# Patient Record
Sex: MALE | Race: WHITE | NOT HISPANIC OR LATINO | Employment: FULL TIME | URBAN - METROPOLITAN AREA
[De-identification: names, ages, dates, MRNs, and addresses within clinical notes are randomized per-mention and may not be internally consistent; named-entity substitution may affect disease eponyms.]

---

## 2017-01-05 ENCOUNTER — HOSPITAL ENCOUNTER (OUTPATIENT)
Dept: WOUND CARE | Facility: CLINIC | Age: 42
Discharge: HOME | End: 2017-01-05
Attending: SURGERY | Admitting: SURGERY
Payer: COMMERCIAL

## 2017-01-05 DIAGNOSIS — L89.324 DECUBITUS ULCER OF LEFT ISCHIUM, STAGE 4 (H): Primary | ICD-10-CM

## 2017-01-05 DIAGNOSIS — L89.324: Primary | ICD-10-CM

## 2017-01-05 DIAGNOSIS — T81.89XA NONHEALING SURGICAL WOUND, INITIAL ENCOUNTER: Primary | ICD-10-CM

## 2017-01-05 LAB
ALBUMIN SERPL-MCNC: 3.8 G/DL (ref 3.4–5)
CRP SERPL-MCNC: 15.5 MG/L (ref 0–8)
ERYTHROCYTE [SEDIMENTATION RATE] IN BLOOD BY WESTERGREN METHOD: 30 MM/H (ref 0–15)
PREALB SERPL IA-MCNC: 18 MG/DL (ref 15–45)

## 2017-01-05 PROCEDURE — 84134 ASSAY OF PREALBUMIN: CPT | Performed by: SURGERY

## 2017-01-05 PROCEDURE — 82040 ASSAY OF SERUM ALBUMIN: CPT | Performed by: SURGERY

## 2017-01-05 PROCEDURE — A6212 FOAM DRG <=16 SQ IN W/BORDER: HCPCS

## 2017-01-05 PROCEDURE — 86140 C-REACTIVE PROTEIN: CPT | Performed by: SURGERY

## 2017-01-05 PROCEDURE — 85652 RBC SED RATE AUTOMATED: CPT | Performed by: SURGERY

## 2017-01-05 PROCEDURE — 97602 WOUND(S) CARE NON-SELECTIVE: CPT

## 2017-01-05 PROCEDURE — 11042 DBRDMT SUBQ TIS 1ST 20SQCM/<: CPT

## 2017-01-05 PROCEDURE — A6248 HYDROGEL DRSG GEL FILLER: HCPCS

## 2017-01-05 RX ORDER — CEFAZOLIN SODIUM 1 G/50ML
2 SOLUTION INTRAVENOUS
Status: CANCELLED | OUTPATIENT
Start: 2017-01-05

## 2017-01-05 NOTE — PROGRESS NOTES
[facility]      DATE OF VISIT:  01/05/2017.       Sacha Ndiaye is a 41-year-old paraplegic who is here today with his significant other who also does his wound care at home.  We had done a redo left posterior thigh flap for recurrent large left ischial decubitus, stage IV, and unfortunately looks like he has once again developed a seroma pocket which is being expressed through a very tiny pinhole in the incision.  He is pounding the proteins, juice, muscle milk, you name it, foods as well but it really seems like the seroma has recurred causing recurrence of the wound below the healed flap surface.  The drain had been removed at 2 weeks while he was at a nursing facility.  He started a sitting protocol at the 3 week ranjit and my only thought that perhaps that this is too premature for him and we need to hold off on any kind of mechanical stress or trauma to these tissues until it has ample time to fill in, which is obviously not enough at the 3-week ranjit.  I really think there might be some element of shear there with him being home alone, sitting and whatnot.  We were trying to get him a motorized drive pusher for his manual wheelchair because his shoulders are starting to degenerate but this paperwork should probably be done either by PM&R at the  or perhaps his neurosurgeon.  He is still on vancomycin, still getting levels, which are looking good, CRP only blipped a little bit with this.  We will get CRP, sed rate and prealbumin today but will plan on scheduling him for a left gluteal rotation flap after debridement of his left IT on the 11th of this month.  Things were opened up with a 15 blade and essentially debrided into the skin and subcu layer.  We got a lot of clot and fibrinous debris out along with fair amount of serosanguineous fluid.  We will have him pack daily with dry gauze and at this point, I do not think he really needs to have much in the way of sitting limitations since we will  be flapping him again next week.  Please see nursing notes for dimensions of the new wound.  Vitals were within normal limits today.         REJI GUERRA MD             D: 2017 12:06   T: 2017 15:18   MT: BRYSON      Name:     GIA CAMARGO   MRN:      [id number]        Account:      [id number]   :      1975           Visit Date:   2017      Document: [id number]

## 2017-01-06 ENCOUNTER — TELEPHONE (OUTPATIENT)
Dept: INFECTIOUS DISEASES | Facility: CLINIC | Age: 42
End: 2017-01-06

## 2017-01-06 NOTE — TELEPHONE ENCOUNTER
----- Message from Abigail Bennett MD sent at 1/6/2017  1:47 PM CST -----  Regarding: RE: PICC  Hi Shanelle,     Thanks for the update. Will you see if Sacha is ok with the following plan? If he's not let me know and I'll call him to make a different one.     1. Stop vancomycin as planned on 1/9/17  2. Keep PICC in place as long as there are no problems with it since it will be useful to have for his surgery on 1/11.   3. Further antibiotics will be determined after his surgery. I'll discuss with Dr. Harris (his surgeon).   4. Please reschedule him with me for late February - we can cancel if we don't need the appointment but we always seem to have trouble getting him in when we need to so I'd like to have one on the books.     Thanks!  Abigail      ----- Message -----     From: Shanelle Roper RN     Sent: 1/6/2017   9:10 AM       To: Abigail Bennett MD  Subject: PICC                                             Patient calling asking about future antibiotics or removal of PICC. Spoke with Infusion nurse and will be done with antibiotics on 1/9/2017.  Patient is having surgery again on 1/11/2017.  Patient had a follow up appointment with you on 1/26/2017 but had to cancel because of this surgery. Please advise. Thank you, Shanelle Roper RN...01/06/2017....9:38 AM

## 2017-01-06 NOTE — TELEPHONE ENCOUNTER
Spoke with patient and agrees with the plan. Patient will call to schedule follow up appointment when knows how long will be on bedrest because of surgery. Shanelle Roper RN...01/06/2017....2:55 PM

## 2017-01-11 ENCOUNTER — ANESTHESIA EVENT (OUTPATIENT)
Dept: SURGERY | Facility: CLINIC | Age: 42
DRG: 901 | End: 2017-01-11
Payer: COMMERCIAL

## 2017-01-11 ENCOUNTER — ANESTHESIA (OUTPATIENT)
Dept: SURGERY | Facility: CLINIC | Age: 42
DRG: 901 | End: 2017-01-11
Payer: COMMERCIAL

## 2017-01-11 ENCOUNTER — SURGERY (OUTPATIENT)
Age: 42
End: 2017-01-11

## 2017-01-11 ENCOUNTER — HOSPITAL ENCOUNTER (INPATIENT)
Facility: CLINIC | Age: 42
LOS: 3 days | Discharge: NURSING, REHAB OR LONG-TERM CARE | DRG: 901 | End: 2017-01-14
Attending: SURGERY | Admitting: SURGERY
Payer: COMMERCIAL

## 2017-01-11 DIAGNOSIS — L89.324 DECUBITUS ULCER OF LEFT ISCHIUM, STAGE 4 (H): Primary | ICD-10-CM

## 2017-01-11 LAB
CREAT SERPL-MCNC: 0.7 MG/DL (ref 0.66–1.25)
GFR SERPL CREATININE-BSD FRML MDRD: NORMAL ML/MIN/1.7M2
PLATELET # BLD AUTO: 218 10E9/L (ref 150–450)

## 2017-01-11 PROCEDURE — 25800025 ZZH RX 258: Performed by: NURSE ANESTHETIST, CERTIFIED REGISTERED

## 2017-01-11 PROCEDURE — 99221 1ST HOSP IP/OBS SF/LOW 40: CPT | Performed by: INTERNAL MEDICINE

## 2017-01-11 PROCEDURE — 0JB90ZZ EXCISION OF BUTTOCK SUBCUTANEOUS TISSUE AND FASCIA, OPEN APPROACH: ICD-10-PCS | Performed by: SURGERY

## 2017-01-11 PROCEDURE — 37000008 ZZH ANESTHESIA TECHNICAL FEE, 1ST 30 MIN: Performed by: SURGERY

## 2017-01-11 PROCEDURE — 36000053 ZZH SURGERY LEVEL 2 EA 15 ADDTL MIN - UMMC: Performed by: SURGERY

## 2017-01-11 PROCEDURE — 25000125 ZZHC RX 250: Performed by: NURSE ANESTHETIST, CERTIFIED REGISTERED

## 2017-01-11 PROCEDURE — 25000125 ZZHC RX 250: Performed by: SURGERY

## 2017-01-11 PROCEDURE — 25000128 H RX IP 250 OP 636: Performed by: NURSE ANESTHETIST, CERTIFIED REGISTERED

## 2017-01-11 PROCEDURE — 82565 ASSAY OF CREATININE: CPT | Performed by: SURGERY

## 2017-01-11 PROCEDURE — P9041 ALBUMIN (HUMAN),5%, 50ML: HCPCS | Performed by: NURSE ANESTHETIST, CERTIFIED REGISTERED

## 2017-01-11 PROCEDURE — 12000001 ZZH R&B MED SURG/OB UMMC

## 2017-01-11 PROCEDURE — 40000170 ZZH STATISTIC PRE-PROCEDURE ASSESSMENT II: Performed by: SURGERY

## 2017-01-11 PROCEDURE — 36415 COLL VENOUS BLD VENIPUNCTURE: CPT | Performed by: SURGERY

## 2017-01-11 PROCEDURE — 25000566 ZZH SEVOFLURANE, EA 15 MIN: Performed by: SURGERY

## 2017-01-11 PROCEDURE — 25000132 ZZH RX MED GY IP 250 OP 250 PS 637: Performed by: INTERNAL MEDICINE

## 2017-01-11 PROCEDURE — 36000051 ZZH SURGERY LEVEL 2 1ST 30 MIN - UMMC: Performed by: SURGERY

## 2017-01-11 PROCEDURE — 85049 AUTOMATED PLATELET COUNT: CPT | Performed by: SURGERY

## 2017-01-11 PROCEDURE — 27210794 ZZH OR GENERAL SUPPLY STERILE: Performed by: SURGERY

## 2017-01-11 PROCEDURE — 25000132 ZZH RX MED GY IP 250 OP 250 PS 637: Performed by: PLASTIC SURGERY

## 2017-01-11 PROCEDURE — 25800025 ZZH RX 258: Performed by: SURGERY

## 2017-01-11 PROCEDURE — 71000014 ZZH RECOVERY PHASE 1 LEVEL 2 FIRST HR: Performed by: SURGERY

## 2017-01-11 PROCEDURE — 37000009 ZZH ANESTHESIA TECHNICAL FEE, EACH ADDTL 15 MIN: Performed by: SURGERY

## 2017-01-11 PROCEDURE — 0KXP0Z2 TRANSFER LEFT HIP MUSCLE WITH SKIN AND SUBCUTANEOUS TISSUE, OPEN APPROACH: ICD-10-PCS | Performed by: SURGERY

## 2017-01-11 RX ORDER — ALBUMIN, HUMAN INJ 5% 5 %
SOLUTION INTRAVENOUS CONTINUOUS PRN
Status: DISCONTINUED | OUTPATIENT
Start: 2017-01-11 | End: 2017-01-11

## 2017-01-11 RX ORDER — SODIUM CHLORIDE, SODIUM LACTATE, POTASSIUM CHLORIDE, CALCIUM CHLORIDE 600; 310; 30; 20 MG/100ML; MG/100ML; MG/100ML; MG/100ML
INJECTION, SOLUTION INTRAVENOUS CONTINUOUS PRN
Status: DISCONTINUED | OUTPATIENT
Start: 2017-01-11 | End: 2017-01-11

## 2017-01-11 RX ORDER — SODIUM CHLORIDE, SODIUM LACTATE, POTASSIUM CHLORIDE, CALCIUM CHLORIDE 600; 310; 30; 20 MG/100ML; MG/100ML; MG/100ML; MG/100ML
INJECTION, SOLUTION INTRAVENOUS CONTINUOUS
Status: DISCONTINUED | OUTPATIENT
Start: 2017-01-11 | End: 2017-01-11 | Stop reason: HOSPADM

## 2017-01-11 RX ORDER — CEFAZOLIN SODIUM 1 G/3ML
1 INJECTION, POWDER, FOR SOLUTION INTRAMUSCULAR; INTRAVENOUS SEE ADMIN INSTRUCTIONS
Status: DISCONTINUED | OUTPATIENT
Start: 2017-01-11 | End: 2017-01-11 | Stop reason: HOSPADM

## 2017-01-11 RX ORDER — HYDROMORPHONE HYDROCHLORIDE 1 MG/ML
.3-.5 INJECTION, SOLUTION INTRAMUSCULAR; INTRAVENOUS; SUBCUTANEOUS EVERY 5 MIN PRN
Status: DISCONTINUED | OUTPATIENT
Start: 2017-01-11 | End: 2017-01-11 | Stop reason: HOSPADM

## 2017-01-11 RX ORDER — ONDANSETRON 4 MG/1
4 TABLET, ORALLY DISINTEGRATING ORAL EVERY 30 MIN PRN
Status: DISCONTINUED | OUTPATIENT
Start: 2017-01-11 | End: 2017-01-11 | Stop reason: HOSPADM

## 2017-01-11 RX ORDER — CEFAZOLIN SODIUM 1 G/3ML
1 INJECTION, POWDER, FOR SOLUTION INTRAMUSCULAR; INTRAVENOUS EVERY 8 HOURS
Status: DISCONTINUED | OUTPATIENT
Start: 2017-01-12 | End: 2017-01-14 | Stop reason: HOSPADM

## 2017-01-11 RX ORDER — LIDOCAINE HYDROCHLORIDE 20 MG/ML
INJECTION, SOLUTION INFILTRATION; PERINEURAL PRN
Status: DISCONTINUED | OUTPATIENT
Start: 2017-01-11 | End: 2017-01-11

## 2017-01-11 RX ORDER — LIDOCAINE 40 MG/G
CREAM TOPICAL
Status: DISCONTINUED | OUTPATIENT
Start: 2017-01-11 | End: 2017-01-14 | Stop reason: HOSPADM

## 2017-01-11 RX ORDER — NEOSTIGMINE METHYLSULFATE 1 MG/ML
VIAL (ML) INJECTION PRN
Status: DISCONTINUED | OUTPATIENT
Start: 2017-01-11 | End: 2017-01-11

## 2017-01-11 RX ORDER — ONDANSETRON 4 MG/1
4 TABLET, ORALLY DISINTEGRATING ORAL EVERY 6 HOURS PRN
Status: DISCONTINUED | OUTPATIENT
Start: 2017-01-11 | End: 2017-01-14 | Stop reason: HOSPADM

## 2017-01-11 RX ORDER — FENTANYL CITRATE 50 UG/ML
25-50 INJECTION, SOLUTION INTRAMUSCULAR; INTRAVENOUS
Status: DISCONTINUED | OUTPATIENT
Start: 2017-01-11 | End: 2017-01-11 | Stop reason: HOSPADM

## 2017-01-11 RX ORDER — METOCLOPRAMIDE HYDROCHLORIDE 5 MG/ML
10 INJECTION INTRAMUSCULAR; INTRAVENOUS EVERY 6 HOURS PRN
Status: DISCONTINUED | OUTPATIENT
Start: 2017-01-11 | End: 2017-01-14 | Stop reason: HOSPADM

## 2017-01-11 RX ORDER — OXYBUTYNIN CHLORIDE 10 MG/1
30 TABLET, EXTENDED RELEASE ORAL DAILY
Status: DISCONTINUED | OUTPATIENT
Start: 2017-01-12 | End: 2017-01-14 | Stop reason: HOSPADM

## 2017-01-11 RX ORDER — ONDANSETRON 2 MG/ML
INJECTION INTRAMUSCULAR; INTRAVENOUS PRN
Status: DISCONTINUED | OUTPATIENT
Start: 2017-01-11 | End: 2017-01-11

## 2017-01-11 RX ORDER — NALOXONE HYDROCHLORIDE 0.4 MG/ML
.1-.4 INJECTION, SOLUTION INTRAMUSCULAR; INTRAVENOUS; SUBCUTANEOUS
Status: DISCONTINUED | OUTPATIENT
Start: 2017-01-11 | End: 2017-01-14 | Stop reason: HOSPADM

## 2017-01-11 RX ORDER — PROCHLORPERAZINE MALEATE 5 MG
5-10 TABLET ORAL EVERY 6 HOURS PRN
Status: DISCONTINUED | OUTPATIENT
Start: 2017-01-11 | End: 2017-01-14 | Stop reason: HOSPADM

## 2017-01-11 RX ORDER — GLYCOPYRROLATE 0.2 MG/ML
INJECTION, SOLUTION INTRAMUSCULAR; INTRAVENOUS PRN
Status: DISCONTINUED | OUTPATIENT
Start: 2017-01-11 | End: 2017-01-11

## 2017-01-11 RX ORDER — FENTANYL CITRATE 50 UG/ML
INJECTION, SOLUTION INTRAMUSCULAR; INTRAVENOUS PRN
Status: DISCONTINUED | OUTPATIENT
Start: 2017-01-11 | End: 2017-01-11

## 2017-01-11 RX ORDER — DOCUSATE SODIUM 100 MG/1
100 CAPSULE, LIQUID FILLED ORAL 2 TIMES DAILY
Status: DISCONTINUED | OUTPATIENT
Start: 2017-01-11 | End: 2017-01-12

## 2017-01-11 RX ORDER — ONDANSETRON 2 MG/ML
4 INJECTION INTRAMUSCULAR; INTRAVENOUS EVERY 6 HOURS PRN
Status: DISCONTINUED | OUTPATIENT
Start: 2017-01-11 | End: 2017-01-14 | Stop reason: HOSPADM

## 2017-01-11 RX ORDER — BISACODYL 10 MG
10 SUPPOSITORY, RECTAL RECTAL DAILY PRN
Status: DISCONTINUED | OUTPATIENT
Start: 2017-01-11 | End: 2017-01-14 | Stop reason: HOSPADM

## 2017-01-11 RX ORDER — ONDANSETRON 2 MG/ML
4 INJECTION INTRAMUSCULAR; INTRAVENOUS EVERY 30 MIN PRN
Status: DISCONTINUED | OUTPATIENT
Start: 2017-01-11 | End: 2017-01-11 | Stop reason: HOSPADM

## 2017-01-11 RX ORDER — METOCLOPRAMIDE 10 MG/1
10 TABLET ORAL EVERY 6 HOURS PRN
Status: DISCONTINUED | OUTPATIENT
Start: 2017-01-11 | End: 2017-01-14 | Stop reason: HOSPADM

## 2017-01-11 RX ORDER — OXYCODONE HYDROCHLORIDE 5 MG/1
5-10 TABLET ORAL
Status: DISCONTINUED | OUTPATIENT
Start: 2017-01-11 | End: 2017-01-14 | Stop reason: HOSPADM

## 2017-01-11 RX ORDER — ACETAMINOPHEN 325 MG/1
650 TABLET ORAL EVERY 4 HOURS PRN
Status: DISCONTINUED | OUTPATIENT
Start: 2017-01-11 | End: 2017-01-14 | Stop reason: HOSPADM

## 2017-01-11 RX ORDER — PROPOFOL 10 MG/ML
INJECTION, EMULSION INTRAVENOUS PRN
Status: DISCONTINUED | OUTPATIENT
Start: 2017-01-11 | End: 2017-01-11

## 2017-01-11 RX ORDER — CEFAZOLIN SODIUM 2 G/100ML
2 INJECTION, SOLUTION INTRAVENOUS
Status: COMPLETED | OUTPATIENT
Start: 2017-01-11 | End: 2017-01-11

## 2017-01-11 RX ADMIN — ACETAMINOPHEN 650 MG: 325 TABLET, FILM COATED ORAL at 20:26

## 2017-01-11 RX ADMIN — SODIUM CHLORIDE, POTASSIUM CHLORIDE, SODIUM LACTATE AND CALCIUM CHLORIDE: 600; 310; 30; 20 INJECTION, SOLUTION INTRAVENOUS at 13:42

## 2017-01-11 RX ADMIN — NEOSTIGMINE METHYLSULFATE 3 MG: 1 INJECTION INTRAMUSCULAR; INTRAVENOUS; SUBCUTANEOUS at 17:45

## 2017-01-11 RX ADMIN — GENTAMICIN SULFATE 1 L: 40 INJECTION, SOLUTION INTRAMUSCULAR; INTRAVENOUS at 15:29

## 2017-01-11 RX ADMIN — LIDOCAINE HYDROCHLORIDE 50 MG: 20 INJECTION, SOLUTION INFILTRATION; PERINEURAL at 14:04

## 2017-01-11 RX ADMIN — SODIUM CHLORIDE, POTASSIUM CHLORIDE, SODIUM LACTATE AND CALCIUM CHLORIDE: 600; 310; 30; 20 INJECTION, SOLUTION INTRAVENOUS at 17:30

## 2017-01-11 RX ADMIN — METHYLENE BLUE 1 ML: 10 INJECTION INTRAVENOUS at 14:57

## 2017-01-11 RX ADMIN — PHENYLEPHRINE HYDROCHLORIDE 50 MCG: 10 INJECTION, SOLUTION INTRAMUSCULAR; INTRAVENOUS; SUBCUTANEOUS at 16:46

## 2017-01-11 RX ADMIN — PHENYLEPHRINE HYDROCHLORIDE 50 MCG: 10 INJECTION, SOLUTION INTRAMUSCULAR; INTRAVENOUS; SUBCUTANEOUS at 16:15

## 2017-01-11 RX ADMIN — CEFAZOLIN SODIUM 2 G: 2 INJECTION, SOLUTION INTRAVENOUS at 14:28

## 2017-01-11 RX ADMIN — CIPROFLOXACIN HYDROCHLORIDE 750 MG: 500 TABLET, FILM COATED ORAL at 22:25

## 2017-01-11 RX ADMIN — FENTANYL CITRATE 50 MCG: 50 INJECTION, SOLUTION INTRAMUSCULAR; INTRAVENOUS at 13:49

## 2017-01-11 RX ADMIN — DEXTROSE AND SODIUM CHLORIDE: 5; 450 INJECTION, SOLUTION INTRAVENOUS at 20:26

## 2017-01-11 RX ADMIN — GLYCOPYRROLATE 0.4 MG: 0.2 INJECTION, SOLUTION INTRAMUSCULAR; INTRAVENOUS at 17:45

## 2017-01-11 RX ADMIN — MIDAZOLAM HYDROCHLORIDE 2 MG: 1 INJECTION, SOLUTION INTRAMUSCULAR; INTRAVENOUS at 13:42

## 2017-01-11 RX ADMIN — PHENYLEPHRINE HYDROCHLORIDE 100 MCG: 10 INJECTION, SOLUTION INTRAMUSCULAR; INTRAVENOUS; SUBCUTANEOUS at 16:25

## 2017-01-11 RX ADMIN — PROPOFOL 150 MG: 10 INJECTION, EMULSION INTRAVENOUS at 14:03

## 2017-01-11 RX ADMIN — PHENYLEPHRINE HYDROCHLORIDE 100 MCG: 10 INJECTION, SOLUTION INTRAMUSCULAR; INTRAVENOUS; SUBCUTANEOUS at 16:56

## 2017-01-11 RX ADMIN — FENTANYL CITRATE 100 MCG: 50 INJECTION, SOLUTION INTRAMUSCULAR; INTRAVENOUS at 14:02

## 2017-01-11 RX ADMIN — ONDANSETRON 4 MG: 2 INJECTION INTRAMUSCULAR; INTRAVENOUS at 17:43

## 2017-01-11 RX ADMIN — ROCURONIUM BROMIDE 50 MG: 10 INJECTION INTRAVENOUS at 14:01

## 2017-01-11 RX ADMIN — GENTAMICIN SULFATE 1 BOTTLE: 40 INJECTION, SOLUTION INTRAMUSCULAR; INTRAVENOUS at 16:30

## 2017-01-11 RX ADMIN — PHENYLEPHRINE HYDROCHLORIDE 100 MCG: 10 INJECTION, SOLUTION INTRAMUSCULAR; INTRAVENOUS; SUBCUTANEOUS at 16:36

## 2017-01-11 RX ADMIN — MIDAZOLAM HYDROCHLORIDE 1 MG: 1 INJECTION, SOLUTION INTRAMUSCULAR; INTRAVENOUS at 13:49

## 2017-01-11 RX ADMIN — ALBUMIN (HUMAN): 12.5 SOLUTION INTRAVENOUS at 16:23

## 2017-01-11 RX ADMIN — CEFAZOLIN SODIUM 1 G: 2 INJECTION, SOLUTION INTRAVENOUS at 16:30
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
## 2017-01-11 NOTE — BRIEF OP NOTE
[facility], [place]    Brief Operative Note    Pre-operative diagnosis: Recurrent Left Ischial Seroma   Post-operative diagnosis Recurrent left ischial ulcer  Procedure: Procedure(s):  Left Ischial Debridement, Left Gluteal Rotational Flap - Wound Class: II-Clean Contaminated  Surgeon: Surgeon(s) and Role:     * Cheryl Harris MD - Primary     * Liberty Borjas - Resident - Assisting  Anesthesia: General   Estimated blood loss: 250  Fluids: 1200 crystalloid, 250 albumin  UOP: 550 ml  Drains: RENARD drain  Specimens: * No specimens in log *  Findings:   None.  Complications: None.  Implants: None.    Plan: Admit to inpatient, wave mattress, Q2H turns while awake, Q4H while asleep, abx while drain in place

## 2017-01-11 NOTE — IP AVS SNAPSHOT
8A: [phone]                                              INTERAGENCY TRANSFER FORM - PHYSICIAN ORDERS   2017                    Hospital Admission Date: 2017  GIA CAMARGO   : 1975  Sex: Male        Attending Provider: Cheryl Harris MD     Allergies:  No Known Allergies    Infection:  None   Service:  PLASTIC SURG    Ht:  1.829 m (6')   Wt:  77.111 kg (170 lb)   Admission Wt:  77.111 kg (170 lb)    BMI:  23.05 kg/m 2   BSA:  1.98 m 2            Patient PCP Information     Provider PCP Type    MATT MELGAR [facility]      ED Clinical Impression     Diagnosis Description Comment Added By Time Added    Decubitus ulcer of left ischium, stage 4 (H) [L89.324] Decubitus ulcer of left ischium, stage 4 (H) [L89.324]  Cheryl Harris MD 2017  4:12 PM      Hospital Problems as of 2017              Priority Class Noted POA    Decubitus ulcer of left ischium, stage 4 (H) Medium  2017 Yes      Non-Hospital Problems as of 2017              Priority Class Noted    Spinal stenosis Medium  2015    SCI (spinal cord injury) Medium  2015    Hardware complicating wound infection, initial encounter (H) Medium  10/27/2015    Charcot's joint, vertebrae Medium  2015    Decubitus ulcer of ischial area, left, stage IV (H) Medium  2016    Status post flap graft Medium  2016    Decubital ulcer, stage IV (H) Medium  2016      Code Status History     Date Active Date Inactive Code Status Order ID Comments User Context    2017  4:49 PM  Full Code [id number]  Cheryl Harris MD Outpatient    2017  6:43 PM 2017  4:49 PM Full Code [id number]  Cheryl Harris MD Inpatient    2016  9:56 AM 2017  6:43 PM Full Code [id number]  Juice Tanner MD Outpatient    2016 12:19 PM 2016  9:56 AM Full Code [id number]  Marixa Davis MD Inpatient    2016  8:30 AM 2016 12:19 PM Full Code [id number]   Juice Tanner MD Outpatient    5/9/2016  6:27 PM 5/13/2016  8:30 AM Full Code [id number]  Ronald Bernard MD Inpatient    1/25/2016  8:33 PM 2/2/2016  4:15 PM Full Code [id number]  Cheryl Harris MD Inpatient    10/30/2015 10:21 AM 1/25/2016  8:33 PM Full Code [id number]  Susu Shea APRN CNP Outpatient    9/28/2015 11:22 AM 10/30/2015 10:21 AM Full Code [id number]  La Tristan MD Outpatient    9/24/2015 12:55 PM 9/28/2015 11:22 AM Full Code [id number]  La Tristan MD Inpatient    9/24/2015  8:07 AM 9/24/2015 12:55 PM Full Code [id number]  Susu Shea APRN CNP Outpatient         Medication Review      START taking        Dose / Directions Comments    sodium phosphate 7-19 GM/118ML rectal enema   Used for:  Decubitus ulcer of left ischium, stage 4 (H)        Dose:  1 enema   Place 1 Bottle (1 enema) rectally every 48 hours   Quantity:  1 Bottle   Refills:  0          CONTINUE these medications which have NOT CHANGED        Dose / Directions Comments    acetaminophen 325 MG tablet   Commonly known as:  TYLENOL   Used for:  Decubitus ulcer of left ischium, stage 4 (H)        Dose:  650 mg   Take 2 tablets (650 mg) by mouth every 4 hours as needed for other (surgical pain)   Quantity:  100 tablet   Refills:  0        bisacodyl 10 MG Suppository   Commonly known as:  DULCOLAX   Used for:  Decubitus ulcer of left ischium, stage 4 (H)        Dose:  10 mg   Place 1 suppository (10 mg) rectally daily as needed for constipation   Quantity:  30 suppository   Refills:  0        ciprofloxacin 750 MG tablet   Commonly known as:  CIPRO   Indication:  Skin and Soft Tissue Infection   Used for:  Decubitus ulcer of left ischium, stage 4 (H)        Dose:  750 mg   Take 1 tablet (750 mg) by mouth every 12 hours   Refills:  0        clindamycin 1 % lotion   Commonly known as:  CLEOCIN T        Apply topically 2 times daily Apply to rash on back   Refills:  0        docusate sodium 100 MG capsule  "  Commonly known as:  COLACE        Dose:  100 mg   Take 100 mg by mouth daily as needed   Quantity:  60 capsule   Refills:  0        FIBER SELECT GUMMIES PO        Dose:  1 tablet   Take 1 tablet by mouth   Refills:  0        MULTIVITAMINS PLUS ZINC Caps   Used for:  Malnutrition (H)        Dose:  1 tablet   Take 1 tablet by mouth daily   Refills:  0        nystatin ointment   Commonly known as:  MYCOSTATIN        Apply topically 2 times daily Apply to inner thighs   Refills:  0        * order for DME   Used for:  Nonhealing surgical wound, initial encounter        Equipment being ordered: [facility] Medical Order Fax [phone]  Primary dressin\" kerlix Qty: 30 Secondary Dressing 4x4 nonsterile gauze Qty 200 ct loaf Secondary dressing: 3x3 nonsterile gauze Qty 200ct loaf Length of Need: 1 month Frequency of dressing change: every day   Quantity:  30 days   Refills:  0        * order for DME   Used for:  Decubitus ulcer of buttock, left, stage IV (H)        Equipment being ordered: [facility] Medical Order Fax [phone]   Primary dressin\" kerlix Qty: 30  Secondary Dressing 4x4 Mepilex Border Qty 20 Secondary dressing: 3x3 mepilex border Qty 20 Length of Need: 1 month  Frequency of dressing change: every day   Quantity:  30 days   Refills:  0        oxybutynin chloride 15 MG Tb24   Used for:  Decubitus ulcer of left ischium, stage 4 (H)        Dose:  30 mg   Take 2 tablets (30 mg) by mouth daily   Quantity:  30 tablet   Refills:  0        PANOXYL 2.5 % Liqd   Generic drug:  benzoyl peroxide        Dose:  1 Application   Externally apply 1 Application topically daily Hold and notify prn excess erythema   Refills:  0        polyethylene glycol Packet   Commonly known as:  MIRALAX/GLYCOLAX        Dose:  17 g   Take 17 g by mouth daily as needed for constipation   Quantity:  7 packet   Refills:  0        PROBIOTIC DAILY PO        Dose:  1 tablet   Take 1 tablet by mouth   Refills:  0        * " Notice:  This list has 2 medication(s) that are the same as other medications prescribed for you. Read the directions carefully, and ask your doctor or other care provider to review them with you.      STOP taking     vancomycin 1,250 mg                   Summary of Visit     Reason for your hospital stay       S/p debridement recurrent left ischial decubitus, possibly due to recurrent seroma pocket. Reconstructed with left gluteal myocutaneous rotation flap. Tolerated surgery without complications. Postop course uneventful. Hb stable around 9. Flap looks great. RENARD output serosanguinous. No new cultures or path specimens sent so will keep on current prophylactic antibiotics until next sees ID. Does not need his PICC line. Ready for transfer back to [facility] where he did his previous recovery. Will follow more conservative drain management and prolonged bedrest before cautious sitting protocol at 6 weeks postop. Will also need CT pelvis around weeks 5-6 postop to check for signs of early seroma formation. Follow up at [facility] wound clinic either 2/16 or 2/23. Must transport via [facility] until cleared for sitting.             After Care     Advance Diet as Tolerated       Follow this diet upon discharge: Regular       Daily weights       Call Provider for weight gain of more than 2 pounds per day or 5 pounds per week.       Discharge Instructions       High protein diet - nutrition to see.       Rene catheter       To straight gravity drainage. Change catheter every 2 weeks and PRN for leaking or decreased uring output with signs of bladder distention. DO NOT change catheter without a specific MD order IF diagnosis of benign prostatic hypertrophy (BPH), neurogenic bladder, or other urological conditions       General info for [facility]       Length of Stay Estimate: Short Term Care: Estimated # of Days 31-90  Condition at Discharge: Improving  Level of care:skilled   Rehabilitation Potential: Excellent  Admission H&P  remains valid and up-to-date: Yes  Recent Chemotherapy: N/A  Use Nursing Home Standing Orders: Yes       IV access       OK to dc PICC since no IV antibiotics being given after discharge..       Intake and output       Every shift       Mantoux instructions       Give two-step Mantoux (PPD) Per Facility Policy Yes       Weight bearing status       MUST be on group 2 low air loss mattress as soon as he arrives at SNF.  HOB no greater than 30 degrees.  Be very careful to avoid shear forces when repositioning in bed. OK to lay supine and right lateral but try to avoid much time on left hip flap.  Should change positions Q2 hours while awake, Q4 hours during PMs.  Keep heels off mattress.       Wound care (specify)       Site:   Left gluteal incisions  Instructions:  Cleanse daily and PRN after bowel program. If any drainage from incision, cover with ABD to quantify and characterize fluid. Would cover incisions prophylacticaly with ABD pads to protect during transfers.             Referrals     Physical Therapy Adult Consult       Evaluate and treat as clinically indicated.    Reason:  Paraplegic s/p gluteal flap. Will need 6 weeks of bedrest this time to avoid recurrent seroma formation or shearing of internal flap.  PLEASE help patient with maintaining strength and FROM of upper extremities while at bedrest. OK to flex right lower extremity, but minimal flexion of left hip beyond 30 degrees to avoid tension on flap closure. Would be ok to do ROM on left LE from knee down while in lateral position.             Your next 10 appointments already scheduled     Feb 16, 2017 10:00 AM   Return Visit with Cheryl Harris MD   [facility] [place] ([facility])    [address] [address]   [phone]            Mar 23, 2017 10:30 AM   (Arrive by 10:15 AM)   Return Visit with Abigail Bennett MD   [facility] ([facility]  [facility] [place])    [address] [place] [address]  [address] [facility] [address]   [phone]            Mar 23, 2017 11:35 AM   XR THORACIC SPINE 2 VIEWS with UCXR1   [facility] ([facility])    [address] [place] [address]  [address] [facility] [id number] [phone]           Please bring a list of your current medicines to your exam. (Include vitamins, minerals and over-thecounter medicines.) Leave your valuables at home.  Tell your doctor if there is a chance you may be pregnant.  You do not need to do anything special for this exam.            Mar 23, 2017  2:15 PM   (Arrive by 2:00 PM)   Return Visit with Barbara Parikh MD   [facility] ([facility])    [address] [place] [address]   [phone]              Statement of Approval     Ordered          01/14/17 0952  I have reviewed and agree with all the recommendations and orders detailed in this document.   EFFECTIVE NOW     Approved and electronically signed by:  Fouzia Godfrey MD

## 2017-01-11 NOTE — IP AVS SNAPSHOT
[facility]    [address]    [facility] [place] [address]    Phone:  [phone]                                       After Visit Summary   1/11/2017    Sacha Ndiaye    MRN: [id number]           After Visit Summary Signature Page     I have received my discharge instructions, and my questions have been answered. I have discussed any challenges I see with this plan with the nurse or doctor.    ..........................................................................................................................................  Patient/Patient Representative Signature      ..........................................................................................................................................  Patient Representative Print Name and Relationship to Patient    ..................................................               ................................................  Date                                            Time    ..........................................................................................................................................  Reviewed by Signature/Title    ...................................................              ..............................................  Date                                                            Time

## 2017-01-11 NOTE — IP AVS SNAPSHOT
MRN:[id number]                      After Visit Summary   1/11/2017    Sacha Ndiaye    MRN: [id number]           Thank you!     Thank you for choosing [facility] for your care. Our goal is always to provide you with excellent care. Hearing back from our patients is one way we can continue to improve our services. Please take a few minutes to complete the written survey that you may receive in the mail after you visit with us. Thank you!        Patient Information     Date Of Birth          1975        About your hospital stay     You were admitted on:  January 11, 2017 You last received care in the:  [facility]    You were discharged on:  January 14, 2017        Reason for your hospital stay       S/p debridement recurrent left ischial decubitus, possibly due to recurrent seroma pocket. Reconstructed with left gluteal myocutaneous rotation flap. Tolerated surgery without complications. Postop course uneventful. Hb stable around 9. Flap looks great. RENARD output serosanguinous. No new cultures or path specimens sent so will keep on current prophylactic antibiotics until next sees ID. Does not need his PICC line. Ready for transfer back to [facility] where he did his previous recovery. Will follow more conservative drain management and prolonged bedrest before cautious sitting protocol at 6 weeks postop. Will also need CT pelvis around weeks 5-6 postop to check for signs of early seroma formation. [facility] up at [facility] wound clinic either 2/16 or 2/23. Must transport via [facility] until cleared for sitting.                  Who to Call     For medical emergencies, please call 911.  For non-urgent questions about your medical care, please call your primary care provider or clinic, [phone]  For questions related to your surgery, please call your surgery clinic        Attending Provider     Provider    Cheryl Harris MD       Primary Care Provider Office Phone # Fax #    Celestine Jerry  [phone]       [facility] [address] [facility] [address]        After Care Instructions     Advance Diet as Tolerated       Follow this diet upon discharge: Regular            Daily weights       Call Provider for weight gain of more than 2 pounds per day or 5 pounds per week.            Discharge Instructions       High protein diet - nutrition to see.            Rene catheter       To straight gravity drainage. Change catheter every 2 weeks and PRN for leaking or decreased uring output with signs of bladder distention. DO NOT change catheter without a specific MD order IF diagnosis of benign prostatic hypertrophy (BPH), neurogenic bladder, or other urological conditions            General info for SNF       Length of Stay Estimate: Short Term Care: Estimated # of Days 31-90  Condition at Discharge: Improving  Level of care:skilled   Rehabilitation Potential: Excellent  Admission H&P remains valid and up-to-date: Yes  Recent Chemotherapy: N/A  Use Nursing Home Standing Orders: Yes            IV access       OK to dc PICC since no IV antibiotics being given after discharge..            Intake and output       Every shift            Mantoux instructions       Give two-step Mantoux (PPD) Per Facility Policy Yes            Weight bearing status       MUST be on group 2 low air loss mattress as soon as he arrives at SNF.  HOB no greater than 30 degrees.  Be very careful to avoid shear forces when repositioning in bed. OK to lay supine and right lateral but try to avoid much time on left hip flap.  Should change positions Q2 hours while awake, Q4 hours during PMs.  Keep heels off mattress.            Wound care (specify)       Site:   Left gluteal incisions  Instructions:  Cleanse daily and PRN after bowel program. If any drainage from incision, cover with ABD to quantify and characterize fluid. Would cover incisions prophylacticaly with ABD pads to protect during transfers.                   Your next 10 appointments already scheduled     Feb 16, 2017 10:00 AM   Return Visit with Cheryl Harris MD   [facility] ([facility])    [address]  [address] [place] [id number]   [phone]            Mar 23, 2017 10:30 AM   (Arrive by 10:15 AM)   Return Visit with Abigail Bennett MD   [facility] ([facility] [place])    [address] [place] [address]  3rd [facility] [id number] [phone]            Mar 23, 2017 11:35 AM   XR THORACIC SPINE 2 VIEWS with UCXR1   [facility] [place] [facility] ([facility])    [address] [place] [id number] [phone]           Please bring a list of your current medicines to your exam. (Include vitamins, minerals and over-thecounter medicines.) Leave your valuables at home.  Tell your doctor if there is a chance you may be pregnant.  You do not need to do anything special for this exam.            Mar 23, 2017  2:15 PM   (Arrive by 2:00 PM)   Return Visit with Barbara Parikh MD   [facility] ([facility] [place])    [address] [place] [id number] [phone]              Additional Services     Physical Therapy Adult Consult       Evaluate and treat as clinically indicated.    Reason:  Paraplegic s/p gluteal flap. Will need 6 weeks of bedrest this time to avoid recurrent seroma formation or shearing of internal flap.  PLEASE help patient with maintaining strength and FROM of upper extremities while at bedrest. OK to flex right lower extremity, but minimal flexion of left hip beyond 30 degrees to avoid tension on flap closure. Would be ok to do ROM on left LE from knee down while in lateral position.                  Pending Results     No orders found from 1/10/2017 to 1/12/2017.            Statement of Approval     Ordered          01/14/17  "0952  I have reviewed and agree with all the recommendations and orders detailed in this document.   EFFECTIVE NOW     Approved and electronically signed by:  Fouzia Godfrey MD             Admission Information        Provider Department Dept Phone    2017 Cheryl Harris MD [facility] [phone]      Your Vitals Were     Blood Pressure Pulse Temperature    106/67 mmHg 86 96.1  F (35.6  C) (Oral)    Respirations Height Weight    16 1.829 m (6') 77.111 kg (170 lb)    BMI (Body Mass Index) Pulse Oximetry       23.05 kg/m2 94%       MyChart Information     [employer] lets you send messages to your doctor, view your test results, renew your prescriptions, schedule appointments and more. To sign up, go to [email or web address] . Click on \"Log in\" on the left side of the screen, which will take you to the Welcome page. Then click on \"Sign up Now\" on the right side of the page.     You will be asked to enter the access code listed below, as well as some personal information. Please follow the directions to create your username and password.     Your access code is: [email or web address]  Expires: 3/1/2017  9:10 AM     Your access code will  in 90 days. If you need help or a new code, please call your [facility] clinic or [phone].        Care EveryWhere ID     This is your Care EveryWhere ID. This could be used by other organizations to access your [facility] medical records  [id number]           Review of your medicines      START taking        Dose / Directions    sodium phosphate 7-19 GM/118ML rectal enema   Used for:  Decubitus ulcer of left ischium, stage 4 (H)        Dose:  1 enema   Place 1 Bottle (1 enema) rectally every 48 hours   Quantity:  1 Bottle   Refills:  0         CONTINUE these medicines which have NOT CHANGED        Dose / Directions    acetaminophen 325 MG tablet   Commonly known as:  TYLENOL   Used for:  Decubitus ulcer of left ischium, stage 4 (H)        Dose:  650 mg   Take 2 tablets (650 mg) " "by mouth every 4 hours as needed for other (surgical pain)   Quantity:  100 tablet   Refills:  0       bisacodyl 10 MG Suppository   Commonly known as:  DULCOLAX   Used for:  Decubitus ulcer of left ischium, stage 4 (H)        Dose:  10 mg   Place 1 suppository (10 mg) rectally daily as needed for constipation   Quantity:  30 suppository   Refills:  0       ciprofloxacin 750 MG tablet   Commonly known as:  CIPRO   Indication:  Skin and Soft Tissue Infection   Used for:  Decubitus ulcer of left ischium, stage 4 (H)        Dose:  750 mg   Take 1 tablet (750 mg) by mouth every 12 hours   Refills:  0       clindamycin 1 % lotion   Commonly known as:  CLEOCIN T        Apply topically 2 times daily Apply to rash on back   Refills:  0       docusate sodium 100 MG capsule   Commonly known as:  COLACE        Dose:  100 mg   Take 100 mg by mouth daily as needed   Quantity:  60 capsule   Refills:  0       FIBER SELECT GUMMIES PO        Dose:  1 tablet   Take 1 tablet by mouth   Refills:  0       MULTIVITAMINS PLUS ZINC Caps   Used for:  Malnutrition (H)        Dose:  1 tablet   Take 1 tablet by mouth daily   Refills:  0       nystatin ointment   Commonly known as:  MYCOSTATIN        Apply topically 2 times daily Apply to inner thighs   Refills:  0       * order for DME   Used for:  Nonhealing surgical wound, initial encounter        Equipment being ordered: [facility] Fax [phone]  Primary dressin\" kerlix Qty: 30 Secondary Dressing 4x4 nonsterile gauze Qty 200 ct loaf Secondary dressing: 3x3 nonsterile gauze Qty 200ct loaf Length of Need: 1 month Frequency of dressing change: every day   Quantity:  30 days   Refills:  0       * order for DME   Used for:  Decubitus ulcer of buttock, left, stage IV (H)        Equipment being ordered: [facility] Fax [phone]   Primary dressin\" kerlix Qty: 30  Secondary Dressing 4x4 Mepilex Border Qty 20 Secondary dressing: 3x3 mepilex " border Qty 20 Length of Need: 1 month  Frequency of dressing change: every day   Quantity:  30 days   Refills:  0       oxybutynin chloride 15 MG Tb24   Used for:  Decubitus ulcer of left ischium, stage 4 (H)        Dose:  30 mg   Take 2 tablets (30 mg) by mouth daily   Quantity:  30 tablet   Refills:  0       PANOXYL 2.5 % Liqd   Generic drug:  benzoyl peroxide        Dose:  1 Application   Externally apply 1 Application topically daily Hold and notify prn excess erythema   Refills:  0       polyethylene glycol Packet   Commonly known as:  MIRALAX/GLYCOLAX        Dose:  17 g   Take 17 g by mouth daily as needed for constipation   Quantity:  7 packet   Refills:  0       PROBIOTIC DAILY PO        Dose:  1 tablet   Take 1 tablet by mouth   Refills:  0       * Notice:  This list has 2 medication(s) that are the same as other medications prescribed for you. Read the directions carefully, and ask your doctor or other care provider to review them with you.      STOP taking     vancomycin 1,250 mg                Where to get your medicines      Some of these will need a paper prescription and others can be bought over the counter. Ask your nurse if you have questions.     You don't need a prescription for these medications    - acetaminophen 325 MG tablet  - bisacodyl 10 MG Suppository  - ciprofloxacin 750 MG tablet  - oxybutynin chloride 15 MG Tb24  - sodium phosphate 7-19 GM/118ML rectal enema             Protect others around you: Learn how to safely use, store and throw away your medicines at www.disposemymeds.org.             Medication List: This is a list of all your medications and when to take them. Check marks below indicate your daily home schedule. Keep this list as a reference.      Medications           Morning Afternoon Evening Bedtime As Needed    acetaminophen 325 MG tablet   Commonly known as:  TYLENOL   Take 2 tablets (650 mg) by mouth every 4 hours as needed for other (surgical pain)   Last time this  "was given:  650 mg on 2017 10:37 AM                                bisacodyl 10 MG Suppository   Commonly known as:  DULCOLAX   Place 1 suppository (10 mg) rectally daily as needed for constipation                                ciprofloxacin 750 MG tablet   Commonly known as:  CIPRO   Take 1 tablet (750 mg) by mouth every 12 hours   Last time this was given:  2017  7:41 AM                                clindamycin 1 % lotion   Commonly known as:  CLEOCIN T   Apply topically 2 times daily Apply to rash on back                                docusate sodium 100 MG capsule   Commonly known as:  COLACE   Take 100 mg by mouth daily as needed                                FIBER SELECT GUMMIES PO   Take 1 tablet by mouth                                MULTIVITAMINS PLUS ZINC Caps   Take 1 tablet by mouth daily                                nystatin ointment   Commonly known as:  MYCOSTATIN   Apply topically 2 times daily Apply to inner thighs                                * order for DME   Equipment being ordered: [facility] Fax [phone]  Primary dressin\" kerlix Qty: 30 Secondary Dressing 4x4 nonsterile gauze Qty 200 ct loaf Secondary dressing: 3x3 nonsterile gauze Qty 200ct loaf Length of Need: 1 month Frequency of dressing change: every day                                * order for DME   Equipment being ordered: [facility] Fax [phone]   Primary dressin\" kerlix Qty: 30  Secondary Dressing 4x4 Mepilex Border Qty 20 Secondary dressing: 3x3 mepilex border Qty 20 Length of Need: 1 month  Frequency of dressing change: every day                                oxybutynin chloride 15 MG Tb24   Take 2 tablets (30 mg) by mouth daily   Last time this was given:  30 mg on 2017  7:41 AM                                PANOXYL 2.5 % Liqd   Externally apply 1 Application topically daily Hold and notify prn excess erythema   Generic drug:  benzoyl peroxide     "                            polyethylene glycol Packet   Commonly known as:  MIRALAX/GLYCOLAX   Take 17 g by mouth daily as needed for constipation                                PROBIOTIC DAILY PO   Take 1 tablet by mouth                                sodium phosphate 7-19 GM/118ML rectal enema   Place 1 Bottle (1 enema) rectally every 48 hours   Last time this was given:  1 enema on 1/13/2017  8:50 AM                                * Notice:  This list has 2 medication(s) that are the same as other medications prescribed for you. Read the directions carefully, and ask your doctor or other care provider to review them with you.

## 2017-01-11 NOTE — IP AVS SNAPSHOT
` `     UR 8A: [phone]            Medication Administration Report for Sacha Ndiaye as of 01/14/17 1117   Legend:    Given Hold Not Given Due Canceled Entry Other Actions    Time Time (Time) Time  Time-Action       Inactive    Active    Linked        Medications 01/08/17 01/09/17 01/10/17 01/11/17 01/12/17 01/13/17 01/14/17    acetaminophen (TYLENOL) tablet 650 mg  Dose: 650 mg Freq: EVERY 4 HOURS PRN Route: PO  PRN Reason: other  PRN Comment: surgical pain  Start: 01/11/17 1839   Admin Instructions: Maximum acetaminophen dose from all sources = 75 mg/kg/day not to exceed 4 grams/day.        2026 (650 mg)-Given        0047 (650 mg)-Given       0819 (650 mg)-Given       1228 (650 mg)-Given       1736 (650 mg)-Given        0846 (650 mg)-Given       2149 (650 mg)-Given        1037 (650 mg)-Given           bisacodyl (DULCOLAX) Suppository 10 mg  Dose: 10 mg Freq: DAILY PRN Route: RE  PRN Reason: constipation  Start: 01/11/17 1843              ceFAZolin (ANCEF) 1 g vial to attach to  ml bag for ADULT or 50 ml bag for PEDS  Dose: 1 g Freq: EVERY 8 HOURS Route: IV  Indications of Use: SURGICAL PROPHYLAXIS  Indications Comment: possible SSTI  Start: 01/12/17 0000        0043 (1 g)-New Bag       1100 (1 g)-New Bag [C]       1849 (1 g)-New Bag        0319 (1 g)-New Bag       1155 (1 g)-New Bag       1854 (1 g)-New Bag        0226 (1 g)-New Bag       1037 (1 g)-New Bag       [ ] 1900           ciprofloxacin (CIPRO) tablet 750 mg  Dose: 750 mg Freq: EVERY 12 HOURS Route: PO  Indications of Use: SKIN AND SOFT TISSUE INFECTION  Start: 01/11/17 2145   Admin Instructions: Administer at least 2 hours before or 4 hours after aluminum, calcium, iron, zinc or magnesium containing medications. May be taken with food or on an empty stomach.  Do not administer alone with a dairy product like milk or yogurt or calcium-fortified juice,  but may be administered with a meal containing dairy.        2225 (750 mg)-Given         "0819 (750 mg)-Given              2049 (750 mg)-Given        1028 (750 mg)-Given       2148 (750 mg)-Given        0741 (750 mg)-Given       [ ] 2030           enoxaparin (LOVENOX) injection 40 mg  Dose: 40 mg Freq: EVERY 24 HOURS Route: SC  Start: 01/12/17 1215   Admin Instructions: Check to make sure start date/time is 12-24 hours post op unless documented complication, AND no sooner than 22 hours post op if spinal anesthesia used.   Continue until discharge to home. HOLD if platelet count falls below 50% of baseline or less than 100,000/ L and notify provider.         1222 (40 mg)-Given        1155 (40 mg)-Given        1037 (40 mg)-Given                  famotidine (PEPCID) injection 20 mg  Dose: 20 mg Freq: EVERY 12 HOURS Route: IV  Start: 01/11/17 1900   Admin Instructions: Automatic interchange Ranitidine 50mg IV q8h to Famotidine 20mg IV q12h per [place] policy        (2027)-Not Given        (0657)-Not Given [C]       (1849)-Not Given        (0834)-Not Given       (1849)-Not Given               [ ] 1900          Or  ranitidine (ZANTAC) tablet 150 mg  Dose: 150 mg Freq: EVERY 12 HOURS Route: PO  Start: 01/11/17 1900                                                    [ ] 1900           lidocaine (LMX4) kit  Freq: EVERY 1 HOUR PRN Route: Top  PRN Reason: pain  PRN Comment: with VAD insertion or accessing implanted port.  Start: 01/11/17 1843   Admin Instructions: Do NOT give if patient has a history of allergy to any local anesthetic or any \"alis\" product.   Apply 30 minutes prior to VAD insertion or port access.  MAX Dose:  2.5 g (  of 5 g tube)               lidocaine 1 % 1 mL  Dose: 1 mL Freq: EVERY 1 HOUR PRN Route: OTHER  PRN Comment: mild pain with VAD insertion or accessing implanted port  Start: 01/11/17 1843   Admin Instructions: Do NOT give if patient has a history of allergy to any local anesthetic or any \"alis\" product. MAX dose 1 mL subcutaneous OR intradermal in divided doses.               " metoclopramide (REGLAN) tablet 10 mg  Dose: 10 mg Freq: EVERY 6 HOURS PRN Route: PO  PRN Reasons: nausea,vomiting  Start: 01/11/17 1843   Admin Instructions: This is Step 3 of nausea and vomiting management.  Give if nausea not resolved 15 minutes after giving prochlorperazine (COMPAZINE).  If nausea not resolved in 15-30 minutes, Notify provider.              Or  metoclopramide (REGLAN) injection 10 mg  Dose: 10 mg Freq: EVERY 6 HOURS PRN Route: IV  PRN Reasons: nausea,vomiting  Start: 01/11/17 1843   Admin Instructions: This is Step 3 of nausea and vomiting management.  Give if nausea not resolved 15 minutes after giving prochlorperazine (COMPAZINE).  If nausea not resolved in 15-30 minutes, Notify provider.               naloxone (NARCAN) injection 0.1-0.4 mg  Dose: 0.1-0.4 mg Freq: EVERY 2 MIN PRN Route: IV  PRN Reason: opioid reversal  Start: 01/11/17 1843   Admin Instructions: For respiratory rate LESS than or EQUAL to 8.  Partial reversal dose:  0.1 mg titrated q 2 minutes for Analgesia Side Effects Monitoring Sedation Level of 3 (frequently drowsy, arousable, drifts to sleep during conversation).Full reversal dose:  0.4 mg bolus for Analgesia Side Effects Monitoring Sedation Level of 4 (somnolent, minimal or no response to stimulation).               ondansetron (ZOFRAN-ODT) ODT tab 4 mg  Dose: 4 mg Freq: EVERY 6 HOURS PRN Route: PO  PRN Reason: nausea  Start: 01/11/17 1843   Admin Instructions: This is Step 1 of nausea and vomiting management.  If nausea not resolved in 15 minutes, go to Step 2 prochlorperazine (COMPAZINE). Do not push through foil backing. Peel back foil and gently remove. Place on tongue immediately. Administration with liquid unnecessary              Or  ondansetron (ZOFRAN) injection 4 mg  Dose: 4 mg Freq: EVERY 6 HOURS PRN Route: IV  PRN Reasons: nausea,vomiting  Start: 01/11/17 1843   Admin Instructions: This is Step 1 of nausea and vomiting management.  If nausea not resolved in 15  minutes, go to Step 2 prochlorperazine (COMPAZINE).               oxybutynin (DITROPAN-XL) 24 hr tablet 30 mg  Dose: 30 mg Freq: DAILY Route: PO  Start: 01/12/17 0800        0819 (30 mg)-Given        0835 (30 mg)-Given        0741 (30 mg)-Given           oxyCODONE (ROXICODONE) IR tablet 5-10 mg  Dose: 5-10 mg Freq: EVERY 3 HOURS PRN Route: PO  PRN Reason: moderate to severe pain  Start: 01/11/17 1839   Admin Instructions: IF CrCl is UNKNOWN start at lowest end of dosing range. Hold while on PCA or with regular IV opioid dosing.               prochlorperazine (COMPAZINE) injection 5-10 mg  Dose: 5-10 mg Freq: EVERY 6 HOURS PRN Route: IV  PRN Reasons: nausea,vomiting  Start: 01/11/17 1843   Admin Instructions: This is Step 2 of nausea and vomiting management.   If nausea not resolved in 15 minutes, give metoclopramide (REGLAN), if ordered (step 3 of nausea and vomiting management)              Or  prochlorperazine (COMPAZINE) tablet 5-10 mg  Dose: 5-10 mg Freq: EVERY 6 HOURS PRN Route: PO  PRN Reasons: nausea,vomiting  Start: 01/11/17 1843   Admin Instructions: This is Step 2 of nausea and vomiting management.   If nausea not resolved in 15 minutes, give metoclopramide (REGLAN), if ordered (step 3 of nausea and vomiting management)               saccharomyces boulardii (FLORASTOR) capsule 250 mg  Dose: 250 mg Freq: 2 TIMES DAILY Route: PO  Start: 01/12/17 1145   Admin Instructions: Capsules may be opened and sprinkled on semisolid food or added to beverage.         (1229)-Not Given [C]       2049 (250 mg)-Given        0835 (250 mg)-Given       1854 (250 mg)-Given       (1905)-Not Given [C]        0741 (250 mg)-Given       [ ] 2000           sodium chloride (PF) 0.9% PF flush 3 mL  Dose: 3 mL Freq: EVERY 8 HOURS Route: IK  Start: 01/11/17 1845   Admin Instructions: And Q1H PRN, to lock peripheral IV dormant line.        (1858)-Not Given        (0525)-Not Given       (1004)-Not Given [C]       1849 (3 mL)-Given                1029 (3 mL)-Given       1854 (3 mL)-Given               [ ] 1045       [ ] 1845           sodium chloride (PF) 0.9% PF flush 3 mL  Dose: 3 mL Freq: EVERY 1 HOUR PRN Route: IK  PRN Reason: line flush  PRN Comment: for peripheral IV flush post IV meds  Start: 01/11/17 1843              sodium phosphate (FLEET ENEMA) 1 enema  Dose: 1 enema Freq: EVERY 48 HOURS Route: RE  Start: 01/13/17 0600   Admin Instructions: For children greater than or equal to 12 years          0850 (1 enema)-Given           Completed Medications  Medications 01/08/17 01/09/17 01/10/17 01/11/17 01/12/17 01/13/17 01/14/17         Dose: 500 mL Freq: ONCE Route: IV  Last Dose: 500 mL (01/12/17 0528)  Start: 01/12/17 0415   End: 01/12/17 0628        0528 (500 mL)-New Bag               Dose: 2 mg Freq: EVERY 2 HOURS Route: IV  Start: 01/12/17 0800   End: 01/12/17 1122   Admin Instructions: For Central Venous Catheter  Use 10 mL syringe to draw up 2 ML into clotted catheter & allow to dwell for 30 mins, then DRAW BACK and discard contents to assess catheter function.  If still occluded, allow mixture to dwell for an additional 90 mins, then DRAW BACK and discard contents to reassess catheter function.  May repeat dose once if occlusion persists.  Contact MD if 2nd dose is unsuccessful.  Only use a 10 ml syringe to administer the solution into each lumen. For catheters with lumen volumes greater than the volume dispensed, request additional syringe(s) from pharmacy.  To prevent inadvertent embolization of thrombus from the catheter, ALWAYS WITHDRAW alteplase (tPA) at the end of the dwell time before administering any solution through the catheter.         0738 (2 mg)-Given       1122 (2 mg)-Given               Dose: 2 g Freq: PRE-OP/PRE-PROCEDURE Route: IV  Indications of Use: SURGICAL PROPHYLAXIS  Start: 01/11/17 1046   End: 01/11/17 1630   Admin Instructions: Give first dose within 1 hour PRIOR to incision. If patient weight is greater than or  equal to 120 kg increase dose to 3 g.        1428 (2 g)-Given       1630 (1 g)-Given             Discontinued Medications  Medications 01/08/17 01/09/17 01/10/17 01/11/17 01/12/17 01/13/17 01/14/17         Freq: PRN  Start: 01/11/17 1630   End: 01/11/17 1843       1630 (1 Bottle)-Given       1843-Med Discontinued            Freq: PRN  Start: 01/11/17 1529   End: 01/11/17 1843       1529 (1 L)-Given       1843-Med Discontinued            Dose: 1 g Freq: SEE ADMIN INSTRUCTIONS Route: IV  Indications of Use: SURGICAL PROPHYLAXIS  Start: 01/11/17 1046   End: 01/11/17 1819   Admin Instructions: Intra-Op Dose.  Give every 2 hours while patient in surgery, starting 2 hours after pre-op dose.  DO NOT GIVE intra-op dose if CrCl less than 10 mL/min (on dialysis).  If CrCL less than 50 mL/min, double the time interval between doses.        1819-Med Discontinued            Rate: 100 mL/hr Freq: CONTINUOUS Route: IV  Start: 01/11/17 1845   End: 01/13/17 1701   Admin Instructions: Until adequate PO        2026 ( )-New Bag         1701-Med Discontinued          Dose: 100 mg Freq: 2 TIMES DAILY Route: PO  Start: 01/11/17 2000   End: 01/12/17 2019       (2027)-Not Given        (0819)-Not Given       2019-Med Discontinued                 Dose: 25-50 mcg Freq: EVERY 2 MIN PRN Route: IV  PRN Reason: other  PRN Comment: acute pain  Start: 01/11/17 1747   End: 01/11/17 1843   Admin Instructions: MAX cumulative dose = 250 mcg.    Use Fentanyl initially, as a short acting agent for acute pain control.  If insufficient, or a longer acting agent is needed, begin Morphine or Hydromorphone if ordered.        1843-Med Discontinued            Dose: 0.3-0.5 mg Freq: EVERY 5 MIN PRN Route: IV  PRN Reason: other  PRN Comment: acute pain.  May administer if Respiratory Rate is greater than 10  Start: 01/11/17 1747   End: 01/11/17 1843   Admin Instructions: If fentanyl is also ordered, use HYDROmorphone if pain control insufficient with fentanyl or a  longer acting agent is needed.   Max cumulative dose = 2 mg        1843-Med Discontinued            Rate: 100 mL/hr Freq: CONTINUOUS Route: IV  Start: 01/11/17 1800   End: 01/11/17 1843   Admin Instructions: Continue until IV catheter is weaned               1843-Med Discontinued            Freq: PRN  Start: 01/11/17 1457   End: 01/11/17 1843       1457 (1 mL)-Given       1843-Med Discontinued            Dose: 4 mg Freq: EVERY 30 MIN PRN Route: PO  PRN Reason: nausea  Start: 01/11/17 1747   End: 01/11/17 1843   Admin Instructions: MAX total dose = 8 mg, including OR dosing. If not resolved in 15 minutes, then go to step 2 (Prochlorperazine if ordered).        1843-Med Discontinued         Or    Dose: 4 mg Freq: EVERY 30 MIN PRN Route: IV  PRN Reason: nausea  Start: 01/11/17 1747   End: 01/11/17 1843   Admin Instructions: MAX total dose = 8 mg, including OR dosing. If not resolved in 15 minutes, then go to step 2 (Prochlorperazine if ordered).        1843-Med Discontinued            Dose: 5-10 mg Freq: EVERY 6 HOURS PRN Route: IV  PRN Reasons: nausea,vomiting  Start: 01/11/17 1747   End: 01/11/17 1843   Admin Instructions: This is Step 2 of the nausea and vomiting protocol.   If nausea not resolved in 15 minutes, give Metoclopramide if ordered (step 3 of nausea and vomiting protocol)        1843-Med Discontinued            Dose: 50 mg Freq: EVERY 8 HOURS Route: IV  Start: 01/11/17 1845   End: 01/11/17 1855   Admin Instructions: May also be given PO.               1855-Med Discontinued       Medications 01/08/17 01/09/17 01/10/17 01/11/17 01/12/17 01/13/17 01/14/17

## 2017-01-12 LAB
ANION GAP SERPL CALCULATED.3IONS-SCNC: 12 MMOL/L (ref 3–14)
ANION GAP SERPL CALCULATED.3IONS-SCNC: 6 MMOL/L (ref 3–14)
BASOPHILS # BLD AUTO: 0 10E9/L (ref 0–0.2)
BASOPHILS NFR BLD AUTO: 0.3 %
BUN SERPL-MCNC: 11 MG/DL (ref 7–30)
BUN SERPL-MCNC: 9 MG/DL (ref 7–30)
CALCIUM SERPL-MCNC: 7.7 MG/DL (ref 8.5–10.1)
CALCIUM SERPL-MCNC: 7.8 MG/DL (ref 8.5–10.1)
CHLORIDE SERPL-SCNC: 106 MMOL/L (ref 94–109)
CHLORIDE SERPL-SCNC: 107 MMOL/L (ref 94–109)
CO2 SERPL-SCNC: 23 MMOL/L (ref 20–32)
CO2 SERPL-SCNC: 27 MMOL/L (ref 20–32)
CREAT SERPL-MCNC: 0.59 MG/DL (ref 0.66–1.25)
CREAT SERPL-MCNC: 0.67 MG/DL (ref 0.66–1.25)
DIFFERENTIAL METHOD BLD: ABNORMAL
EOSINOPHIL # BLD AUTO: 0.2 10E9/L (ref 0–0.7)
EOSINOPHIL NFR BLD AUTO: 3.1 %
ERYTHROCYTE [DISTWIDTH] IN BLOOD BY AUTOMATED COUNT: 13.5 % (ref 10–15)
GFR SERPL CREATININE-BSD FRML MDRD: ABNORMAL ML/MIN/1.7M2
GFR SERPL CREATININE-BSD FRML MDRD: ABNORMAL ML/MIN/1.7M2
GLUCOSE SERPL-MCNC: 224 MG/DL (ref 70–99)
GLUCOSE SERPL-MCNC: 98 MG/DL (ref 70–99)
HCT VFR BLD AUTO: 29.3 % (ref 40–53)
HGB BLD-MCNC: 9.3 G/DL (ref 13.3–17.7)
IMM GRANULOCYTES # BLD: 0 10E9/L (ref 0–0.4)
IMM GRANULOCYTES NFR BLD: 0.3 %
LYMPHOCYTES # BLD AUTO: 1.1 10E9/L (ref 0.8–5.3)
LYMPHOCYTES NFR BLD AUTO: 14.3 %
MCH RBC QN AUTO: 27.8 PG (ref 26.5–33)
MCHC RBC AUTO-ENTMCNC: 31.7 G/DL (ref 31.5–36.5)
MCV RBC AUTO: 88 FL (ref 78–100)
MONOCYTES # BLD AUTO: 0.4 10E9/L (ref 0–1.3)
MONOCYTES NFR BLD AUTO: 5.2 %
NEUTROPHILS # BLD AUTO: 5.9 10E9/L (ref 1.6–8.3)
NEUTROPHILS NFR BLD AUTO: 76.8 %
NRBC # BLD AUTO: 0 10*3/UL
NRBC BLD AUTO-RTO: 0 /100
PLATELET # BLD AUTO: 206 10E9/L (ref 150–450)
POTASSIUM SERPL-SCNC: 3.8 MMOL/L (ref 3.4–5.3)
POTASSIUM SERPL-SCNC: 4.2 MMOL/L (ref 3.4–5.3)
RBC # BLD AUTO: 3.35 10E12/L (ref 4.4–5.9)
SODIUM SERPL-SCNC: 140 MMOL/L (ref 133–144)
SODIUM SERPL-SCNC: 141 MMOL/L (ref 133–144)
WBC # BLD AUTO: 7.6 10E9/L (ref 4–11)

## 2017-01-12 PROCEDURE — 25000132 ZZH RX MED GY IP 250 OP 250 PS 637: Performed by: PLASTIC SURGERY

## 2017-01-12 PROCEDURE — 25000132 ZZH RX MED GY IP 250 OP 250 PS 637: Performed by: INTERNAL MEDICINE

## 2017-01-12 PROCEDURE — 12000001 ZZH R&B MED SURG/OB UMMC

## 2017-01-12 PROCEDURE — 99232 SBSQ HOSP IP/OBS MODERATE 35: CPT | Performed by: INTERNAL MEDICINE

## 2017-01-12 PROCEDURE — 25000128 H RX IP 250 OP 636: Performed by: INTERNAL MEDICINE

## 2017-01-12 PROCEDURE — 80048 BASIC METABOLIC PNL TOTAL CA: CPT | Performed by: SURGERY

## 2017-01-12 PROCEDURE — 25000125 ZZHC RX 250: Performed by: SURGERY

## 2017-01-12 PROCEDURE — 80048 BASIC METABOLIC PNL TOTAL CA: CPT | Performed by: INTERNAL MEDICINE

## 2017-01-12 PROCEDURE — 85025 COMPLETE CBC W/AUTO DIFF WBC: CPT | Performed by: SURGERY

## 2017-01-12 PROCEDURE — 36415 COLL VENOUS BLD VENIPUNCTURE: CPT | Performed by: INTERNAL MEDICINE

## 2017-01-12 RX ORDER — SACCHAROMYCES BOULARDII 250 MG
250 CAPSULE ORAL 2 TIMES DAILY
Status: DISCONTINUED | OUTPATIENT
Start: 2017-01-12 | End: 2017-01-14 | Stop reason: HOSPADM

## 2017-01-12 RX ADMIN — CIPROFLOXACIN HYDROCHLORIDE 750 MG: 500 TABLET, FILM COATED ORAL at 08:19

## 2017-01-12 RX ADMIN — CEFAZOLIN 1 G: 1 INJECTION, POWDER, FOR SOLUTION INTRAMUSCULAR; INTRAVENOUS at 00:43

## 2017-01-12 RX ADMIN — ALTEPLASE 2 MG: 2.2 INJECTION, POWDER, LYOPHILIZED, FOR SOLUTION INTRAVENOUS at 07:38

## 2017-01-12 RX ADMIN — OXYBUTYNIN CHLORIDE 30 MG: 10 TABLET, FILM COATED, EXTENDED RELEASE ORAL at 08:19

## 2017-01-12 RX ADMIN — CEFAZOLIN 1 G: 1 INJECTION, POWDER, FOR SOLUTION INTRAMUSCULAR; INTRAVENOUS at 11:00

## 2017-01-12 RX ADMIN — ACETAMINOPHEN 650 MG: 325 TABLET, FILM COATED ORAL at 08:19

## 2017-01-12 RX ADMIN — CIPROFLOXACIN HYDROCHLORIDE 750 MG: 500 TABLET, FILM COATED ORAL at 20:49

## 2017-01-12 RX ADMIN — SODIUM CHLORIDE 500 ML: 9 INJECTION, SOLUTION INTRAVENOUS at 05:28

## 2017-01-12 RX ADMIN — ENOXAPARIN SODIUM 40 MG: 40 INJECTION SUBCUTANEOUS at 12:22

## 2017-01-12 RX ADMIN — ALTEPLASE 2 MG: 2.2 INJECTION, POWDER, LYOPHILIZED, FOR SOLUTION INTRAVENOUS at 11:22

## 2017-01-12 RX ADMIN — Medication 250 MG: at 20:49

## 2017-01-12 RX ADMIN — CEFAZOLIN 1 G: 1 INJECTION, POWDER, FOR SOLUTION INTRAMUSCULAR; INTRAVENOUS at 18:49

## 2017-01-12 RX ADMIN — ACETAMINOPHEN 650 MG: 325 TABLET, FILM COATED ORAL at 17:36

## 2017-01-12 RX ADMIN — ACETAMINOPHEN 650 MG: 325 TABLET, FILM COATED ORAL at 12:28

## 2017-01-12 RX ADMIN — ACETAMINOPHEN 650 MG: 325 TABLET, FILM COATED ORAL at 00:47

## 2017-01-12 NOTE — PLAN OF CARE
Problem: Goal Outcome Summary  Goal: Goal Outcome Summary  Outcome: No Change  Pt A&O x's 4. Paraplegic. VSS. Afebrile. O2 94-96% on RA. Lungs clear. Denies SOB, CP and nausea. Tolerating regular diet and tolerated well. Bowel sounds active in all quadrants. LBM on 01/11  but passing gas. Rene cathter in place and patent.  Left ischial incisional dressing CDI. RENARD  In place and patent. Pain well managed with prn tylenol. PICC infusing D5 1/2 NS at 100 ml/hr into right upper arm. Pt repositioned q2 hrs. Pt resting comfortably between cares. Able to make needs know, call light in reach. Will continue to monitor.

## 2017-01-12 NOTE — ANESTHESIA CARE TRANSFER NOTE
Patient: Sacha Ndiaye    COMBINED IRRIGATION AND DEBRIDEMENT DECUBITUS WITH FLAP CLOSURE (Left Sacrum)  Additional InformationProcedure(s):  Left Ischial Debridement, Left Gluteal Rotational Flap - Wound Class: II-Clean Contaminated    Diagnosis: Recurrent Left Ischial Seroma   Diagnosis Additional Information: No value filed.    Anesthesia Type:   General, ETT     Note:  Airway :Face Mask  Patient transferred to:PACU  Comments: Arrived in PACU, report to RN, vitals stable, patient comfortable.        Vitals: (Last set prior to Anesthesia Care Transfer)              Electronically Signed By: JULIANNE Berger CRNA  January 11, 2017  6:11 PM

## 2017-01-12 NOTE — OP NOTE
DATE OF PROCEDURE: 01/11/2017      ATTENDING SURGEON:  Dr. Cheryl Harris      RESIDENT SURGEON:  Dr. Liberty Borjas.        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      POSTOPERATIVE DIAGNOSIS:  Left ischial decubitus, stage IV, with probable seroma.      PROCEDURE:  Sharp excisional debridement of recurrent left ischial decubitus pocket.  Left gluteal myocutaneous rotation flap with gluteal muscle extension.      ANESTHESIA:  GET.      ESTIMATED BLOOD LOSS:  250 mL.      INTRAVENOUS FLUIDS:  1200 mL LR +250 mL of albumin.      URINE OUTPUT:  550 mL.      COUNTS:  Correct.      COMPLICATIONS:  None.      DRAINS:  RENARD x1.      INDICATIONS FOR PROCEDURE:  Sacha Ndiaye is a 41-year-old paraplegic gentleman who has had recurrent problems with left ischial decubitus.  He started out with a stage IV pressure sore and bone cultures showed osteomyelitis.  He has been treated for that.  Unfortunately, he then developed what appeared to be a seroma so the left posterior thigh was readvanced.  At this time, there was good healing externally but a small pinhole started to allow spontaneous drainage from serous fluid again.  As near as we can decipher, the tissues had dehisced and he had formed another seroma pocket.  The question is whether or not this occurred due to shear forces and early deep trauma due to sitting protocol and transfers.  A decision was made to bring the patient back and this time do debridement and try a left gluteal rotation flap.      DESCRIPTION OF PROCEDURE:  The patient was seen in the preoperative waiting area.  The operative site was marked on the anatomical diagram.  Informed consent was obtained after reviewing the possible risks and complications including but not limited to the following:  Infection, bleeding, hematoma, seroma formation, poor healing, possible flap loss or necrosis, possible dehiscence, possible injury surrounding  neurovascular, musculoskeletal as well as colorectal and urinary urologic structures, residual deformities, need for further surgery and anesthetic risks such as DVT, PE and cardiopulmonary arrest.  The patient was then brought to the operating room and after intubation, was placed prone on the OR table.  He had pillows under his chest and a hip roll.  He had additional padding under his knees and lower extremities.  He had sequential compression devices on his lower extremities prior to induction.  The arms were placed into superman position.  Additional safety straps were placed across his thorax and his lower extremities.  A Rene had been placed to replace his condom cath prior to positioning him prone.  Preop dressings were removed and photos were taken.  The area of the left buttock and left posterior thigh were then prepped and draped in the usual sterile fashion using PCMX soap.      After a timeout was taken and the proper patient and procedure were identified, we used a mL of methylene blue to stain the inner surface of the wound to facilitate better excision of the lining or bursa of the wound.  Essentially would be reusing the posterior thigh flap.  We opened that incision to facilitate good debridement.  This was done sharply with the 10 blade as well as electrocautery.  Of note, the proximal aspect of the posterior thigh flap was quite thin and there was a lot of rubbery scar tissue present.  Ultimately, that would most likely be sacrificed.  When we were happy with the debridement, we used the rongeur on the base of the wound.  This did expose his ischial bone but it appeared to be healthy bleeding and hard in consistency, so we did not send additional cultures or path.  The wound was then pulsavaced with a liter of triple antibiotic solution.  This included a liter of normal saline with 50,000 units of bacitracin, 500,000 units of polymyxin B and 80 mg of gentamicin.  Hemostasis was achieved with  electrocautery.  Of note, we lost a fair amount of blood due to the increased perfusion and inflammation to the area.  This was controlled with the Bovie.  When we were happy with our debridement and our cleanout, we then fashioned our gluteal rotation flap.  This was somewhat dictated by the shape of his posterior thigh rotation flap.  This was marked out and we made sure that we avoided going directly over the trochanter which already had a paucity of soft tissue there.  The flap incision was made with a 10 blade.  Cautery was used to dissect the deeper subcutaneous tissues down to and through the gluteal fascia.  We then mobilized the gluteal extension to the trochanter essentially undermining the overlying skin and subcutaneous fat.  This allowed for an additional 6 cm and muscle tissue to rotate with the flap.  We undermined the gluteal fascia proximally to allow for better rotation.  We did Doppler out a few of the perforators from the inferior gluteal but had difficulty in hearing that easily.  We did have to use a suture ligature with 3-0 Vicryl stick ties to control a large cluster of arterial and venous perforators in the mid flap region.  This did, however, allow us to rotate enough flap so that our gluteal extension could cover the entire exposed ischial tuberosity and an extension towards the pubic ramus.  When we were happy with this and had adequate hemostasis, the muscle was then anchored over a 19 channel drain that was brought out through a separate stab wound incision laterally over the hip area.  This was secured with 2-0 nylon suture.  Muscle was then anchored with 2-0 Vicryl figure-of-eight sutures and additional multiple layers, complex closure to eradicate dead space.  Once we were happy with our bony coverage, the gluteal flap and the residual posterior thigh flap proximally were reapproximated in multiple levels as well.  The area that had been thinned and was full of rubbery scar was also  excised sharply and was still bleeding well.  Closure was achieved with 2-0 Vicryl deep figure-of-eight sutures followed by 2-0 and 3-0 deep dermal buried sutures and then 3-0 Prolene vertical mattress sutures for the skin.  RENARD was put to bulb suction.  A few skin staples were used to reapproximate the skin of the donor site.  At all times, all the flap appeared to be viable and was not under too much undue tension.  A dressing was applied using Adaptic and ABD pads.  These were secured with Medipore tape.  The patient was then returned to a supine position on a wave mattress.  He was extubated in the operating room.  He was taken to the recovery room in satisfactory condition having tolerated the procedure without difficulty or complication.         REJI GUERRA MD             D: 2017 15:27   T: 2017 16:23   MT: TD      Name:     GIA CAMARGO   MRN:      [id number]        Account:        [id number]   :      1975           Procedure Date: 2017      Document: [id number]

## 2017-01-12 NOTE — PROVIDER NOTIFICATION
Went to draw out of Pt's PICC line and unable to get blood return. Dr. Gustavo Dumont paged and order for alteplase received and given. Oncoming nurse updated to withdraw after 30mins dwell time.

## 2017-01-12 NOTE — PLAN OF CARE
Problem: Goal Outcome Summary  Goal: Goal Outcome Summary  Outcome: No Change  Pt c/o minimal pain and medicated with tylenol. S/o at the bedside. BP 80/40's. Moonlighter paged see MAR for bolus order. Repositioning every 2hrs. Pt understands HOB and not being able to lay on flap side restrictions. J/p patent. IVFs infusing. On special mattress.  Call light in reach and able to make needs known.

## 2017-01-12 NOTE — TELEPHONE ENCOUNTER
Returned phone call to Sacha regarding his questions about appointments and plan of care. Firstly, Sacha would like to be seen if possible by Dr. Bennett and Dr. Parikh at the same time in mid to late March. He is unable to make it in Feb due to surgery recovery. He also has questions regarding when to restart doxycycline Rx. He currently does have a PICC line but is done with Vanco and other antibiotics as of today.  Will forward to coordinators for scheduling and to Dr. Bennett for doxycycline recommendations.

## 2017-01-12 NOTE — PLAN OF CARE
Problem: Goal Outcome Summary  Goal: Goal Outcome Summary  Patient A&O x 4 and able to make needs known using call light. VSS and lung sounds clear and equal bilaterally. Bowel sounds active and passing gas. Enema scheduled for QOD starting tomorrow morning per patient's home bowel routine. Denies chest pain, lightheadedness, dizziness, and SOB. TPA given x 2 in right arm PICC line d/t no blood return. PICC now with good blood return. Drinking well and tolerating regular diet with no nausea. Carrillo patent and draining clear yellow urine; will likely discharge home with carrillo d/t wound healing. Sensation absent from waist down. Pain at incision site and taking Tylenol with relief. Dressing CDI. Turned and repositioned with heels floated off of bed q 2 hours. Turning supine and right side only with HOB <30 degrees on WAVE mattress. RENARD with serosanguinous drainage to bulb suction. Patient will likely discharge home on Saturday. Will continue to follow plan of care and provide interventions as needed.

## 2017-01-12 NOTE — TELEPHONE ENCOUNTER
Pt called, informed of Dr. Bennett's plan for him. Pt will not be out of TCU by mid Feb, said mid March better. Please advise. Requesting back.

## 2017-01-12 NOTE — ANESTHESIA POSTPROCEDURE EVALUATION
Patient: Sacha Ndiaye    COMBINED IRRIGATION AND DEBRIDEMENT DECUBITUS WITH FLAP CLOSURE (Left Sacrum)  Additional InformationProcedure(s):  Left Ischial Debridement, Left Gluteal Rotational Flap - Wound Class: II-Clean Contaminated    Diagnosis:Recurrent Left Ischial Seroma   Diagnosis Additional Information: No value filed.    Anesthesia Type:  General, ETT    Note:  Anesthesia Post Evaluation    Patient location during evaluation: PACU  Patient participation: Able to fully participate in evaluation  Level of consciousness: awake  Pain management: adequate  Airway patency: patent  Cardiovascular status: acceptable and stable  Respiratory status: acceptable and room air  Hydration status: acceptable  PONV: none     Anesthetic complications: None          Last vitals:  Filed Vitals:    01/11/17 1000 01/11/17 1809   BP: 111/84 109/71   Pulse: 94    Temp: 36.5  C (97.7  F) 36.6  C (97.9  F)   Resp: 16 14   SpO2: 98% 100%       Electronically Signed By: Nixon Lamar MD  January 11, 2017  6:31 PM
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
## 2017-01-13 LAB
ANION GAP SERPL CALCULATED.3IONS-SCNC: 7 MMOL/L (ref 3–14)
BUN SERPL-MCNC: 14 MG/DL (ref 7–30)
CALCIUM SERPL-MCNC: 7.6 MG/DL (ref 8.5–10.1)
CHLORIDE SERPL-SCNC: 106 MMOL/L (ref 94–109)
CO2 SERPL-SCNC: 26 MMOL/L (ref 20–32)
CREAT SERPL-MCNC: 0.64 MG/DL (ref 0.66–1.25)
GFR SERPL CREATININE-BSD FRML MDRD: ABNORMAL ML/MIN/1.7M2
GLUCOSE SERPL-MCNC: 112 MG/DL (ref 70–99)
HBA1C MFR BLD: 4.7 % (ref 4.3–6)
HGB BLD-MCNC: 8.9 G/DL (ref 13.3–17.7)
POTASSIUM SERPL-SCNC: 3.8 MMOL/L (ref 3.4–5.3)
SODIUM SERPL-SCNC: 139 MMOL/L (ref 133–144)

## 2017-01-13 PROCEDURE — 99232 SBSQ HOSP IP/OBS MODERATE 35: CPT | Performed by: INTERNAL MEDICINE

## 2017-01-13 PROCEDURE — 25000132 ZZH RX MED GY IP 250 OP 250 PS 637: Performed by: PLASTIC SURGERY

## 2017-01-13 PROCEDURE — 25000132 ZZH RX MED GY IP 250 OP 250 PS 637: Performed by: INTERNAL MEDICINE

## 2017-01-13 PROCEDURE — 12000001 ZZH R&B MED SURG/OB UMMC

## 2017-01-13 PROCEDURE — 85018 HEMOGLOBIN: CPT | Performed by: INTERNAL MEDICINE

## 2017-01-13 PROCEDURE — 25000125 ZZHC RX 250: Performed by: SURGERY

## 2017-01-13 PROCEDURE — 80048 BASIC METABOLIC PNL TOTAL CA: CPT | Performed by: INTERNAL MEDICINE

## 2017-01-13 PROCEDURE — 83036 HEMOGLOBIN GLYCOSYLATED A1C: CPT | Performed by: INTERNAL MEDICINE

## 2017-01-13 RX ORDER — ACETAMINOPHEN 325 MG/1
650 TABLET ORAL EVERY 4 HOURS PRN
Qty: 100 TABLET | Status: ON HOLD
Start: 2017-01-13 | End: 2024-03-25

## 2017-01-13 RX ORDER — BISACODYL 10 MG
10 SUPPOSITORY, RECTAL RECTAL DAILY PRN
Qty: 30 SUPPOSITORY | Status: ON HOLD
Start: 2017-01-13 | End: 2024-03-25

## 2017-01-13 RX ORDER — CIPROFLOXACIN 750 MG/1
750 TABLET, FILM COATED ORAL EVERY 12 HOURS
Start: 2017-01-13 | End: 2017-03-23

## 2017-01-13 RX ORDER — OXYBUTYNIN CHLORIDE 15 MG/1
30 TABLET, EXTENDED RELEASE ORAL DAILY
Qty: 30 TABLET | Status: ON HOLD
Start: 2017-01-13 | End: 2024-03-25

## 2017-01-13 RX ADMIN — Medication 250 MG: at 18:54

## 2017-01-13 RX ADMIN — ACETAMINOPHEN 650 MG: 325 TABLET, FILM COATED ORAL at 21:49

## 2017-01-13 RX ADMIN — Medication 250 MG: at 08:35

## 2017-01-13 RX ADMIN — SODIUM PHOSPHATE 1 ENEMA: 7; 19 ENEMA RECTAL at 08:50

## 2017-01-13 RX ADMIN — ENOXAPARIN SODIUM 40 MG: 40 INJECTION SUBCUTANEOUS at 11:55

## 2017-01-13 RX ADMIN — ACETAMINOPHEN 650 MG: 325 TABLET, FILM COATED ORAL at 08:46

## 2017-01-13 RX ADMIN — CIPROFLOXACIN HYDROCHLORIDE 750 MG: 500 TABLET, FILM COATED ORAL at 21:48

## 2017-01-13 RX ADMIN — OXYBUTYNIN CHLORIDE 30 MG: 10 TABLET, FILM COATED, EXTENDED RELEASE ORAL at 08:35

## 2017-01-13 RX ADMIN — CEFAZOLIN 1 G: 1 INJECTION, POWDER, FOR SOLUTION INTRAMUSCULAR; INTRAVENOUS at 18:54

## 2017-01-13 RX ADMIN — CIPROFLOXACIN HYDROCHLORIDE 750 MG: 500 TABLET, FILM COATED ORAL at 10:28

## 2017-01-13 RX ADMIN — CEFAZOLIN 1 G: 1 INJECTION, POWDER, FOR SOLUTION INTRAMUSCULAR; INTRAVENOUS at 03:19

## 2017-01-13 RX ADMIN — CEFAZOLIN 1 G: 1 INJECTION, POWDER, FOR SOLUTION INTRAMUSCULAR; INTRAVENOUS at 11:55

## 2017-01-13 ASSESSMENT — ACTIVITIES OF DAILY LIVING (ADL)
TOILETING: 1-->ASSISTIVE EQUIPMENT
RETIRED_EATING: 0-->INDEPENDENT
AMBULATION: 3-->ASSISTIVE EQUIPMENT AND PERSON
SWALLOWING: 0-->SWALLOWS FOODS/LIQUIDS WITHOUT DIFFICULTY
BATHING: 1-->ASSISTIVE EQUIPMENT
RETIRED_COMMUNICATION: 0-->UNDERSTANDS/COMMUNICATES WITHOUT DIFFICULTY
DRESS: 1-->ASSISTIVE EQUIPMENT
TRANSFERRING: 0-->INDEPENDENT
FALL_HISTORY_WITHIN_LAST_SIX_MONTHS: NO
COGNITION: 0 - NO COGNITION ISSUES REPORTED

## 2017-01-13 ASSESSMENT — PAIN DESCRIPTION - DESCRIPTORS: DESCRIPTORS: ACHING

## 2017-01-13 NOTE — PROGRESS NOTES
[facility] Internal Medicine Progress Note            Interval History:   Record reviewed.   Seen with RN.   No interval concerns.  Resolution of upper airway congestion. Smear only with dig stim and fleets this AM.  Denies overt abdominal distention.  No nausea, reflux, CP, SOB.             Medications:   All medications reviewed today          Physical Exam:   Blood pressure 105/59, pulse 86, temperature 98.5  F (36.9  C), temperature source Oral, resp. rate 16, height 1.829 m (6'), weight 77.111 kg (170 lb), SpO2 100 %.    Intake/Output Summary (Last 24 hours) at 01/12/17 2001  Last data filed at 01/12/17 1339   Gross per 24 hour   Intake   1200 ml   Output   2970 ml   Net  -1770 ml   I/O this shift:  In: -   Out: 950 [Urine:950]      General:  Alert.  Appropriate.  No distress.  No O2.    Heent:      Neck:    Skin:    Chest:  Clear     Cardiac:  Reg without gallop, murmur    Abdomen:  Non distended, soft, good BS    Extremities:  Perfused.  No edema, calf, thigh induration.    Neuro:            Data:     Results for orders placed or performed during the hospital encounter of 01/11/17 (from the past 24 hour(s))   Basic metabolic panel   Result Value Ref Range    Sodium 139 133 - 144 mmol/L    Potassium 3.8 3.4 - 5.3 mmol/L    Chloride 106 94 - 109 mmol/L    Carbon Dioxide 26 20 - 32 mmol/L    Anion Gap 7 3 - 14 mmol/L    Glucose 112 (H) 70 - 99 mg/dL    Urea Nitrogen 14 7 - 30 mg/dL    Creatinine 0.64 (L) 0.66 - 1.25 mg/dL    GFR Estimate >90  Non  GFR Calc   >60 mL/min/1.7m2    GFR Estimate If Black >90   GFR Calc   >60 mL/min/1.7m2    Calcium 7.6 (L) 8.5 - 10.1 mg/dL   Hemoglobin   Result Value Ref Range    Hemoglobin 8.9 (L) 13.3 - 17.7 g/dL                Assessment and Plan:   1)  S/P Left Ischial Debridement, Left Gluteal Rotational Flap.  Indication Recurrent Left Ischial Seroma/ulcer. In general doing well.  Plan to extend bed rest per plastics.   2)  Polymicrobial  spinal HW infection on  cipro.  3)  T6 paraplegia.  4)   Anemia likely CD with superimposed acute blood loss.  Likely component of iron deficiency based on prior iron studies 11/29.   Adequate..   5)  Prior issue with constipation with tendency to frequent loose stools with excess laxative use.  Desires to continue present bowel regimen.     6)  Neurogenic bladder with recurrent UTI.  Rene in place.    7)  Charcot arthropathy, S/P spine fusion 2015 complicated by infection (as above).    8)  Transient hyperglycemia - no indication of DM.  Check A1c.     PLAN:  1)  Same meds.  2)  Add A1c to labs.  3)  SL IV.  4)   Plan for DC 1/14 to SNF.             Attestation:  I have reviewed today's vital signs, notes, medications, labs and imaging.     Marc Padilla MD

## 2017-01-13 NOTE — PROGRESS NOTES
Social Work: Assessment with Discharge Plan    Patient Name:  Sacha Camargo  :  1975  Age:  41 year old  MRN:  [id number]  Risk/Complexity Score:   7  Completed assessment with:  Patient    Presenting Information   Reason for Referral:  Discharge plan  Date of Intake:  2017  Referral Source:  Physician  Decision Maker:  Patient  Alternate Decision Maker:  Significant other, Tanja.  [phone]  Health Care Directive:  Declined completing  Living Situation:  House  Previous Functional Status:  Independent  Patient and family understanding of hospitalization:  Surgery for infections.  Cultural/Language/Spiritual Considerations:  N/A  Adjustment to Illness:  Pt has has a complicated course, but is fairly positive and calm upon this admission.  Pt accepting of TCU.  Pt has had frustration in the past, which is understandable.    Physical Health  Reason for Admission:  No diagnosis found.  Services Needed/Recommended:  TCU    Support System  Significant relationship at present time:  Significant Other: Tanja.  Family of origin is available for support:  Yes  Other support available:  Mother, Radha.  Gaps in support system:  N/A  Patient is caregiver to:  None     Provider Information   Primary Care Physician:  Celestine Jerry   [phone]   Clinic:  [facility] [address] [place] [address] / [facility] [address]      :  None known.    Financial   Income Source:  Employed  Financial Concerns:  None stated  Insurance:    Payor/Plan Subscriber Name Rel Member # Group #   [employer] * SACHA CAMARGO  [id number]       [id number]       Discharge Plan   Patient and family discharge goal:  TCU  Provided education on discharge plan:  YES  Patient agreeable to discharge plan:  YES  A list of Medicare Certified Facilities was provided to the patient and/or family to encourage patient choice. Patient's choices for facility are:  [facility].  Will NH provide Skilled  rehabilitation or complex medical:  YES  General information regarding anticipated insurance coverage and possible out of pocket cost was discussed. Patient and patient's family are aware patient may incur the cost of transportation to the facility, pending insurance payment: YES  Barriers to discharge:  None    Discharge Recommendations   Anticipated Disposition:  Facility:  [facility] ([phone] and F:[phone])  Transportation Needs:  Other:  Stretcher  Name of Transportation Company and Phone:  Online Dealer at 11:30am on 1-14.    Additional comments   Pt in agreement with plan to transfer to [facility] in the morning.  PAS completed, confirmation # is: [id number]  RN to call report to above phone number and orders to be faxed to above fax before discharge.      ARANZA López  [phone]

## 2017-01-13 NOTE — PLAN OF CARE
Problem: Goal Outcome Summary  Goal: Goal Outcome Summary  Outcome: Improving  A/Ox's 4. Pt denied pain. Dressing CDI. Pt has a Drain. CMS to pt's baseline. Denied any nausea, CP, SOB, lightheadedness or dizziness. Pt has a carrillo. Encouraged increased/continued IS use. Bilateral heels elevated off bed. Pt is on a wave mattress. Reposition done every two hours. Resting in bed at this time with call light in reach. Able to make needs known. Continue to monitor.

## 2017-01-13 NOTE — DISCHARGE SUMMARY
Physician Discharge Summary     Patient ID:  Sacha Ndiaye  [id number]  41 year old  1975    Admit date: 1/11/2017    Discharge date and time: 1/14/17     Admitting Physician: Cheryl Harris MD     Discharge Physician: jillian    Admission Diagnoses: Recurrent Left Ischial Seroma   Decubitus ulcer of left ischium, stage 4 (H)    Discharge Diagnoses: stage 4 recurrent left ischial decubitus with probable seroma recurrence. S/p left gluteal myocutaneous rotation flap closure after debridement.  T5 paraplegia.    Admission Condition: good    Discharged Condition: good    Indication for Admission: 41 year old paraplegic male with twice recurrent left ischial decubitus, stage 4, probably from recurrent seroma pocket created by shear forces. Flap #2 was perfectly healed externally but started to leak serous fluid when started sitting. Came in for yet another I&D and closed with gluteal muscle rotation flap this time. Admitted for flap observation prior to transfer to SNF closer to home.     Hospital Course: postop course uneventful. Hb stable around 9. Flap viable and intact. >100 mls serosanguinous drainage from RENARD. Minimal pain controlled with Tylenol. No cultures or pathology pending since recurrence most likely due to seroma rather than infection. Bone was hard and had good perfusion. Already had completed a full course of antibiotics based on previous cultures.    Consults: hospitalist    Significant Diagnostic Studies: none    Treatments: surgery: see procedure listed above.    Discharge Exam:  Stable. Flap viable and intact.    Disposition: SNF    Patient Instructions:   Current Discharge Medication List      START taking these medications    Details   sodium phosphate (FLEET ENEMA) 7-19 GM/118ML rectal enema Place 1 Bottle (1 enema) rectally every 48 hours  Qty: 1 Bottle, Refills: 0    Associated Diagnoses: Decubitus ulcer of left ischium, stage 4 (H)         CONTINUE these medications which have  "CHANGED    Details   acetaminophen (TYLENOL) 325 MG tablet Take 2 tablets (650 mg) by mouth every 4 hours as needed for other (surgical pain)  Qty: 100 tablet    Associated Diagnoses: Decubitus ulcer of left ischium, stage 4 (H)      bisacodyl (DULCOLAX) 10 MG Suppository Place 1 suppository (10 mg) rectally daily as needed for constipation  Qty: 30 suppository    Associated Diagnoses: Decubitus ulcer of left ischium, stage 4 (H)      ciprofloxacin (CIPRO) 750 MG tablet Take 1 tablet (750 mg) by mouth every 12 hours    Associated Diagnoses: Decubitus ulcer of left ischium, stage 4 (H)      oxybutynin 15 MG TB24 Take 2 tablets (30 mg) by mouth daily  Qty: 30 tablet    Associated Diagnoses: Decubitus ulcer of left ischium, stage 4 (H)         CONTINUE these medications which have NOT CHANGED    Details   Probiotic Product (PROBIOTIC DAILY PO) Take 1 tablet by mouth      FIBER SELECT GUMMIES PO Take 1 tablet by mouth      benzoyl peroxide (PANOXYL) 2.5 % LIQD Externally apply 1 Application topically daily Hold and notify prn excess erythema      clindamycin (CLEOCIN T) 1 % lotion Apply topically 2 times daily Apply to rash on back      docusate sodium (COLACE) 100 MG capsule Take 100 mg by mouth daily as needed   Qty: 60 capsule      polyethylene glycol (MIRALAX/GLYCOLAX) Packet Take 17 g by mouth daily as needed for constipation  Qty: 7 packet      Multiple Vitamins-Minerals (MULTIVITAMINS PLUS ZINC) CAPS Take 1 tablet by mouth daily    Associated Diagnoses: Malnutrition (H)      !! order for DME Equipment being ordered: [facility] Medical Order Fax [phone]    Primary dressin\" kerlix Qty: 30  Secondary Dressing 4x4 nonsterile gauze Qty 200 ct loaf  Secondary dressing: 3x3 nonsterile gauze Qty 200ct loaf  Length of Need: 1 month  Frequency of dressing change: every day  Qty: 30 days, Refills: 0    Associated Diagnoses: Nonhealing surgical wound, initial encounter      !! order for DME Equipment being " "ordered: [facility] Medical Order Fax [phone]     Primary dressin\" kerlix Qty: 30   Secondary Dressing 4x4 Mepilex Border Qty 20  Secondary dressing: 3x3 mepilex border Qty 20  Length of Need: 1 month   Frequency of dressing change: every day  Qty: 30 days, Refills: 0    Associated Diagnoses: Decubitus ulcer of buttock, left, stage IV (H)      nystatin (MYCOSTATIN) ointment Apply topically 2 times daily Apply to inner thighs       !! - Potential duplicate medications found. Please discuss with provider.      STOP taking these medications       vancomycin 1,250 mg Comments:   Reason for Stopping:             Activity: bedrest on group 2 low air loss mattress.   HOB no greater than 30 degrees- NO SITTING.   Change positions Q2 hours while awake, Q4 hours during PMs.   OK to lay supine or right lateral. Minimize time on left hip due to fresh flap.   Keep heels off mattress.   OK to do PT/OT for UE's strength and FROM. FROM for right LE only.   Do NOT flex left hip greater than 30-45 degrees.   Avoid shear forces.    Diet: regular diet with additional protein for wound healing as tolerated.    Wound Care: keep wound clean and dry. Cover if any drainage.   STRIP and record RENARD output BID and PRN. Most likely remove drain after 3 weeks postop.   Stitches and staples to remain for at least 4 weeks.   Will need CT pelvis around postop week 5-6 to rule out recurrent seroma.    Follow-up with Dr Harris at [facility] wound clinic in 6 weeks. ALL TRANSPORT BY STRETCHER until cleared to sit by Dr Harris    Signed:  Cheryl Harris  2017  4:49 PM    "

## 2017-01-13 NOTE — PROGRESS NOTES
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  Doing fine other than some mild discomfort from the operative site that seems to be well managed with Tylenol only.    Hb 8.9  tmax - 99.6, BP= /48-55, HR= 80's  RZ=823 serosanguinous yesterday.  No cultures or path sent this time.    Flap intact and viable,  looks great with minimal drainage/edema or tension.  RENARD patent and secured.    A/P: OK to dc tomorrow to [facility] for ongoing postop recovery.  Since this is the 3rd time we have repaired this site, we will be extending our usual postop bedrest criteria from 4 weeks to 6 weeks. We will leave the RENARD drain in for at least 2 more weeks. We will get a CT pelvis around the 5 week ranjit to assess for any recurrent seromas. He is currently scheduled to see me at the [facility] on 2/16, but we may need to extend that 1 week. Otherwise, we could see him in clinic on stretcher and he could finish his bedrest and start sitting protocol at home.  We will reassess in the next several weeks. The patient has my personal contact info to keep us in the loop if anything changes out of the ordinary.

## 2017-01-13 NOTE — PLAN OF CARE
Problem: Goal Outcome Summary  Goal: Goal Outcome Summary  Outcome: No Change  Patient A&O, lungs CTA, bowel sounds active-passing gas. Pt taking tylenol for pain. No numbness (aside from baseline), tingling, weakness or SOB per pt report. Fluids promoted, PICC saline locked. Dressing CDI.  Fleet enema given in am with no results, dig stim done, no stool felt upon inspection. Pt uses call light appropriately and is able to make needs known.  Will continue to monitor.

## 2017-01-13 NOTE — PROGRESS NOTES
[facility] Internal Medicine Progress Note            Interval History:   Record reviewed including medicine consult.  Seen earlier with RN.  Pt well known to me from prior admits.  S/P Left Ischial Debridement, Left Gluteal Rotational Flap.  Indication Recurrent Left Ischial Seroma/ulcer.   cc's.  In general doing well.  Discomfort controlled with tylenol.  Retained upper airway secretions attributed to ET tube irritation.  No appreciable distress.  Declines intervention.  No CP, SOB, nausea.  Tolerating diet.  Requesting gummy fiber tabs and QOD fleets for bowel program.            Medications:   All medications reviewed today          Physical Exam:   Blood pressure 95/54, pulse 73, temperature 99.6  F (37.6  C), temperature source Oral, resp. rate 16, height 1.829 m (6'), weight 77.111 kg (170 lb), SpO2 100 %.    Intake/Output Summary (Last 24 hours) at 01/12/17 2001  Last data filed at 01/12/17 1339   Gross per 24 hour   Intake   1200 ml   Output   2970 ml   Net  -1770 ml       General:  Alert.  Appropriate.  No distress.  No O2.    Heent:      Neck:    Skin:    Chest:  Clear - weak cough/ mild upper airway     Cardiac:  Reg without gallop, murmur    Abdomen:  Non distended, soft, good BS    Extremities:  Perfused.  No edema, calf, thigh induration.    Neuro:            Data:     Results for orders placed or performed during the hospital encounter of 01/11/17 (from the past 24 hour(s))   Platelet count   Result Value Ref Range    Platelet Count 218 150 - 450 10e9/L   Creatinine   Result Value Ref Range    Creatinine 0.70 0.66 - 1.25 mg/dL    GFR Estimate >90  Non  GFR Calc   >60 mL/min/1.7m2    GFR Estimate If Black >90   GFR Calc   >60 mL/min/1.7m2   Basic metabolic panel   Result Value Ref Range    Sodium 140 133 - 144 mmol/L    Potassium 4.2 3.4 - 5.3 mmol/L    Chloride 107 94 - 109 mmol/L    Carbon Dioxide 27 20 - 32 mmol/L    Anion Gap 6 3 - 14 mmol/L     Glucose 98 70 - 99 mg/dL    Urea Nitrogen 11 7 - 30 mg/dL    Creatinine 0.67 0.66 - 1.25 mg/dL    GFR Estimate >90  Non  GFR Calc   >60 mL/min/1.7m2    GFR Estimate If Black >90   GFR Calc   >60 mL/min/1.7m2    Calcium 7.8 (L) 8.5 - 10.1 mg/dL   CBC with platelets differential   Result Value Ref Range    WBC 7.6 4.0 - 11.0 10e9/L    RBC Count 3.35 (L) 4.4 - 5.9 10e12/L    Hemoglobin 9.3 (L) 13.3 - 17.7 g/dL    Hematocrit 29.3 (L) 40.0 - 53.0 %    MCV 88 78 - 100 fl    MCH 27.8 26.5 - 33.0 pg    MCHC 31.7 31.5 - 36.5 g/dL    RDW 13.5 10.0 - 15.0 %    Platelet Count 206 150 - 450 10e9/L    Diff Method Automated Method     % Neutrophils 76.8 %    % Lymphocytes 14.3 %    % Monocytes 5.2 %    % Eosinophils 3.1 %    % Basophils 0.3 %    % Immature Granulocytes 0.3 %    Nucleated RBCs 0 0 /100    Absolute Neutrophil 5.9 1.6 - 8.3 10e9/L    Absolute Lymphocytes 1.1 0.8 - 5.3 10e9/L    Absolute Monocytes 0.4 0.0 - 1.3 10e9/L    Absolute Eosinophils 0.2 0.0 - 0.7 10e9/L    Absolute Basophils 0.0 0.0 - 0.2 10e9/L    Abs Immature Granulocytes 0.0 0 - 0.4 10e9/L    Absolute Nucleated RBC 0.0    Basic metabolic panel   Result Value Ref Range    Sodium 141 133 - 144 mmol/L    Potassium 3.8 3.4 - 5.3 mmol/L    Chloride 106 94 - 109 mmol/L    Carbon Dioxide 23 20 - 32 mmol/L    Anion Gap 12 3 - 14 mmol/L    Glucose 224 (H) 70 - 99 mg/dL    Urea Nitrogen 9 7 - 30 mg/dL    Creatinine 0.59 (L) 0.66 - 1.25 mg/dL    GFR Estimate >90  Non  GFR Calc   >60 mL/min/1.7m2    GFR Estimate If Black >90   GFR Calc   >60 mL/min/1.7m2    Calcium 7.7 (L) 8.5 - 10.1 mg/dL                Assessment and Plan:   1)  S/P Left Ischial Debridement, Left Gluteal Rotational Flap.  Indication Recurrent Left Ischial Seroma/ulcer.  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  3)  T6 paraplegia.  4)   Anemia likely CD with superimposed acute blood loss.  Likely component of iron deficiency  based on prior iron studies 11/29.   Adequate..   5)  Prior issue with constipation with tendency to frequent loose stools with excess laxative use.   Resolved.     6)  Neurogenic bladder with recurrent UTI.  Rene in place.    7)  Charcot arthropathy, S/P spine fusion 2015 complicated by infection (as above).           PLAN:  1)  Review meds.  Bowel program per pt request.  2)  AM labs.  3)  Defer iron for now with bowel issues.  4)  IS, lovenox.  5)  Monitor wound/clinically with prob DC to TCU 1/14 (reviewed with Dr. Harris).           Attestation:  I have reviewed today's vital signs, notes, medications, labs and imaging.     Marc Padilla MD

## 2017-01-14 VITALS
BODY MASS INDEX: 23.03 KG/M2 | HEART RATE: 86 BPM | OXYGEN SATURATION: 94 % | TEMPERATURE: 96.1 F | HEIGHT: 72 IN | WEIGHT: 170 LBS | DIASTOLIC BLOOD PRESSURE: 67 MMHG | SYSTOLIC BLOOD PRESSURE: 106 MMHG | RESPIRATION RATE: 16 BRPM

## 2017-01-14 LAB
ANION GAP SERPL CALCULATED.3IONS-SCNC: 7 MMOL/L (ref 3–14)
BUN SERPL-MCNC: 11 MG/DL (ref 7–30)
CALCIUM SERPL-MCNC: 8.3 MG/DL (ref 8.5–10.1)
CHLORIDE SERPL-SCNC: 108 MMOL/L (ref 94–109)
CO2 SERPL-SCNC: 27 MMOL/L (ref 20–32)
CREAT SERPL-MCNC: 0.51 MG/DL (ref 0.66–1.25)
GFR SERPL CREATININE-BSD FRML MDRD: ABNORMAL ML/MIN/1.7M2
GLUCOSE SERPL-MCNC: 108 MG/DL (ref 70–99)
HGB BLD-MCNC: 9 G/DL (ref 13.3–17.7)
PLATELET # BLD AUTO: 249 10E9/L (ref 150–450)
POTASSIUM SERPL-SCNC: 3.9 MMOL/L (ref 3.4–5.3)
SODIUM SERPL-SCNC: 142 MMOL/L (ref 133–144)

## 2017-01-14 PROCEDURE — 85049 AUTOMATED PLATELET COUNT: CPT | Performed by: SURGERY

## 2017-01-14 PROCEDURE — 25000132 ZZH RX MED GY IP 250 OP 250 PS 637: Performed by: PLASTIC SURGERY

## 2017-01-14 PROCEDURE — 99232 SBSQ HOSP IP/OBS MODERATE 35: CPT | Performed by: INTERNAL MEDICINE

## 2017-01-14 PROCEDURE — 80048 BASIC METABOLIC PNL TOTAL CA: CPT | Performed by: SURGERY

## 2017-01-14 PROCEDURE — 25000132 ZZH RX MED GY IP 250 OP 250 PS 637: Performed by: INTERNAL MEDICINE

## 2017-01-14 PROCEDURE — 25000125 ZZHC RX 250: Performed by: SURGERY

## 2017-01-14 PROCEDURE — 85018 HEMOGLOBIN: CPT | Performed by: SURGERY

## 2017-01-14 RX ADMIN — CEFAZOLIN 1 G: 1 INJECTION, POWDER, FOR SOLUTION INTRAMUSCULAR; INTRAVENOUS at 10:37

## 2017-01-14 RX ADMIN — Medication 250 MG: at 07:41

## 2017-01-14 RX ADMIN — ACETAMINOPHEN 650 MG: 325 TABLET, FILM COATED ORAL at 10:37

## 2017-01-14 RX ADMIN — CIPROFLOXACIN HYDROCHLORIDE 750 MG: 500 TABLET, FILM COATED ORAL at 07:41

## 2017-01-14 RX ADMIN — OXYBUTYNIN CHLORIDE 30 MG: 10 TABLET, FILM COATED, EXTENDED RELEASE ORAL at 07:41

## 2017-01-14 RX ADMIN — ENOXAPARIN SODIUM 40 MG: 40 INJECTION SUBCUTANEOUS at 10:37

## 2017-01-14 RX ADMIN — CEFAZOLIN 1 G: 1 INJECTION, POWDER, FOR SOLUTION INTRAMUSCULAR; INTRAVENOUS at 02:26

## 2017-01-14 NOTE — DISCHARGE SUMMARY
Pt. discharged at 1130 to [facility] in [place]. Pt. was accompanied by [facility] transport, and left with personal belongings. Pt. received complete discharge paperwork. Pt. was given times of last dose for all discharge medications in writing on discharge medication sheets. Discharge teaching included medication, pain management, activity restrictions, dressing changes, and signs and symptoms of infection. Pt. to follow up with Dr. Harris in 4 weeks (appt already made). Pt. had no further questions at the time of discharge and no unmet needs were identified.

## 2017-01-14 NOTE — PROGRESS NOTES
[facility] Internal Medicine Progress Note            Interval History:   Record reviewed.  Seen with RN. Plan for DC to SNF this AM.  No interval concerns.   Still no BM. Mild abdominal bloating.  Similar issue over the last year.  Wants to continue present regimenfor now and address further at SNF.  No upper airway congestion, CP, SOB.    No nausea, reflux, tolerating diet.  Has carrillo.             Medications:   All medications reviewed today          Physical Exam:   Blood pressure 106/67, pulse 86, temperature 96.1  F (35.6  C), temperature source Oral, resp. rate 16, height 1.829 m (6'), weight 77.111 kg (170 lb), SpO2 94 %.    Intake/Output Summary (Last 24 hours) at 01/12/17 2001  Last data filed at 01/12/17 1339   Gross per 24 hour   Intake   1200 ml   Output   2970 ml   Net  -1770 ml   I/O this shift:  In: -   Out: 950 [Urine:950]      General:  Alert.  Appropriate.  No distress.  No O2.    Heent:      Neck:    Skin:    Chest:  Clear     Cardiac:  Reg without gallop, murmur    Abdomen:   Mildly  distended, soft, good BS    Extremities:  Perfused.  No edema, calf, thigh induration.    Neuro:            Data:     Results for orders placed or performed during the hospital encounter of 01/11/17 (from the past 24 hour(s))   Platelet count   Result Value Ref Range    Platelet Count 249 150 - 450 10e9/L   Basic metabolic panel   Result Value Ref Range    Sodium 142 133 - 144 mmol/L    Potassium 3.9 3.4 - 5.3 mmol/L    Chloride 108 94 - 109 mmol/L    Carbon Dioxide 27 20 - 32 mmol/L    Anion Gap 7 3 - 14 mmol/L    Glucose 108 (H) 70 - 99 mg/dL    Urea Nitrogen 11 7 - 30 mg/dL    Creatinine 0.51 (L) 0.66 - 1.25 mg/dL    GFR Estimate >90  Non  GFR Calc   >60 mL/min/1.7m2    GFR Estimate If Black >90   GFR Calc   >60 mL/min/1.7m2    Calcium 8.3 (L) 8.5 - 10.1 mg/dL   Hemoglobin   Result Value Ref Range    Hemoglobin 9.0 (L) 13.3 - 17.7 g/dL                Assessment and Plan:    1)  S/P Left Ischial Debridement, Left Gluteal Rotational Flap.  Indication Recurrent Left Ischial Seroma/ulcer. In general doing well.  Plan to extend bed rest per plastics.   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  3)  T6 paraplegia.  4)   Anemia likely CD with superimposed acute blood loss.  Likely component of iron deficiency based on prior iron studies 11/29.  Iron replacement reviewed.  Deferred for now with bowel issues.  Adequate..   5)  Constipation, altered bowel mobility (probable component neurogenic bowel).  Too frequent bowel movements with miralax.     6)  Neurogenic bladder with recurrent UTI.  Rene in place.    7)  Charcot arthropathy, S/P spine fusion 2015 complicated by infection (as above).    8)  Transient hyperglycemia - no indication of DM based on follow up, normal A1c.     PLAN:  1)  Clinically stable for DC to TCU.  2)  Meds/orders reviewed.          Attestation:  I have reviewed today's vital signs, notes, medications, labs and imaging.     Marc Padilla MD

## 2017-01-14 NOTE — PROGRESS NOTES
JOSR met with pt to discuss d/c planning today.  He is on board to d/c and updated on plan/part of the process.  He is looking forward to discharge today.  JOSR faxed orders to TCU at this time.  Morelia Kraus  [phone]

## 2017-01-18 ENCOUNTER — TELEPHONE (OUTPATIENT)
Dept: INFECTIOUS DISEASES | Facility: CLINIC | Age: 42
End: 2017-01-18

## 2017-01-18 DIAGNOSIS — L89.329: Primary | ICD-10-CM

## 2017-01-18 RX ORDER — DOXYCYCLINE 100 MG/1
100 CAPSULE ORAL 2 TIMES DAILY
Qty: 180 CAPSULE | Refills: 0 | Status: SHIPPED | OUTPATIENT
Start: 2017-01-18 | End: 2017-03-23

## 2017-01-18 NOTE — TELEPHONE ENCOUNTER
1/18/2017   Infectious Diseases Telephone Note:    I spoke with Sacha today. He is doing well after surgery. His PICC was pulled yesterday and he will restart doxycycline today. He is also on suppressive ciprofloxacin for previous Pseudomonas spinal hardware infection. He will continue both antibiotics through return appointment with me on 3/23/17. At that time we will discuss possible stop dates which will depend, in part, on whether his spinal hardware can be removed. He is also seeing neurosurgery that day. He will let me know of problems in the meantime.     Abigail Bennett MD  Infectious Diseases  [phone]

## 2017-01-18 NOTE — TELEPHONE ENCOUNTER
Pt called re: doxycycline. Pt at [facility], told if going to give him med, require order. Can be faxed to [phone].Message sent to INOC[employer] simon.

## 2017-01-18 NOTE — TELEPHONE ENCOUNTER
----- Message from Abigail Bennett MD sent at 1/18/2017  3:00 PM CST -----  Kameron Timmons,     Yes. He should continue taking doxycycline and ciprofloxacin through his appointment with me on 3/23. I spoke with Sacha about this earlier today so he is up to date as well. Thanks!     Abigail Bennett MD  Infectious Diseases  [phone]     ----- Message -----     From: Shanelle Roper RN     Sent: 1/18/2017   2:50 PM       To: Abigail Bennett MD    Do you want patient to continue on taking Doxycycline. Care Center he is at after surgery asking if yes need orders, I can do this I just need to know if he should continue taking. Shanelle Roper RN...01/18/2017....2:56 PM

## 2017-01-19 ENCOUNTER — TELEPHONE (OUTPATIENT)
Dept: WOUND CARE | Facility: CLINIC | Age: 42
End: 2017-01-19

## 2017-01-19 ENCOUNTER — TELEPHONE (OUTPATIENT)
Dept: INFECTIOUS DISEASES | Facility: CLINIC | Age: 42
End: 2017-01-19

## 2017-01-19 NOTE — TELEPHONE ENCOUNTER
----- Message from Abigail Bennett MD sent at 1/18/2017  3:00 PM CST -----  Kameron Timmons,     Yes. He should continue taking doxycycline and ciprofloxacin through his appointment with me on 3/23. I spoke with Sacha about this earlier today so he is up to date as well. Thanks!     Abigail Bennett MD  Infectious Diseases  [phone]     ----- Message -----     From: Shanelle Roper RN     Sent: 1/18/2017   2:50 PM       To: Abigail Bennett MD    Do you want patient to continue on taking Doxycycline. Care Center he is at after surgery asking if yes need orders, I can do this I just need to know if he should continue taking. Shanelle Roper RN...01/18/2017....2:56 PM

## 2017-01-19 NOTE — TELEPHONE ENCOUNTER
Nhung MIRANDA from [facility] in [facility] left a voice message asking for a call back regarding pt's antibiotic therapy. Nhung can be reached at [phone].

## 2017-01-19 NOTE — TELEPHONE ENCOUNTER
Return call to facility regarding plan of care.  Staff posed questions regarding care delivery. Dr. Harris consulted requested to have [facility] drainage recorded and report results to [facility] in 3 weeks, February 9th. They will fax results to clinic. Clarified scheduled appointment for a return visit to see Dr. Harris in the clinic on February 23rd and at that time staples will be removed. The third concern was to have the [facility]. Staff have clinic contact information and verbalized understanding of plan of care.

## 2017-02-06 ENCOUNTER — TELEPHONE (OUTPATIENT)
Dept: WOUND CARE | Facility: CLINIC | Age: 42
End: 2017-02-06

## 2017-02-06 DIAGNOSIS — Z98.890 S/P FLAP GRAFT: Primary | ICD-10-CM

## 2017-02-06 NOTE — TELEPHONE ENCOUNTER
Pt had flap surgery on 1/11/17 and Dr. Harris wanted a post op CT scan to rule out seroma pocket 5-6 weeks post op. Pt will be following up with Dr. Harris on 2-23-17. Order for CT scan sent to [facility] in [place] at [phone] to obtain scan the week prior to his appt so we can review the results. Asked their radiology dept to send us both the film and the report to review.

## 2017-02-09 ENCOUNTER — TELEPHONE (OUTPATIENT)
Dept: WOUND CARE | Facility: CLINIC | Age: 42
End: 2017-02-09

## 2017-02-09 NOTE — TELEPHONE ENCOUNTER
Received fax from Za at [facility] where pt is rehabing after flap surgery that pt has had less that 10cc output from his RENARD drain for the past two weeks. Pt has a CT scan scheduled for 2-16-17. Dr. Harris would like RENARD drain pulled and today to see if a seroma pocket develops when he gets his CT scan next week.

## 2017-02-16 ENCOUNTER — TRANSFERRED RECORDS (OUTPATIENT)
Dept: HEALTH INFORMATION MANAGEMENT | Facility: CLINIC | Age: 42
End: 2017-02-16

## 2017-02-17 ENCOUNTER — TELEPHONE (OUTPATIENT)
Dept: WOUND CARE | Facility: CLINIC | Age: 42
End: 2017-02-17

## 2017-02-17 NOTE — TELEPHONE ENCOUNTER
Patient calling to confirm that results from CT done yesterday at [facility] have been sent to [facility] in preparation for visit with Dr. Harris on 2/23/2017.  Results currently not in EMR or in fax.  If not here by Monday, 2/20/2017, please call patient and he will work on getting them sent.

## 2017-02-23 ENCOUNTER — HOSPITAL ENCOUNTER (OUTPATIENT)
Dept: WOUND CARE | Facility: CLINIC | Age: 42
Discharge: HOME | End: 2017-02-23
Attending: SURGERY | Admitting: SURGERY
Payer: COMMERCIAL

## 2017-02-23 DIAGNOSIS — Z98.890 S/P FLAP GRAFT: ICD-10-CM

## 2017-02-23 PROCEDURE — 99212 OFFICE O/P EST SF 10 MIN: CPT

## 2017-02-23 NOTE — PROGRESS NOTES
[facility]       DATE OF SERVICE:  02/23/2017      Sacha Ndiaye is a 41-year-old paraplegic gentleman who has had repeated problems with aleft ischial decubitus, requiring multiple flap surgeries.  It seems like the last one was complicated by a delayed seroma formation and so he is very concerned about that at this time.  We also did take extra care in keeping him at bed rest for additional healing since part of that might have happened before but due to deep dehiscence.        We did a flap on 01/11 and he has been doing his rehabilitation at the [facility].  His RENARD was discontinued about 2-3 weeks postop and we removed the sutures and staples today.  He has a very well-healed incision and everything appears very good from the outside.  CAT scan done on 02/16/2016 of his pelvis did not show any fluid collections underneath the flap itself.  He does have a small stable left hip effusion but other than that, things look good from imaging perspective.  The left anterior groin hip wound did break open a little bit and they have been using Keragel but other than a slightly raw area it seems to be developing pretty thick scar there.        At this point, he can start a sitting protocol.  I would like him to get pressure mapped one more time whenever he can since this was the new flap for this area rather than just posterior thigh.  He is hoping to get home on 03/01 in time for his birthday on the 2nd.  I think that is probably fine as long as as soon as he gets home after being in his chair he will get right back down into bed.  Just to play it safe with him, will get an ultrasound about 4 weeks from now just to make sure that there is no fluid collection starting as he starts to get more active and up in his chair.  If that shows something concerning, we can then go ahead with more detailed CAT scan for comparison.  This should be done around the left IT and under the flap areas.         We will see him back in 6-8 weeks here in clinic and hopefully he will be doing just fine by then and we can have the results of the ultrasound to review.  Overall, I think he has done extremely well, particularly with being down on bed rest for such a long time.  He even minimized the amount of head of bed elevation and did not even do weights, etc. this most recent recovery session.  Please see nursing notes for vitals today which were within normal limits.         REJI GUERRA MD             D: 2017 10:53   T: 2017 11:09   MT: CD      Name:     GIA CAMARGO   MRN:      [id number]        Account:      [id number]   :      1975           Visit Date:   2017      Document: [id number]

## 2017-03-01 DIAGNOSIS — L89.93: Primary | ICD-10-CM

## 2017-03-13 DIAGNOSIS — L89.93: ICD-10-CM

## 2017-03-23 ENCOUNTER — OFFICE VISIT (OUTPATIENT)
Dept: INFECTIOUS DISEASES | Facility: CLINIC | Age: 42
End: 2017-03-23
Attending: INTERNAL MEDICINE
Payer: COMMERCIAL

## 2017-03-23 ENCOUNTER — OFFICE VISIT (OUTPATIENT)
Dept: NEUROSURGERY | Facility: CLINIC | Age: 42
End: 2017-03-23

## 2017-03-23 VITALS
BODY MASS INDEX: 25.06 KG/M2 | HEIGHT: 72 IN | HEART RATE: 79 BPM | TEMPERATURE: 96.2 F | WEIGHT: 185 LBS | DIASTOLIC BLOOD PRESSURE: 74 MMHG | SYSTOLIC BLOOD PRESSURE: 117 MMHG

## 2017-03-23 VITALS
SYSTOLIC BLOOD PRESSURE: 136 MMHG | HEIGHT: 72 IN | WEIGHT: 185 LBS | HEART RATE: 80 BPM | BODY MASS INDEX: 25.06 KG/M2 | DIASTOLIC BLOOD PRESSURE: 58 MMHG

## 2017-03-23 DIAGNOSIS — T84.7XXD WOUND INFECTION COMPLICATING HARDWARE, SUBSEQUENT ENCOUNTER: Primary | ICD-10-CM

## 2017-03-23 DIAGNOSIS — L89.324 DECUBITUS ULCER OF LEFT ISCHIUM, STAGE 4 (H): ICD-10-CM

## 2017-03-23 DIAGNOSIS — Z98.1 ARTHRODESIS STATUS: Primary | ICD-10-CM

## 2017-03-23 PROCEDURE — 99212 OFFICE O/P EST SF 10 MIN: CPT | Mod: ZF

## 2017-03-23 RX ORDER — DOXYCYCLINE 100 MG/1
100 CAPSULE ORAL 2 TIMES DAILY
Qty: 180 CAPSULE | Refills: 1 | Status: SHIPPED | OUTPATIENT
Start: 2017-03-23 | End: 2018-01-22

## 2017-03-23 RX ORDER — CIPROFLOXACIN 750 MG/1
750 TABLET, FILM COATED ORAL EVERY 12 HOURS
Qty: 60 TABLET | Refills: 3 | Status: SHIPPED | OUTPATIENT
Start: 2017-03-23 | End: 2017-11-02

## 2017-03-23 ASSESSMENT — PAIN SCALES - GENERAL: PAINLEVEL: NO PAIN (0)
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
## 2017-03-23 NOTE — MR AVS SNAPSHOT
After Visit Summary   3/23/2017    Sacha Ndiaye    MRN: [id number]           Patient Information     Date Of Birth          1975        Visit Information        Provider Department      3/23/2017 10:30 AM Abigail Bennett MD [facility] and Infectious Diseases        Today's Diagnoses     Wound infection complicating hardware, subsequent encounter    -  1    Decubitus ulcer of left ischium, stage 4 (H)           Follow-ups after your visit        Follow-up notes from your care team     Return in about 1 year (around 3/23/2018).      Your next 10 appointments already scheduled     Jun 01, 2017 10:00 AM CDT   Return Visit with Cheryl Harris MD   [facility] [place] ([facility])    [address]  [address] [place] [id number] [phone]            Mar 22, 2018 12:30 PM CDT   XR SPINE COMPLETE 2 VIEWS with UCXR1   [facility] ([facility] [place])    [address]  1st Floor  [facility] [address] [phone]           Please bring a list of your current medicines to your exam. (Include vitamins, minerals and over-thecounter medicines.) Leave your valuables at home.  Tell your doctor if there is a chance you may be pregnant.  You do not need to do anything special for this exam.            Mar 22, 2018  1:00 PM CDT   (Arrive by 12:45 PM)   Return Visit with Barbara Parikh MD   [facility]surgery ([facility] [place])    [address]  3rd Floor  [facility] [place] [facility] [address] [phone]              Who to contact     If you have questions or need follow up information about today's clinic visit or your schedule please contact [facility] AND INFECTIOUS DISEASES directly at [phone].  Normal or non-critical lab and imaging results will be communicated to you by MyChart, letter or phone within 4 business days after the clinic has received  "the results. If you do not hear from us within 7 days, please contact the clinic through [employer] or phone. If you have a critical or abnormal lab result, we will notify you by phone as soon as possible.  Submit refill requests through SeeSpace or call your pharmacy and they will forward the refill request to us. Please allow 3 business days for your refill to be completed.          Additional Information About Your Visit        [employer] Information     See[employer] lets you send messages to your doctor, view your test results, renew your prescriptions, schedule appointments and more. To sign up, go to [email or web address] . Click on \"Log in\" on the left side of the screen, which will take you to the Welcome page. Then click on \"Sign up Now\" on the right side of the page.     You will be asked to enter the access code listed below, as well as some personal information. Please follow the directions to create your username and password.     Your access code is: [email or web address]  Expires: 2017  9:03 AM     Your access code will  in 90 days. If you need help or a new code, please call your [place] clinic or [phone].        Care EveryWhere ID     This is your Care EveryWhere ID. This could be used by other organizations to access your [place] medical records  [id number]        Your Vitals Were     Pulse Temperature Height BMI (Body Mass Index)          79 96.2  F (35.7  C) (Oral) 1.829 m (6') 25.09 kg/m2         Blood Pressure from Last 3 Encounters:   17 136/58   17 117/74   17 106/67    Weight from Last 3 Encounters:   17 83.9 kg (185 lb)   17 83.9 kg (185 lb)   17 77.1 kg (170 lb)              Today, you had the following     No orders found for display         Where to get your medicines      These medications were sent to [id number]  [address] [facility] [address]  [address] [address], [facility] [address]     Phone:  [phone]     ciprofloxacin 750 MG " tablet    doxycycline 100 MG capsule          Primary Care Provider Office Phone # Fax #    Celestine Jerry [phone]       [facility] [address] [facility] [address]        Thank you!     Thank you for choosing [facility] AND INFECTIOUS DISEASES  for your care. Our goal is always to provide you with excellent care. Hearing back from our patients is one way we can continue to improve our services. Please take a few minutes to complete the written survey that you may receive in the mail after your visit with us. Thank you!             Your Updated Medication List - Protect others around you: Learn how to safely use, store and throw away your medicines at www.disposemymeds.org.          This list is accurate as of: 3/23/17 11:59 PM.  Always use your most recent med list.                   Brand Name Dispense Instructions for use    acetaminophen 325 MG tablet    TYLENOL    100 tablet    Take 2 tablets (650 mg) by mouth every 4 hours as needed for other (surgical pain)       bisacodyl 10 MG Suppository    DULCOLAX    30 suppository    Place 1 suppository (10 mg) rectally daily as needed for constipation       ciprofloxacin 750 MG tablet    CIPRO    60 tablet    Take 1 tablet (750 mg) by mouth every 12 hours       doxycycline 100 MG capsule    VIBRAMYCIN    180 capsule    Take 1 capsule (100 mg) by mouth 2 times daily       FIBER SELECT GUMMIES PO      Take 1 tablet by mouth       MULTIVITAMINS PLUS ZINC Caps      Take 1 tablet by mouth daily       order for DME     30 days    Equipment being ordered [facility] Medical Medical Order Fax [phone]  Primary Dressing 3x3 adaptic Qty 5 sheets Length of Need: 1 month Frequency of dressing change: every other day       oxybutynin chloride 15 MG Tb24     30 tablet    Take 2 tablets (30 mg) by mouth daily       PANOXYL 2.5 % Liqd   Generic drug:  benzoyl peroxide      Externally apply 1 Application topically daily Hold and notify prn  excess erythema       polyethylene glycol Packet    MIRALAX/GLYCOLAX    7 packet    Take 17 g by mouth daily as needed for constipation       PROBIOTIC DAILY PO      Take 1 tablet by mouth       sodium phosphate 7-19 GM/118ML rectal enema     1 Bottle    Place 1 Bottle (1 enema) rectally every 48 hours

## 2017-03-23 NOTE — NURSING NOTE
Chief Complaint   Patient presents with     RECHECK     F/U per patient       Initial /74  Pulse 79  Temp 96.2  F (35.7  C) (Oral)  Ht 1.829 m (6')  Wt 83.9 kg (185 lb)  BMI 25.09 kg/m2 Estimated body mass index is 25.09 kg/(m^2) as calculated from the following:    Height as of this encounter: 1.829 m (6').    Weight as of this encounter: 83.9 kg (185 lb).  Medication Reconciliation: complete

## 2017-03-23 NOTE — MR AVS SNAPSHOT
After Visit Summary   3/23/2017    Sacha Ndiaye    MRN: [id number]           Patient Information     Date Of Birth          1975        Visit Information        Provider Department      3/23/2017 2:15 PM Barbara Parikh MD [facility]        Today's Diagnoses     Arthrodesis status    -  1       Follow-ups after your visit        Your next 10 appointments already scheduled     2017 10:00 AM CDT   Return Visit with Cheryl Harris MD   [facility] ([facility])    [address] [facility] [address] [place] [id number] [phone]              Who to contact     Please call your clinic at [phone] to:    Ask questions about your health    Make or cancel appointments    Discuss your medicines    Learn about your test results    Speak to your doctor   If you have compliments or concerns about an experience at your clinic, or if you wish to file a complaint, please contact [facility] Patient Relations at [phone] or email us at [email or web address]         Additional Information About Your Visit        MyChart Information     [employer] is an electronic gateway that provides easy, online access to your medical records. With [employer], you can request a clinic appointment, read your test results, renew a prescription or communicate with your care team.     To sign up for [employer] visit the website at [email or web address]   You will be asked to enter the access code listed below, as well as some personal information. Please follow the directions to create your username and password.     Your access code is: [email or web address]  Expires: 2017  9:03 AM     Your access code will  in 90 days. If you need help or a new code, please contact your [facility] or call [phone] for assistance.        Care EveryWhere ID     This is your Care EveryWhere ID.  This could be used by other organizations to access your [facility] medical records  [id number]        Your Vitals Were     Pulse Height BMI (Body Mass Index)             80 1.829 m (6') 25.09 kg/m2          Blood Pressure from Last 3 Encounters:   03/23/17 136/58   03/23/17 117/74   01/14/17 106/67    Weight from Last 3 Encounters:   03/23/17 83.9 kg (185 lb)   03/23/17 83.9 kg (185 lb)   01/11/17 77.1 kg (170 lb)              Today, you had the following     No orders found for display         Where to get your medicines      These medications were sent to [facility] [address] [place] [address] [address] [facility] [address]  [address] [address]     Phone:  [phone]     ciprofloxacin 750 MG tablet    doxycycline 100 MG capsule          Primary Care Provider Office Phone # Fax #    Celestine Jerry [phone]       [facility] [address] [address]        Thank you!     Thank you for choosing [facility]  for your care. Our goal is always to provide you with excellent care. Hearing back from our patients is one way we can continue to improve our services. Please take a few minutes to complete the written survey that you may receive in the mail after your visit with us. Thank you!             Your Updated Medication List - Protect others around you: Learn how to safely use, store and throw away your medicines at www.disposemymeds.org.          This list is accurate as of: 3/23/17 11:59 PM.  Always use your most recent med list.                   Brand Name Dispense Instructions for use    acetaminophen 325 MG tablet    TYLENOL    100 tablet    Take 2 tablets (650 mg) by mouth every 4 hours as needed for other (surgical pain)       bisacodyl 10 MG Suppository    DULCOLAX    30 suppository    Place 1 suppository (10 mg) rectally daily as needed for constipation       ciprofloxacin 750 MG tablet    CIPRO    60 tablet    Take 1 tablet (750 mg) by mouth every 12 hours        doxycycline 100 MG capsule    VIBRAMYCIN    180 capsule    Take 1 capsule (100 mg) by mouth 2 times daily       FIBER SELECT GUMMIES PO      Take 1 tablet by mouth       MULTIVITAMINS PLUS ZINC Caps      Take 1 tablet by mouth daily       order for DME     30 days    Equipment being ordered [facility] Order Fax [phone]  Primary Dressing 3x3 adaptic Qty 5 sheets Length of Need: 1 month Frequency of dressing change: every other day       oxybutynin chloride 15 MG Tb24     30 tablet    Take 2 tablets (30 mg) by mouth daily       PANOXYL 2.5 % Liqd   Generic drug:  benzoyl peroxide      Externally apply 1 Application topically daily Hold and notify prn excess erythema       polyethylene glycol Packet    MIRALAX/GLYCOLAX    7 packet    Take 17 g by mouth daily as needed for constipation       PROBIOTIC DAILY PO      Take 1 tablet by mouth       sodium phosphate 7-19 GM/118ML rectal enema     1 Bottle    Place 1 Bottle (1 enema) rectally every 48 hours

## 2017-03-23 NOTE — LETTER
3/23/2017       RE: Sacha Ndiaye  [address] [facility] [address]     Dear Colleague,    Thank you for referring your patient, Sacha Ndiaye, to the [facility] NEUROSURGERY at [facility]. Please see a copy of my visit note below.    Please see dictated note #[id number].        HISTORY OF PRESENT ILLNESS:  Mr. Ndiaye is a 42-year-old male seen in Neurosurgery Clinic today for followup.  Mr. Ndiaye has a history of T6 paraplegia from childhood injury who underwent T2-S1 fusion for Charcot spine on 09/16/2015 with Dr. Parikh and Dr. Welsh.  His postoperative course has been complicated by several pressure ulcers within the sacrum as well as his left proximal thigh.  He also has had issues with infection associated with these pressure ulcers.      He is seen in Neurosurgery Clinic today for followup.  He continues to be on chronic suppressive antibiotic therapy with oral ciprofloxacin and doxycycline.  He continues to be followed in the Infectious Disease Clinic by Dr. Dayana Bennett.      Mr. Ndiaye has no complaints since his prior visit.  However, he did have several questions including whether or not removal of the hardware would be an option in order to cease chronic oral antibiotics.  He also had a question whether or not his bowel or bladder function has plateaued in terms of recovery following a spinal operation.      PHYSICAL EXAMINATION:   GENERAL:  This is a middle-aged obese male sitting in a wheelchair in no acute distress.  He is paraplegic from waist down.      IMAGING:  Partial AP and lateral films of his T2 to pelvic fusion and instrumentation were obtained 03/23/2017.  I have reviewed the images that demonstrate that the fusion hardware is intact and in place with no evidence of defect.      ASSESSMENT AND PLAN:  Mr. Ndiaye is a 42-year-old male with history of T2 to pelvic fusion for Charcot spine which is sequelae of T6 paraplegia from a spinal cord  injury suffered as a child.  Overall, he is doing well from a spine standpoint; however, he continues to be on oral antibiotic therapy for suppression of osteomyelitis.  Per discussion with the patient, we recommended that the spinal hardware remain in place as we cannot take all of it out given that he has a vertebral implant that could potentially harbor infection and cannot be removed safely.  We discussed with the patient that if he is insistent upon cessation of oral antibiotics, that he could give this a trial and see if the infection recurs.  We also discussed that at this point, we believe his bowel and bladder function have recovered to the extent that could reasonably be expected following the operation.  Finally, he can follow up on an as-needed basis in Neurosurgery Clinic.          Addendum:  I have seen this patient and agree with this note. BULL DAILEY MD       As dictated by AUORRA FLETCHER MD, PHD, PGY1 neurosurgery resident.             D: 2017 18:22   T: 2017 08:21   MT: MONICA      Name:     GIA CAMARGO   MRN:      [id number]        Account:      [id number]   :      1975           Service Date: 2017      Document: [id number]

## 2017-03-24 ENCOUNTER — TELEPHONE (OUTPATIENT)
Dept: WOUND CARE | Facility: CLINIC | Age: 42
End: 2017-03-24

## 2017-03-24 DIAGNOSIS — G82.20 PARAPLEGIA (H): Primary | ICD-10-CM

## 2017-03-25 NOTE — PROGRESS NOTES
HISTORY OF PRESENT ILLNESS:  Mr. Ndiaye is a 42-year-old male seen in Neurosurgery Clinic today for followup.  Mr. Ndiaye has a history of T6 paraplegia from childhood injury who underwent T2-S1 fusion for Charcot spine on 09/16/2015 with Dr. Parikh and Dr. Welsh.  His postoperative course has been complicated by several pressure ulcers within the sacrum as well as his left proximal thigh.  He also has had issues with infection associated with these pressure ulcers.      He is seen in Neurosurgery Clinic today for followup.  He continues to be on chronic suppressive antibiotic therapy with oral ciprofloxacin and doxycycline.  He continues to be followed in the Infectious Disease Clinic by Dr. Dayana Bennett.      Mr. Ndiaye has no complaints since his prior visit.  However, he did have several questions including whether or not removal of the hardware would be an option in order to cease chronic oral antibiotics.  He also had a question whether or not his bowel or bladder function has plateaued in terms of recovery following a spinal operation.      PHYSICAL EXAMINATION:   GENERAL:  This is a middle-aged obese male sitting in a wheelchair in no acute distress.  He is paraplegic from waist down.      IMAGING:  Partial AP and lateral films of his T2 to pelvic fusion and instrumentation were obtained 03/23/2017.  I have reviewed the images that demonstrate that the fusion hardware is intact and in place with no evidence of defect.      ASSESSMENT AND PLAN:  Mr. Ndiaye is a 42-year-old male with history of T2 to pelvic fusion for Charcot spine which is sequelae of T6 paraplegia from a spinal cord injury suffered as a child.  Overall, he is doing well from a spine standpoint; however, he continues to be on oral antibiotic therapy for suppression of osteomyelitis.  Per discussion with the patient, we recommended that the spinal hardware remain in place as we cannot take all of it out given that he has a vertebral  implant that could potentially harbor infection and cannot be removed safely.  We discussed with the patient that if he is insistent upon cessation of oral antibiotics, that he could give this a trial and see if the infection recurs.  We also discussed that at this point, we believe his bowel and bladder function have recovered to the extent that could reasonably be expected following the operation.  Finally, he can follow up on an as-needed basis in Neurosurgery Clinic.          Addendum:  I have seen this patient and agree with this note. BULL DAILEY MD       As dictated by AURORA FLETCHER MD, PHD, PGY1 neurosurgery resident.             D: 2017 18:22   T: 2017 08:21   MT: MONICA      Name:     GIA CAMARGO   MRN:      [id number]        Account:      [id number]   :      1975           Service Date: 2017      Document: [id number]

## 2017-03-27 ENCOUNTER — TRANSFERRED RECORDS (OUTPATIENT)
Dept: HEALTH INFORMATION MANAGEMENT | Facility: CLINIC | Age: 42
End: 2017-03-27

## 2017-04-06 ENCOUNTER — HOSPITAL ENCOUNTER (OUTPATIENT)
Dept: WOUND CARE | Facility: CLINIC | Age: 42
Discharge: HOME | End: 2017-04-06
Attending: SURGERY | Admitting: SURGERY
Payer: COMMERCIAL

## 2017-04-06 DIAGNOSIS — G82.20 PARAPLEGIA (H): Primary | ICD-10-CM

## 2017-04-06 DIAGNOSIS — L76.34 POSTOPERATIVE SEROMA OF SUBCUTANEOUS TISSUE AFTER NON-DERMATOLOGIC PROCEDURE: ICD-10-CM

## 2017-04-06 PROCEDURE — 97597 DBRDMT OPN WND 1ST 20 CM/<: CPT

## 2017-04-06 PROCEDURE — A6197 ALGINATE DRSG >16 <=48 SQ IN: HCPCS

## 2017-04-06 PROCEDURE — A6212 FOAM DRG <=16 SQ IN W/BORDER: HCPCS

## 2017-04-06 NOTE — PROGRESS NOTES
[facility]      DATE OF VISIT:  04/06/2017.        Sacha Ndiaye is a 42-year-old paraplegic who is here today with his new wife and caregiver.  He just got  a couple of weekends ago.  He is here today for further evaluation of his remaining left groin wound as well as kind of a reevaluation of his left ischial wound, status post flap.  He had an ultrasound done at the [facility] and results showed a 14 x 9 x 0 mm disk of possible fluid in the IT area, but it really did not seem to be anything that was of concern.  They did recommend that we do a followup ultrasound and make sure that it is not progressing and I think we can schedule out for the next couple of months.  As far as the skin in this area, over the IT with a redo posterior thigh and gluteal flaps, and a little bit of a callus along the incision, but otherwise nothing open.  We gave him some Sween cream to use for that to keep the skin supple.  As far as the left groin, it is healing quite nicely.  They did, however, feel that maybe the Keragel was keeping things too moist so she switch to Silvercel on that and it is doing better now.  Will continue with the Silvercel for that area.  As far as wheelchair time, he is up now about 4 hours and then down for 2, then up for another 4-6 hours most days.  He starts to complain of head, neck and upper back pain by about a 4-hour ranjit.  He is wondering if he will be able to go back to work now as a .  I think he is doing as much activity at home as he would on the job so I think that would be fine.  Disability paperwork was filled out.  Obviously if things start to worsen, he can let us know and we can add limitations to his current work papers.  Interestingly, he has another wheelchair for work and that needs to be pressure mapped so this was facilitated.  He also had me check him sitting in his current wheelchair and the side guards are definitely too narrow since he has  gained quite a bit of weight since all of his surgeries and bed rest.  We are happy to write any orders or sign any orders that may come through as far as his chair adjustments are concerned.  We will see Gia back in a couple months.  Please see nursing notes for dimensions of the wound and vital signs which were normal.         REJI GUERRA MD             D: 2017 11:34   T: 2017 13:31   MT: BRYSON      Name:     GIA CAMARGO   MRN:      [id number]        Account:      [id number]   :      1975           Visit Date:   2017      Document: [id number]

## 2017-04-07 ENCOUNTER — TELEPHONE (OUTPATIENT)
Dept: WOUND CARE | Facility: CLINIC | Age: 42
End: 2017-04-07

## 2017-04-07 NOTE — TELEPHONE ENCOUNTER
Received call from Jes she is calling about coordinating his scheduled colonoscopy. She wants Dr. Harris's notes from the visit and wants an ok. Advised that while in hospital should have pressure reducing mattress and should not lay one side for too long. Advised he only has a wound on his anterior groin at this time.

## 2017-04-28 ENCOUNTER — TRANSFERRED RECORDS (OUTPATIENT)
Dept: HEALTH INFORMATION MANAGEMENT | Facility: CLINIC | Age: 42
End: 2017-04-28

## 2017-06-07 ENCOUNTER — TRANSFERRED RECORDS (OUTPATIENT)
Dept: HEALTH INFORMATION MANAGEMENT | Facility: CLINIC | Age: 42
End: 2017-06-07

## 2017-06-15 ENCOUNTER — HOSPITAL ENCOUNTER (OUTPATIENT)
Dept: WOUND CARE | Facility: CLINIC | Age: 42
Discharge: HOME | End: 2017-06-15
Attending: SURGERY | Admitting: SURGERY
Payer: COMMERCIAL

## 2017-06-15 PROCEDURE — 99211 OFF/OP EST MAY X REQ PHY/QHP: CPT

## 2017-06-15 NOTE — PROGRESS NOTES
[facility]      DATE OF VISIT:  06/15/2017.       Gia Camargo is a 42-year-old paraplegic gentleman who has become somewhat obese during all of his months and months of bed rest.  He is here for final followup after multiple surgeries for both recurrent left ischial wound and a chronic left anterior groin wound.  Both wounds continue to be healed.  He has been using Aquaphor on all the scars to keep them more supple and moisturized.  We did have him repeat an ultrasound that originally had showed a very small fluid collection in the IT area, but this has since resolved since the previous study.  He has been feeling good and working part-time sitting up for about 12-14 hours a day, doing his weight shifts.  He actually drove his car for 2-1/2 hours to get here today by himself and realizes he needs a transfer board for that even though he is fine with using his pickup truck.  We recommended that he look into talking with [facility] for possible Javed strips for a sliding board.  He could also talk to them about other friction reducing products if necessary.  At this point, he does not need to see us for followup anymore unless some problems develop.  I suggested that if he starts to feel crappy then he can see his PMD and get a CRP and ESR checked.  We will have him work for another month on a part-time level and then he can work himself up to full-time.  If he needs any paperwork they can send it to the clinic for signature.  Gia officially discharged from our clinic.         REJI GUERRA MD             D: 06/15/2017 11:15   T: 06/15/2017 14:44   MT: BRYSON      Name:     GIA CAMARGO   MRN:      [id number]        Account:      [id number]   :      1975           Visit Date:   06/15/2017      Document: [id number]

## 2017-08-08 ENCOUNTER — TELEPHONE (OUTPATIENT)
Dept: WOUND CARE | Facility: CLINIC | Age: 42
End: 2017-08-08

## 2017-08-08 DIAGNOSIS — Z98.890 S/P FLAP GRAFT: Primary | ICD-10-CM

## 2017-08-09 ENCOUNTER — TRANSFERRED RECORDS (OUTPATIENT)
Dept: HEALTH INFORMATION MANAGEMENT | Facility: CLINIC | Age: 42
End: 2017-08-09

## 2017-09-14 ENCOUNTER — HOSPITAL ENCOUNTER (OUTPATIENT)
Dept: WOUND CARE | Facility: CLINIC | Age: 42
Discharge: HOME | End: 2017-09-14
Attending: SURGERY | Admitting: SURGERY
Payer: COMMERCIAL

## 2017-09-14 VITALS
RESPIRATION RATE: 16 BRPM | TEMPERATURE: 97.5 F | HEART RATE: 75 BPM | SYSTOLIC BLOOD PRESSURE: 148 MMHG | DIASTOLIC BLOOD PRESSURE: 96 MMHG

## 2017-09-14 DIAGNOSIS — L89.324 DECUBITUS ULCER OF LEFT BUTTOCK, STAGE 4 (H): Primary | ICD-10-CM

## 2017-09-14 PROCEDURE — 11042 DBRDMT SUBQ TIS 1ST 20SQCM/<: CPT

## 2017-09-14 NOTE — PROGRESS NOTES
[facility]       REQUESTING PHYSICIAN:  None stated.        DATE OF SERVICE:  09/14/2017        HISTORY OF PRESENT ILLNESS:  Sacha Ndiaye is a 42-year-old paraplegic gentleman who we have known for quite some time now for a variety of pressure sores.  He is here today for a recurrent wound over the left ischial area where he has already had at least 2 flap procedures.  He states that after his last surgery he was last mapped probably in June.  He currently uses a Wazoo Sportso hybrid that he got from [employer].  Then he went back to work part-time and noticed a scab over the medial aspect of the posterior thigh incision sometime in August.  Using Aquaphor, the scab came off and he has had a small little wound since then.  The patient did have a CAT scan in early August to evaluate for seroma and this was not evident, although there was some stranding in the fat and the skin over the IT.  Since then, he has been trying to offload more, he also purchased Glidewear for his shorts and his cushion cover which is actually quite slippery but he is able to manage with this.  He feels that his transfers are fairly airborne although we noticed here that his transfers showed a fair amount of dragging or shear.  He does have a standing wheelchair at home but essentially the hydraulics are kaput and he is not able to use it.  He is hoping to get another one that they found online that costs about 3800, but it does look like it is completely upright and anything should really be formally evaluated by seeing a specialist.  As far as any kind of infection control, he has been on suppressive doxycycline and Cipro for his spinal hardware and Dr. Bennett from Infectious Disease has been following him for that.  To my knowledge, he has not been ill of late.      PHYSICAL EXAMINATION:  On exam today, he has well-healed scars from his posterior thigh flaps and gluteal flap on the left but posterior thigh flap, particularly over  the greater trochanter area is quite attenuated and widened.  Even the gluteal flap was a little bit wider and thicker than one would expect.  We also noted essentially a stage I wound over his IT again which is kind of in the middle of our most recent flap.  The wound is medial to that along the incision, near the scrotal base and when I went to curet the fibrinous looking debris there, the  curet actually poked through into a rather large pocket that is probably at least 6 cm in diameter.  It appeared to have a lining that would be suggestive of previous seroma, although nothing showed up on imaging.  It is possible that this is just lining of the previous flap surfaces that did not heal together.  Decision was made to open this wound wider so that we could do dressings and 10 mL 1% lidocaine with epinephrine was injected into the area and allowed to take hemostatic effect.  A #10 blade was used to incise the skin and subcutaneous tissue and actually a portion of the lining and this tissue was actually excised sharply.  He tolerated it fine since he is relatively insensate.  This entire procedure was of course done after obtaining informed consent and doing a timeout.  While tissues appear to be healthy inside, there is that lining appearance and the tissues are actually quite mobile so that depending on how he sits, I am sure the tissues that have pulled away from his previous flap are essentially just having skin over IT.  Hemostasis was achieved with digital pressure and silver nitrate.  At this point, we will try a dry A&D packing on a daily basis.  I suspect that the VAC might be difficult to get a seal since it is so close to the anus and the scrotal base.  I am not sure how effective this will be because there is that lining inside.  We will see how it goes for the next month here.  He has 1 month of [employer] left before he needs to go back fulltime so we will see him in early October before he has to decide  about work.  In the meantime, he should offload as much as possible.  I will send a referral to [facility] to do pressure mapping and seating eval for either standing frame or anything else.  I asked him if he can do his work as a  with a standing frame and seems to think that that would not be doable but then he could certainly do more paperwork from that vantage point.  Essentially, this is a recurrent stage IV left ischial decubitus with an overlying stage I area.  Please see nursing notes for dimensions of the wounds today.  We will see Rommel in a month.         REJI GUERRA MD             D: 2017 09:43   T: 2017 11:10   MT: CRIS      Name:     GIA CAMARGO   MRN:      [id number]        Account:      [id number]   :      1975           Visit Date:   2017      Document: [id number]

## 2017-10-12 ENCOUNTER — HOSPITAL ENCOUNTER (OUTPATIENT)
Dept: WOUND CARE | Facility: CLINIC | Age: 42
Discharge: HOME | End: 2017-10-12
Attending: SURGERY | Admitting: SURGERY
Payer: COMMERCIAL

## 2017-10-12 VITALS — TEMPERATURE: 98.4 F | SYSTOLIC BLOOD PRESSURE: 148 MMHG | HEART RATE: 82 BPM | DIASTOLIC BLOOD PRESSURE: 82 MMHG

## 2017-10-12 PROCEDURE — 97602 WOUND(S) CARE NON-SELECTIVE: CPT

## 2017-10-12 NOTE — PROGRESS NOTES
[facility]       DATE OF SERVICE:  10/12/2017       Sacha Ndiaye is a 42-year-old paraplegic gentleman who is here for follow up of his recurrent left ischial/pubic decubitus.  This is stage IV.  We have been having him use dry A&D gauze for packing and since his last visit, his wife sent a text showing a lot of stringy material coming out of the wound that she cleaned up for us.  The wound itself is relatively soft tissue but somewhat pale and looks fairly soggy or high drainage output.  It is down to bone, although it is not exposed bone.  After examining it more closely, I really feel that this is more along the pubic ramus rather than the ischial tuberosity itself.  Sacha has already had 2 posterior thigh flaps and 1 gluteal rotation flap by myself as well as some assistance from Dr. Elder for some bony cleanup for an anterior wound and then 2 procedures on his spine for Charcot spine problem with neurosurgery x2.  Since we are not sure exactly why this keeps happening other than the fact that this is an area of previous scar tissue and might have been an element of seroma formation, I think we really need to take a very close look at how he is sitting and what would be the best possible seating system for him.  He currently sits on a cushion from [employer] which is basically a Roho in the posterior aspect and then foam anteriorly.  He is due to be evaluated by [facility] not only for pressure mapping but also to help facilitate getting him back into his standing frame at home.  In the meantime, he is trying to offload by going home after 3 hours at work in the morning for a quick lunch and an hour in bed before going back for another 3 hours.  I am starting to wonder if his current cushion with the foam anteriorly is  causing some of this pressure over more of the pubic area.  He does have GlideWear that he got from [employer].  I am going to have them contact him to talk about their  "FlexForm seating system as well.  He would like them to call him on his cell which is [phone].  He has been somewhat concerned about his pelvis \"widening\" since all of his surgeries and really does not feel that this is at all due to his weight gain and loss.  He tried to go to [place] but apparently there was a wrong code for him being seen by their wound team down there and was wondering who could possibly evaluate him for something like this.  I am not exactly sure either, but we can see if there is a team or docs in the [facility] either at the  or possibly [facility] who might have some ideas.  I am not sure that our orthopedic people are appropriate for that.  At this point, will just wait to see what [facility] says about his mapping and then also see what Annette can offer with their FlexForm system and see Gia back on .  He will let us know if there are any other issues or concerns.        Please see nursing notes for dimensions of the wound which are 3.5 x 4.5 x 5.5 with tunnel at 12 o'clock at 4.3 cm and undermining at 2 that is 3.4 cm.  This is essentially a stage IV wound.  Please see nursing notes for vitals which were within normal limits today.         REJI GUERRA MD             D: 10/12/2017 10:08   T: 10/12/2017 11:30   MT: RODNEY      Name:     GIA CAMARGO   MRN:      [id number]        Account:      [id number]   :      1975           Visit Date:   10/12/2017      Document: [id number]    "

## 2017-10-17 ENCOUNTER — HOSPITAL ENCOUNTER (OUTPATIENT)
Dept: OCCUPATIONAL THERAPY | Facility: CLINIC | Age: 42
Setting detail: THERAPIES SERIES
End: 2017-10-17
Attending: SURGERY
Payer: COMMERCIAL

## 2017-10-17 PROCEDURE — 97542 WHEELCHAIR MNGMENT TRAINING: CPT | Mod: GO | Performed by: OCCUPATIONAL THERAPIST

## 2017-10-17 PROCEDURE — 40000132 ZZH STATISTIC OT SEATING/WHEELED MOBILITY VISIT: Performed by: OCCUPATIONAL THERAPIST

## 2017-10-17 NOTE — PROGRESS NOTES
SEATING AND WHEELED MOBILITY ASSESSMENT  10/17/17 1000   Quick Adds   Quick Adds Current Manual Wheelchair   General Information:   Rehab Discipline OT   [facility]   Service Outpatient;Occupational Therapy;Seating/Wheeled Mobility Evaluation   Height 6   Weight 190   Start Of Care Date 10/17/17   Referring Physician Cheryl Harris   Orders Evaluate And Treat As Indicated;Per Therapist Evaluation   Orders Date 09/14/17   Others Present at Evaluation LEVO rep   Patient/Caregiver Goals standing w/c   Rehabilitation Technology Supplier [facility]   Current Community Support Family/Friend Caregiver   Patient role/Employment history Employed  ()   Medical History   Onset Of Illness/injury Or Date Of Surgery 12/3/1993   Medical Diagnosis SCI T5   Medical History Charcot spine with hardware removal and addition of new hardware, 3 flap surgeries with new stage 4 pubic ramus wound   Current Manual Wheelchair   Age 3    Ti lite ZRA   Cushion Air Filled  (roho elite hybrid)   Wheelchair Back Solid Curved   Footrest Style v front   Settings Used Home;Work;Outdoor Community Mobility;Primary Means of Transportation   Condition Good   Manual Wheelchair Comments Needs another way to pressure releve due to chronic wounds   Home Accessibility   Living Environment House   Primary Entrance Exposed Ramp   All Rooms Wheelchair Accessible Yes   Community ADL   Transportation Van  (Truck)   Community Mobility Requirements Medical Appointments;Work;Shopping   Accessibility Requirements for Community Settings full time work as a    Cognitive/Visual/Hearing Status   Vision Intact   Hearing Intact   ADL Status   Feeding Independent   Grooming/Hygiene Independent   Dressing Independent   Toileting Independent;Incontinent;Catheter   Bathing Independent;Uses Equipment  (shower chair with pads)   Meal Preparation Independent   Home Management Independent   Balance   Unsupported Sitting Balance  Uses Upper Extremities for Balance   Sitting Balance in Chair Uses Upper Extremities for Balance   Standing Balance Physical Assist Required;Unable   Balance Comments standing frame at home as tolerated.  Less lately.   Ambulation   Ambulation Non Ambulatory   Transfers   Transfer Assist Independent   Transfer Method Squat Pivot/Scoot Boost   Wheelchair Ability   Wheelchair Ability Quick Adds Manual Chair;Wheelchair Use   Manual Wheelchair Propulsion   Manual Wheelchair Propulsion Independent   Wheelchair Use   Ability to Perform Weight Shifts Independent   Bed Confined Without Wheelchair? Yes   Hours in Wheelchair Daily 9   Hours Spent Alone Daily 0   Comments tolerates only a few hours at a time up in chair due to wound   Neuromuscular   History of Pressure Sores Yes   Current Pressure Sores Yes   Pressure Sore location pubic ramus   Pressure Sore type/size 4   Coordination UE Intact;LE Impaired   Tone Hypotonic   Sensory Deficits Reported chest down    Sensation on Seating Surface Absent per Report   Upper Extremities   UE ROM full rom   UE Strength 5/5   Pelvis   Anterior/Posterior Pelvis Position Anterior Tilt;Partially Flexible;Fixed   Trunk   Anterior/Posterior Trunk Position Increased Lumbar Lordosis;Fixed   Lower Extremities   Hip Position Neutral;Flexible   LE ROM full passive   LE Strength 0   Patient Measurements   Other see atp notes   Education Assessment   Barriers to Learning No Barriers   Preferred Learning Style Listening;Demonstration   Assessment/Plan   Criteria for Skilled Interventions Met Yes, Treatment Indicated   Treatment Diagnosis LB paralysis and chronic wounds   Therapy Frequency up to 4 sessions as needed   Predicted Duration of Therapy Intervention in 90 days   Planned Therapy Interventions Wheelchair Management/Propulsion Training   Planned Therapy Interventions Comments standing chair and custom cushion trials.   Risks and benefits of treatment have been explained Yes    Patient/family & other staff in agreement with plan of care Yes   Comments next session in clinic for better trials.   Session Time   Total Treatment Time 90   Total Session Time 90   Adult OT Eval Goals   OT Eval Goals (Adult) 1   OT Goal 1   Goal Identifier standing manual chair and custom cushion   Goal Description Patient to demonstrate a successful clinical trial of the recommended wheelchair   Target Date 01/15/18   Electronically signed by:  Lucila Ulrich OTR/L, ATP       Occupational Therapist, Assistive   [phone]      fax: [phone]      [email or web address]  Seating Clinic- [facility], [address]  [address]  [place] [address]

## 2017-10-20 ENCOUNTER — TELEPHONE (OUTPATIENT)
Dept: WOUND CARE | Facility: CLINIC | Age: 42
End: 2017-10-20

## 2017-10-20 DIAGNOSIS — L89.324 DECUBITUS ULCER OF LEFT BUTTOCK, STAGE 4 (H): ICD-10-CM

## 2017-10-20 NOTE — TELEPHONE ENCOUNTER
Patient called reporting more bloody drainage and odor with dressing changes. Patient given instructions  for vinegar soaks.

## 2017-10-25 DIAGNOSIS — L89.324 DECUBITUS ULCER OF LEFT BUTTOCK, STAGE 4 (H): ICD-10-CM

## 2017-11-02 DIAGNOSIS — L89.324 DECUBITUS ULCER OF LEFT ISCHIUM, STAGE 4 (H): ICD-10-CM

## 2017-11-02 RX ORDER — CIPROFLOXACIN 750 MG/1
TABLET, FILM COATED ORAL
Qty: 180 TABLET | Refills: 1 | Status: SHIPPED | OUTPATIENT
Start: 2017-11-02 | End: 2018-05-28

## 2017-11-16 ENCOUNTER — HOSPITAL ENCOUNTER (OUTPATIENT)
Dept: WOUND CARE | Facility: CLINIC | Age: 42
Discharge: HOME | End: 2017-11-16
Attending: SURGERY | Admitting: SURGERY
Payer: COMMERCIAL

## 2017-11-16 ENCOUNTER — HOSPITAL ENCOUNTER (OUTPATIENT)
Dept: OCCUPATIONAL THERAPY | Facility: CLINIC | Age: 42
Setting detail: THERAPIES SERIES
End: 2017-11-16
Attending: SURGERY
Payer: COMMERCIAL

## 2017-11-16 VITALS
HEART RATE: 100 BPM | SYSTOLIC BLOOD PRESSURE: 122 MMHG | TEMPERATURE: 98.1 F | RESPIRATION RATE: 16 BRPM | DIASTOLIC BLOOD PRESSURE: 65 MMHG

## 2017-11-16 DIAGNOSIS — G82.20 PARAPLEGIA (H): Primary | ICD-10-CM

## 2017-11-16 DIAGNOSIS — L89.43: ICD-10-CM

## 2017-11-16 PROCEDURE — 97602 WOUND(S) CARE NON-SELECTIVE: CPT

## 2017-11-16 PROCEDURE — 97542 WHEELCHAIR MNGMENT TRAINING: CPT | Mod: GO | Performed by: OCCUPATIONAL THERAPIST

## 2017-11-16 PROCEDURE — 40000132 ZZH STATISTIC OT SEATING/WHEELED MOBILITY VISIT: Performed by: OCCUPATIONAL THERAPIST

## 2017-11-16 NOTE — PROGRESS NOTES
[facility]       REQUESTING PHYSICIAN:  None stated.        DATE OF SERVICE:  11/16/2017       HISTORY OF PRESENT ILLNESS:  Sacha Ndiaye is a 42-year-old paraplegic gentleman who continues to struggle with a recurrent left pubic stage IV decubitus.  He has been doing dry AMD gauze with occasional acetic acid soaking every few days.  He was using [employer] but stopped because things were too slippery and his pants were falling off.  He is due to see Annette today so I have asked him to talk to them about possibly find some solutions for that.  It sounds like they are also helping with standing chair but he still needs some parts.  He did not really do anything with regard to seeing PM&R, ortho or Sister Jitendra or anybody like that after being refused at [facility].  He does feel that his chair is not as comfortable anymore and perhaps there is some sort of spinal shift going on since he feels like his pelvis is kind of like tilting or shifting.  I think perhaps seeing Dr. Welsh who was involved in his earlier spinal surgery with Dr. Parikh might be helpful in this regard.  I will send an email for Dr. Welsh and ask if this sounds like an appropriate referral.  As far as pressure mapping goes, he did remind us that his last mapping showed that there was quite a large area of pressure over the pubic area and they did let some air out that improved it but still I think this is an area of concern.  Hopefully, Annette will be able to address this as well.  At this point, will still continue with the dry AMD gauze and the acetic acid soaks.  Really the wound is quite clean but it is down to bone but the walls and soft tissue are soft with minimal scar and decent granulation tissue.  I will see Sacha back in January.  If he needs to be seen sooner than that he can come up and be seen by our PA.  The wound measures today are as follows:  Left pubis 5.3 x 2.9 x 2.5 with undermining from the 10 to 11 of 4.8 cm and a  tunnel at 11 o'clock of 5 cm.  His vitals are within normal limits other than heart rate of 100.         REJI GUERRA MD             D: 2017 11:52   T: 2017 13:07   MT: CRIS      Name:     GIA CAMARGO   MRN:      [id number]        Account:      [id number]   :      1975           Visit Date:   2017      Document: [id number]

## 2017-11-20 ENCOUNTER — TELEPHONE (OUTPATIENT)
Dept: WOUND CARE | Facility: CLINIC | Age: 42
End: 2017-11-20

## 2017-11-20 NOTE — TELEPHONE ENCOUNTER
Patient calling to report that the soonest he can get in to see an orthopedist is March 14, 2018.  He is on the list should there be a cancellation.

## 2017-11-21 ENCOUNTER — TELEPHONE (OUTPATIENT)
Dept: WOUND CARE | Facility: CLINIC | Age: 42
End: 2017-11-21

## 2017-11-21 DIAGNOSIS — N81.89 PELVIC FLOOR RELAXATION: Primary | ICD-10-CM

## 2017-11-21 DIAGNOSIS — G82.20 PARAPLEGIA (H): ICD-10-CM

## 2017-11-22 DIAGNOSIS — Z98.1 HISTORY OF SPINAL FUSION: ICD-10-CM

## 2017-11-22 DIAGNOSIS — G82.20 PARAPLEGIC SPINAL PARALYSIS (H): Primary | ICD-10-CM

## 2017-11-22 DIAGNOSIS — M14.68: ICD-10-CM

## 2017-11-22 DIAGNOSIS — S22.009A: ICD-10-CM

## 2017-11-22 DIAGNOSIS — M43.8X9 SAGITTAL PLANE IMBALANCE: ICD-10-CM

## 2017-11-29 ENCOUNTER — TELEPHONE (OUTPATIENT)
Dept: ORTHOPEDICS | Facility: CLINIC | Age: 42
End: 2017-11-29

## 2017-11-29 NOTE — TELEPHONE ENCOUNTER
Pt. Had an XR only appt. Today because pt. & wife wanted to be sure there is nothing wrong with pelvis.   See plastics 's dictations.     reviewed XR & stated negative from his standpoint & nothing that needs pelvis surgery.  F/U will be with .   had his partner  review case  & imaging & they discussed need for offloading Ulcer area in some way &  stated he would be available to  if she wanted a consult or assist if there was a surgery in future.  Informed pt. & wife & they agreed with plan to F/U with .   sent message to .  Call back prn.  Pt. Agreed.  V.O.R.B./Susu Knox RN.

## 2017-11-30 ENCOUNTER — TELEPHONE (OUTPATIENT)
Dept: WOUND CARE | Facility: CLINIC | Age: 42
End: 2017-11-30

## 2017-11-30 DIAGNOSIS — L89.43: ICD-10-CM

## 2017-11-30 DIAGNOSIS — G82.20 PARAPLEGIA (H): ICD-10-CM

## 2017-11-30 NOTE — TELEPHONE ENCOUNTER
Patient was calling to see if Dr. Harris reviewed his recent xrays. Information given to Dr. Harris, she wants Patient to follow up with PM&R to assess his pelvic tilt. Patient was called and given the number to call to make an apt.

## 2017-12-08 NOTE — PROGRESS NOTES
REQUISITION AND JUSTIFICATION FOR DURABLE MEDICAL EQUIPMENT    Patient Name:  Sacha Ndiaye  MR #:  [id number]  :  1975  Age/Gender:  42 year old male  Visit Date:  Patient seen for seating and wheeled mobility evaluation by Lucila Ulrich OTR/L, ATP and ATP from [facility] on 10/17/17.      CLINICAL CRITERIA FOR MOBILITY ASSISTIVE EQUIPMENT  Coverage Criteria Per Local Coverage Determination    A) Sacha dNiaye has mobility limitations due to T5 SCI that significantly impairs his ability to participate in all of his mobility-related activities of daily living (MRADL). Specifically affected are toileting (being able to get there in time to prevent accidents), dressing, and bathing (getting into the bathroom of designated place). Current equipment used is 2 year old Ti Lite ZRA. This patient needs the new equipment requested to be able to allow for pressure relief in order to assist with full time w/c use and prevent/heal chronic pressure ulcers. Please see additional documentation in the seating and wheeled mobility report for details.   Sacha had a successful clinical trial here, and also a successful trial at home with the recommended equipment. He is very willing and physically / cognitively able to use the recommended equipment to assist his with mobility related activities of daily living and general mobility.    B) Sacha's mobility limitation cannot be sufficiently and safely resolved by the use of an appropriately fitted cane or walker because he is non ambulatory s/p paralysis from injury.. Strength of legs is 0/5 for one maximal repetition. Fatigue also  impacts this patient's ability to ambulate, regardless of the gait aid.    C) Sacha does not have sufficient upper extremity function to self-propel an optimally-configured manual wheelchair in his home to perform MRADLs during a typical day due to limitations in strength, endurance, range of motion, and coordination. Distance and time to propel a  "light weight manual wheelchair NT as he is a full time ultra lightweight manual w/c user since injury.  Strength of arms is 5/5.    D) The need for this equipment is LIFETIME.     RECOMMENDATIONS FOR MOBILITY BASE, SEATING SYSTEM AND COMPONENTS  LEVA LAE gas strut assist to stand manual wheelchair - this wheelchair is appropriate and necessary for Sacha to be able to assist and complete a standing program while participating in full time work in order to prevent and heal current pressure ulcers.  He is currently on a restricted work schedule due to chronic pressure ulcers that have resulted in 3 flap surgeries.  He would like to work more, but is limited due to Dr. Polk for need to stay on wound that is currently on his pubic ramus.  This chair will allow for increased work time due to ability to stand and offload pressure. Sacha has mobility limitations impacting his ability to ambulate independently or with any ambulation aid. He has had a thorough clinical evaluation, and this manual wheelchair is the best option for this patient to prevent further surgeries/hospitalization and allow for increased productivity at work.    Steel hand rim- needed to assist with ease of propulsion as this is his preferred method of  with w/c use.    6\" camber- Patient requesting this in order to make the chair have wider SIDDHARTH and thus sturdier with standing and functional UE use.    High pressure pnuematic tires - for jarring free, soft roll mobility.  Knee support/Abductor- Scoot requires the abductor knee block with swing away hardware to abduct the lower extremities to a neutral position while seated and provide an anterior support component for reaching using standing feature.  This swing away hardware is necessary for transfers in/out of power chair.     Calf strap- prevents lower legs migrating rearward     Chest support- Sacha requires the chest support to provide additional safety and stability, particularly necessary " when standing to prevent anterior movement.    2pc flip up footplates- Required with individual adjustability (angle and height) to accommodate bilateral leg length and asymmetries present.  The flip-up features allows for them to fold completely out of the way for safe transfers.      Solid curved and padded back support - firm and contoured back support is needed to support Sacha's thoracolumbar area in an upright and midline position, with appropriate support pads as needed. This back support is essential to provide sufficient posterior support to maximize his postural alignment and minimize his tendency to develop scoliosis and other secondary complications.  This equipment is reasonable and necessary with reference to accepted standards of medical practice and treatment of this patient's condition and is not being recommended as a convenience item. Without this recommended equipment, he is highly likely to sustain injuries from falls, develop pressure sores or postural compensation, and/or be bed confined, which those costs far exceed the cost of the requested equipment.    Electronically signed by:  Lucila SALINAS, ATP       Occupational Therapist, Assistive   [phone]      fax: [phone]      [email or web address]  Seating Clinic- [facility], [place] [address]  December 8, 2017    I have read and concur with the above recommendations.    Physician Printed Name __________________________________________    Physician SIgnature  _____________________________________________    Date of SIgnature ______________________________    Physician Phone  ______________________________

## 2017-12-08 NOTE — ADDENDUM NOTE
Encounter addended by: Lucila Ulrich OT on: 12/8/2017 12:21 PM<BR>     Actions taken: Sign clinical note

## 2017-12-12 ENCOUNTER — TELEPHONE (OUTPATIENT)
Dept: PHYSICAL MEDICINE AND REHAB | Facility: CLINIC | Age: 42
End: 2017-12-12

## 2017-12-12 NOTE — TELEPHONE ENCOUNTER
Received PMR referral from Dr Harris. Left message on patient's voice with PMR phone number to call to schedule.

## 2017-12-21 ENCOUNTER — MEDICAL CORRESPONDENCE (OUTPATIENT)
Dept: HEALTH INFORMATION MANAGEMENT | Facility: CLINIC | Age: 42
End: 2017-12-21

## 2018-01-04 ENCOUNTER — HOSPITAL ENCOUNTER (OUTPATIENT)
Dept: WOUND CARE | Facility: CLINIC | Age: 43
Discharge: HOME | End: 2018-01-04
Attending: SURGERY | Admitting: SURGERY
Payer: COMMERCIAL

## 2018-01-04 VITALS — HEART RATE: 109 BPM | TEMPERATURE: 97.4 F | SYSTOLIC BLOOD PRESSURE: 128 MMHG | DIASTOLIC BLOOD PRESSURE: 75 MMHG

## 2018-01-04 DIAGNOSIS — L89.324 DECUBITUS ULCER OF LEFT BUTTOCK, STAGE 4 (H): ICD-10-CM

## 2018-01-04 PROCEDURE — 97602 WOUND(S) CARE NON-SELECTIVE: CPT

## 2018-01-04 NOTE — IP AVS SNAPSHOT
MRN:[id number]                      After Visit Summary   1/4/2018    Sacha Ndiaye    MRN: [id number]           Visit Information        Provider Department      1/4/2018 10:00 AM Cheryl Harris MD [facility] [place]           Review of your medicines      UNREVIEWED medicines. Ask your doctor about these medicines        Dose / Directions    acetaminophen 325 MG tablet   Commonly known as:  TYLENOL   Used for:  Decubitus ulcer of left ischium, stage 4 (H)        Dose:  650 mg   Take 2 tablets (650 mg) by mouth every 4 hours as needed for other (surgical pain)   Quantity:  100 tablet   Refills:  0       bisacodyl 10 MG Suppository   Commonly known as:  DULCOLAX   Used for:  Decubitus ulcer of left ischium, stage 4 (H)        Dose:  10 mg   Place 1 suppository (10 mg) rectally daily as needed for constipation   Quantity:  30 suppository   Refills:  0       ciprofloxacin 750 MG tablet   Commonly known as:  CIPRO   Used for:  Decubitus ulcer of left ischium, stage 4 (H)        TAKE ONE TABLET BY MOUTH EVERY 12 HOURS   Quantity:  180 tablet   Refills:  1       doxycycline 100 MG capsule   Commonly known as:  VIBRAMYCIN        Dose:  100 mg   Take 1 capsule (100 mg) by mouth 2 times daily   Quantity:  180 capsule   Refills:  1       FIBER SELECT GUMMIES PO        Dose:  1 tablet   Take 1 tablet by mouth   Refills:  0       MULTIVITAMINS PLUS ZINC Caps   Used for:  Malnutrition (H)        Dose:  1 tablet   Take 1 tablet by mouth daily   Refills:  0       oxybutynin chloride 15 MG Tb24   Used for:  Decubitus ulcer of left ischium, stage 4 (H)        Dose:  30 mg   Take 2 tablets (30 mg) by mouth daily   Quantity:  30 tablet   Refills:  0       PANOXYL 2.5 % Liqd   Generic drug:  benzoyl peroxide        Dose:  1 Application   Externally apply 1 Application topically daily Hold and notify prn excess erythema   Refills:  0       polyethylene glycol Packet   Commonly known  as:  MIRALAX/GLYCOLAX        Dose:  17 g   Take 17 g by mouth daily as needed for constipation   Quantity:  7 packet   Refills:  0       PROBIOTIC DAILY PO        Dose:  1 tablet   Take 1 tablet by mouth   Refills:  0       sodium phosphate 7-19 GM/118ML rectal enema   Used for:  Decubitus ulcer of left ischium, stage 4 (H)        Dose:  1 enema   Place 1 Bottle (1 enema) rectally every 48 hours   Quantity:  1 Bottle   Refills:  0         CONTINUE these medicines which have NOT CHANGED        Dose / Directions    * order for DME   Used for:  Paraplegia (H)        Equipment being ordered: Evaluate for appropriate length/ width wheelchair axle tube spacer.  Chair is too narrow following weight gain.   Quantity:  1 each   Refills:  0       * order for DME   Used for:  Paraplegia (H)        Equipment being ordered:   Wheelchair cushion mapping and seating eval   Quantity:  1 Device   Refills:  0       * order for DME   Used for:  Decubitus ulcer of left buttock, stage 4 (H)        [facility] Phone [phone] Fax [phone] Wen Mclean to Pressure map current wheelchair as well as Patient was referred to [facility] for standing wheelchair assist with evaluation for this   Quantity:  1 Device   Refills:  0       * order for DME   Used for:  Decubitus ulcer of left buttock, stage 4 (H)        [facility] Fax [phone] Secondary Dressing: saline flush 10 ml qty: 60 Secondary dressing 2' Medipore Tape Qty 2 roll Length of Need: 1 month Frequency of dressing change: daily   Quantity:  30 days   Refills:  0       * order for DME   Used for:  Decubitus ulcer of left buttock, stage 4 (H)        [facility] Fax [phone] Secondary Dressing AMD Kerlix Qty 10 rolls Length of Need: 1 month Frequency of dressing change: daily   Quantity:  30 days   Refills:  0       * order for DME   Used for:  Paraplegia (H), Pressure ulcer of contiguous region involving buttock and hip, stage 3, unspecified  laterality (H)        Equipment being ordered [facility] Fax [phone]  Secondary Dressing ABD Pad: 30 Secondary Dressing All Purpose Gauze Sponges: 1 loaf Length of Need: 1 month Frequency of dressing change: daily   Quantity:  30 days   Refills:  0       * Notice:  This list has 6 medication(s) that are the same as other medications prescribed for you. Read the directions carefully, and ask your doctor or other care provider to review them with you.             Protect others around you: Learn how to safely use, store and throw away your medicines at www.disposemymeds.org.         Follow-ups after your visit        Your next 10 appointments already scheduled     Jan 04, 2018 10:00 AM CST   Return Visit with Cheryl Harris MD   [facility] [place] ([facility])    [address]  [address] [place] [address]   [phone]            Mar 15, 2018 12:00 PM CDT   (Arrive by 11:30 AM)   NEW SPINE with Mack Welsh MD   [facility] ([facility])    [address] [place] [address]  [address] [facility] [id number] [phone]            Mar 22, 2018 12:30 PM CDT   XR SPINE COMPLETE 2 VIEWS with UCXR1   [facility] [place] [facility] ([facility])    [address] [place] [address]  [address] [facility] [id number] [phone]           Please bring a list of your current medicines to your exam. (Include vitamins, minerals and over-thecounter medicines.) Leave your valuables at home.  Tell your doctor if there is a chance you may be pregnant.  You do not need to do anything special for this exam.            Mar 22, 2018  1:00 PM CDT   (Arrive by 12:45 PM)   Return Visit with Barbara Parikh MD   [facility] Neurosurgery ([facility])    [address] [place] [address]  [address] [facility] [id number] [phone]            May 02, 2018 10:20 AM CDT   (Arrive by 10:05  "AM)   New Patient Visit with Sacha Rivera MD   [facility] ([facility] [place])    [address] [facility]  [address] [facility] [address] [phone]               Care Instructions         Additional Information About Your Visit        MyChart Information     [employer] lets you send messages to your doctor, view your test results, renew your prescriptions, schedule appointments and more. To sign up, go to [email or web address] . Click on \"Log in\" on the left side of the screen, which will take you to the Welcome page. Then click on \"Sign up Now\" on the right side of the page.     You will be asked to enter the access code listed below, as well as some personal information. Please follow the directions to create your username and password.     Your access code is: [email or web address]  Expires: 2018 11:31 AM     Your access code will  in 90 days. If you need help or a new code, please call your [place] clinic or [phone].        Care EveryWhere ID     This is your Care EveryWhere ID. This could be used by other organizations to access your [place] medical records  [id number]        Your Vitals Were     Blood Pressure Pulse Temperature             128/75 109 97.4  F (36.3  C) (Tympanic)          Primary Care Provider Office Phone # Fax #    Celestine Jerry [phone]      Equal Access to Services     [facility] Center[facility]SE ARMANDO AH: Hadii aad ku hadasho Soomaali, waaxda luqadaha, qaybta kaalmada adeegyada, raj ortega . So [facility] [phone].    ATENCIÓN: Si habla español, tiene a townsend disposición servicios gratuitos de asistencia lingüística. Llame al [phone].    We comply with applicable federal civil rights laws and [place] laws. We do not discriminate on the basis of race, color, national origin, age, disability, sex, sexual orientation, or gender identity.            Thank you!     Thank you for choosing [facility] for your care. Our goal is always to provide you with excellent care. Hearing back from our patients is one way we can continue to improve our services. Please take a few minutes to complete the written survey that you may receive in the mail after you visit with us. Thank you!             Medication List: This is a list of all your medications and when to take them. Check marks below indicate your daily home schedule. Keep this list as a reference.      Medications           Morning Afternoon Evening Bedtime As Needed    acetaminophen 325 MG tablet   Commonly known as:  TYLENOL   Take 2 tablets (650 mg) by mouth every 4 hours as needed for other (surgical pain)                                bisacodyl 10 MG Suppository   Commonly known as:  DULCOLAX   Place 1 suppository (10 mg) rectally daily as needed for constipation                                ciprofloxacin 750 MG tablet   Commonly known as:  CIPRO   TAKE ONE TABLET BY MOUTH EVERY 12 HOURS                                doxycycline 100 MG capsule   Commonly known as:  VIBRAMYCIN   Take 1 capsule (100 mg) by mouth 2 times daily                                FIBER SELECT GUMMIES PO   Take 1 tablet by mouth                                MULTIVITAMINS PLUS ZINC Caps   Take 1 tablet by mouth daily                                * order for DME   Equipment being ordered: Evaluate for appropriate length/ width wheelchair axle tube spacer.  Chair is too narrow following weight gain.                                * order for DME   Equipment being ordered:   Wheelchair cushion mapping and seating eval                                * order for DME   [facility] Phone [phone] Fax [phone] Wen Mclean to Pressure map current wheelchair as well as Patient was referred to [facility] for standing wheelchair assist with evaluation for this                                * order for DME   [facility] Fax [phone] Secondary Dressing: saline  flush 10 ml qty: 60 Secondary dressing 2' Medipore Tape Qty 2 roll Length of Need: 1 month Frequency of dressing change: daily                                * order for DME   [facility] Fax [phone] Secondary Dressing AMD Kerlix Qty 10 rolls Length of Need: 1 month Frequency of dressing change: daily                                * order for DME   Equipment being ordered [facility] Fax [phone]  Secondary Dressing ABD Pad: 30 Secondary Dressing All Purpose Gauze Sponges: 1 loaf Length of Need: 1 month Frequency of dressing change: daily                                oxybutynin chloride 15 MG Tb24   Take 2 tablets (30 mg) by mouth daily                                PANOXYL 2.5 % Liqd   Externally apply 1 Application topically daily Hold and notify prn excess erythema   Generic drug:  benzoyl peroxide                                polyethylene glycol Packet   Commonly known as:  MIRALAX/GLYCOLAX   Take 17 g by mouth daily as needed for constipation                                PROBIOTIC DAILY PO   Take 1 tablet by mouth                                sodium phosphate 7-19 GM/118ML rectal enema   Place 1 Bottle (1 enema) rectally every 48 hours                                * Notice:  This list has 6 medication(s) that are the same as other medications prescribed for you. Read the directions carefully, and ask your doctor or other care provider to review them with you.

## 2018-01-04 NOTE — IP AVS SNAPSHOT
[facility] [place] [facility] [place]    [address] [facility] [address] [place] [address]    Phone:  [phone]                                       After Visit Summary   1/4/2018    Sacha Ndiaye    MRN: [id number]           After Visit Summary Signature Page     I have received my discharge instructions, and my questions have been answered. I have discussed any challenges I see with this plan with the nurse or doctor.    ..........................................................................................................................................  Patient/Patient Representative Signature      ..........................................................................................................................................  Patient Representative Print Name and Relationship to Patient    ..................................................               ................................................  Date                                            Time    ..........................................................................................................................................  Reviewed by Signature/Title    ...................................................              ..............................................  Date                                                            Time

## 2018-01-04 NOTE — PROGRESS NOTES
[facility]        DATE OF SERVICE:  01/04/2018      Sacha Ndiaye is a 42-year-old paraplegic gentleman who is here today actually with one of the representatives from [place].  Sacha is here to follow up on his recurrent chronic left kind of ischial/pubic decubitus.  He has just been using dry AMD and ABDs.  He feels like the size may be somewhat smaller but certainly has been less drainage.  Unfortunately, he also had recurrence of his left kind of inguinal crease hip anterior wound from wearing jeans for a couple days.  This we tried to selectively debride with a curet, but it is quite adherent so we are just going to put a DuoDERM on that and see if he can heal it up just by protecting it from further injury.  That one measures 3 x 2 x 0.1 cm today.  The left kind of IT pubic wound is very clean and today measures 4.5 x 1.5 x 2.3 cm with a tunnel at 11 o'clock position of 7.5 cm and undermining of about 4.2 cm.  This also tracked along the pubic ramus towards the pubic symphysis at least 6 cm.  There is some kind of a rubbery scarred granulation tissue in the base covering the bone.  While we tried to send Sacha to see Dr. Welsh to see if there is anything about his spinal and pelvic alignment that could be adding to our problems with this recurrence since we have already done at least a couple flaps to this area, they felt that there is nothing to be done from their perspective and he did get scheduled with Sacha Rivera in PM&R at the  but not until May.  In the meantime, he has been on FlexForm seating for the last couple weeks and [place] is working very closely with him as far as even having made a customized GlideWear pad for his truck seat since he feels like there has been some drag or injury when he returns to get in and out of his vehicle.  He did try the GlideWear undies but they are too slippery and he could not keep his pants on, so he is not using that.  He does, however, have the  GlideWear cover on the FlexForm seating.  They had to do some adjustments to the wheelchair back so that he was not riding on the posterior edge of the seat itself and then that required an extension of his seat bottom and Gia being a  was able to extend that, so that is not so much of a problem anymore but he is hoping to replace the back with something a little bit more sturdy.  At this point, we will continue with the dry AMD for the large posterior decub.  Will use a DuoDERM for the anterior hip and will see Gia back on .  His vitals today were within normal limits other than heart rate 109.         REJI GUERRA MD             D: 2018 10:44   T: 2018 11:27   MT: RODNEY      Name:     GIA CAMARGO   MRN:      [id number]        Account:      [id number]   :      1975           Visit Date:   2018      Document: [id number]

## 2018-01-05 ENCOUNTER — TELEPHONE (OUTPATIENT)
Dept: WOUND CARE | Facility: CLINIC | Age: 43
End: 2018-01-05

## 2018-01-05 DIAGNOSIS — L89.324 DECUBITUS ULCER OF LEFT BUTTOCK, STAGE 4 (H): ICD-10-CM

## 2018-01-05 NOTE — TELEPHONE ENCOUNTER
Patient called requesting more 4x4 Mepilex borders, new order placed yesterday for 3x3 thin duoderm patient states that duoderm doesn't stick and would like Mepilex instead. Order from yesterday modified to reflect this change, [facility] notified of the change and new order sent.

## 2018-01-22 DIAGNOSIS — T84.7XXA: Primary | ICD-10-CM

## 2018-01-24 RX ORDER — DOXYCYCLINE 100 MG/1
100 CAPSULE ORAL 2 TIMES DAILY
Qty: 60 CAPSULE | Refills: 2 | Status: SHIPPED | OUTPATIENT
Start: 2018-01-24 | End: 2018-05-17

## 2018-01-26 NOTE — TELEPHONE ENCOUNTER
APPT INFO    Date /Time: 3/15/18 12PM   Reason for Appt: Spinal Stenosis   Ref Provider/Clinic: Dr. Harris   Are there internal records? Yes/No?  IF YES, list clinic names: Yes-     [facility]  [facility]    Irrigation and Debridement Spine op-note 9/16/15 in epic.    Imaging in pacs:  XR Spine 11/29/17, 3/23/17  XR T Spine 3/23/17, 10/14/16  XR Leg Length 3/23/17  2015 spine imaging   Are there outside records? Yes/No? no   Patient Contact (Y/N) & Call Details: No - Patient is referred. All records are in Epic/PACS.      Action: Chart reviewed

## 2018-02-01 ENCOUNTER — HOSPITAL ENCOUNTER (OUTPATIENT)
Dept: WOUND CARE | Facility: CLINIC | Age: 43
Discharge: HOME | End: 2018-02-01
Attending: SURGERY | Admitting: SURGERY
Payer: COMMERCIAL

## 2018-02-01 VITALS — DIASTOLIC BLOOD PRESSURE: 69 MMHG | SYSTOLIC BLOOD PRESSURE: 112 MMHG | TEMPERATURE: 98.2 F | HEART RATE: 90 BPM

## 2018-02-01 DIAGNOSIS — L89.324 DECUBITUS ULCER OF LEFT BUTTOCK, STAGE 4 (H): ICD-10-CM

## 2018-02-01 PROCEDURE — 97597 DBRDMT OPN WND 1ST 20 CM/<: CPT | Performed by: SURGERY

## 2018-02-01 PROCEDURE — 97602 WOUND(S) CARE NON-SELECTIVE: CPT

## 2018-02-01 PROCEDURE — 27211076 ZZH DRESSING FIBRACOL 4X4 3/8 INCH

## 2018-02-01 PROCEDURE — A6212 FOAM DRG <=16 SQ IN W/BORDER: HCPCS

## 2018-02-01 NOTE — PROGRESS NOTES
Visit Date:   02/01/2018      [facility]      This is a 42-year-old paraplegic gentleman who is here today for further evaluation and treatment of his left ischial/pubic decubitus.  He has been using dry AMD gauze on a daily basis.  He notes that his drainage has been decreasing and that his wife, who packs his wound, feels that perhaps they are packing less material into the defect.      On exam, there still tracking along the ischial ramus to the ischial tuberosity, which is pretty smooth, but over the more distal portion that is headed towards the pubis, there is more growth of irregular granulation tissue on the bone, which would suggest to us that perhaps he is getting better at off-loading that area.  He is getting used to his FlexForm wheelchair from [employer] and really feels that the majority of his weight is being born on his anterior thighs.  They also set him up with a car seat cover, and that seems to be working as well as far shear forces when he transfers in and out of his car.  As far as his wheelchair back is concerned, he is still waiting for that, although it is in process.  With regard to his left anterior hip wound and the old scar in the crevice, he has been avoiding tight jeans, and they were treating that with some collagen they have left over, but they ran out.  It still has a filmy layer, and that was curetted selectively with a small curet today.  We will continue with the collagen for that, either Fibracol or some acceptable alternative.  We will continue with the dry AMD for the IT pubic wound.  He is due to see Sacha Rivera in May, and I think we just want Dr. Rivera to address any other factors that might be causing a recurrence of this wound in this area or if there is any other possible options that may be unrelated to any kind of pelvic or spinal surgery.      Dimensions of the wounds today are as follows:  Left anterior hip 2 x 0.7 x 0.1 cm.  The left IT pubic 5  x 1.1 x 1.5 cm with undermining of 5.3 cm from the 6 o'clock to 12 o'clock position.  Gia's vitals were within normal limits, other than a heart rate of 90 today.      We will see him back on 03/15/2018.         REJI GUERRA MD             D: 2018   T: 2018   MT: NTS      Name:     GIA CAMARGO   MRN:      [id number]        Account:      [id number]   :      1975           Visit Date:   2018      Document: [id number]

## 2018-02-09 ENCOUNTER — TELEPHONE (OUTPATIENT)
Dept: INFECTIOUS DISEASES | Facility: CLINIC | Age: 43
End: 2018-02-09

## 2018-02-09 DIAGNOSIS — L89.90 DECUBITAL ULCER: Primary | ICD-10-CM

## 2018-02-09 NOTE — TELEPHONE ENCOUNTER
Patient is calling regarding upcoming labs that are needed for his ID apt in April.   Has an appt on 4/12/2018 with ID.    Is requesting to have labs drawn at local clinic prior to appt in April with ID if appropriate.   Lab orders can be sent to [facility] in [place].   Phone # to Clinic is [phone].  Pt is requesting that a voicemail be left for him when orders faxed at [phone].  Thanks   Beverly Samuel LPN

## 2018-02-14 NOTE — TELEPHONE ENCOUNTER
Abigail Bennett MD   You 19 hours ago (1:34 PM)                 Kameron Mendenhall,     It would be very useful to have a creatinine and a CRP before his appointment.  We do not really need anything more extensive. Thanks!     -AML (Routing comment)                        You   Abigail Bennett MD 2 days ago                   Hi Dr. Bnenett- What labs do you want Sacha to get done prior to his appt with you? Thanks!   Za (Routing comment)               Faxed orders to clinic at [phone]. LVM with pt to let him know orders were faxed.  Za Mcneill RN

## 2018-02-16 ENCOUNTER — TRANSFERRED RECORDS (OUTPATIENT)
Dept: HEALTH INFORMATION MANAGEMENT | Facility: CLINIC | Age: 43
End: 2018-02-16

## 2018-03-12 DIAGNOSIS — L89.324 DECUBITUS ULCER OF LEFT BUTTOCK, STAGE 4 (H): ICD-10-CM

## 2018-03-15 ENCOUNTER — PRE VISIT (OUTPATIENT)
Dept: ORTHOPEDICS | Facility: CLINIC | Age: 43
End: 2018-03-15

## 2018-03-15 ENCOUNTER — HOSPITAL ENCOUNTER (OUTPATIENT)
Dept: WOUND CARE | Facility: CLINIC | Age: 43
Discharge: HOME | End: 2018-03-15
Attending: SURGERY | Admitting: SURGERY
Payer: COMMERCIAL

## 2018-03-15 VITALS — DIASTOLIC BLOOD PRESSURE: 69 MMHG | HEART RATE: 107 BPM | TEMPERATURE: 96.7 F | SYSTOLIC BLOOD PRESSURE: 107 MMHG

## 2018-03-15 PROCEDURE — 97597 DBRDMT OPN WND 1ST 20 CM/<: CPT

## 2018-03-15 PROCEDURE — 11042 DBRDMT SUBQ TIS 1ST 20SQCM/<: CPT

## 2018-03-15 PROCEDURE — A6209 FOAM DRSG <=16 SQ IN W/O BDR: HCPCS

## 2018-03-15 NOTE — PROGRESS NOTES
Visit Date:   03/15/2018      [facility] VISIT NOTE       DATE OF VISIT:  03/15/2018      HISTORY OF PRESENT ILLNESS:  This is a 42-year-old paraplegic gentleman who is here today for further evaluation and treatment of his left pubic ramus ischial stage IV decubitus ulcer as well as stage II over the left anterior iliac crest.  He has been using some collagen samples for the iliac crest and then dry AMD for the left pubic IT region.  Overall, I think he is pretty much status quo.  He states he has a little bit of drainage at the end of the day or when he wakes up in the morning, otherwise it is not much.  His wheelchair back issue with cane length measurement is being addressed.  He still is due to visit with Sacha wilkins in PMNR at  on 05/11/2018 to see if he any insight as to why this particular location wound continues to be a problem for Sacha now that he is on a flex form and using GlideWear for both the car as well as his wheelchair cover.  Overall, he has been in good health.  Interestingly, the left pubic IT wound has a fair amount of kind of edematous fronds tongues or grape-like growth coming from the base of the wound.  These were sharply excised with use of Bovie into and including the subcutaneous layer.  The film over the left hip wound anterior hip wound was selectively debrided with a curette.  This was of course done after gaining written consent from patient.  He did have 4% topical Lidocaine applied to both areas prior to that by our CMA staff as per protocol.  He tolerated the procedures well, although he is somewhat sensate for the left anterior hip.  I would like to try some PolyMem for that and see if that helps debride that.  He is wearing tight jeans that might have caused the wound in the first place, but this is within old scar tissue.  We will continue with dry AMD for the posterior wound.  We will just see if removing some of the excess tissue actually is  going to allow more normal granulation tissue to occur.      Wound measurements as follows:  Left pubic IT:  6.7 x 1 x 3.9 with undermining of 4.6 from the 9 to 11 o'clock position.  Left anterior hip:  1.1 x 0.9 x 0.1 cm.        Gia's vitals were within normal limits other than heart rate of 107 today.         REJI GUERRA MD             D: 03/15/2018   T: 03/15/2018   MT: OMAYRA      Name:     GIA CAMARGO   MRN:      [id number]        Account:      [id number]   :      1975           Visit Date:   03/15/2018      Document: [id number]

## 2018-03-15 NOTE — MR AVS SNAPSHOT
MRN:[id number]                      After Visit Summary   3/15/2018    Sacha Ndiaye    MRN: [id number]           Visit Information        Provider Department      3/15/2018  9:30 AM Cheryl Harris MD [facility]        Your next 10 appointments already scheduled     Mar 15, 2018 12:00 PM CDT   (Arrive by 11:30 AM)   NEW SPINE with Mack Welsh MD   [facility] ([facility])    [address]  4th Floor  [facility] [address] [phone]            Mar 22, 2018 12:30 PM CDT   XR SPINE COMPLETE 2 VIEWS with [id number]   [facility] ([facility])    [address] [place] [address]  1st Floor  [facility] [id number] [phone]           Please bring a list of your current medicines to your exam. (Include vitamins, minerals and over-thecounter medicines.) Leave your valuables at home.  Tell your doctor if there is a chance you may be pregnant.  You do not need to do anything special for this exam.            Mar 22, 2018  1:00 PM CDT   (Arrive by 12:45 PM)   Return Visit with Barbara Parikh MD   [facility] ([facility])    [address] [place] [address]  3rd Floor  [facility] [address] [phone]            Apr 12, 2018 10:00 AM CDT   (Arrive by 9:45 AM)   Return Visit with Abigail Bennett MD   [facility] ([facility])    [address] [place] [address]  [address]  [facility] [address] [phone]            May 02, 2018 10:20 AM CDT   (Arrive by 10:05 AM)   New Patient Visit with Sacha Rivera MD   [facility] ([facility])    [address] [place] [address]  3rd Floor  [facility] [address] [phone]                Further instructions from your care team             [facility]  [address] [facility] [address], [facility] [address]  Appointment Phone [phone] Nurse Advisors [phone]    Sacha Ndiaye      1975    Wound Dressing Change to left IT/pubic area  Cleanse wound and surrounding skin with: wound cleanser  Lightly pack wound with dry AMD gauze (antimicrobial dressing) to fill cavity.  Cover wound with ABD pad  Change dressing daily.        Wound Dressing Change left anterior groin  Cleanse wound and surrounding skin with: wound cleanser  Cover wound with Polymem, secure with 2\" medipore tape  Change dressing daily     Bull Harris M.D.. March 15, 2018    Call us at [phone] if you have any questions about your wounds, have redness or swelling around your wound, have a fever of 101 or greater or if you have any other problems or concerns. We answer the phone Monday through Friday 8 am to 4 pm, please leave a message as we check the voicemail frequently throughout the day.     Follow up with Provider - 4 weeks          [employer] Information     [employer] lets you send messages to your doctor, view your test results, renew your prescriptions, schedule appointments and more. To sign up, go to [email or web address] . Click on \"Log in\" on the left side of the screen, which will take you to the Welcome page. Then click on \"Sign up Now\" on the right side of the page.     You will be asked to enter the access code listed below, as well as some personal information. Please follow the directions to create your username and password.     Your access code is: [email or web address]  Expires: 2018  9:59 AM     Your access code will  in 90 days. If you need help or a new code, please call your [place] clinic or [phone].        Care EveryWhere ID     This is your Care EveryWhere ID. This could be used by other organizations to access your [place] medical records  [id number]        Equal Access to Services     [facility] MICHAEL AH: lindy Goss, " raj marie ah. So [facility] [phone].    ATENCIÓN: Si habla español, tiene a townsend disposición servicios gratuitos de asistencia lingüística. Llame al [phone].    We comply with applicable federal civil rights laws and [place] laws. We do not discriminate on the basis of race, color, national origin, age, disability, sex, sexual orientation, or gender identity.

## 2018-03-15 NOTE — DISCHARGE INSTRUCTIONS
"      [facility]  [address] [facility] [address], [place] [address]  Appointment Phone [phone] Nurse Advisors [phone]    Sacha Ndiaye      1975    Wound Dressing Change to left IT/pubic area  Cleanse wound and surrounding skin with: wound cleanser  Lightly pack wound with dry AMD gauze (antimicrobial dressing) to fill cavity.  Cover wound with ABD pad  Change dressing daily.        Wound Dressing Change left anterior groin  Cleanse wound and surrounding skin with: wound cleanser  Cover wound with Polymem, secure with 2\" medipore tape  Change dressing daily     Bull Harris M.D.. March 15, 2018    Call us at [phone] if you have any questions about your wounds, have redness or swelling around your wound, have a fever of 101 or greater or if you have any other problems or concerns. We answer the phone Monday through Friday 8 am to 4 pm, please leave a message as we check the voicemail frequently throughout the day.     Follow up with Provider - 4 weeks        "

## 2018-04-12 ENCOUNTER — HOSPITAL ENCOUNTER (OUTPATIENT)
Dept: WOUND CARE | Facility: CLINIC | Age: 43
Discharge: HOME | End: 2018-04-12
Attending: SURGERY | Admitting: SURGERY
Payer: COMMERCIAL

## 2018-04-12 VITALS
SYSTOLIC BLOOD PRESSURE: 134 MMHG | TEMPERATURE: 97.6 F | DIASTOLIC BLOOD PRESSURE: 80 MMHG | RESPIRATION RATE: 12 BRPM | HEART RATE: 90 BPM

## 2018-04-12 DIAGNOSIS — L89.324 DECUBITUS ULCER OF LEFT BUTTOCK, STAGE 4 (H): ICD-10-CM

## 2018-04-12 PROCEDURE — 97602 WOUND(S) CARE NON-SELECTIVE: CPT

## 2018-04-12 PROCEDURE — A6210 FOAM DRG >16<=48 SQ IN W/O B: HCPCS

## 2018-04-12 NOTE — PROGRESS NOTES
Visit Date:   04/12/2018      [facility] VISIT      DATE OF VISIT:    04/12/2018      This is a 43-year-old paraplegic gentleman who is here for further evaluation and treatment of his chronic recurrent left ischial pubic decubitus, as well as a recurrent left anterior groin/hip wound.  Today, he also pointed out a rather sizable 2 x 1 cm cyst under his left chin and jaw that he states is a pimple that just kind of blew up to this size when he squeezed it.  It feels rather fixed and it is right over his carotids, so I have deferred him to ENT for that.  This is not something that our office is set up to manage.        Overall, Sacha has been doing fine.  He is currently under the care of a GI doc who is planning on doing an EGD on the 26th of this month for Sacha's hemoglobin of 10.7.  He said a couple years ago his hemoglobin was 12.  My guess is this is just related to multiple surgeries and anemia of chronic disease since he is not having any other kind of GI symptoms.  He is also due to see Sacha Rivera MD over in [facility] on May 2nd at the .  We are hoping that Dr. Rievra can shed some light on any other factors that might be contributing to Sacha's multiple occurrences.  We have already addressed the chair issue and while he is still working on getting some new canes for the wheelchair back, he mapped fine.  Wound care has been stable using homemade acetic acid and then AMD dry gauze packing on a daily basis. He states the drainage is about the same with some blood on occasion.  The base of the wound is starting to show growth again of these little, kind of grape-like granulation buds of edematous tissue.  Last visit, we had actually excised some of those with the cautery.  He still has a tunnel that is quite smooth, so we know that it is kind of lined with a fibrin capsule related to his wound pocket.  That wound today measures 5 x 3.2 x 4.3 cm with an undermining of 7 cm at about the  11-12 o'clock position.  He also has a small wound kind of in the area of his left inguinal anterior pelvic scar tissue that breaks open on occasion.  We had been using PolyMem on that, but now it looks a little bit hypertrophic.  I think we will try some Iodoform for that and see if that will help that finish up.  He continues to have issues with some of his supplies.  It sounds like they are charging him $30 to $50 for a roll of tape for his dressings and we gave him a resource called [employer] where he can get 3M tape, a whole box for a fraction of that cost.  He also needs ABDs and AMD gauze, as well as 4 x 4s to apply acetic acid, so those supplies will be ordered for him.  At this point, we will continue with our current regimen and see Gia back on May 24th. When he comes on  that means he misses work and has to work on .  It should be fine.  The left anterior hip today measured 1.5 x 0.5 x 0.1 cm.  His vitals today were within normal limits other than a pulse rate of 90 after positioning.         REJI GUERRA MD             D: 2018   T: 2018   MT: KYRA      Name:     GIA CAMARGO   MRN:      [id number]        Account:      [id number]   :      1975           Visit Date:   2018      Document: [id number]

## 2018-04-12 NOTE — MR AVS SNAPSHOT
MRN:[id number]                      After Visit Summary   4/12/2018    Sacha Ndiaye    MRN: [id number]           Visit Information        Provider Department      4/12/2018  9:00 AM Cheryl Harris MD [facility]        Your next 10 appointments already scheduled     Apr 12, 2018 10:00 AM CDT   (Arrive by 9:45 AM)   Return Visit with Abigail Bennett MD   [facility] ([facility])    [address]  [address]  [facility] [address] [phone]            May 02, 2018 10:20 AM CDT   (Arrive by 10:05 AM)   New Patient Visit with Sacha Rivera MD   [facility] ([facility])    [address]  3rd Floor  [facility] [address] [phone]                Further instructions from your care team             [address] [address]  Appointment Phone [phone] Nurse Advisors [phone]     Sacha Ndiaye      1975     Wound Dressing Change to left ischial tuberosity/pubic area  Cleanse wound and surrounding skin with: wound cleanser  Lightly pack wound with dry AMD gauze (antimicrobial dressing) to fill cavity.  Cover wound with ABD pad  Change dressing daily.         Wound Dressing Change left anterior groin  Cleanse wound and surrounding skin with: wound cleanser  Cover wound Iodofoam, cover with gauze or bandaid  Change dressing daily    Bull Harris M.D. April 12, 2018    See an ENT specialist for bump on the right side of your throat     Call us at [phone] if you have any questions about your wounds, have redness or swelling around your wound, have a fever of 101 or greater or if you have any other problems or concerns. We answer the phone Monday through Friday 8 am to 4 pm, please leave a message as we check the voicemail  "frequently throughout the day.      Follow up with Dr. Harris in 4 weeks      start[employer] Information     [employer] lets you send messages to your doctor, view your test results, renew your prescriptions, schedule appointments and more. To sign up, go to [email or web address] . Click on \"Log in\" on the left side of the screen, which will take you to the Welcome page. Then click on \"Sign up Now\" on the right side of the page.     You will be asked to enter the access code listed below, as well as some personal information. Please follow the directions to create your username and password.     Your access code is: [email or web address]  Expires: 2018  9:59 AM     Your access code will  in 90 days. If you need help or a new code, please call your [place] clinic or [phone].        Care EveryWhere ID     This is your Care EveryWhere ID. This could be used by other organizations to access your [place] medical records  [id number]        Equal Access to Services     NICK RODRIGUEZ : Hadii carol salinaso Soolivia, waaxda luqadaha, qaybta kaalmada adeozzie, raj nye. So [facility] [phone].    ATENCIÓN: Si habla español, tiene a townsend disposición servicios gratuitos de asistencia lingüística. Llame al [phone].    We comply with applicable federal civil rights laws and [place] laws. We do not discriminate on the basis of race, color, national origin, age, disability, sex, sexual orientation, or gender identity.            "

## 2018-04-12 NOTE — DISCHARGE INSTRUCTIONS
[facility]  [address] [facility] [address], [facility] [address]  Appointment Phone [phone] Nurse Advisors [phone]     Sacha Ndiaye      1975     Wound Dressing Change to left ischial tuberosity/pubic area  Cleanse wound and surrounding skin with: wound cleanser  Lightly pack wound with dry AMD gauze (antimicrobial dressing) to fill cavity.  Cover wound with ABD pad  Change dressing daily.         Wound Dressing Change left anterior groin  Cleanse wound and surrounding skin with: wound cleanser  Cover wound Iodofoam, cover with gauze or bandaid  Change dressing daily    Bull Harris M.D. April 12, 2018    See an ENT specialist for bump on the right side of your throat     Call us at [phone] if you have any questions about your wounds, have redness or swelling around your wound, have a fever of 101 or greater or if you have any other problems or concerns. We answer the phone Monday through Friday 8 am to 4 pm, please leave a message as we check the voicemail frequently throughout the day.      Follow up with Dr. Harris in 4 weeks

## 2018-05-02 ENCOUNTER — OFFICE VISIT (OUTPATIENT)
Dept: PHYSICAL MEDICINE AND REHAB | Facility: CLINIC | Age: 43
End: 2018-05-02
Payer: COMMERCIAL

## 2018-05-02 VITALS
HEIGHT: 72 IN | DIASTOLIC BLOOD PRESSURE: 73 MMHG | OXYGEN SATURATION: 100 % | BODY MASS INDEX: 23.66 KG/M2 | RESPIRATION RATE: 24 BRPM | TEMPERATURE: 97.7 F | WEIGHT: 174.7 LBS | HEART RATE: 94 BPM | SYSTOLIC BLOOD PRESSURE: 121 MMHG

## 2018-05-02 DIAGNOSIS — G82.20 PARAPLEGIA (H): Primary | ICD-10-CM

## 2018-05-02 PROBLEM — S73.005A HIP DISLOCATION, BILATERAL (H): Status: ACTIVE | Noted: 2017-08-24

## 2018-05-02 PROBLEM — K59.00 CONSTIPATION: Status: ACTIVE | Noted: 2017-04-12

## 2018-05-02 PROBLEM — S73.004A HIP DISLOCATION, BILATERAL (H): Status: ACTIVE | Noted: 2017-08-24

## 2018-05-02 PROBLEM — K59.2 NEUROGENIC BOWEL: Status: ACTIVE | Noted: 2017-08-24

## 2018-05-02 PROBLEM — N31.9 NEUROGENIC BLADDER: Status: ACTIVE | Noted: 2017-08-24

## 2018-05-02 PROBLEM — Z98.890 H/O LUMBOSACRAL SPINE SURGERY: Status: ACTIVE | Noted: 2017-08-24

## 2018-05-02 RX ORDER — SILDENAFIL 100 MG/1
100 TABLET, FILM COATED ORAL PRN
Status: ON HOLD | COMMUNITY
End: 2024-03-19

## 2018-05-02 RX ORDER — BACLOFEN 20 MG/1
20 TABLET ORAL DAILY
Status: ON HOLD | COMMUNITY
End: 2019-04-12

## 2018-05-02 RX ORDER — DOXYCYCLINE HYCLATE 100 MG
100 TABLET ORAL 2 TIMES DAILY
Status: ON HOLD | COMMUNITY
End: 2019-04-17

## 2018-05-02 ASSESSMENT — PAIN SCALES - GENERAL: PAINLEVEL: NO PAIN (0)

## 2018-05-02 NOTE — MR AVS SNAPSHOT
After Visit Summary   5/2/2018    Sacha Ndiaye    MRN: [id number]           Patient Information     Date Of Birth          1975        Visit Information        Provider Department      5/2/2018 10:20 AM Sacha Rivera MD [facility]        Today's Diagnoses     Paraplegia (H)    -  1       Follow-ups after your visit        Additional Services     PHYSICAL THERAPY REFERRAL       [facility]    Treatment: Evaluation & Treatment  Special Instructions/Modalities: 42 yo with paraplegia and some chronic buttock wounds refractory to flap and other management options. Presently with [place] Flex Form cushion. Looking at optimizing present seating, mapping and consider custom wheelchair moulded cushion.    Please be aware that coverage of these services is subject to the terms and limitations of your health insurance plan.  Call member services at your health plan with any benefit or coverage questions.      **Note to Provider** To refer patients to therapy outside of the location list, change the order class to External Referral in the order composer.                  Follow-up notes from your care team     Return if symptoms worsen or fail to improve.      Your next 10 appointments already scheduled     May 24, 2018  9:30 AM CDT   Return Visit with Cheryl Harris MD   [facility] [place] ([facility])    [address] [address] [phone]              Who to contact     Please call your clinic at [phone] to:    Ask questions about your health    Make or cancel appointments    Discuss your medicines    Learn about your test results    Speak to your doctor            Additional Information About Your Visit        [employer] Information     [employer] is an electronic gateway that provides easy, online access to your medical records. With [employer], you can request a clinic appointment,  read your test results, renew a prescription or communicate with your care team.     To sign up for [employer] Informationhart visit the website at [email or web address]   You will be asked to enter the access code listed below, as well as some personal information. Please follow the directions to create your username and password.     Your access code is: [email or web address]  Expires: 2018  9:59 AM     Your access code will  in 90 days. If you need help or a new code, please contact your [facility] or call [phone] for assistance.        Care EveryWhere ID     This is your Care EveryWhere ID. This could be used by other organizations to access your [place] medical records  [id number]        Your Vitals Were     Pulse Temperature Respirations Height Pulse Oximetry BMI (Body Mass Index)    94 97.7  F (36.5  C) (Oral) 24 6' (1.829 m) 100% 23.69 kg/m2       Blood Pressure from Last 3 Encounters:   18 121/73   18 134/80   03/15/18 107/69    Weight from Last 3 Encounters:   18 174 lb 11.2 oz (79.2 kg)   17 185 lb (83.9 kg)   17 185 lb (83.9 kg)              We Performed the Following     PHYSICAL THERAPY REFERRAL        Primary Care Provider Office Phone # Fax #    Celestine Jerry [phone]       [facility] [address] [facility] [address]        Equal Access to Services     NICK RODRIGUEZ : Hadii aad ku hadasho Soomaali, waaxda luqadaha, qaybta kaalmada adeegyada, waxay viktoria nye. So [facility] [phone].    ATENCIÓN: Si habla español, tiene a townsend disposición servicios gratuitos de asistencia lingüística. Llame al [phone].    We comply with applicable federal civil rights laws and [place] laws. We do not discriminate on the basis of race, color, national origin, age, disability, sex, sexual orientation, or gender identity.            Thank you!     Thank you for choosing [facility] PHYSICAL MEDICINE AND  REHABILITATION  for your care. Our goal is always to provide you with excellent care. Hearing back from our patients is one way we can continue to improve our services. Please take a few minutes to complete the written survey that you may receive in the mail after your visit with us. Thank you!             Your Updated Medication List - Protect others around you: Learn how to safely use, store and throw away your medicines at www.[employer].org.          This list is accurate as of 5/2/18  2:56 PM.  Always use your most recent med list.                   Brand Name Dispense Instructions for use Diagnosis    acetaminophen 325 MG tablet    TYLENOL    100 tablet    Take 2 tablets (650 mg) by mouth every 4 hours as needed for other (surgical pain)    Decubitus ulcer of left ischium, stage 4 (H)       baclofen 20 MG tablet    LIORESAL     Take 20 mg by mouth daily        bisacodyl 10 MG Suppository    DULCOLAX    30 suppository    Place 1 suppository (10 mg) rectally daily as needed for constipation    Decubitus ulcer of left ischium, stage 4 (H)       ciprofloxacin 750 MG tablet    CIPRO    180 tablet    TAKE ONE TABLET BY MOUTH EVERY 12 HOURS    Decubitus ulcer of left ischium, stage 4 (H)       doxycycline 100 MG tablet    VIBRA-TABS     Take 100 mg by mouth 2 times daily        doxycycline monohydrate 100 MG capsule     60 capsule    Take 1 capsule (100 mg) by mouth 2 times daily Please call [phone] & make appointment w/Dr. Bennett before April.    Wound infection complicating hardware (H)       order for DME     30 days    [facility] Fax [phone]  Primary Dressing to left ischial wound: AMD kerlix Qty 6 Secondary Dressing to left ischial: 5x9 ABD pads Qty 30 Secondary dressing to left pubis: 4x4 nonsterile gauze Qty 1 loaf of 200  Length of Need: 1 month Frequency of dressing change: daily    Decubitus ulcer of left buttock, stage 4 (H)       oxybutynin chloride 15 MG Tb24     30 tablet    Take 2  tablets (30 mg) by mouth daily    Decubitus ulcer of left ischium, stage 4 (H)       PANOXYL 2.5 % Liqd   Generic drug:  benzoyl peroxide      Externally apply 1 Application topically daily Hold and notify prn excess erythema        polyethylene glycol Packet    MIRALAX/GLYCOLAX    7 packet    Take 17 g by mouth daily as needed for constipation        sildenafil 100 MG tablet    VIAGRA     Take 100 mg by mouth as needed        sodium phosphate 7-19 GM/118ML rectal enema     1 Bottle    Place 1 Bottle (1 enema) rectally every 48 hours    Decubitus ulcer of left ischium, stage 4 (H)

## 2018-05-02 NOTE — NURSING NOTE
A referral from Dr Rivera was faxed to [facility] [phone].Patient was given a copy of the referral.

## 2018-05-02 NOTE — NURSING NOTE
Chief Complaint   Patient presents with     Consult     [facility]- SPINAL CORD INJURY     Orville Alvarado, CMA

## 2018-05-02 NOTE — PROGRESS NOTES
Physical medicine report some clinic consultation:    Reason for consultation: Was asked by Dr. Harris to evaluate Mr. Ndiaye related to pressure ulcer and if any rehabilitation principles positioning issues may help.    Sacha is a 43-year-old with history of T5 JOSE A SCI in 1993.  Presents accompanied by his wife as above.  Briefly reviewing, original rehab through [facility]. He has been having different wounds since roughly 2015.  He has had three flap procedures he currently has an ulceration in the buttock area opening midline roughly between the anus and the scrotum with some tracking towards the left ischial tuberosity.  He has had IT ulcers left anterior inguinal ulcers and lumbar spinal surgery.  He had revision spinal surgery related to T12-L1 fracture dislocation for which he underwent corpectomy decompression and fusion.  He uses an ultra lightweight manual wheelchair.  Currently with a [place] Flex Formcushion since about November.  He has been pressure mapped in this cushion.  He is at other wheelchair cushions in his past.  He also has a standing manual wheelchair that blocks his knees and provide some trunk control.  This has been malfunctioning with mechanical issues as it is quite old.  He is in process of trying to acquire another standing manual chair but insurance is pushing back having issued the most recent ultralight weight manual chair roughly 2 years ago.  Most recent seating evaluation clinic with Lucila Bird.  Sacha is up in his manual chair roughly from 7 AM until 11, down for midday then up 1 PM till 4:30 PM.  Another 1.5 hour downtime then up from 5 PM until 9:30 PM.  Reports doing frequent repositioning and wheelchair push-ups.  He does drive with hand controls.  He does not tend to drive for very long distances.  2 and from work is only 6 minutes with only occasional longer drive times it is to the [facility].  They live in [place].  He has neurogenic bowel managed with  half dose MiraLAX each day and daily dig stim/extraction.  Historically was effective with suppository but that is been less effective in more recent years.  Neurogenic bladder management has a condom catheter with some reflexive voiding but he also intermittently catheterizes 3 times per day now.    Physical exam:  /73  Pulse 94  Temp 97.7  F (36.5  C) (Oral)  Resp 24  Ht 6' (1.829 m)  Wt 174 lb 11.2 oz (79.2 kg)  SpO2 100%  BMI 23.69 kg/m2  Alert pleasant fluent speech language excellent historian.  Arrives with ultralight weight manual wheelchair and a [employer] Flex Form cushion.  There is partial approximately 5  dump, a low backrest that comes forward supporting some of his slight hyperlordosis.  Overall alignment of hips spine in sitting position seems neutral and not particularly problematic for pressure areas.  Able to transfer wheelchair to and from the exam table by himself.  Slight assistance to bring his legs up onto bed.  Examination of left inguinal area very small approximately 1 cm shallow ulceration seen under Mepilex.  Posteriorly he has numerous healed scars including lumbar spine large area, right trochanter, bilateral issue tuberosities all of which are currently closed over.  There is a larger gauze dressing with moderate tape between the scrotum and anterior to the rectum.  I did not do this dressing for further wound check having recently been documented by Dr. Harris in the wound care clinic note.  He has bilateral hip instability able to do some partial subluxation with different anterior posterior positioning.  Some clunking is felt.  Lower extremities are flaccid no elevated tone.    Assessment and recommendations:  1. For the most part I believe Sacha is already doing most of the things he could be in maximizing his wound control though there are some potential opportunities.  2. He should quit smoking for sure.  Counseled on the importance of this for tissue healing and  oxygenation.  He is quit smoking in the past but went back to it.  3. He should be more consistent or at least cognizant of how often he is doing weight relief and repositioning.  He does have many hours up in his wheelchair per day and even 1 hour sitting in 1 position can be too long.  4. He is pursuing another standing wheelchair which I believe will be good for partial weight relief and getting more different positions through the day.  Challenge in getting insurance authorization for this given his current wheelchair is less than a few years old.  5. Current wheelchair cushion may well be sufficient for optimal however consideration for custom molded cushion could be explored.  I did give him a prescription for formal seating assessment and remapping option for custom molding to [facility].  6. Defer back to Dr. Harris specific wound care treatments.  7. Given Sacha lives further away, he would prefer to follow-up in rehabilitation clinic on an as-needed basis if rehab specific equipment type issues arise.    One hour spent in direct patient interaction, greater than 50% counseling and education on the above detailed items.

## 2018-05-02 NOTE — LETTER
5/2/2018       RE: Sacha Ndiaye  [address] [facility] [address]     Dear Colleague,    Thank you for referring your patient, Sacha Ndiaye, to the [facility] PHYSICAL MEDICINE AND REHABILITATION at [facility]. Please see a copy of my visit note below.    Physical medicine report some clinic consultation:    Reason for consultation: Was asked by Dr. Harris to evaluate Mr. Ndiaye related to pressure ulcer and if any rehabilitation principles positioning issues may help.    Sacha is a 43-year-old with history of T5 JOSE A SCI in 1993.  Presents accompanied by his wife as above.  Briefly reviewing, original rehab through [facility]. He has been having different wounds since roughly 2015.  He has had three flap procedures he currently has an ulceration in the buttock area opening midline roughly between the anus and the scrotum with some tracking towards the left ischial tuberosity.  He has had IT ulcers left anterior inguinal ulcers and lumbar spinal surgery.  He had revision spinal surgery related to T12-L1 fracture dislocation for which he underwent corpectomy decompression and fusion.  He uses an ultra lightweight manual wheelchair.  Currently with a [place] Flex Formcushion since about November.  He has been pressure mapped in this cushion.  He is at other wheelchair cushions in his past.  He also has a standing manual wheelchair that blocks his knees and provide some trunk control.  This has been malfunctioning with mechanical issues as it is quite old.  He is in process of trying to acquire another standing manual chair but insurance is pushing back having issued the most recent ultralight weight manual chair roughly 2 years ago.  Most recent seating evaluation clinic with Lucila Bird.  Sacha is up in his manual chair roughly from 7 AM until 11, down for midday then up 1 PM till 4:30 PM.  Another 1.5 hour downtime then up from 5 PM until 9:30 PM.  Reports  doing frequent repositioning and wheelchair push-ups.  He does drive with hand controls.  He does not tend to drive for very long distances.  2 and from work is only 6 minutes with only occasional longer drive times it is to the [facility].  They live in [place].  He has neurogenic bowel managed with half dose MiraLAX each day and daily dig stim/extraction.  Historically was effective with suppository but that is been less effective in more recent years.  Neurogenic bladder management has a condom catheter with some reflexive voiding but he also intermittently catheterizes 3 times per day now.    Physical exam:  /73  Pulse 94  Temp 97.7  F (36.5  C) (Oral)  Resp 24  Ht 6' (1.829 m)  Wt 174 lb 11.2 oz (79.2 kg)  SpO2 100%  BMI 23.69 kg/m2  Alert pleasant fluent speech language excellent historian.  Arrives with ultralight weight manual wheelchair and a [place] Flex Form cushion.  There is partial approximately 5  dump, a low backrest that comes forward supporting some of his slight hyperlordosis.  Overall alignment of hips spine in sitting position seems neutral and not particularly problematic for pressure areas.  Able to transfer wheelchair to and from the exam table by himself.  Slight assistance to bring his legs up onto bed.  Examination of left inguinal area very small approximately 1 cm shallow ulceration seen under Mepilex.  Posteriorly he has numerous healed scars including lumbar spine large area, right trochanter, bilateral issue tuberosities all of which are currently closed over.  There is a larger gauze dressing with moderate tape between the scrotum and anterior to the rectum.  I did not do this dressing for further wound check having recently been documented by Dr. Harris in the wound care clinic note.  He has bilateral hip instability able to do some partial subluxation with different anterior posterior positioning.  Some clunking is felt.  Lower extremities are flaccid no elevated  tone.    Assessment and recommendations:  1. For the most part I believe Sacha is already doing most of the things he could be in maximizing his wound control though there are some potential opportunities.  2. He should quit smoking for sure.  Counseled on the importance of this for tissue healing and oxygenation.  He is quit smoking in the past but went back to it.  3. He should be more consistent or at least cognizant of how often he is doing weight relief and repositioning.  He does have many hours up in his wheelchair per day and even 1 hour sitting in 1 position can be too long.  4. He is pursuing another standing wheelchair which I believe will be good for partial weight relief and getting more different positions through the day.  Challenge in getting insurance authorization for this given his current wheelchair is less than a few years old.  5. Current wheelchair cushion may well be sufficient for optimal however consideration for custom molded cushion could be explored.  I did give him a prescription for formal seating assessment and remapping option for custom molding to [facility].  6. Defer back to Dr. Harris specific wound care treatments.  7. Given Sacha lives further away, he would prefer to follow-up in rehabilitation clinic on an as-needed basis if rehab specific equipment type issues arise.    One hour spent in direct patient interaction, greater than 50% counseling and education on the above detailed items.      Again, thank you for allowing me to participate in the care of your patient.      Sincerely,    Sacha Rivera MD

## 2018-05-09 DIAGNOSIS — L89.324 DECUBITUS ULCER OF LEFT BUTTOCK, STAGE 4 (H): ICD-10-CM

## 2018-05-14 DIAGNOSIS — L89.324 DECUBITUS ULCER OF LEFT BUTTOCK, STAGE 4 (H): ICD-10-CM

## 2018-05-14 DIAGNOSIS — T84.7XXA: ICD-10-CM

## 2018-05-14 RX ORDER — DOXYCYCLINE 100 MG/1
CAPSULE ORAL
Qty: 60 CAPSULE | Refills: 2 | Status: CANCELLED | OUTPATIENT
Start: 2018-05-14

## 2018-05-15 ENCOUNTER — TELEPHONE (OUTPATIENT)
Dept: INFECTIOUS DISEASES | Facility: CLINIC | Age: 43
End: 2018-05-15

## 2018-05-15 DIAGNOSIS — T84.7XXA: ICD-10-CM

## 2018-05-15 NOTE — TELEPHONE ENCOUNTER
[facility] Call Center    Phone Message    May a detailed message be left on voicemail: yes    Reason for Call: Other: Other: PT is following up on his refill request for doxycycline monohydrate and is wondering if he needs to make an appointment with Dr. Bennett      Action Taken: Message routed to:  [facility] ([facility]): ID

## 2018-05-16 NOTE — TELEPHONE ENCOUNTER
GRACYM for pt to let him know that Dr. Bennett wants to see pt annually for clinic appt. Gave him number to schedule and once appt is made, will refill doxycycline to last until appt with Dr. Bennett.  Za Mcneill RN

## 2018-05-17 RX ORDER — DOXYCYCLINE 100 MG/1
100 CAPSULE ORAL 2 TIMES DAILY
Qty: 60 CAPSULE | Refills: 2 | Status: SHIPPED | OUTPATIENT
Start: 2018-05-17 | End: 2018-09-05

## 2018-05-28 DIAGNOSIS — L89.324 DECUBITUS ULCER OF LEFT ISCHIUM, STAGE 4 (H): ICD-10-CM

## 2018-05-29 RX ORDER — CIPROFLOXACIN 750 MG/1
TABLET, FILM COATED ORAL
Qty: 120 TABLET | Refills: 0 | Status: SHIPPED | OUTPATIENT
Start: 2018-05-29 | End: 2018-08-19

## 2018-06-07 ENCOUNTER — HOSPITAL ENCOUNTER (OUTPATIENT)
Dept: WOUND CARE | Facility: CLINIC | Age: 43
Discharge: HOME | End: 2018-06-07
Attending: SURGERY | Admitting: SURGERY
Payer: COMMERCIAL

## 2018-06-07 VITALS — TEMPERATURE: 98.5 F | HEART RATE: 114 BPM | SYSTOLIC BLOOD PRESSURE: 121 MMHG | DIASTOLIC BLOOD PRESSURE: 70 MMHG

## 2018-06-07 DIAGNOSIS — L89.324 DECUBITUS ULCER OF ISCHIAL AREA, LEFT, STAGE IV (H): ICD-10-CM

## 2018-06-07 DIAGNOSIS — L89.324 DECUBITUS ULCER OF LEFT BUTTOCK, STAGE 4 (H): ICD-10-CM

## 2018-06-07 PROCEDURE — 97602 WOUND(S) CARE NON-SELECTIVE: CPT

## 2018-06-07 NOTE — DISCHARGE INSTRUCTIONS
[facility]  [address] [facility] [address], [facility] [address]  Appointment Phone [phone] Nurse Advisors [phone]      Sacha Ndiaye      1975      Wound Dressing Change to left ischial tuberosity/pubic area  Cleanse wound and surrounding skin with: wound cleanser  Lightly pack wound with dry AMD gauze (antimicrobial dressing) to fill cavity.  Cover wound with ABD pad  Change dressing daily.     Bull Harris M.D. June 7, 2018      Call us at [phone] if you have any questions about your wounds, have redness or swelling around your wound, have a fever of 101 or greater or if you have any other problems or concerns. We answer the phone Monday through Friday 8 am to 4 pm, please leave a message as we check the voicemail frequently throughout the day.       Follow up with Dr. Harris in 4 weeks

## 2018-06-07 NOTE — MR AVS SNAPSHOT
MRN:[id number]                      After Visit Summary   6/7/2018    Sacha Ndiaye    MRN: [id number]           Visit Information        Provider Department      6/7/2018  9:30 AM Cheryl Harris MD [facility]        Your next 10 appointments already scheduled     Jul 26, 2018 10:30 AM CDT   (Arrive by 10:15 AM)   Return Visit with Abigail Bennett MD   [facility] ([facility])    [address] [place] [address]  [address]  [facility] [address] [phone]                Further instructions from your care team              [facility] [place]  [address] [place] [address] [address] [address]  Appointment Phone [phone] Nurse Advisors [phone]      Sacha Grandend      1975      Wound Dressing Change to left ischial tuberosity/pubic area  Cleanse wound and surrounding skin with: wound cleanser  Lightly pack wound with dry AMD gauze (antimicrobial dressing) to fill cavity.  Cover wound with ABD pad  Change dressing daily.     Bull Harris M.D. June 7, 2018      Call us at [phone] if you have any questions about your wounds, have redness or swelling around your wound, have a fever of 101 or greater or if you have any other problems or concerns. We answer the phone Monday through Friday 8 am to 4 pm, please leave a message as we check the voicemail frequently throughout the day.       Follow up with Dr. Harris in 4 weeks      Care EveryWhere ID     This is your Care EveryWhere ID. This could be used by other organizations to access your [facility] medical records  [id number]        Equal Access to Services     [facility] CenterJOSE ARMANDO : Jose Zamorano, lindy mosher, raj maire. So [facility] [phone].    ATENCIÓN: Si habla español, tiene a townsend disposición servicios gratuitos de asistencia  lingüística. Maria Luisa al [phone].    We comply with applicable federal civil rights laws and [place] laws. We do not discriminate on the basis of race, color, national origin, age, disability, sex, sexual orientation, or gender identity.

## 2018-06-07 NOTE — PROGRESS NOTES
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  He also has had a chronic recurrent wound of the left anterior iliac crest, but this has since healed.  Currently, they are using homemade acetic acid and AMD gauze for the left IT pubic wound and just ABDs for cover.  I have received a text from his wife this morning, who was worried about him, both in terms of his wound progress, and also some recent history of some dizziness.  Sacha feels that the dizziness may have been related to being dehydrated from taking a lot of MiraLax.  It sounds like these symptoms resolved fairly quickly and he has not been in any dangerous situations when this occurs.  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  He had his local MD excise the dermal cyst that was in his right neck and that was after it spontaneously drained just before his appointment.  As far as seating and stuff goes, he is due to see Alfredo in 2 weeks for another seating eval.  He did go and see Sacha Rivera over in rehab at the  and Dr. Rivera suggested maybe getting a molded cushion, but Sacha is not interested in that considering how active he is.  Dr. Rivera was also worried about Sacha getting a standing frame but I do not think that is going to deter the patient.  He will, however, wait until he can get a new wheelchair covered, as it is not much of a hassle getting it approved.  He is working with [employer] to get this standing frame chair.  As far as the wound itself, it is very clean.  There is still those kind of scarred grape-like granulation excrescences in the base of the wound.  It does track up over the ramus to the ischial  "tuberosity, where I might feel just a speck of the exposed bone at the very tip.  Otherwise, it is a pretty smooth tunnel.  The outer edges are extremely callused, but at this point, we cannot even get the inner tunnel to fill in.  I mentioned a resource in [place] that may help us if we need to \"more closely assess\" any kind of shear forces that might be occurring to create this wound in this particular anatomical location.  Sacha has given me verbal permission to talk with this mera about his case.  He would even be willing to go out an visit the mera.  In the meantime, they will continue with the homemade acetic acid and maybe try packing the wound with a little bit less of the AMD gauze.  We will see Sacha back on probably  so that he can coordinate with another visit at the hospital that day.  Dimensions of the left IT pubic wound are 5.5 x 2.5 x 3.7 cm with a 7.5 cm tunnel at the 10 o'clock position.  His vitals were okay except for heart rate 114 today.  BP was 121/70.         REJI GUERRA MD             D: 2018   T: 2018   MT: BEBO      Name:     SACHA CAMARGO   MRN:      [id number]        Account:      [id number]   :      1975           Visit Date:   2018      Document: [id number]    "

## 2018-07-05 ENCOUNTER — TELEPHONE (OUTPATIENT)
Dept: WOUND CARE | Facility: CLINIC | Age: 43
End: 2018-07-05

## 2018-07-05 DIAGNOSIS — L89.324 DECUBITUS ULCER OF LEFT BUTTOCK, STAGE 4 (H): ICD-10-CM

## 2018-07-05 NOTE — TELEPHONE ENCOUNTER
Patient called and left message that he wanted roll gauze. Attempted to reach patient to see what type of roll gauze he was requesting. I believe it is for AMD Gauze but I just wanted to make sure.

## 2018-07-26 ENCOUNTER — HOSPITAL ENCOUNTER (OUTPATIENT)
Dept: WOUND CARE | Facility: CLINIC | Age: 43
Discharge: HOME | End: 2018-07-26
Attending: SURGERY | Admitting: SURGERY
Payer: COMMERCIAL

## 2018-07-26 ENCOUNTER — OFFICE VISIT (OUTPATIENT)
Dept: INFECTIOUS DISEASES | Facility: CLINIC | Age: 43
End: 2018-07-26
Attending: INTERNAL MEDICINE
Payer: COMMERCIAL

## 2018-07-26 VITALS — HEART RATE: 84 BPM | SYSTOLIC BLOOD PRESSURE: 114 MMHG | TEMPERATURE: 97.4 F | DIASTOLIC BLOOD PRESSURE: 67 MMHG

## 2018-07-26 VITALS
OXYGEN SATURATION: 98 % | TEMPERATURE: 97.8 F | HEART RATE: 81 BPM | DIASTOLIC BLOOD PRESSURE: 72 MMHG | SYSTOLIC BLOOD PRESSURE: 114 MMHG

## 2018-07-26 DIAGNOSIS — L89.324 DECUBITUS ULCER OF LEFT ISCHIUM, STAGE 4 (H): ICD-10-CM

## 2018-07-26 DIAGNOSIS — G82.20 PARAPLEGIA (H): Primary | ICD-10-CM

## 2018-07-26 PROCEDURE — G0463 HOSPITAL OUTPT CLINIC VISIT: HCPCS | Mod: ZF

## 2018-07-26 PROCEDURE — 97602 WOUND(S) CARE NON-SELECTIVE: CPT

## 2018-07-26 ASSESSMENT — PAIN SCALES - GENERAL: PAINLEVEL: NO PAIN (0)

## 2018-07-26 NOTE — PATIENT INSTRUCTIONS
1. Continue doxycycline and ciprofloxacin until a surgical plan is in place.   2. If you plan to go ahead with surgery, we may try stopping antibiotics 6-8 weeks before it is planned.   3. I'll also look at your MRI and we can re-adjust earlier if needed.   4. If surgery is planned, please give my clinic a call at [phone] with an update.

## 2018-07-26 NOTE — MR AVS SNAPSHOT
After Visit Summary   7/26/2018    Sacha Ndiaye    MRN: [id number]           Patient Information     Date Of Birth          1975        Visit Information        Provider Department      7/26/2018 10:30 AM Abigail Bennett MD [facility] Infectious Diseases        Today's Diagnoses     Decubitus ulcer of left ischium, stage 4 (H)          Care Instructions    1. Continue doxycycline and ciprofloxacin until a surgical plan is in place.   2. If you plan to go ahead with surgery, we may try stopping antibiotics 6-8 weeks before it is planned.   3. I'll also look at your MRI and we can re-adjust earlier if needed.   4. If surgery is planned, please give my clinic a call at [phone] with an update.           Follow-ups after your visit        Your next 10 appointments already scheduled     Aug 23, 2018 10:00 AM CDT   Return Visit with Cheryl Harris MD   [facility] [place] ([facility])    [address] [address] [phone]              Who to contact     If you have questions or need follow up information about today's clinic visit or your schedule please contact [facility] AND INFECTIOUS DISEASES directly at [phone].  Normal or non-critical lab and imaging results will be communicated to you by MyChart, letter or phone within 4 business days after the clinic has received the results. If you do not hear from us within 7 days, please contact the clinic through MyChart or phone. If you have a critical or abnormal lab result, we will notify you by phone as soon as possible.  Submit refill requests through [employer] or call your pharmacy and they will forward the refill request to us. Please allow 3 business days for your refill to be completed.          Additional Information About Your Visit        Care EveryWhere ID     This is your Care EveryWhere ID. This could be used by other  organizations to access your [facility] medical records  [id number]        Your Vitals Were     Pulse Temperature Pulse Oximetry             81 97.8  F (36.6  C) 98%          Blood Pressure from Last 3 Encounters:   07/26/18 114/72   07/26/18 114/67   06/07/18 121/70    Weight from Last 3 Encounters:   05/02/18 79.2 kg (174 lb 11.2 oz)   03/23/17 83.9 kg (185 lb)   03/23/17 83.9 kg (185 lb)              Today, you had the following     No orders found for display       Primary Care Provider Office Phone # Fax #    Celestine Jerry [phone]       [facility] [address] [facility] [address]        Equal Access to Services     NICK RODRIGUEZ : Jose salinaso Soolivia, waaxda luqadaha, qaybta kaalmada adeegyada, raj ortega . So [facility] [phone].    ATENCIÓN: Si habla español, tiene a townsend disposición servicios gratuitos de asistencia lingüística. Llame al [phone].    We comply with applicable federal civil rights laws and [place] laws. We do not discriminate on the basis of race, color, national origin, age, disability, sex, sexual orientation, or gender identity.            Thank you!     Thank you for choosing [facility] AND INFECTIOUS DISEASES  for your care. Our goal is always to provide you with excellent care. Hearing back from our patients is one way we can continue to improve our services. Please take a few minutes to complete the written survey that you may receive in the mail after your visit with us. Thank you!             Your Updated Medication List - Protect others around you: Learn how to safely use, store and throw away your medicines at www.disposemymeds.org.          This list is accurate as of 7/26/18 10:42 AM.  Always use your most recent med list.                   Brand Name Dispense Instructions for use Diagnosis    acetaminophen 325 MG tablet    TYLENOL    100 tablet    Take 2 tablets (650 mg) by mouth every 4 hours as  "needed for other (surgical pain)    Decubitus ulcer of left ischium, stage 4 (H)       baclofen 20 MG tablet    LIORESAL     Take 20 mg by mouth daily        bisacodyl 10 MG Suppository    DULCOLAX    30 suppository    Place 1 suppository (10 mg) rectally daily as needed for constipation    Decubitus ulcer of left ischium, stage 4 (H)       ciprofloxacin 750 MG tablet    CIPRO    120 tablet    TAKE ONE TABLET BY MOUTH EVERY 12 HOURS    Decubitus ulcer of left ischium, stage 4 (H)       doxycycline 100 MG tablet    VIBRA-TABS     Take 100 mg by mouth 2 times daily        doxycycline monohydrate 100 MG capsule     60 capsule    Take 1 capsule (100 mg) by mouth 2 times daily    Wound infection complicating hardware (H)       order for DME     30 days    [facility] Medical Fax [phone]  Primary Dressing to left ischial: AMD kerlix qty 30 Primary dressing to left pubis: 4x4 nonsterile gauze Qty 30 Secondary dressing to left IT: 30 ABD pads 6\" medipore tape Qty 4 rolls   Length of Need: 1 month Frequency of dressing change: daily    Decubitus ulcer of left buttock, stage 4 (H)       oxybutynin chloride 15 MG Tb24     30 tablet    Take 2 tablets (30 mg) by mouth daily    Decubitus ulcer of left ischium, stage 4 (H)       PANOXYL 2.5 % Liqd   Generic drug:  benzoyl peroxide      Externally apply 1 Application topically daily Hold and notify prn excess erythema        polyethylene glycol Packet    MIRALAX/GLYCOLAX    7 packet    Take 17 g by mouth daily as needed for constipation        sildenafil 100 MG tablet    VIAGRA     Take 100 mg by mouth as needed        sodium phosphate 7-19 GM/118ML rectal enema     1 Bottle    Place 1 Bottle (1 enema) rectally every 48 hours    Decubitus ulcer of left ischium, stage 4 (H)         "

## 2018-07-26 NOTE — MR AVS SNAPSHOT
MRN:[id number]                      After Visit Summary   7/26/2018    Sacha Ndiaye    MRN: [id number]           Visit Information        Provider Department      7/26/2018  8:30 AM Cheryl Harris MD [facility]        Your next 10 appointments already scheduled     Jul 26, 2018 10:30 AM CDT   (Arrive by 10:15 AM)   Return Visit with Abigail Bennett MD   [facility] ([facility])    [address]  [address]  [facility] [address] [phone]                Further instructions from your care team              [facility] [place]  [address] [place] [address] [address] [address]  Appointment Phone [phone] Nurse Advisors [phone]      Sacha Ndiaye      1975      Wound Dressing Change to left ischial tuberosity/pubic area  Cleanse wound and surrounding skin with: wound cleanser then dampen a gauze with Acetic Acid and let dwell in wound for 10 minutes. Remove gauze then do perform dressing change  Lightly pack wound with dry AMD gauze (antimicrobial dressing) to fill cavity.  Cover wound with ABD pad  Change dressing daily      Bull Harris M.D. July 26, 2018      Call us at [phone] if you have any questions about your wounds, have redness or swelling around your wound, have a fever of 101 or greater or if you have any other problems or concerns. We answer the phone Monday through Friday 8 am to 4 pm, please leave a message as we check the voicemail frequently throughout the day.       Follow up with Dr. Harris August 23rd      Care EveryWhere ID     This is your Care EveryWhere ID. This could be used by other organizations to access your [place] medical records  [id number]        Equal Access to Services     NICK RODRIGUEZ : lindy Goss, raj marie  eligio ortega ah. So [facility] [phone].    ATENCIÓN: Si habla español, tiene a townsend disposición servicios gratuitos de asistencia lingüística. Llame al [phone].    We comply with applicable federal civil rights laws and [place] laws. We do not discriminate on the basis of race, color, national origin, age, disability, sex, sexual orientation, or gender identity.
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
## 2018-07-26 NOTE — Clinical Note
7/26/2018      RE: Sacha Ndiaye  [address] [facility] [address]       /72  Pulse 81  Temp 97.8  F (36.6  C)  SpO2 98%     Abigail Bennett MD

## 2018-07-26 NOTE — PROGRESS NOTES
Visit Date:   07/26/2018      [facility] VISIT      This is a 43-year-old paraplegic gentleman who is here for further followup of his recurrent left ischial pubic stage IV decubitus.  He is essentially doing dry AMD gauze,  packing it lately, and they are cleansing with acetic acid made at home.  His seating system is a Flexform and when he went to [place] a couple weeks ago, he mapped extremely well to the point where really there is nothing more that they would suggest for him, other than a customized cushion and I do not think that would be any better.  We had a long conversation about what might be causing this recurrence.  I have already done 2 posterior thigh flaps and a gluteal flap on him and he has been trying to be much more cognizant and careful with his activities at home.  He has adjusted how he transfers in and out of his car, in his bed.  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  Even when lifting things from side, he has switched to right-sided in order to not lean on the left side.  While we could consider having someone come in and do home PT evaluation, that might be a little difficult, him being up in [place], but also, I am not sure that they would be any more cognizant of what is probably more likely shear forces than pressure.  We could do a sitting MRI to see if there is any change in anatomy that could possibly explain these recurrent wounds or one of the factors in their recurrence.  There is one open MRI facility called [facility] in the [place] area that we can call and see if they are able to do him.  As far as any kind of flap closure, at this point I do not think a redo gluteus is going to cut it for us since there is a fairly large undermined pocket.  The posterior thigh flap wall is very redundant, would end up being not an axial flap, or rather a random flap based on the medial thigh tissue.  My only other possible option for him would be  actually doing a vastus lateralis that might need to be delayed in order to get extension.  We could certainly do that with the Spy-Phi, and then have him come back 3 weeks later for the actual transposition of the flap.  As far as timing for that, Gia is very busy at work and probably would not be able to do anything until 2019.  As long as he continues to stay clean and healthy, I guess that is fine.  Other than having a slightly low hemoglobin of 9.5, he feels well.  Iron supplements cause problems with constipation, so he is no longer supplementing that.        As far as the exam is concerned, the tissues are clean, but there is a little edge of bone that I am feeling laterally in the pocket and there is this kind of grape-like granulation tissue over the bone that we have trimmed in the past.  The pocket is perhaps a little bit smaller, but still significant, and I would not anticipate that this would heal by itself, certainly not with him being active.  At this point, he will continue with the dry AMD light packing.  We will see him back in a month on .  His pubic wound opening measures 5.5 x 2.3 x 4 cm and then there is tunneling pocket of at least 7.7 cm, deepest at the 10 o'clock position.  His vitals were within normal limits today.         REJI GUERRA MD             D: 2018   T: 2018   MT: BEBO      Name:     GIA CAMARGO   MRN:      [id number]        Account:      [id number]   :      1975           Visit Date:   2018      Document: [id number]

## 2018-07-26 NOTE — DISCHARGE INSTRUCTIONS
[facility]  [address] [facility] [address], [facility] [address]  Appointment Phone [phone] Nurse Advisors [phone]      Sacha Ndiaye      1975      Wound Dressing Change to left ischial tuberosity/pubic area  Cleanse wound and surrounding skin with: wound cleanser then dampen a gauze with Acetic Acid and let dwell in wound for 10 minutes. Remove gauze then do perform dressing change  Lightly pack wound with dry AMD gauze (antimicrobial dressing) to fill cavity.  Cover wound with ABD pad  Change dressing daily      Bull Harris M.D. July 26, 2018      Call us at [phone] if you have any questions about your wounds, have redness or swelling around your wound, have a fever of 101 or greater or if you have any other problems or concerns. We answer the phone Monday through Friday 8 am to 4 pm, please leave a message as we check the voicemail frequently throughout the day.       Follow up with Dr. Harris August 23rd

## 2018-07-26 NOTE — NURSING NOTE
Chief Complaint   Patient presents with     RECHECK     follow Up Med refills     /72  Pulse 81  Temp 97.8  F (36.6  C)  SpO2 98%   Rebeka Stover

## 2018-08-08 ENCOUNTER — TELEPHONE (OUTPATIENT)
Dept: WOUND CARE | Facility: CLINIC | Age: 43
End: 2018-08-08

## 2018-08-08 DIAGNOSIS — L89.324 DECUBITUS ULCER OF LEFT BUTTOCK, STAGE 4 (H): ICD-10-CM

## 2018-08-19 DIAGNOSIS — L89.324 DECUBITUS ULCER OF LEFT ISCHIUM, STAGE 4 (H): ICD-10-CM

## 2018-08-21 RX ORDER — CIPROFLOXACIN 750 MG/1
TABLET, FILM COATED ORAL
Qty: 60 TABLET | Refills: 1 | Status: SHIPPED | OUTPATIENT
Start: 2018-08-21 | End: 2018-10-22

## 2018-08-23 ENCOUNTER — HOSPITAL ENCOUNTER (OUTPATIENT)
Dept: WOUND CARE | Facility: CLINIC | Age: 43
Discharge: HOME | End: 2018-08-23
Attending: SURGERY | Admitting: SURGERY
Payer: COMMERCIAL

## 2018-08-23 VITALS
RESPIRATION RATE: 18 BRPM | DIASTOLIC BLOOD PRESSURE: 79 MMHG | SYSTOLIC BLOOD PRESSURE: 111 MMHG | TEMPERATURE: 97.8 F | HEART RATE: 115 BPM

## 2018-08-23 DIAGNOSIS — L89.324 DECUBITUS ULCER OF ISCHIAL AREA, LEFT, STAGE IV (H): ICD-10-CM

## 2018-08-23 PROCEDURE — 97602 WOUND(S) CARE NON-SELECTIVE: CPT

## 2018-08-23 NOTE — PROGRESS NOTES
[facility] [place] Progress Note    Subject: Sacha Ndiaye is a 43-year-old paraplegic who is here today for further follow-up of his recurrent left stage IV ischial decubitus.  Since his last visit last month, I made a number of phone calls to various radiology clinics in the [facility] area to discuss the practicality of getting a sitting MRI for him to assess what might be happening to his soft tissues as a possible explanation for his repeated recurrence.  Unfortunately, even if that imaging study could be done, the radiologists would be at a  loss to render an opinion on the appearance of the soft tissues in this area since this is not something that they routinely do.  Sacha would be interested in talking to the specialists in [place] that do intensive functional assessments for 2 weeks  when customizing seating options for paraplegics.  He is also open to having his case be reviewed by an independent medical intuitive.  He reports his wounds are essentially status quo.  He has a couple small scabs on the right trochanteric area that he has been treating 3-4 times a day with Aquaphor.  The anterior left inguinal crease has a small opening again which he is treating with a cover foam.  The ischial decubitus continues to be treated with their homemade acetic acid and dry AMD gauze.      Exam:  /79  Pulse 115  Temp 97.8  F (36.6  C) (Temporal)  Resp 18  On exam, Rommel's left IT/ pubic wound measures 5 x 2.7 x 3.8 cm.  The depth of the tunnel at 10:00 is 7 cm.  Essentially there are some tongues of granulation tissue growing from the base of the wound.  There are a couple areas where there is a very thin layer of coverage of his ischial ramus.  The tissues however are clean and fairly soft.  However, the undermined pocket is still large despite the difference in measurements today.    Procedure: none    Impression: Sacha continues to maintain his stage IV left ischial recurrent decubitus  in a clean condition.  I do not think he has lost any ground, but he has not really made any considerable progress either.  He is using the FlexForm seating system and being very cognizant of his activities with regard to shear and pressure over this area.  He is still busy at work and still open to flap closure sometime after January when work slows down.  While we could probably re-rotate posterior thigh tissue, I am a bit concerned with the large undermined pocket.  He already had a gluteal rotation flap which could also be readvanced.  We may need to bring in new tissue from the left vastus lateralis area of the lateral thigh.  We would need a SPY-PHI to assess whether we could or should do an extension of that flap in a delayed fashion.    Plan: We will dress the wounds with acetic acid and dry AMD gauze..  Patient will return to the clinic in 6 weeks time on 10/4.    Cheryl Harris MD on 8/23/2018 at 10:57 AM

## 2018-08-23 NOTE — DISCHARGE INSTRUCTIONS
[facility]  [address] [facility] [address], [place] [address]  Appointment Phone [phone] Nurse Advisors [phone]      Sacha Ndiaye      1975      Wound Dressing Change to left ischial tuberosity/pubic area  Cleanse wound and surrounding skin with: wound cleanser then dampen a gauze with Acetic Acid and let dwell in wound for 10 minutes. Remove gauze then do perform dressing change  Lightly pack wound with dry AMD gauze (antimicrobial dressing) to fill cavity.  Cover wound with ABD pad  Change dressing daily      Bull Harris M.D. August 23, 2018      Call us at [phone] if you have any questions about your wounds, have redness or swelling around your wound, have a fever of 101 or greater or if you have any other problems or concerns. We answer the phone Monday through Friday 8 am to 4 pm, please leave a message as we check the voicemail frequently throughout the day.       Follow up with Dr. Harris in 4-6 weeks

## 2018-08-23 NOTE — MR AVS SNAPSHOT
MRN:[id number]                      After Visit Summary   8/23/2018    Sacha Ndiaye    MRN: [id number]           Visit Information        Provider Department      8/23/2018 10:00 AM Cheryl Harris MD [facility] [place]          Further instructions from your care team              [facility]  [address] [facility] [address], [place] [address]  Appointment Phone [phone] Nurse Advisors [phone]      Sacha Ndiaye      1975      Wound Dressing Change to left ischial tuberosity/pubic area  Cleanse wound and surrounding skin with: wound cleanser then dampen a gauze with Acetic Acid and let dwell in wound for 10 minutes. Remove gauze then do perform dressing change  Lightly pack wound with dry AMD gauze (antimicrobial dressing) to fill cavity.  Cover wound with ABD pad  Change dressing daily      Bull Harris M.D. August 23, 2018      Call us at [phone] if you have any questions about your wounds, have redness or swelling around your wound, have a fever of 101 or greater or if you have any other problems or concerns. We answer the phone Monday through Friday 8 am to 4 pm, please leave a message as we check the voicemail frequently throughout the day.       Follow up with Dr. Harris in 4-6 weeks      Care EveryWhere ID     This is your Care EveryWhere ID. This could be used by other organizations to access your [place] medical records  [id number]        Equal Access to Services     DUSTIN RODRIGUEZ AH: Hadii carol ku hadasho Sojordanali, waaxda luqadaha, qaybta kaalmada adeegyada, waxay idiin hayaan adeann nye. So [facility] [phone].    ATENCIÓN: Si habla español, tiene a townsend disposición servicios gratuitos de asistencia lingüística. Llame al [phone].    We comply with applicable federal civil rights laws and [place] laws. We do not discriminate on the basis of race, color, national origin, age,  disability, sex, sexual orientation, or gender identity.

## 2018-09-04 DIAGNOSIS — L89.324 DECUBITUS ULCER OF LEFT BUTTOCK, STAGE 4 (H): ICD-10-CM

## 2018-09-05 DIAGNOSIS — T84.7XXA: ICD-10-CM

## 2018-09-06 RX ORDER — DOXYCYCLINE 100 MG/1
CAPSULE ORAL
Qty: 60 CAPSULE | Refills: 2 | Status: SHIPPED | OUTPATIENT
Start: 2018-09-06 | End: 2018-12-23

## 2018-09-14 ENCOUNTER — TELEPHONE (OUTPATIENT)
Dept: WOUND CARE | Facility: CLINIC | Age: 43
End: 2018-09-14

## 2018-09-14 DIAGNOSIS — L89.324 DECUBITUS ULCER OF LEFT BUTTOCK, STAGE 4 (H): ICD-10-CM

## 2018-10-04 ENCOUNTER — HOSPITAL ENCOUNTER (OUTPATIENT)
Dept: WOUND CARE | Facility: CLINIC | Age: 43
Discharge: HOME | End: 2018-10-04
Attending: SURGERY | Admitting: SURGERY
Payer: COMMERCIAL

## 2018-10-04 VITALS
DIASTOLIC BLOOD PRESSURE: 62 MMHG | SYSTOLIC BLOOD PRESSURE: 118 MMHG | RESPIRATION RATE: 18 BRPM | TEMPERATURE: 98.4 F | HEART RATE: 92 BPM

## 2018-10-04 DIAGNOSIS — L89.324 DECUBITUS ULCER OF LEFT ISCHIUM, STAGE 4 (H): ICD-10-CM

## 2018-10-04 PROCEDURE — 97602 WOUND(S) CARE NON-SELECTIVE: CPT

## 2018-10-04 PROCEDURE — A6212 FOAM DRG <=16 SQ IN W/BORDER: HCPCS

## 2018-10-04 NOTE — DISCHARGE INSTRUCTIONS
[facility]  [address] [facility] [address], [place] [address]  Appointment Phone [phone] Nurse Advisors [phone]      Sacha Ndiaye      1975      Wound Dressing Change to left ischial tuberosity/pubic area  Cleanse wound and surrounding skin with: wound cleanser then dampen a gauze with Acetic Acid and let dwell in wound for 10 minutes. Remove gauze then do perform dressing change  Lightly pack wound with dry AMD gauze (antimicrobial dressing) to fill cavity.  Cover wound with ABD pad  Change dressing daily    Right trochanter ulcer, protect with 3x3 mepilex border every other day      Bull Harris M.D. October 4, 2018      Call us at [phone] if you have any questions about your wounds, have redness or swelling around your wound, have a fever of 101 or greater or if you have any other problems or concerns. We answer the phone Monday through Friday 8 am to 4 pm, please leave a message as we check the voicemail frequently throughout the day.       Follow up with Dr. Harris in 4-6 weeks

## 2018-10-04 NOTE — PROGRESS NOTES
Visit Date:   10/04/2018      [facility]       SUBJECTIVE:  This is a 43-year-old obese paraplegic who is here for further followup of his left chronic recurrent ischiopubic decubitus stage IV.  He also had a recent scare with a newer right trochanteric wound that at this point is stage II with a pinhole wound, but it had been much larger before this.  He got in to see Annette pérez to make adjustments on his FlexForm Seating System and they have adjusted things so that there is no further pressure on that and he is healing that smaller wound now.        They have been using acetic acid and dry AMD gauze at home.  His left IT is no worse.  There is still  fairly extensive tunneling that does go over the initial ramus and tuberosity, but it is also localized quite close to the medial scrotal base and pubic bone.  It is clean.  The base has these interesting tongues of hypertrophic granulation tissue.  There is no evidence of gross infection.  It measures 6.5 x 4 x 3.2 cm today with a tunnel at the 10-o'clock position with a depth of 7 cm.        I think they are managing fine with their current dressing regimen so will continue with that.  He would like to use a Mepilex 3 x 3's every other day for his newer right trochanteric wound.  He is still interested in having our medical intuitive work with him, but I also mentioned that I found a chiropractor who works at [employer] who can take a look at him as far as his body mechanics and activity levels to see if there is any information that we can glean as to why this might be recurring given his new spinal pelvic healing and positioning.  He is interested in all of these and will facilitate those appointments.        At this point, we are still thinking about doing a left vastus lateralis with a SPI-PHY probably sometime after January for the left IT.  We will see Jez back in a month.  His vitals today were within normal limits other than a  heart rate of 92.         REJI GUERRA MD             D: 10/04/2018   T: 10/04/2018   MT: BEBO      Name:     GIA CAMARGO   MRN:      [id number]        Account:      [id number]   :      1975           Visit Date:   10/04/2018      Document: [id number]

## 2018-10-04 NOTE — MR AVS SNAPSHOT
MRN:[id number]                      After Visit Summary   10/4/2018    Sacha Ndiaye    MRN: [id number]           Visit Information        Provider Department      10/4/2018 10:00 AM Cheryl Harris MD [facility] [place] [facility] [place]          Further instructions from your care team                 [facility]  [address], [place] [address]  Appointment Phone [phone] Nurse Advisors [phone]      Sacha Ndiaye      1975      Wound Dressing Change to left ischial tuberosity/pubic area  Cleanse wound and surrounding skin with: wound cleanser then dampen a gauze with Acetic Acid and let dwell in wound for 10 minutes. Remove gauze then do perform dressing change  Lightly pack wound with dry AMD gauze (antimicrobial dressing) to fill cavity.  Cover wound with ABD pad  Change dressing daily    Right trochanter ulcer, protect with 3x3 mepilex border every other day      Bull Harris M.D. October 4, 2018      Call us at [phone] if you have any questions about your wounds, have redness or swelling around your wound, have a fever of 101 or greater or if you have any other problems or concerns. We answer the phone Monday through Friday 8 am to 4 pm, please leave a message as we check the voicemail frequently throughout the day.       Follow up with Dr. Harris in 4-6 weeks      Care EveryWhere ID     This is your Care EveryWhere ID. This could be used by other organizations to access your [facility] medical records  [id number]        Equal Access to Services     [facility] ARMANDO : Hadii aad ku hadasho Soomaali, waaxda luqadaha, qaybta kaalmada chanaegyada, raj ortega . So [facility] [phone].    ATENCIÓN: Si habla español, tiene a townsend disposición servicios gratuitos de asistencia lingüística. Llame al [phone].    We comply with applicable federal civil rights laws and [place] laws.  We do not discriminate on the basis of race, color, national origin, age, disability, sex, sexual orientation, or gender identity.

## 2018-10-05 DIAGNOSIS — L89.324 DECUBITUS ULCER OF LEFT BUTTOCK, STAGE 4 (H): ICD-10-CM

## 2018-10-05 NOTE — TELEPHONE ENCOUNTER
Patient called reporting he forgot to have ABD pads ordered at his appointment yesterday. Rx sent to [facility] per his request.

## 2018-10-22 DIAGNOSIS — L89.324 DECUBITUS ULCER OF LEFT ISCHIUM, STAGE 4 (H): ICD-10-CM

## 2018-10-25 ENCOUNTER — HOSPITAL ENCOUNTER (OUTPATIENT)
Dept: WOUND CARE | Facility: CLINIC | Age: 43
Discharge: HOME | End: 2018-10-25
Attending: SURGERY | Admitting: SURGERY
Payer: COMMERCIAL

## 2018-10-25 VITALS
SYSTOLIC BLOOD PRESSURE: 96 MMHG | RESPIRATION RATE: 18 BRPM | DIASTOLIC BLOOD PRESSURE: 55 MMHG | HEART RATE: 103 BPM | TEMPERATURE: 97.8 F

## 2018-10-25 DIAGNOSIS — L89.324 DECUBITUS ULCER OF ISCHIAL AREA, LEFT, STAGE IV (H): ICD-10-CM

## 2018-10-25 PROCEDURE — 97602 WOUND(S) CARE NON-SELECTIVE: CPT

## 2018-10-25 NOTE — PROGRESS NOTES
"Visit Date:   10/25/2018      [facility]       DATE OF SERVICE:  10/25/2018.        This 43-year-old paraplegic gentleman who is here today for further followup of his left recurrent stage IV ischial decubitus.  He has been using acetic acid and dry AMD gauze packing on a daily basis and is essentially keeping things pretty clean, but we have not been making much progress really.  He continues to work with his weight shifts, etc.  The wound itself is clean.  The ischial tuberosity and ramus are covered with a thin layer of healthy granulation tissue but still pretty exposed us far as its contour is concerned.  Interestingly, inferiorly, there are these lymphedematous grape-like fronds of granulation tissue in the base.  Some of these had been excised by me earlier, but they seem to occur and increase in size over time.  The surrounding tissues are intact with good jazmine-wound skin.  Today, this measures 5.5 x 3.2 x 3.5 cm with a tunnel at the 10 o'clock position that is 8 cm deep.  This is essentially along the ischial ramus.  His stroke healed pretty quickly just with conservative management and a quick adjustment to his Flexform chair.  On his truck seat, he does not use a special cushion, but he is using GlideWear, and that seems to help him when he turns to get in and out of the seat.  As far as his wound cares are concerned, we will continue with the acetic acid and the AMD gauze.  We are waiting to see what the chiropractor at [facility] has to say about his activity level or his spinal pelvic anatomy and how that might be adding to this recurrence.  That being said, I really think that his Flexform has been good for him.  He is still interested in speaking with our medical intuitive as well as pursuing possible additional therapies using frequency specific microcurrent.  He has been reading a book that I recommended called \"The Residence Effect,\" and I spoke somewhat to him about a workshop that " will be local next month.  He states that since his spine surgery for the Charcot joint, he has had some persistent lower back pain.  His bowel program was made worse, and he is experiencing some more erectile dysfunction.  It is quite possible that might have been just as a result of any nerve damage done from the surgery itself, or also a possibility of just scar tissue or inflammation.  At this point, he is still thinking that if he needs surgery, he would like to do it sometime after January, but he is willing and able to pursue some of these additional complimentary therapies for his wound healing.  At this point, we will see Jez lewis in about 6 weeks.  His vitals today were within normal limits other than a heart rate of 103.         REJI GUERRA MD             D: 10/25/2018   T: 10/25/2018   MT: APRIL      Name:     GIA CAMARGO   MRN:      [id number]        Account:      [id number]   :      1975           Visit Date:   10/25/2018      Document: [id number]

## 2018-10-25 NOTE — DISCHARGE INSTRUCTIONS
[facility]  [address] [facility] [address], [place] [address]  Appointment Phone [phone] Nurse Advisors [phone]      Sacha Ndiaye      1975      Wound Dressing Change to left ischial tuberosity/pubic area  Cleanse wound and surrounding skin with: wound cleanser then dampen a gauze with Acetic Acid and let dwell in wound for 10 minutes. Remove gauze then do perform dressing change  Lightly pack wound with dry AMD gauze (antimicrobial dressing) to fill cavity.  Cover wound with ABD pad  Change dressing daily        Bull Harris M.D. October 25, 2018      Call us at [phone] if you have any questions about your wounds, have redness or swelling around your wound, have a fever of 101 or greater or if you have any other problems or concerns. We answer the phone Monday through Friday 8 am to 4 pm, please leave a message as we check the voicemail frequently throughout the day.       Follow up with Dr. Harris in December 6, 2018

## 2018-10-25 NOTE — MR AVS SNAPSHOT
MRN:[id number]                      After Visit Summary   10/25/2018    Sacha Ndiaye    MRN: [id number]           Visit Information        Provider Department      10/25/2018  9:30 AM Cheryl Harris MD [facility] [place]          Further instructions from your care team              [facility]  [address] [facility] [address], [place] [address]  Appointment Phone [phone] Nurse Advisors [phone]      Sacha Ndiaye      1975      Wound Dressing Change to left ischial tuberosity/pubic area  Cleanse wound and surrounding skin with: wound cleanser then dampen a gauze with Acetic Acid and let dwell in wound for 10 minutes. Remove gauze then do perform dressing change  Lightly pack wound with dry AMD gauze (antimicrobial dressing) to fill cavity.  Cover wound with ABD pad  Change dressing daily        Bull Harris M.D. October 25, 2018      Call us at [phone] if you have any questions about your wounds, have redness or swelling around your wound, have a fever of 101 or greater or if you have any other problems or concerns. We answer the phone Monday through Friday 8 am to 4 pm, please leave a message as we check the voicemail frequently throughout the day.       Follow up with Dr. Harris in December 6, 2018         Care EveryWhere ID     This is your Care EveryWhere ID. This could be used by other organizations to access your [facility] medical records  [id number]        Equal Access to Services     DUSTIN RODRIGUEZ AH: Hadii carol hernandez hadforesto Soolivia, waaxda luqadaha, qaybta kaalmada adeegyada, waxay idiin hayaan adeann nye. So [facility] [phone].    ATENCIÓN: Si habla español, tiene a townsend disposición servicios gratuitos de asistencia lingüística. Llame al [phone].    We comply with applicable federal civil rights laws and [place] laws. We do not discriminate on the basis of race, color, national  origin, age, disability, sex, sexual orientation, or gender identity.

## 2018-10-29 RX ORDER — CIPROFLOXACIN 750 MG/1
TABLET, FILM COATED ORAL
Qty: 180 TABLET | Refills: 0 | Status: SHIPPED | OUTPATIENT
Start: 2018-10-29 | End: 2019-02-17

## 2018-10-31 DIAGNOSIS — L89.324 DECUBITUS ULCER OF LEFT BUTTOCK, STAGE 4 (H): ICD-10-CM

## 2018-11-20 DIAGNOSIS — L89.324 DECUBITUS ULCER OF LEFT BUTTOCK, STAGE 4 (H): ICD-10-CM

## 2018-12-10 DIAGNOSIS — L89.324 DECUBITUS ULCER OF LEFT BUTTOCK, STAGE 4 (H): ICD-10-CM

## 2018-12-13 ENCOUNTER — HOSPITAL ENCOUNTER (OUTPATIENT)
Dept: WOUND CARE | Facility: CLINIC | Age: 43
Discharge: HOME | End: 2018-12-13
Attending: SURGERY | Admitting: SURGERY
Payer: COMMERCIAL

## 2018-12-13 VITALS
HEART RATE: 83 BPM | RESPIRATION RATE: 18 BRPM | DIASTOLIC BLOOD PRESSURE: 89 MMHG | TEMPERATURE: 97.3 F | SYSTOLIC BLOOD PRESSURE: 146 MMHG

## 2018-12-13 DIAGNOSIS — L89.324 DECUBITUS ULCER OF LEFT ISCHIUM, STAGE 4 (H): ICD-10-CM

## 2018-12-13 PROCEDURE — 97602 WOUND(S) CARE NON-SELECTIVE: CPT

## 2018-12-13 NOTE — DISCHARGE INSTRUCTIONS
[facility]  [address] [facility] [address], [place] [address]  Appointment Phone [phone] Nurse Advisors [phone]      Sacha Ndiaye      1975      Wound Dressing Change to left ischial tuberosity/pubic area  Cleanse wound and surrounding skin with: wound cleanser then dampen a gauze with Acetic Acid and let dwell in wound for 10 minutes. Remove gauze then do perform dressing change  Lightly pack wound with dry AMD gauze (antimicrobial dressing) to fill cavity.  Cover wound with ABD pad  Change dressing daily      Bull Harris M.D. December 13, 2018      Call us at [phone] if you have any questions about your wounds, have redness or swelling around your wound, have a fever of 101 or greater or if you have any other problems or concerns. We answer the phone Monday through Friday 8 am to 4 pm, please leave a message as we check the voicemail frequently throughout the day.       Follow up with Dr. Harris in 4-6 weeks

## 2018-12-14 NOTE — PROGRESS NOTES
Visit Date:   12/13/2018      [facility] VISIT NOTE      This is a 43-year-old paraplegic gentleman who is here today with his wife for further followup of his recurrent left ischial stage IV decubitus.  They have been using homemade acetic acid and dry AMD gauze packing on a daily basis.  There have been no changes as far as drainage, odor or anything like that.  The wound appears quite clean.  There are some grape-like growths in the base that sometimes fall off with dressings, but otherwise it is clean.  There is a thin film of granulation tissue covering the ischial ramus from the tuberosity and no evidence of any ischemic injury at this point.  It measures 5.2 x 3 x 3.5 cm today with a tunnel of 9 cm depth at the 10 o'clock position along the bone.  The skin edges are very calloused and macerated today.  At this point, we will discontinue with the normal dressings.  He is interested in doing a personal trial with a frequency specific microcurrent that he trained for this past month.  We will facilitate those trials including running current through the actual packing in the wound.  The treatment that he received at his training actually helped with his lordosis and relieved a considerable amount of his neck and upper back pain for at least 2 weeks afterwards after working with his lumbar musculoskeletal system.  He did not notice any difference with his ED or his bowel program.  We still have the paperwork to send to [facility] to see if there is any other evaluation of his spinal mechanics in his chair, etc.  We will see how it goes with the [facility] microcurrent.  Sacha is going to come back and see us in about 4 weeks or so, barring any bad weather.  His vitals today were within normal limits, although he had a slightly elevated diastolic blood pressure of 89.  Systolic was 146.  Heart rate was 83.         REJI GUERRA MD             D: 12/13/2018   T: 12/14/2018   MT: MARCELINA      Name:      GIA CAMARGO   MRN:      [id number]        Account:      [id number]   :      1975           Visit Date:   2018      Document: [id number]

## 2018-12-23 DIAGNOSIS — T84.7XXA: ICD-10-CM

## 2018-12-24 RX ORDER — DOXYCYCLINE 100 MG/1
CAPSULE ORAL
Qty: 60 CAPSULE | Refills: 2 | Status: ON HOLD | OUTPATIENT
Start: 2018-12-24 | End: 2019-04-17

## 2019-01-17 ENCOUNTER — HOSPITAL ENCOUNTER (OUTPATIENT)
Dept: WOUND CARE | Facility: CLINIC | Age: 44
Discharge: HOME | End: 2019-01-17
Attending: SURGERY | Admitting: SURGERY
Payer: COMMERCIAL

## 2019-01-17 VITALS
HEART RATE: 101 BPM | SYSTOLIC BLOOD PRESSURE: 132 MMHG | TEMPERATURE: 97.3 F | RESPIRATION RATE: 18 BRPM | DIASTOLIC BLOOD PRESSURE: 75 MMHG

## 2019-01-17 DIAGNOSIS — L89.324 DECUBITUS ULCER OF LEFT ISCHIUM, STAGE 4 (H): ICD-10-CM

## 2019-01-17 DIAGNOSIS — L89.324 DECUBITUS ULCER OF LEFT BUTTOCK, STAGE 4 (H): ICD-10-CM

## 2019-01-17 LAB
ALBUMIN SERPL-MCNC: 2.9 G/DL (ref 3.4–5)
CRP SERPL-MCNC: 37.8 MG/L (ref 0–8)
ERYTHROCYTE [SEDIMENTATION RATE] IN BLOOD BY WESTERGREN METHOD: 72 MM/H (ref 0–15)
PREALB SERPL IA-MCNC: 16 MG/DL (ref 15–45)

## 2019-01-17 PROCEDURE — 87070 CULTURE OTHR SPECIMN AEROBIC: CPT | Performed by: SURGERY

## 2019-01-17 PROCEDURE — 87077 CULTURE AEROBIC IDENTIFY: CPT | Performed by: SURGERY

## 2019-01-17 PROCEDURE — 87186 SC STD MICRODIL/AGAR DIL: CPT | Performed by: SURGERY

## 2019-01-17 PROCEDURE — 87181 SC STD AGAR DILUTION PER AGT: CPT | Performed by: SURGERY

## 2019-01-17 PROCEDURE — 85652 RBC SED RATE AUTOMATED: CPT | Performed by: SURGERY

## 2019-01-17 PROCEDURE — 84134 ASSAY OF PREALBUMIN: CPT | Performed by: SURGERY

## 2019-01-17 PROCEDURE — 87075 CULTR BACTERIA EXCEPT BLOOD: CPT | Performed by: SURGERY

## 2019-01-17 PROCEDURE — 87076 CULTURE ANAEROBE IDENT EACH: CPT | Performed by: SURGERY

## 2019-01-17 PROCEDURE — 82040 ASSAY OF SERUM ALBUMIN: CPT | Performed by: SURGERY

## 2019-01-17 PROCEDURE — 11044 DBRDMT BONE 1ST 20 SQ CM/<: CPT

## 2019-01-17 PROCEDURE — 87102 FUNGUS ISOLATION CULTURE: CPT | Performed by: SURGERY

## 2019-01-17 PROCEDURE — 86140 C-REACTIVE PROTEIN: CPT | Performed by: SURGERY

## 2019-01-17 NOTE — DISCHARGE INSTRUCTIONS
[facility]  [address] [facility] [address], [place] [address]  Appointment Phone [phone] Nurse Advisors [phone]     Sacha Ndiaye 1975     Wound Dressing Change to left ischial tuberosity/pubic area  Cleanse wound and surrounding skin with: wound cleanser then dampen a gauze with Acetic Acid and let dwell in wound for 10 minutes. Remove gauze then do perform dressing change  Lightly pack wound with dry AMD gauze (antimicrobial dressing) to fill cavity.  Cover wound with ABD pad  Change dressing daily     Bull Harris M.D. January 17, 2019    Call us at [phone] if you have any questions about your wounds, have redness or swelling around your wound, have a fever of 101 or greater or if you have any other problems or concerns. We answer the phone Monday through Friday 8 am to 4 pm, please leave a message as we check the voicemail frequently throughout the day.     Follow up with Dr. Harris in 4-6 weeks

## 2019-01-18 ENCOUNTER — TRANSFERRED RECORDS (OUTPATIENT)
Dept: HEALTH INFORMATION MANAGEMENT | Facility: CLINIC | Age: 44
End: 2019-01-18

## 2019-01-18 NOTE — PROGRESS NOTES
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  He has been using homemade acetic acid and dry AMD gauze, and we also added on frequency specific micro current to his regimen since his last visit.  Interestingly, there seems to be less grape-like excrescences in the wound base, but also a thin ridge of bone that is now exposed.  This was debrided sharply with a rongeur and these bony shard samples were sent for culture, including aerobic, anaerobic and fungal.  Interestingly, the wound sidewalls are much beefier then they usually appear to be and seem to be coming in somewhat.  That being said, however, there is still a significant tunnel that tracks along the exposed ischial ramus.  There seems to be some thinning over the previous flap incisional scar.  Overall, it appears healthy and clean.  He has been feeling better overall, particularly with back pain, which is minimal now since starting the FSM.  He still has not noticed much change as far as bowel habits or any erectile function, but he thinks that even though he caths regularly throughout the day, that the urine volumes are smaller.  That is difficult to explain.  He has been in good health and continues to work despite his wound.        The wound today measured 4.5 x 2 x 5 with a tunnel at the 11 o'clock that has still an 8 cm depth.  His vitals were within normal limits, other than a heart rate of 101 today.  We will go ahead and get a CRP and a sed rate since he had exposed bone.  We took cultures.  We will order a CAT scan of his pelvis to see if there is any new osteo.  He will continue with the acetic acid and the dry AMD packing.  We will make some adjustments to his micro current protocol related to polarity of the electrodes when we see him next.  At this point, we will see Sacha back in a month.  He  will let us know if there are any other issues or concerns.         REJI GUERRA MD             D: 2019   T: 2019   MT: MARIBEL      Name:     GIA CAMARGO   MRN:      [id number]        Account:      [id number]   :      1975           Visit Date:   2019      Document: [id number]

## 2019-01-22 LAB
BACTERIA SPEC CULT: ABNORMAL
SPECIMEN SOURCE: ABNORMAL

## 2019-01-24 ENCOUNTER — TELEPHONE (OUTPATIENT)
Dept: WOUND CARE | Facility: CLINIC | Age: 44
End: 2019-01-24

## 2019-01-24 DIAGNOSIS — L89.324 DECUBITUS ULCER OF LEFT BUTTOCK, STAGE 4 (H): ICD-10-CM

## 2019-01-26 LAB
BACTERIA SPEC CULT: ABNORMAL
Lab: ABNORMAL
SPECIMEN SOURCE: ABNORMAL

## 2019-02-14 DIAGNOSIS — L89.324 DECUBITUS ULCER OF LEFT BUTTOCK, STAGE 4 (H): ICD-10-CM

## 2019-02-14 LAB
FUNGUS SPEC CULT: NORMAL
SPECIMEN SOURCE: NORMAL

## 2019-02-17 DIAGNOSIS — L89.324 DECUBITUS ULCER OF LEFT ISCHIUM, STAGE 4 (H): ICD-10-CM

## 2019-02-19 RX ORDER — CIPROFLOXACIN 750 MG/1
TABLET, FILM COATED ORAL
Qty: 60 TABLET | Refills: 5 | Status: SHIPPED | OUTPATIENT
Start: 2019-02-19 | End: 2019-08-08

## 2019-02-19 NOTE — TELEPHONE ENCOUNTER
Per face to face discussion with Dr. Bennett, OK to refill cipro for 6 months worth of medication. New prescription ordered. For future cipro prescriptions, patient will need to either be seen by Dr. Bennett within the next 6 months or the prescription will need to be transferred to his primary care provider. Clinic will get in contact with patient to determine what he would like to do.      Lisa Casillas RN

## 2019-02-20 ENCOUNTER — TELEPHONE (OUTPATIENT)
Dept: INFECTIOUS DISEASES | Facility: CLINIC | Age: 44
End: 2019-02-20

## 2019-02-20 NOTE — TELEPHONE ENCOUNTER
Called patient to schedule appointment with Dr. Bennett within the next 6 months. Scheduled patient for 03/28/2019 at 9:30am with Dr. Bennett but patient will call clinic back tomorrow to confirm if that date and time will work or not.    Lisa Casillas RN

## 2019-02-21 ENCOUNTER — TELEPHONE (OUTPATIENT)
Dept: WOUND CARE | Facility: CLINIC | Age: 44
End: 2019-02-21

## 2019-02-21 ENCOUNTER — HOSPITAL ENCOUNTER (OUTPATIENT)
Dept: WOUND CARE | Facility: CLINIC | Age: 44
Discharge: HOME | End: 2019-02-21
Attending: SURGERY | Admitting: SURGERY
Payer: COMMERCIAL

## 2019-02-21 VITALS — DIASTOLIC BLOOD PRESSURE: 66 MMHG | HEART RATE: 124 BPM | SYSTOLIC BLOOD PRESSURE: 94 MMHG | TEMPERATURE: 97.2 F

## 2019-02-21 DIAGNOSIS — L89.324 DECUBITUS ULCER OF LEFT ISCHIUM, STAGE 4 (H): ICD-10-CM

## 2019-02-21 PROCEDURE — 97602 WOUND(S) CARE NON-SELECTIVE: CPT

## 2019-02-21 NOTE — TELEPHONE ENCOUNTER
Left message for Veronica, radiology dept at [facility] where pt had his CT scan done on 1-18-19, requesting that a copy of the exam images be placed on a disc and mailed to our Central File room to be uploaded into Epic for ortho to review. The mailing address is     Central File Room   [address] [address]  [phone]

## 2019-02-21 NOTE — PROGRESS NOTES
Visit Date:   02/21/2019      [facility]AL[facility]  INST[facility]      This is a 43-year-old paraplegic gentleman who is here today for further evaluation and treatment of his chronic recurrent left ischial decubitus.  Currently, he is using acetic acid for cleansing and then packing with dry AMD gauze.  He has also been doing some frequency specific microcurrent of a trial at home every couple days or so.  This has helped tremendously with his upper mid back pain and lower back soreness which could be related to his hardware.  His CAT scan of the pelvis about a month ago shows osteomyelitis of the left ischial ramus towards the pubis and past the tuberosity.  Will ask Dr. Elder to review that since any aggressive resection of bone may effect pressure redistribution in sitting positions.  He is still on his Flexform from [place] and that seems to be going well.  He is still on prophylactic Cipro for his hardware and followed by Dr. Abigail Bennett from Infectious Disease at the .  His next visit with her was scheduled for 03/28.  As far the wound is concerned, it appears clean.  I think the walls are definitely kind of smooth from scar.  He is starting to redevelop these kind of grape-like granulation buds in the base that are quite abnormal and were excised a while back.        The bone is covered, but it is a thin covering and there is a tiny little bit just at the very tip of it that may be exposed.  The skin edges themselves are fairly thickened and callused, but at this point we cannot even fill in depth let alone enlist any kind of healing at the skin level.  His old flap scars from a gluteal rotation flap and posterior thigh advancement flap are both well-healed.  The posterior thigh flap scars are quite attenuated.  There is quite a bit of laxity there and when we redo coverage for this wound, I think we will be able to readvance the posterior thigh flap hopefully using that as a delayed kind of a random medially  based flap to try to get the fill.  I am not sure that another gluteal rotation would really do it.  We will definitely need the Spy to make sure that this is going to work for him.  At this point, he is hoping for maybe some time in April, but we will not pick a day until next month we see him back.        Nursing notes have his vitals which were normal except for heart rate 124.  The dimensions are also included are 5.2 x 2.5 cm with undermining of 4.6 cm between the 6 and 12 o'clock positions.        At this point, we will see Gia in 5 weeks as I will be at an FSM conference in 4.  I would like to try changing the polarity of the therapy that he is using for the wounds.  Also curious if there is anything that might be good for osteomyelitis.  We will try to get in touch with him outside the clinic to make some adjustments to his programs and do some additional alternative treatment.  He is very open and eager to participate and understands the possible risks and complications.  We will continue, however, to provide standardized care that he is currently on the acetic acid and the AMD packing.         REJI GUERRA MD             D: 2019   T: 2019   MT: BRITTANY      Name:     GIA CAMARGO   MRN:      [id number]        Account:      [id number]   :      1975           Visit Date:   2019      Document: [id number]

## 2019-02-21 NOTE — DISCHARGE INSTRUCTIONS
[facility]  [address] [facility] [address], [place] [address]  Appointment Phone [phone] Nurse Advisors [phone]     Sacha Ndiaye 1975     Wound Dressing Change to left ischial tuberosity/pubic area  Cleanse wound and surrounding skin with: wound cleanser then dampen a gauze with Acetic Acid and let dwell in wound for 10 minutes. Remove gauze then do perform dressing change  Lightly pack wound with dry AMD gauze (antimicrobial dressing) to fill cavity.  Cover wound with ABD pad  Change dressing daily     Bull Harris M.D. February 21, 2019     Call us at [phone] if you have any questions about your wounds, have redness or swelling around your wound, have a fever of 101 or greater or if you have any other problems or concerns. We answer the phone Monday through Friday 8 am to 4 pm, please leave a message as we check the voicemail frequently throughout the day.     Follow up with Dr. Harris in 5-6 weeks

## 2019-02-25 DIAGNOSIS — L89.324 DECUBITUS ULCER OF LEFT BUTTOCK, STAGE 4 (H): ICD-10-CM

## 2019-03-21 DIAGNOSIS — L89.324 DECUBITUS ULCER OF LEFT BUTTOCK, STAGE 4 (H): ICD-10-CM

## 2019-03-28 ENCOUNTER — HOSPITAL ENCOUNTER (OUTPATIENT)
Dept: WOUND CARE | Facility: CLINIC | Age: 44
Discharge: HOME | End: 2019-03-28
Attending: SURGERY | Admitting: SURGERY
Payer: COMMERCIAL

## 2019-03-28 VITALS — TEMPERATURE: 97.8 F | SYSTOLIC BLOOD PRESSURE: 85 MMHG | DIASTOLIC BLOOD PRESSURE: 69 MMHG | RESPIRATION RATE: 16 BRPM

## 2019-03-28 DIAGNOSIS — L89.324 PRESSURE INJURY OF LEFT ISCHIUM, STAGE 4 (H): Primary | ICD-10-CM

## 2019-03-28 DIAGNOSIS — L89.324 DECUBITUS ULCER OF LEFT ISCHIUM, STAGE 4 (H): ICD-10-CM

## 2019-03-28 PROCEDURE — 97602 WOUND(S) CARE NON-SELECTIVE: CPT

## 2019-03-28 RX ORDER — CEFAZOLIN SODIUM 1 G/50ML
1 INJECTION, SOLUTION INTRAVENOUS SEE ADMIN INSTRUCTIONS
Status: CANCELLED | OUTPATIENT
Start: 2019-03-28

## 2019-03-28 NOTE — PROGRESS NOTES
Visit Date:   03/28/2019      [facility]      This is a 43-year-old paraplegic gentleman with current chronic left ischial decubitus, now has been diagnosed with osteomyelitis.  He is just using dry gauze and a homemade acetic acid for dressings.  The wound itself is quite clean.  There is good coverage of the ischial bone right now, which is interesting.  He has been using frequency specific microcurrent and was doing some experiments essentially by holding off on his therapies and is noticing that it has been doing some good for his lower back pain as well as helping him to manage his bowel and bladder function better.  He states that the bladder volume has been going up an additional 10 ounces of output over the past 2 weeks when he stopped therapy and also his bowels are more alternating between constipation and diarrhea when he is not on it.  So, he is prepared to do another flap surgery.  I am hoping that we can use a posterior thigh flap again but it would be more of a delayed kind of wide-based pedicle flap since we have already done a gluteal rotation but will study things with the SPIFI and make sure we have good flow for that.  If not, we could conceivably re-rotate the gluteus, but I do not think that that is as mobile  or as redundant as the thigh is.  The other option that we could consider would be a vastus lateralis from the lateral thigh, but we will just have to see what things look like when we check the blood flow.  It sounds like Sacha is able to continue to do some work from home paperwork and his employers are okay with that.  He is thinking he needs a couple weeks to kind of tidy things up and then could be ready to be on bed rest for the next month or 2.  We were looking to schedule him on 04/12 if there is operating room availability.  Otherwise, it will not be until like the 26th or something, so hopefully we can get him on the books.  In the meantime, we will continue with  his normal dressing changes.  He is also going to go back on FSM to see how that changes his bowel and bladder function as well as his lower back pain again.  Today, his wound measures 4.5 x 1.5 x 4 with undermining from 10-12 o'clock of a depth of 6 cm.  The base does have some grape-like granulation tissue but otherwise it looks very healthy and very clean and a bit thicker over the bone, but still very rubbery with a lot of scar tissue.  His vitals were within normal limits except for heart rate of 128.   Any kind of followup postop will be based off of when we get the surgery done.  Most likely, he will need to go to some sort of facility to get long-term antibiotics and bed rest.         REJI GUERRA MD             D: 2019   T: 2019   MT: MICHELLE      Name:     GIA CAMARGO   MRN:      [id number]        Account:      [id number]   :      1975           Visit Date:   2019      Document: [id number]

## 2019-03-28 NOTE — DISCHARGE INSTRUCTIONS
[facility]  [address] [facility] [address], [place] [address]  Appointment Phone [phone] Nurse Advisors [phone]     Sacha Ndiaye 1975     Wound Dressing Change to left ischial tuberosity/pubic area  Cleanse wound and surrounding skin with: wound cleanser then dampen a gauze with Acetic Acid and let dwell in wound for 10 minutes. Remove gauze then do perform dressing change  Lightly pack wound with dry AMD gauze (antimicrobial dressing) to fill cavity.  Cover wound with ABD pad  Change dressing daily     Plan for flap surgery with Dr. Harris on April 12th at the [facility]. Her surgery scheduler will call you for a time.     Bull Harris M.D. March 28, 2019     Call us at [phone] if you have any questions about your wounds, have redness or swelling around your wound, have a fever of 101 or greater or if you have any other problems or concerns. We answer the phone Monday through Friday 8 am to 4 pm, please leave a message as we check the voicemail frequently throughout the day.     Follow up with Dr. Harris for a post op at our office on May 16th at 11:10am

## 2019-04-01 ENCOUNTER — TELEPHONE (OUTPATIENT)
Dept: PLASTIC SURGERY | Facility: CLINIC | Age: 44
End: 2019-04-01

## 2019-04-01 NOTE — TELEPHONE ENCOUNTER
Spoke with patient to schedule surgery with Dr Bull Harris      Surgery was scheduled on 4/12 at [facility]    Patient will have pre-surgery visit with PCP on      -Patient advised H&P is needed or surgery cancellation may occur    Post-Op care appointment scheduled?  YES on 5/16    Patient is aware a / is needed day of surgery.     Surgery packet was sent via mail, patient has my direct contact information for any further questions.     Whit Bella  Surgical Melissa-Op Coordinator  [phone]

## 2019-04-12 ENCOUNTER — HOSPITAL ENCOUNTER (INPATIENT)
Facility: CLINIC | Age: 44
LOS: 5 days | Discharge: NURSING, REHAB OR LONG-TERM CARE | DRG: 580 | End: 2019-04-17
Attending: SURGERY | Admitting: SURGERY
Payer: COMMERCIAL

## 2019-04-12 ENCOUNTER — ANESTHESIA (OUTPATIENT)
Dept: SURGERY | Facility: CLINIC | Age: 44
DRG: 580 | End: 2019-04-12
Payer: COMMERCIAL

## 2019-04-12 ENCOUNTER — ANESTHESIA EVENT (OUTPATIENT)
Dept: SURGERY | Facility: CLINIC | Age: 44
DRG: 580 | End: 2019-04-12
Payer: COMMERCIAL

## 2019-04-12 DIAGNOSIS — Z98.890: Primary | ICD-10-CM

## 2019-04-12 LAB
CREAT SERPL-MCNC: 0.52 MG/DL (ref 0.66–1.25)
GFR SERPL CREATININE-BSD FRML MDRD: >90 ML/MIN/{1.73_M2}
GLUCOSE BLDC GLUCOMTR-MCNC: 82 MG/DL (ref 70–99)
PLATELET # BLD AUTO: 324 10E9/L (ref 150–450)

## 2019-04-12 PROCEDURE — 00000146 ZZHCL STATISTIC GLUCOSE BY METER IP

## 2019-04-12 PROCEDURE — 88311 DECALCIFY TISSUE: CPT | Performed by: SURGERY

## 2019-04-12 PROCEDURE — 25800030 ZZH RX IP 258 OP 636: Performed by: STUDENT IN AN ORGANIZED HEALTH CARE EDUCATION/TRAINING PROGRAM

## 2019-04-12 PROCEDURE — 87102 FUNGUS ISOLATION CULTURE: CPT | Performed by: SURGERY

## 2019-04-12 PROCEDURE — 85049 AUTOMATED PLATELET COUNT: CPT | Performed by: STUDENT IN AN ORGANIZED HEALTH CARE EDUCATION/TRAINING PROGRAM

## 2019-04-12 PROCEDURE — 36000057 ZZH SURGERY LEVEL 3 1ST 30 MIN - UMMC: Performed by: SURGERY

## 2019-04-12 PROCEDURE — 88304 TISSUE EXAM BY PATHOLOGIST: CPT | Performed by: SURGERY

## 2019-04-12 PROCEDURE — 25800030 ZZH RX IP 258 OP 636: Performed by: NURSE ANESTHETIST, CERTIFIED REGISTERED

## 2019-04-12 PROCEDURE — 99232 SBSQ HOSP IP/OBS MODERATE 35: CPT | Performed by: INTERNAL MEDICINE

## 2019-04-12 PROCEDURE — 99207 ZZC MOONLIGHTING INDICATOR: CPT | Performed by: INTERNAL MEDICINE

## 2019-04-12 PROCEDURE — 25000128 H RX IP 250 OP 636: Performed by: NURSE ANESTHETIST, CERTIFIED REGISTERED

## 2019-04-12 PROCEDURE — 27210794 ZZH OR GENERAL SUPPLY STERILE: Performed by: SURGERY

## 2019-04-12 PROCEDURE — 25000125 ZZHC RX 250: Performed by: NURSE ANESTHETIST, CERTIFIED REGISTERED

## 2019-04-12 PROCEDURE — 87181 SC STD AGAR DILUTION PER AGT: CPT | Performed by: SURGERY

## 2019-04-12 PROCEDURE — 87116 MYCOBACTERIA CULTURE: CPT | Performed by: SURGERY

## 2019-04-12 PROCEDURE — 37000009 ZZH ANESTHESIA TECHNICAL FEE, EACH ADDTL 15 MIN: Performed by: SURGERY

## 2019-04-12 PROCEDURE — 87076 CULTURE ANAEROBE IDENT EACH: CPT | Performed by: SURGERY

## 2019-04-12 PROCEDURE — 87070 CULTURE OTHR SPECIMN AEROBIC: CPT | Performed by: SURGERY

## 2019-04-12 PROCEDURE — 25000566 ZZH SEVOFLURANE, EA 15 MIN: Performed by: SURGERY

## 2019-04-12 PROCEDURE — 36415 COLL VENOUS BLD VENIPUNCTURE: CPT | Performed by: STUDENT IN AN ORGANIZED HEALTH CARE EDUCATION/TRAINING PROGRAM

## 2019-04-12 PROCEDURE — 25000128 H RX IP 250 OP 636: Performed by: SURGERY

## 2019-04-12 PROCEDURE — 82565 ASSAY OF CREATININE: CPT | Performed by: STUDENT IN AN ORGANIZED HEALTH CARE EDUCATION/TRAINING PROGRAM

## 2019-04-12 PROCEDURE — 88304 TISSUE EXAM BY PATHOLOGIST: CPT | Mod: 26 | Performed by: SURGERY

## 2019-04-12 PROCEDURE — 25800025 ZZH RX 258: Performed by: SURGERY

## 2019-04-12 PROCEDURE — 87186 SC STD MICRODIL/AGAR DIL: CPT | Performed by: SURGERY

## 2019-04-12 PROCEDURE — 40000170 ZZH STATISTIC PRE-PROCEDURE ASSESSMENT II: Performed by: SURGERY

## 2019-04-12 PROCEDURE — 25000125 ZZHC RX 250: Performed by: SURGERY

## 2019-04-12 PROCEDURE — 71000014 ZZH RECOVERY PHASE 1 LEVEL 2 FIRST HR: Performed by: SURGERY

## 2019-04-12 PROCEDURE — 37000008 ZZH ANESTHESIA TECHNICAL FEE, 1ST 30 MIN: Performed by: SURGERY

## 2019-04-12 PROCEDURE — 0JWW07Z REVISION OF AUTOLOGOUS TISSUE SUBSTITUTE IN LOWER EXTREMITY SUBCUTANEOUS TISSUE AND FASCIA, OPEN APPROACH: ICD-10-PCS | Performed by: SURGERY

## 2019-04-12 PROCEDURE — 36000059 ZZH SURGERY LEVEL 3 EA 15 ADDTL MIN UMMC: Performed by: SURGERY

## 2019-04-12 PROCEDURE — 87077 CULTURE AEROBIC IDENTIFY: CPT | Performed by: SURGERY

## 2019-04-12 PROCEDURE — 12000001 ZZH R&B MED SURG/OB UMMC

## 2019-04-12 PROCEDURE — 0QB30ZZ EXCISION OF LEFT PELVIC BONE, OPEN APPROACH: ICD-10-PCS | Performed by: SURGERY

## 2019-04-12 PROCEDURE — 87075 CULTR BACTERIA EXCEPT BLOOD: CPT | Performed by: SURGERY

## 2019-04-12 PROCEDURE — 87206 SMEAR FLUORESCENT/ACID STAI: CPT | Performed by: SURGERY

## 2019-04-12 RX ORDER — PROPOFOL 10 MG/ML
INJECTION, EMULSION INTRAVENOUS PRN
Status: DISCONTINUED | OUTPATIENT
Start: 2019-04-12 | End: 2019-04-12

## 2019-04-12 RX ORDER — OXYCODONE HYDROCHLORIDE 5 MG/1
5-10 TABLET ORAL EVERY 4 HOURS PRN
Status: DISCONTINUED | OUTPATIENT
Start: 2019-04-12 | End: 2019-04-17 | Stop reason: HOSPADM

## 2019-04-12 RX ORDER — ACETAMINOPHEN 325 MG/1
650 TABLET ORAL EVERY 4 HOURS PRN
Status: DISCONTINUED | OUTPATIENT
Start: 2019-04-12 | End: 2019-04-17 | Stop reason: HOSPADM

## 2019-04-12 RX ORDER — ONDANSETRON 2 MG/ML
4 INJECTION INTRAMUSCULAR; INTRAVENOUS EVERY 30 MIN PRN
Status: DISCONTINUED | OUTPATIENT
Start: 2019-04-12 | End: 2019-04-12 | Stop reason: HOSPADM

## 2019-04-12 RX ORDER — LIDOCAINE 40 MG/G
CREAM TOPICAL
Status: DISCONTINUED | OUTPATIENT
Start: 2019-04-12 | End: 2019-04-17 | Stop reason: HOSPADM

## 2019-04-12 RX ORDER — HYDROMORPHONE HYDROCHLORIDE 1 MG/ML
.3-.5 INJECTION, SOLUTION INTRAMUSCULAR; INTRAVENOUS; SUBCUTANEOUS EVERY 5 MIN PRN
Status: DISCONTINUED | OUTPATIENT
Start: 2019-04-12 | End: 2019-04-12 | Stop reason: HOSPADM

## 2019-04-12 RX ORDER — EPHEDRINE SULFATE 50 MG/ML
INJECTION, SOLUTION INTRAMUSCULAR; INTRAVENOUS; SUBCUTANEOUS PRN
Status: DISCONTINUED | OUTPATIENT
Start: 2019-04-12 | End: 2019-04-12

## 2019-04-12 RX ORDER — CEFAZOLIN SODIUM 1 G/3ML
1 INJECTION, POWDER, FOR SOLUTION INTRAMUSCULAR; INTRAVENOUS SEE ADMIN INSTRUCTIONS
Status: DISCONTINUED | OUTPATIENT
Start: 2019-04-12 | End: 2019-04-12 | Stop reason: HOSPADM

## 2019-04-12 RX ORDER — ONDANSETRON 2 MG/ML
INJECTION INTRAMUSCULAR; INTRAVENOUS PRN
Status: DISCONTINUED | OUTPATIENT
Start: 2019-04-12 | End: 2019-04-12

## 2019-04-12 RX ORDER — ONDANSETRON 4 MG/1
4 TABLET, ORALLY DISINTEGRATING ORAL EVERY 30 MIN PRN
Status: DISCONTINUED | OUTPATIENT
Start: 2019-04-12 | End: 2019-04-12 | Stop reason: HOSPADM

## 2019-04-12 RX ORDER — BISACODYL 10 MG
10 SUPPOSITORY, RECTAL RECTAL DAILY PRN
Status: DISCONTINUED | OUTPATIENT
Start: 2019-04-12 | End: 2019-04-17 | Stop reason: HOSPADM

## 2019-04-12 RX ORDER — SODIUM CHLORIDE, SODIUM LACTATE, POTASSIUM CHLORIDE, CALCIUM CHLORIDE 600; 310; 30; 20 MG/100ML; MG/100ML; MG/100ML; MG/100ML
INJECTION, SOLUTION INTRAVENOUS CONTINUOUS
Status: DISCONTINUED | OUTPATIENT
Start: 2019-04-12 | End: 2019-04-12 | Stop reason: HOSPADM

## 2019-04-12 RX ORDER — LIDOCAINE HYDROCHLORIDE 20 MG/ML
INJECTION, SOLUTION INFILTRATION; PERINEURAL PRN
Status: DISCONTINUED | OUTPATIENT
Start: 2019-04-12 | End: 2019-04-12

## 2019-04-12 RX ORDER — FENTANYL CITRATE 50 UG/ML
25-50 INJECTION, SOLUTION INTRAMUSCULAR; INTRAVENOUS
Status: DISCONTINUED | OUTPATIENT
Start: 2019-04-12 | End: 2019-04-12 | Stop reason: HOSPADM

## 2019-04-12 RX ORDER — INDOCYANINE GREEN AND WATER 25 MG
KIT INJECTION PRN
Status: DISCONTINUED | OUTPATIENT
Start: 2019-04-12 | End: 2019-04-12

## 2019-04-12 RX ORDER — NALOXONE HYDROCHLORIDE 0.4 MG/ML
.1-.4 INJECTION, SOLUTION INTRAMUSCULAR; INTRAVENOUS; SUBCUTANEOUS
Status: ACTIVE | OUTPATIENT
Start: 2019-04-12 | End: 2019-04-13

## 2019-04-12 RX ORDER — ONDANSETRON 2 MG/ML
4 INJECTION INTRAMUSCULAR; INTRAVENOUS EVERY 6 HOURS PRN
Status: DISCONTINUED | OUTPATIENT
Start: 2019-04-12 | End: 2019-04-17 | Stop reason: HOSPADM

## 2019-04-12 RX ORDER — SODIUM CHLORIDE 9 MG/ML
INJECTION, SOLUTION INTRAVENOUS CONTINUOUS
Status: DISCONTINUED | OUTPATIENT
Start: 2019-04-12 | End: 2019-04-13

## 2019-04-12 RX ORDER — SODIUM CHLORIDE, SODIUM LACTATE, POTASSIUM CHLORIDE, CALCIUM CHLORIDE 600; 310; 30; 20 MG/100ML; MG/100ML; MG/100ML; MG/100ML
INJECTION, SOLUTION INTRAVENOUS CONTINUOUS PRN
Status: DISCONTINUED | OUTPATIENT
Start: 2019-04-12 | End: 2019-04-12

## 2019-04-12 RX ORDER — OXYBUTYNIN CHLORIDE 10 MG/1
30 TABLET, EXTENDED RELEASE ORAL DAILY
Status: DISCONTINUED | OUTPATIENT
Start: 2019-04-13 | End: 2019-04-17 | Stop reason: HOSPADM

## 2019-04-12 RX ORDER — POLYETHYLENE GLYCOL 3350 17 G/17G
17 POWDER, FOR SOLUTION ORAL DAILY PRN
Status: DISCONTINUED | OUTPATIENT
Start: 2019-04-12 | End: 2019-04-17 | Stop reason: HOSPADM

## 2019-04-12 RX ORDER — FENTANYL CITRATE 50 UG/ML
INJECTION, SOLUTION INTRAMUSCULAR; INTRAVENOUS PRN
Status: DISCONTINUED | OUTPATIENT
Start: 2019-04-12 | End: 2019-04-12

## 2019-04-12 RX ADMIN — PROPOFOL 120 MG: 10 INJECTION, EMULSION INTRAVENOUS at 11:04

## 2019-04-12 RX ADMIN — ROCURONIUM BROMIDE 50 MG: 10 INJECTION INTRAVENOUS at 11:06

## 2019-04-12 RX ADMIN — SODIUM CHLORIDE: 9 INJECTION, SOLUTION INTRAVENOUS at 16:40

## 2019-04-12 RX ADMIN — CEFAZOLIN 1 G: 1 INJECTION, POWDER, FOR SOLUTION INTRAMUSCULAR; INTRAVENOUS at 12:37

## 2019-04-12 RX ADMIN — Medication 5 MG: at 11:14

## 2019-04-12 RX ADMIN — PROPOFOL 60 MG: 10 INJECTION, EMULSION INTRAVENOUS at 15:43

## 2019-04-12 RX ADMIN — ONDANSETRON 4 MG: 2 INJECTION INTRAMUSCULAR; INTRAVENOUS at 15:14

## 2019-04-12 RX ADMIN — INDOCYANINE GREEN 7.5 MG: KIT INTRAVENOUS at 14:06

## 2019-04-12 RX ADMIN — SODIUM CHLORIDE, POTASSIUM CHLORIDE, SODIUM LACTATE AND CALCIUM CHLORIDE: 600; 310; 30; 20 INJECTION, SOLUTION INTRAVENOUS at 13:23

## 2019-04-12 RX ADMIN — SODIUM CHLORIDE, POTASSIUM CHLORIDE, SODIUM LACTATE AND CALCIUM CHLORIDE: 600; 310; 30; 20 INJECTION, SOLUTION INTRAVENOUS at 10:57

## 2019-04-12 RX ADMIN — INDOCYANINE GREEN 7.5 MG: KIT INTRAVENOUS at 13:12

## 2019-04-12 RX ADMIN — LIDOCAINE HYDROCHLORIDE 40 MG: 20 INJECTION, SOLUTION INFILTRATION; PERINEURAL at 11:04

## 2019-04-12 RX ADMIN — MIDAZOLAM 2 MG: 1 INJECTION INTRAMUSCULAR; INTRAVENOUS at 11:02

## 2019-04-12 RX ADMIN — FENTANYL CITRATE 50 MCG: 50 INJECTION, SOLUTION INTRAMUSCULAR; INTRAVENOUS at 11:04

## 2019-04-12 RX ADMIN — Medication 5 MG: at 11:38

## 2019-04-12 RX ADMIN — HYDROMORPHONE HYDROCHLORIDE 0.5 MG: 1 INJECTION, SOLUTION INTRAMUSCULAR; INTRAVENOUS; SUBCUTANEOUS at 13:24

## 2019-04-12 RX ADMIN — FENTANYL CITRATE 50 MCG: 50 INJECTION, SOLUTION INTRAMUSCULAR; INTRAVENOUS at 11:02

## 2019-04-12 ASSESSMENT — ACTIVITIES OF DAILY LIVING (ADL)
DRESS: 1-->ASSISTIVE EQUIPMENT
TOILETING: 1-->ASSISTIVE EQUIPMENT
FALL_HISTORY_WITHIN_LAST_SIX_MONTHS: NO
TRANSFERRING: 0-->INDEPENDENT
ADLS_ACUITY_SCORE: 17
RETIRED_COMMUNICATION: 0-->UNDERSTANDS/COMMUNICATES WITHOUT DIFFICULTY
AMBULATION: 3-->ASSISTIVE EQUIPMENT AND PERSON
BATHING: 1-->ASSISTIVE EQUIPMENT
RETIRED_EATING: 0-->INDEPENDENT
SWALLOWING: 0-->SWALLOWS FOODS/LIQUIDS WITHOUT DIFFICULTY
COGNITION: 0 - NO COGNITION ISSUES REPORTED

## 2019-04-12 ASSESSMENT — MIFFLIN-ST. JEOR: SCORE: 1568.04

## 2019-04-12 NOTE — BRIEF OP NOTE
[facility], [place]    Brief Operative Note    Pre-operative diagnosis: Pressure Injury Of Left Ischium Stage 4  Post-operative diagnosis Same    Procedure:   Left Ischial Decubitus Debridement Including Bone  Re-rotation of Left Gluteal Myocutaneous Flap  Readvancement of Left Posterior Thigh Flap  SPY-PHI    Surgeon: Surgeon(s) and Role:     * Cheryl Harris MD - Primary     * Marshal De Leon MD - Resident - Assisting    Anesthesia: General   Estimated blood loss: 100 ml  Drains: Duke-Sosa  Specimens:   ID Type Source Tests Collected by Time Destination   1 : Left Ischium Bone Bone AFB CULTURE NON BLOOD, ANAEROBIC BACTERIAL CULTURE, BONE CULTURE AEROBIC BACTERIAL, FUNGUS CULTURE Cheryl Harris MD 4/12/2019 12:35 PM    A : Left Ischium Bone Bone SURGICAL PATHOLOGY EXAM Cheryl Harris MD 4/12/2019 12:35 PM    B : Left Ischial Bone Bone Bone SURGICAL PATHOLOGY EXAM Cheryl Harris MD 4/12/2019  1:40 PM      Findings:   Please see dictation.  Complications: None.  Implants:    None

## 2019-04-12 NOTE — LETTER
Transition Communication Hand-off for Care Transitions to Next Level of Care Provider    Name: Sacha Ndiaye  : 1975  MRN #: [id number]  Primary Care Provider: MATT MELGAR     Primary Clinic: [facility] [address] [facility] [address]     Reason for Hospitalization:  Pressure Injury Of Left Ischium Stage 4  Hx of plastic surgery  Admit Date/Time: 2019  9:30 AM  Discharge Date: 19  Payor Source: Payor: [employer] / Plan: [employer] OPEN ACCESS / Product Type: HMO /            Reason for Communication Hand-off Referral: Other Discharge plan    Discharge Plan:  [facility] [phone] f: [phone]    Will be followed by Dr Turner at facility     Concern for non-adherence with plan of care:   Y/N N  Discharge Needs Assessment:        Follow-up plan:    Future Appointments   Date Time Provider Department Center   2019 11:10 AM Cheryl Harris MD [facility]       Any outstanding tests or procedures:        Referrals     Future Labs/Procedures    Occupational Therapy Adult Consult     Comments:    Evaluate and treat as clinically indicated.    Reason: Deconditioned post op    Physical Therapy Adult Consult     Comments:    Evaluate and treat as clinically indicated.    Reason: Deconditioned post op            RAGHAV Suarez  8a/10a Ortho/Med/Surg and Adult [facility] ED     [phone]  [email or web address]    AVS/Discharge Summary is the source of truth; this is a helpful guide for improved communication of patient story
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
## 2019-04-12 NOTE — OR NURSING
Per Dr. De La Rosa, patient is to use saline drops for eye discomfort. Ok to transfer to the floor.

## 2019-04-12 NOTE — ANESTHESIA CARE TRANSFER NOTE
Patient: Sacha Ndiaye    Procedure(s):  Left Ischial Decubitus Debridement Including Bone, Readvancement Gluteal Flaps, SPY-PHI    Diagnosis: Pressure Injury Of Left Ischium Stage 4  Diagnosis Additional Information: No value filed.    Anesthesia Type:   No value filed.     Note:  Airway :Nasal Cannula  Patient transferred to:PACU  Handoff Report: Identifed the Patient, Identified the Reponsible Provider, Reviewed the pertinent medical history, Discussed the surgical course, Reviewed Intra-OP anesthesia mangement and issues during anesthesia, Set expectations for post-procedure period and Allowed opportunity for questions and acknowledgement of understanding      Vitals: (Last set prior to Anesthesia Care Transfer)    CRNA VITALS  4/12/2019 1521 - 4/12/2019 1600      4/12/2019             Pulse:  89    SpO2:  100 %    Resp Rate (observed):  2  (Abnormal)                 Electronically Signed By: JULIANNE Morris CRNA  April 12, 2019  4:00 PM

## 2019-04-12 NOTE — OR NURSING
PACU to Inpatient Nursing Handoff    Patient Sacha Ndiaye is a 44 year old male who speaks English.   Procedure Procedure(s):  Left Ischial Decubitus Debridement Including Bone, Readvancement Gluteal Flaps, SPY-PHI   Surgeon(s) Primary: Cheryl Harris MD  Resident - Assisting: Marshal De Leon MD     Allergies   Allergen Reactions     Econazole Rash     Gentamycin [Gentamicin] Blisters, Unknown and Dermatitis     From topical gentamicin, developed severe blistering on foot       Isolation  [unfilled]     Past Medical History   has a past medical history of Anemia, Charcot's joint, Chronic UTI, Constipation, Dislocated hip (H), Epicondylitis, History of blood transfusion, UTI (urinary tract infection), Neurogenic bladder, Other chronic pain, Paraplegia following spinal cord injury (H), and Pressure ulcer stage IV.    Anesthesia General   Dermatome Level     Preop Meds Not applicable   Nerve block Not applicable   Intraop Meds hydromorphone (Dilaudid): 0.5 mg total  ondansetron (Zofran): last given at 100  fentanyl 100 mcg   Ephedrine 10 mg   Local Meds No   Antibiotics cefazolin (Ancef) - last given at 1237     Pain Patient Currently in Pain: yes  Comfort: comfortably manageable  Pain Control: partially effective   PACU meds  Not applicable   PCA / epidural No   Capnography Respiratory Monitoring (EtCO2): 30 mmHg  Integrated Pulmonary Index (IPI): 8-9   Telemetry ECG Rhythm: Normal sinus rhythm   Inpatient Telemetry Monitor Ordered? No        Labs Glucose Lab Results   Component Value Date     01/14/2017       Hgb Lab Results   Component Value Date    HGB 9.0 01/14/2017       INR Lab Results   Component Value Date    INR 1.12 10/27/2015      PACU Imaging Not applicable     Wound/Incision Pressure Injury 04/12/19 Left Ischial tuberosity Stage 4 (Active)   Dressing Status Re-applied 4/12/2019  4:00 PM   Number of days: 0       Rash 11/28/16 Bilateral thigh (Active)   Number of days: 865       Wound  (used by OP [facility] only) 02/21/19 0916 Left pubis pressure injury (Active)   Length (cm) 4.5 3/28/2019 10:00 AM   Width (cm) 1.5 3/28/2019 10:00 AM   Depth (cm) 4 3/28/2019 10:00 AM   Wound (cm^2) 6.75 cm^2 3/28/2019 10:00 AM   Wound Volume (cm^3) 27 cm^3 3/28/2019 10:00 AM   Undermining [Depth (cm)/Location] 10-12Oclock/Depth 6.0 m 3/28/2019 10:00 AM   Dressing Appearance moist drainage 3/28/2019 11:01 AM   Drainage Characteristics/Odor serosanguineous 3/28/2019 11:01 AM   Drainage Amount moderate 3/28/2019 11:01 AM   Thickness/Stage Stage 4 3/28/2019 11:01 AM   Base red/granulating 3/28/2019 11:01 AM   Periwound intact 3/28/2019 11:01 AM   Periwound Temperature warm 3/28/2019 11:01 AM   Care, Wound non-select wound debridement performed 3/28/2019 11:01 AM   Number of days: 50       Incision/Surgical Site 04/12/19 Left Buttocks (Active)   Incision Assessment UTV 4/12/2019  3:54 PM   Melissa-Incision Assessment UTV 4/12/2019  3:54 PM   Closure Sutures 4/12/2019  2:17 PM   Dressing Intervention Clean, dry, intact 4/12/2019  3:54 PM   Number of days: 0      CMS        Equipment Not applicable   Other LDA       IV Access Peripheral IV 04/12/19 Left Lower forearm (Active)   Site Assessment WDL 4/12/2019  3:54 PM   Line Status Infusing 4/12/2019  3:54 PM   Phlebitis Scale 0-->no symptoms 4/12/2019  3:54 PM   Infiltration Scale 0 4/12/2019  3:54 PM   Dressing Intervention New dressing  4/12/2019 10:45 AM   Number of days: 0      Blood Products Not applicable  mL   Intake/Output Date 04/12/19 0700 - 04/13/19 0659   Shift 3664-2400 4098-9566 8636-5046 24 Hour Total   INTAKE   I.V. 1000 800  1800   Shift Total(mL/kg) 1000(15.75) 800(12.6)  1800(28.35)   OUTPUT   Urine 400 550  950   Blood  100  100   Shift Total(mL/kg) 400(6.3) 650(10.24)  1050(16.53)   Weight (kg) 63.5 63.5 63.5 63.5      Drains / Rene Closed/Suction Drain 1 Left Hip Bulb 15 Irish (Active)   Site Description UTV 4/12/2019  3:54 PM   Dressing Status  Normal: Clean, Dry & Intact 4/12/2019  3:54 PM   Status To bulb suction 4/12/2019  3:54 PM   Number of days: 0       Urethral Catheter Latex;Straight-tip 16 fr (Active)   Collection Container Standard 4/12/2019  3:54 PM   Securement Method Securing device (Describe) 4/12/2019  3:54 PM   Rationale for Continued Use Anesthesia;Neurogenic Bladder 4/12/2019  3:54 PM   Number of days: 0      Time of void PreOp Void Prior to Procedure: 1030(carrillo in place ) (04/12/19 1034)    PostOp      Diapered? No   Bladder Scan     PO    tolerating sips     Vitals    B/P: 96/59  T: 97.5  F (36.4  C)    Temp src: Oral  P:  Pulse: 67 (04/12/19 1630)    Heart Rate: 80 (04/12/19 1630)     R: 12  O2:  SpO2: 100 %    O2 Device: None (Room air) (04/12/19 1554)              Family/support present significant other   Patient belongings  wheelchair   Patient transported on bed   DC meds/scripts (obs/outpt) Not applicable   Inpatient Pain Meds Released? Yes       Special needs/considerations None   Tasks needing completion None       Juana Acosta, RN  [facility] [id number]

## 2019-04-12 NOTE — OR NURSING
Patient meets criteria for discharge but is complaining of eye pain. Updated Dr. De La Rosa, she will come to patient's bedside to assess.

## 2019-04-12 NOTE — PHARMACY
"The following home medications were NOT continued on inpatient admission per \"Discontinuation of nonessential home medications during hospitalization\" policy: benzoyl peroxide liquid--->not supplied in hospital, patient would have to use home supply    If a therapeutic holiday is deemed inappropriate per the prescriber, please notify the pharmacist regarding the medication order.    The pharmacist is available to answer any questions and/or concerns the patient may have regarding discontinuation of non-essential medications.    Please ensure that these medications are restarted as needed upon discharge via the medication reconciliation discharge process and included on the discharge medication reconciliation report.    Thank you,  Zelda Harris, PharmD, BCPS    "

## 2019-04-12 NOTE — LETTER
Transition Communication Hand-off for Care Transitions to Next Level of Care Provider    Name: Sacha Ndiaye  : 1975  MRN #: [id number]  Primary Care Provider: MATT MELGAR     Primary Clinic: [facility] [address] [facility] [address]     Reason for Hospitalization:  Pressure Injury Of Left Ischium Stage 4  Hx of plastic surgery  Admit Date/Time: 2019  9:30 AM  Discharge Date: 19  Payor Source: Payor: [email or web address] / Plan: [email or web address] OPEN ACCESS / Product Type: HMO /              Reason for Communication Hand-off Referral: Other discharge plan    Discharge Plan:  [facility] [phone]  Will be followed by Dr. Turner     Concern for non-adherence with plan of care:   Y/N N  Discharge Needs Assessment:        Follow-up plan:    Future Appointments   Date Time Provider Department Center   2019 11:10 AM Cheryl Harris MD [facility]       Any outstanding tests or procedures:        Referrals     Future Labs/Procedures    Occupational Therapy Adult Consult     Comments:    Evaluate and treat as clinically indicated.    Reason: Deconditioned post op    Physical Therapy Adult Consult     Comments:    Evaluate and treat as clinically indicated.    Reason: Deconditioned post op            RAGHAV Suarez  8a/10a Ortho/Med/Surg and Adult [facility]    [phone]  [email or web address]    AVS/Discharge Summary is the source of truth; this is a helpful guide for improved communication of patient story

## 2019-04-12 NOTE — LETTER
Health Information Management Services               Recipient:    [facility], attention: Pat      Sender:    Keyanna Roy, [facility]  [phone]    [address] [phone]      Date: April 16, 2019  Patient Name:  Sacha Ndiaye  Routing Message:            The documents accompanying this notice contain confidential information belonging to the sender.  This information is intended only for the use of the individual or entity named above.  The authorized recipient of this information is prohibited from disclosing this information to any other party and is required to destroy the information after its stated need has been fulfilled, unless otherwise required by state law.      If you are not the intended recipient, you are hereby notified that any disclosure, copying, distribution or action taken in reliance on the contents of these documents is strictly prohibited. If you have received this document in error, please notify [place] immediately at [phone].  You may return the document via fax ([phone]) or return mail  (Health Information Management, , [address] [place] [address]).

## 2019-04-12 NOTE — OR NURSING
Patient states drug allergies listed are more sensitivities than true allergies. Refusing allergy band, left in chart.

## 2019-04-13 LAB
ANION GAP SERPL CALCULATED.3IONS-SCNC: 6 MMOL/L (ref 3–14)
BASOPHILS # BLD AUTO: 0 10E9/L (ref 0–0.2)
BASOPHILS NFR BLD AUTO: 0.1 %
BUN SERPL-MCNC: 12 MG/DL (ref 7–30)
CALCIUM SERPL-MCNC: 7.4 MG/DL (ref 8.5–10.1)
CHLORIDE SERPL-SCNC: 111 MMOL/L (ref 94–109)
CO2 SERPL-SCNC: 26 MMOL/L (ref 20–32)
CREAT SERPL-MCNC: 0.55 MG/DL (ref 0.66–1.25)
DIFFERENTIAL METHOD BLD: ABNORMAL
EOSINOPHIL # BLD AUTO: 0.3 10E9/L (ref 0–0.7)
EOSINOPHIL NFR BLD AUTO: 3.1 %
ERYTHROCYTE [DISTWIDTH] IN BLOOD BY AUTOMATED COUNT: 16.8 % (ref 10–15)
GFR SERPL CREATININE-BSD FRML MDRD: >90 ML/MIN/{1.73_M2}
GLUCOSE SERPL-MCNC: 95 MG/DL (ref 70–99)
HCT VFR BLD AUTO: 30 % (ref 40–53)
HGB BLD-MCNC: 8.7 G/DL (ref 13.3–17.7)
IMM GRANULOCYTES # BLD: 0 10E9/L (ref 0–0.4)
IMM GRANULOCYTES NFR BLD: 0.2 %
LYMPHOCYTES # BLD AUTO: 1.5 10E9/L (ref 0.8–5.3)
LYMPHOCYTES NFR BLD AUTO: 17.7 %
MCH RBC QN AUTO: 23.8 PG (ref 26.5–33)
MCHC RBC AUTO-ENTMCNC: 29 G/DL (ref 31.5–36.5)
MCV RBC AUTO: 82 FL (ref 78–100)
MONOCYTES # BLD AUTO: 0.5 10E9/L (ref 0–1.3)
MONOCYTES NFR BLD AUTO: 5.9 %
NEUTROPHILS # BLD AUTO: 6.3 10E9/L (ref 1.6–8.3)
NEUTROPHILS NFR BLD AUTO: 73 %
NRBC # BLD AUTO: 0 10*3/UL
NRBC BLD AUTO-RTO: 0 /100
PLATELET # BLD AUTO: 312 10E9/L (ref 150–450)
POTASSIUM SERPL-SCNC: 4.3 MMOL/L (ref 3.4–5.3)
RBC # BLD AUTO: 3.66 10E12/L (ref 4.4–5.9)
SODIUM SERPL-SCNC: 143 MMOL/L (ref 133–144)
WBC # BLD AUTO: 8.6 10E9/L (ref 4–11)

## 2019-04-13 PROCEDURE — 80048 BASIC METABOLIC PNL TOTAL CA: CPT | Performed by: STUDENT IN AN ORGANIZED HEALTH CARE EDUCATION/TRAINING PROGRAM

## 2019-04-13 PROCEDURE — 85025 COMPLETE CBC W/AUTO DIFF WBC: CPT | Performed by: STUDENT IN AN ORGANIZED HEALTH CARE EDUCATION/TRAINING PROGRAM

## 2019-04-13 PROCEDURE — 12000001 ZZH R&B MED SURG/OB UMMC

## 2019-04-13 PROCEDURE — 25000132 ZZH RX MED GY IP 250 OP 250 PS 637: Performed by: INTERNAL MEDICINE

## 2019-04-13 PROCEDURE — 25000128 H RX IP 250 OP 636: Performed by: HOSPITALIST

## 2019-04-13 PROCEDURE — 36415 COLL VENOUS BLD VENIPUNCTURE: CPT | Performed by: STUDENT IN AN ORGANIZED HEALTH CARE EDUCATION/TRAINING PROGRAM

## 2019-04-13 PROCEDURE — 82947 ASSAY GLUCOSE BLOOD QUANT: CPT | Performed by: STUDENT IN AN ORGANIZED HEALTH CARE EDUCATION/TRAINING PROGRAM

## 2019-04-13 PROCEDURE — 99231 SBSQ HOSP IP/OBS SF/LOW 25: CPT | Performed by: INTERNAL MEDICINE

## 2019-04-13 PROCEDURE — 25800030 ZZH RX IP 258 OP 636: Performed by: STUDENT IN AN ORGANIZED HEALTH CARE EDUCATION/TRAINING PROGRAM

## 2019-04-13 PROCEDURE — 25000132 ZZH RX MED GY IP 250 OP 250 PS 637: Performed by: STUDENT IN AN ORGANIZED HEALTH CARE EDUCATION/TRAINING PROGRAM

## 2019-04-13 PROCEDURE — 25000128 H RX IP 250 OP 636: Performed by: STUDENT IN AN ORGANIZED HEALTH CARE EDUCATION/TRAINING PROGRAM

## 2019-04-13 RX ADMIN — SODIUM PHOSPHATE 1 ENEMA: 7; 19 ENEMA RECTAL at 09:56

## 2019-04-13 RX ADMIN — DEXTRAN 70 AND HYPROMELLOSE 2910 1 DROP: 1; 3 SOLUTION/ DROPS OPHTHALMIC at 08:08

## 2019-04-13 RX ADMIN — ACETAMINOPHEN 650 MG: 325 TABLET, FILM COATED ORAL at 08:07

## 2019-04-13 RX ADMIN — OXYBUTYNIN CHLORIDE 30 MG: 10 TABLET, FILM COATED, EXTENDED RELEASE ORAL at 09:48

## 2019-04-13 RX ADMIN — ENOXAPARIN SODIUM 40 MG: 40 INJECTION SUBCUTANEOUS at 09:48

## 2019-04-13 RX ADMIN — SODIUM CHLORIDE: 9 INJECTION, SOLUTION INTRAVENOUS at 06:03

## 2019-04-13 RX ADMIN — ACETAMINOPHEN 650 MG: 325 TABLET, FILM COATED ORAL at 20:32

## 2019-04-13 RX ADMIN — SODIUM CHLORIDE 1000 ML: 9 INJECTION, SOLUTION INTRAVENOUS at 03:47

## 2019-04-13 ASSESSMENT — ACTIVITIES OF DAILY LIVING (ADL)
ADLS_ACUITY_SCORE: 17

## 2019-04-13 ASSESSMENT — PAIN DESCRIPTION - DESCRIPTORS: DESCRIPTORS: ACHING

## 2019-04-13 NOTE — PLAN OF CARE
VS: Temp: 98.4  F (36.9  C) Temp src: Oral BP: 92/47 Pulse: 85 Heart Rate: 54 Resp: 14 SpO2: 98 % O2 Device: None (Room air)    O2: Stable room air. Denies SOB   Output: Rene. Urine output good   Last BM: 4/12 per pt report   Activity: Bedrest, paraplegic    Skin: L ischial tuberosity incision covered, r eye pink and forehead pink.  Pt was laying on stomach for surgery.     Pain: Denies. Has oxy available. Did not request this shift.      CMS: Intact, no new numbness or tingling.  No feeling of BLE. Para, baseline   Dressing: CDI   Diet: Reg. No nausea   LDA: RENARD, Edgard   Equipment: IV pump/pole   Plan: Continue plan of care. Transfer to SNF   Additional Info: Pt able to reposition self, has strong UE strength. Came to floor around 1750.        Moonlighter notified for soft BP's 82/44. Pt asymptomatic.  1L NS bolus ordered.  Will recheck BP when finished.  94/52 post bolus

## 2019-04-13 NOTE — ANESTHESIA POSTPROCEDURE EVALUATION
Anesthesia POST Procedure Evaluation    Patient: Sacha Ndiaye   MRN:     [id number] Gender:   male   Age:    44 year old :      1975        Preoperative Diagnosis: Pressure Injury Of Left Ischium Stage 4   Procedure(s):  Left Ischial Decubitus Debridement Including Bone, Readvancement Gluteal Flaps, SPY-PHI   Postop Comments: No value filed.       Anesthesia Type:  General    Reportable Event: NO     PAIN: Uncomplicated   Sign Out status: Comfortable, Well controlled pain     PONV: No PONV   Sign Out status:  No Nausea or Vomiting     Neuro/Psych: Uneventful perioperative course   Sign Out Status: Preoperative baseline; Age appropriate mentation     Airway/Resp.: Uneventful perioperative course   Sign Out Status: Non labored breathing, age appropriate RR; Resp. Status within EXPECTED Parameters     CV: Uneventful perioperative course   Sign Out status: Appropriate BP and perfusion indices; Appropriate HR/Rhythm     Disposition:   Sign Out in:  PACU  Disposition:  Floor  Recovery Course: Uneventful  Follow-Up: Not required           Last Anesthesia Record Vitals:  CRNA VITALS  2019 1521 - 2019 1621      2019             Pulse:  89    SpO2:  100 %    Resp Rate (observed):  2  (Abnormal)           Last PACU Vitals:  Vitals Value Taken Time   /56 2019  5:55 PM   Temp 35.9  C (96.7  F) 2019  5:55 PM   Pulse 85 2019  5:55 PM   Resp 12 2019  5:30 PM   SpO2 100 % 2019  5:55 PM   Temp src     NIBP     Pulse     SpO2     Resp     Temp     Ht Rate     Temp 2           Electronically Signed By: Gabriella De La Rosa MD, 2019, 7:29 PM

## 2019-04-13 NOTE — PROGRESS NOTES
Plastic Surgery Progress Note    Subjective/Interval History:  Some soft pressures overnight responsive to fluid.     Objective:  Temp:  [96.7  F (35.9  C)-98.4  F (36.9  C)] 97.4  F (36.3  C)  Pulse:  [58-85] 85  Heart Rate:  [54-86] 72  Resp:  [9-16] 16  BP: ()/(44-72) 105/61  SpO2:  [96 %-100 %] 97 %    General appearance: in NAD  Pulm: Non-labored breathing; saturating well on RA  CV: Hemodynamically stable  Left buttock: Both gluteal flap and posterior thigh flaps pink and viable with 2 sec cap refill, sutures and staples in place, drain with serosanguinous output. Dressing intact. Stage II pressure injuries at other bony prominences over contralateral ischium and greater trochanters covered with Mepilex.     Assessment/Plan:   Sacha Ndiaye is a 44 year old male with paraplegia secondary to spinal cord injury and stage IV recurrent left ischial decubitus ulcer who is now s/p re-rotation of left gluteal musculocutaneous flap with concomitant re-advancement of left posterior thigh flap by Dr. Harris 4/12.     - Routine flap cares as per orders.  - Wave mattress.   - Bedrest. Head of bed no greater than 30 degrees. No sitting. Change positions Q2 hours while awake, Q4 hours overnight. OK to lie on side opposite to flap. Please minimize time on flap side.  - Intra-operative cultures pending. Appreciate ID recommendations for antibiotics. May need PICC placement if he has long-term IV antibiotic requirements. Previously on long term PO antibiotics reportedly for spine hardware.   - Placement at TCU once antibiotic regimen is determined.   - Continue Rene for now.     Marshal De Leon, PGY4  [phone]
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
## 2019-04-13 NOTE — PLAN OF CARE
VS: A&Ox4. LS clear. RA Sats 100%, Capno EtCO2=29, IPI=10, Cont pulse ox.   Output: BS+, Rene patent draining clear yellow urine.  RENARD: 10   Activity: Bedrest. HOB <30, No sitting, Okay to lie supine or on R. side.   Skin: Intact ex: L. flap    Pain: C/o right eye discomfort/swelling.    Neuro/CMS: Paraplegic. No sensation BLE.   Dressing(s): CDI   Diet: Regular diet. Tolerating good.   LDA: L. PIV infusing NS 75ml/hr   Equipment: IV Pole. PCDs.   Plan: Call light within reach. Continue to monitor.    Additional Info: Wave mattress.   Wife (Tanja) is staying overnight with patient.  Pt. refused Capno. Okayed to be on cont pulse ox. VSS.
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
## 2019-04-13 NOTE — PROGRESS NOTES
Patient was seen, case reviewed with nursing staff.  Internal medicine consultation from last evening reviewed.    Patient reports feeling comfortable.  He has a mild frontal headache which he believes is secondary to positioning during surgery  He denies cough, chest pain, shortness of breath, anxiety.    Afebrile  Vital signs stable  Patient is alert, fully oriented, appears comfortable, eating breakfast  No sign of head trauma.  Neck is supple  Lungs clear  CV RRR  Abdomen soft protuberant, nontender  No lower extremity edema      Results for GIA CAMARGO (MRN [id number]) as of 4/13/2019 12:25   Ref. Range 4/13/2019 06:51   Sodium Latest Ref Range: 133 - 144 mmol/L 143   Potassium Latest Ref Range: 3.4 - 5.3 mmol/L 4.3   Chloride Latest Ref Range: 94 - 109 mmol/L 111 (H)   Carbon Dioxide Latest Ref Range: 20 - 32 mmol/L 26   Urea Nitrogen Latest Ref Range: 7 - 30 mg/dL 12   Creatinine Latest Ref Range: 0.66 - 1.25 mg/dL 0.55 (L)   GFR Estimate Latest Ref Range: >60 mL/min/1.73_m2 >90   GFR Estimate If Black Latest Ref Range: >60 mL/min/1.73_m2 >90   Calcium Latest Ref Range: 8.5 - 10.1 mg/dL 7.4 (L)   Anion Gap Latest Ref Range: 3 - 14 mmol/L 6   Glucose Latest Ref Range: 70 - 99 mg/dL 95   WBC Latest Ref Range: 4.0 - 11.0 10e9/L 8.6   Hemoglobin Latest Ref Range: 13.3 - 17.7 g/dL 8.7 (L)   Hematocrit Latest Ref Range: 40.0 - 53.0 % 30.0 (L)   Platelet Count Latest Ref Range: 150 - 450 10e9/L 312       Assessment    Status post Left Ischial Decubitus Debridement Including Bone  Re-rotation of Left Gluteal Myocutaneous Flap  Readvancement of Left Posterior Thigh Flap    T6 paraplegia with neurogenic bladder.  Patient performs self catheterizations at home.  Rene in place currently    Anemia with normal MCV, unclear baseline.  Suspect component of acute blood loss.    Plan  Tylenol for pain, per patient request  Hemoglobin in a.m.  Consider empiric iron therapy  Bowel regimen  Will defer need for chemical DVT  prophylaxis to plastics surgery

## 2019-04-13 NOTE — PLAN OF CARE
VS: VSS   O2: PT on RA   Output: Pt has indwelling carrillo, will remain through surgical healing   Last BM: Dig stim today with small amt   Activity: Bedrest, bed to be no higher than 30 degrees   Skin: Pt has incisions covered with dressings, will be changed by plastics tomorrow. Other ischial wounds covered as well   Pain: Pt denies surgical pain, did take tylenol for headache   CMS: Intact to baseline, no sensation/feeling past umbilicus. Cap refill WDL   Dressing: CDI   Diet: Regular   LDA: PIV infusing   Equipment: Wave mattress   Plan: Continue POC, plastics to change dressings tomorrow   Additional Info:

## 2019-04-14 LAB
GLUCOSE SERPL-MCNC: 109 MG/DL (ref 70–99)
HGB BLD-MCNC: 9 G/DL (ref 13.3–17.7)

## 2019-04-14 PROCEDURE — 82947 ASSAY GLUCOSE BLOOD QUANT: CPT | Performed by: INTERNAL MEDICINE

## 2019-04-14 PROCEDURE — 12000001 ZZH R&B MED SURG/OB UMMC

## 2019-04-14 PROCEDURE — 99231 SBSQ HOSP IP/OBS SF/LOW 25: CPT | Performed by: INTERNAL MEDICINE

## 2019-04-14 PROCEDURE — 36415 COLL VENOUS BLD VENIPUNCTURE: CPT | Performed by: INTERNAL MEDICINE

## 2019-04-14 PROCEDURE — 85018 HEMOGLOBIN: CPT | Performed by: INTERNAL MEDICINE

## 2019-04-14 PROCEDURE — 25000128 H RX IP 250 OP 636: Performed by: STUDENT IN AN ORGANIZED HEALTH CARE EDUCATION/TRAINING PROGRAM

## 2019-04-14 PROCEDURE — 25000132 ZZH RX MED GY IP 250 OP 250 PS 637: Performed by: STUDENT IN AN ORGANIZED HEALTH CARE EDUCATION/TRAINING PROGRAM

## 2019-04-14 RX ADMIN — OXYBUTYNIN CHLORIDE 30 MG: 10 TABLET, FILM COATED, EXTENDED RELEASE ORAL at 10:33

## 2019-04-14 RX ADMIN — ENOXAPARIN SODIUM 40 MG: 40 INJECTION SUBCUTANEOUS at 10:33

## 2019-04-14 ASSESSMENT — ACTIVITIES OF DAILY LIVING (ADL)
ADLS_ACUITY_SCORE: 21
ADLS_ACUITY_SCORE: 17
ADLS_ACUITY_SCORE: 21
ADLS_ACUITY_SCORE: 21

## 2019-04-14 NOTE — PLAN OF CARE
VS:   /56 (BP Location: Left arm)   Pulse 85   Temp 97.8  F (36.6  C) (Oral)   Resp 16   Ht 1.829 m (6')   Wt 63.5 kg (140 lb)   SpO2 100%   BMI 18.99 kg/m       Output:   Bowel program tomorrow BS+ LAST BM 4/13 Rene patent draining yellow urine   Activity:   Bedrest can log roll to right side   Skin: Intact ex flap site, old groin wound small drainage MD aware   Pain:   denies   Neuro/CMS:   Intact, sensation absent below naval. paraplegic   Dressing(s):   Wound vac CDI   Diet:   REG   LDA:   PIV SL, wound vac in place   Equipment:   Wound vac, wave mattress, call light within reach     Plan:   Possible discharge this week to TCU   Additional Info:

## 2019-04-14 NOTE — PROGRESS NOTES
Plastic Surgery Progress Note    Subjective/Interval History:  No acute events.     Objective:  Temp:  [97.6  F (36.4  C)-98.7  F (37.1  C)] 97.6  F (36.4  C)  Heart Rate:  [58-74] 58  Resp:  [16] 16  BP: ()/(57-67) 112/67  SpO2:  [98 %-100 %] 100 %    General appearance: in NAD  Pulm: Non-labored breathing; saturating well on RA  CV: Hemodynamically stable  Left buttock: Both gluteal flap and posterior thigh flaps pink and viable with 2 sec cap refill, sutures and staples in place, drain with serosanguinous output. Dressing intact. Stage II pressure injuries at other bony prominences over contralateral ischium and greater trochanters covered with Mepilex.     Assessment/Plan:   Sacha Ndiaye is a 44 year old male with paraplegia secondary to spinal cord injury and stage IV recurrent left ischial decubitus ulcer who is now s/p re-rotation of left gluteal musculocutaneous flap with concomitant re-advancement of left posterior thigh flap by Dr. Harris 4/12.     - Routine flap cares as per orders.  - Wave mattress.   - Bedrest. Head of bed no greater than 30 degrees. No sitting. Change positions Q2 hours while awake, Q4 hours overnight. OK to lie on side opposite to flap. Please minimize time on flap side.  - Intra-operative cultures and path pending. Appreciate ID recommendations for antibiotics. May need PICC placement if he has long-term IV antibiotic requirements. Previously on long term PO antibiotics reportedly for spine hardware. Off antibiotics until ID recommendations.   - Placement at TCU once antibiotic regimen is determined.   - Continue Rene for now.     Marshal De Leon, PGY4  [phone]

## 2019-04-14 NOTE — PROGRESS NOTES
Pt was seen with wife    He is comfortable, denies HA, chest discomfort, SOB, cough  + small BM yesterday  Rene remains in place    Afebrile  BP 110s/    Alert, fully oriented  Lungs clear  CV rrr  Abd soft, non-distended  No LE edema      Hgb 9.0 (8.7)    Intra op cultures neg thus far      Assessment    Status post Left Ischial Decubitus Debridement Including Bone  Re-rotation of Left Gluteal Myocutaneous Flap  Readvancement of Left Posterior Thigh Flap     T6 paraplegia with neurogenic bladder.  Patient performs self catheterizations at home.  Rene in place currently + small BM yesterday     Anemia with normal MCV, unclear baseline.  Suspect component of acute blood loss, though Pt and wife state history of chronic anemia. Intolerant of oral Fe secondary to severe constipation.  Pt does eat red meat and vegetables      Plan  Continue current tx  Bowel regimen  Monitor intra op cultures  Lovenox for DVT proph

## 2019-04-14 NOTE — PLAN OF CARE
VS: A&Ox4. LS clear. RA Sats 98%   Output: Last BM: 4-13, bowel program. BS+, Rene patent draining clear yellow urine. RENARD =0   Activity: Bedrest. HOB <30, No sitting, Okay to lie supine or on R. side.  Strong UE strength.    Skin: Intact ex: L. flap   Dressing to be changed by plastics in AM.    Pain: Face and forehead is still a little pink but pt. states is better today.   Pain managed with PRN Tylenol.   Neuro/CMS: Paraplegic. No sensation BLE baseline.    Dressing(s): CDI   Diet: Regular diet. Tolerating good.   LDA: Rene   Equipment: PCDs.   Plan: Call light within reach. Continue to monitor.    Additional Info: Wave mattress.   Plan is to discharge to TCU.

## 2019-04-14 NOTE — PLAN OF CARE
VS:   /57 (BP Location: Left arm)   Pulse 85   Temp 98.4  F (36.9  C) (Oral)   Resp 16   Ht 1.829 m (6')   Wt 63.5 kg (140 lb)   SpO2 99%   BMI 18.99 kg/m       Output:   Gerardo put out 1550ml this shift and RENARD 30ml this shift. No BM this shift, but is on a bowel program.   Lungs CTA   Activity:   Bedrest. HOB no greater than 30 degrees and repo Q2 hours   Skin: Surgical incision, and slight redness on forehead. Otherwise skin appears inact   Pain:   Denies pain   Neuro/CMS:   A&Ox3. No feeling/sensation beneath the umbilicus   Dressing(s):   CDI on Left flap repair   Diet:   regular   LDA:   CARMELO ARIAS, RENARD, gerardo   Equipment:   PCD's, wave bed   Plan:   discharge to TCU possibly Monday, continue to monitor.   Additional Info:

## 2019-04-15 LAB
ACID FAST STN SPEC QL: NORMAL
ACID FAST STN SPEC QL: NORMAL
CREAT SERPL-MCNC: 0.53 MG/DL (ref 0.66–1.25)
GFR SERPL CREATININE-BSD FRML MDRD: >90 ML/MIN/{1.73_M2}
PLATELET # BLD AUTO: 345 10E9/L (ref 150–450)
SPECIMEN SOURCE: NORMAL

## 2019-04-15 PROCEDURE — 12000001 ZZH R&B MED SURG/OB UMMC

## 2019-04-15 PROCEDURE — 36415 COLL VENOUS BLD VENIPUNCTURE: CPT | Performed by: SURGERY

## 2019-04-15 PROCEDURE — 25000132 ZZH RX MED GY IP 250 OP 250 PS 637: Performed by: STUDENT IN AN ORGANIZED HEALTH CARE EDUCATION/TRAINING PROGRAM

## 2019-04-15 PROCEDURE — 99232 SBSQ HOSP IP/OBS MODERATE 35: CPT | Performed by: INTERNAL MEDICINE

## 2019-04-15 PROCEDURE — 82565 ASSAY OF CREATININE: CPT | Performed by: SURGERY

## 2019-04-15 PROCEDURE — 85049 AUTOMATED PLATELET COUNT: CPT | Performed by: STUDENT IN AN ORGANIZED HEALTH CARE EDUCATION/TRAINING PROGRAM

## 2019-04-15 PROCEDURE — 36415 COLL VENOUS BLD VENIPUNCTURE: CPT | Performed by: STUDENT IN AN ORGANIZED HEALTH CARE EDUCATION/TRAINING PROGRAM

## 2019-04-15 PROCEDURE — 25000128 H RX IP 250 OP 636: Performed by: STUDENT IN AN ORGANIZED HEALTH CARE EDUCATION/TRAINING PROGRAM

## 2019-04-15 RX ORDER — HEPARIN SODIUM,PORCINE 10 UNIT/ML
2-5 VIAL (ML) INTRAVENOUS
Status: DISCONTINUED | OUTPATIENT
Start: 2019-04-15 | End: 2019-04-17 | Stop reason: HOSPADM

## 2019-04-15 RX ORDER — LIDOCAINE 40 MG/G
CREAM TOPICAL
Status: DISCONTINUED | OUTPATIENT
Start: 2019-04-15 | End: 2019-04-17 | Stop reason: HOSPADM

## 2019-04-15 RX ORDER — CEFAZOLIN SODIUM 1 G/50ML
1250 SOLUTION INTRAVENOUS EVERY 12 HOURS
Status: DISCONTINUED | OUTPATIENT
Start: 2019-04-15 | End: 2019-04-17 | Stop reason: HOSPADM

## 2019-04-15 RX ADMIN — SODIUM PHOSPHATE 1 ENEMA: 7; 19 ENEMA RECTAL at 09:24

## 2019-04-15 RX ADMIN — ENOXAPARIN SODIUM 40 MG: 40 INJECTION SUBCUTANEOUS at 09:23

## 2019-04-15 RX ADMIN — OXYBUTYNIN CHLORIDE 30 MG: 10 TABLET, FILM COATED, EXTENDED RELEASE ORAL at 09:23

## 2019-04-15 ASSESSMENT — ACTIVITIES OF DAILY LIVING (ADL)
ADLS_ACUITY_SCORE: 21

## 2019-04-15 NOTE — PLAN OF CARE
VS:   A&Ox3, VSS.   Output:   Rene putting out good amounts of urine (875ml). RENARD 0ml. Last BM 4/13 and on bowel program   Lungs CTA   Activity:   Bedrest. Head no greater than 30 degrees. Okay to turn from back to right side. Strong UE strength.   Skin: Intact except for L flap   Pain:   Denies pain   Neuro/CMS:   Paraplegic, no sensation felt below umbilicus   Dressing(s):   CDI   Diet:   Tolerating regular diet   LDA:   RENARD Rene, L PIV SL   Equipment:   PCD's   Plan:   Plan to discharge to TCU   Additional Info:   Call light within reach and able to make needs known. Pt on wave mattress.

## 2019-04-15 NOTE — PHARMACY-VANCOMYCIN DOSING SERVICE
Pharmacy Vancomycin Initial Note  Date of Service April 15, 2019  Patient's  1975  44 year old, male    Indication: Bone and Joint Infection    Current estimated CrCl = Estimated Creatinine Clearance: 155.5 mL/min (A) (based on SCr of 0.55 mg/dL (L)).    Creatinine for last 3 days  2019:  6:51 AM Creatinine 0.55 mg/dL    Recent Vancomycin Level(s) for last 3 days  No results found for requested labs within last 72 hours.      Vancomycin IV Administrations (past 72 hours)      No vancomycin orders with administrations in past 72 hours.                Nephrotoxins and other renal medications (From now, onward)    None          Contrast Orders - past 72 hours (72h ago, onward)    None                Plan:  1.  Start vancomycin  1250 mg (~20 mg/kg) IV q12h.   2.  Goal Trough Level: 15-20 mg/L   3.  Pharmacy will check trough levels as appropriate in 1-3 Days.    4. Serum creatinine levels will be ordered daily for the first week of therapy and at least twice weekly for subsequent weeks.    5. [place] method utilized to dose vancomycin therapy: Method 2    Ingrid Rivera, PharmD 4 Student

## 2019-04-15 NOTE — PLAN OF CARE
Patient A & O x 4. Neuros and CMS baseline for patient, patient is a para from the waist down. VSS and afebrile, SpO2 97% on room air (see flowsheet), MD aware. LS clear, BS + x 4, patient passing flatus and had his bowel program done today with an enema and digital stimulation with good results of a medium brown BM. Rene catheter patent with good amounts of clear yellow urine. Patient denies pain. Surgical flap site - staples and sutures intact with some mild redness around staples and sutures. Surgical area cleaned with Microklenz and new dressings applied. RENARD drain site cleaned with microklenz and new dressing applied and had an output of 20 ml this shift os serous drainage. Patient has 2 pressure points one on the left buttocks and upper left hip. Both areas cleaned with microklenz and new mepilexs applied. Left PIV saline locked. Patient on a regular diet and tolerating it well. Patient on bedrest and HOB less than 30 degrees. Patient turned on right side and back this throughout shift. Platelet count 345 this AM, MD aware. ID to see patient and possible PICC placement. Patient able to make needs known. Call light within reach. Will continue with POC. Possible discharge to TCU tomorrow once PICC placed.

## 2019-04-15 NOTE — PROGRESS NOTES
[facility] Internal Medicine Progress Note            Interval History:   Record reviewed. Discussed with Dr. Harris.   Seen with RN.   No pain control issues.  Facial discomfort with prone positioning during surgery resolved.   No CP, SOB, cough, nausea, reflux, abd distention.    Last BM 4/13 with enema. Has carrillo.           Medications:   All medications reviewed today          Physical Exam:   Blood pressure 100/61, pulse 85, temperature 96.6  F (35.9  C), temperature source Oral, resp. rate 16, height 1.829 m (6'), weight 63.5 kg (140 lb), SpO2 97 %.    Intake/Output Summary (Last 24 hours) at 4/15/2019 1011  Last data filed at 4/15/2019 0608  Gross per 24 hour   Intake 3 ml   Output 3490 ml   Net -3487 ml       General:  Alert.  Appropriate.  No distress.  No O2.     Heent:      Neck:    Skin:    Chest:  clear    Cardiac:  Reg without gallop, murmur.  No JVD.     Abdomen:  Non distended, soft, non-tender.  BS normal.     Extremities:  Perfused.  No edema, calf, posterior thigh induration to suggest DVT.     Neuro:            Data:     Results for orders placed or performed during the hospital encounter of 04/12/19 (from the past 24 hour(s))   Platelet count   Result Value Ref Range    Platelet Count 345 150 - 450 10e9/L     Last Comprehensive Metabolic Panel:  Sodium   Date Value Ref Range Status   04/13/2019 143 133 - 144 mmol/L Final     Potassium   Date Value Ref Range Status   04/13/2019 4.3 3.4 - 5.3 mmol/L Final     Chloride   Date Value Ref Range Status   04/13/2019 111 (H) 94 - 109 mmol/L Final     Carbon Dioxide   Date Value Ref Range Status   04/13/2019 26 20 - 32 mmol/L Final     Anion Gap   Date Value Ref Range Status   04/13/2019 6 3 - 14 mmol/L Final     Glucose   Date Value Ref Range Status   04/14/2019 109 (H) 70 - 99 mg/dL Final     Urea Nitrogen   Date Value Ref Range Status   04/13/2019 12 7 - 30 mg/dL Final     Creatinine   Date Value Ref Range Status   04/13/2019 0.55 (L) 0.66 - 1.25  mg/dL Final     GFR Estimate   Date Value Ref Range Status   04/13/2019 >90 >60 mL/min/[1.73_m2] Final     Comment:     Non  GFR Calc  Starting 12/18/2018, serum creatinine based estimated GFR (eGFR) will be   calculated using the Chronic Kidney Disease Epidemiology Collaboration   (CKD-EPI) equation.       Calcium   Date Value Ref Range Status   04/13/2019 7.4 (L) 8.5 - 10.1 mg/dL Final     Hemoglobin   Date Value Ref Range Status   04/14/2019 9.0 (L) 13.3 - 17.7 g/dL Final   04/13/2019 8.7 (L) 13.3 - 17.7 g/dL Final   Bone culture 4/12 staph epi and corynebacterium.              Assessment and Plan:   1)  Status post Left Ischial Decubitus Debridement Including Bone.  Re-rotation  Of Left Gluteal Myocutaneous Flap. Readvancement of Left Posterior Thigh Flap 4/12/19 .   ml's.  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  Clinically doing well.  Positive bone culture as above.  Not on ABs.  On lovenox.   2) T6 paraplegia.  3) Neurogenic bladder.  On self catheterizations PTA.  Rene in place.  4)  Anemia 2nd to acute blood loss on more chronic anemia likely 2nd to CD with prior component of iron deficiency.  (intolerant to oral iron).  Adequate.   5)  H/o Left Ischial Debridement, Left Gluteal Rotational Flap. 2017.  6)  Charcot arthropathy, S/P spine fusion 2015 complicated by infection       PLAN:  1)  Review meds, orders.  2)  ID consult pending.  3)  IS, lovenox, bowel program (enema today).  4)  trend labs.  Monitor clinically.  Dispo pending AB plan.   Disposition Plan   Expected discharge pending AB plan to transitional care unit .     Entered: Marc Padilla 04/15/2019, 10:11 AM              Attestation:  I have reviewed today's vital signs, notes, medications, labs and imaging.     Marc Padilla MD

## 2019-04-15 NOTE — PLAN OF CARE
VS: A&Ox4. LS clear. RA Sats 97%   Output: Last BM: 4-13, bowel program. BS+, Rene patent draining clear yellow urine (700). RENARD =0   Activity: Bedrest. HOB <30, No sitting, Okay to lie supine or on R. side.   Strong UE strength.    Skin: Intact ex: L. flap   Dressing was changed by Plastics this AM.    Pain: Denies any pain.   Neuro/CMS: Intact ex: paraplegic. No sensation (below umbilicus) BLE baseline.    Dressing(s): CDI   Diet: Regular diet. Tolerating well.   LDA: Rene   Equipment: PCDs.   Plan: Call light within reach. Continue to monitor.    Additional Info: Wave mattress.   Plan is to discharge to TCU.

## 2019-04-15 NOTE — PROGRESS NOTES
Plastic Surgery Progress Note    Subjective/Interval History:  No acute events.     Objective:  Temp:  [96.6  F (35.9  C)-98  F (36.7  C)] 97.7  F (36.5  C)  Heart Rate:  [57-71] 57  Resp:  [15-16] 15  BP: ()/(56-61) 104/58  SpO2:  [96 %-100 %] 98 %    General appearance: in NAD  Pulm: Non-labored breathing; saturating well on RA  CV: Hemodynamically stable  Left buttock: Both gluteal flap and posterior thigh flaps pink and viable with 2 sec cap refill, sutures and staples in place, drain with serosanguinous output. Dressing intact. Stage II pressure injuries at other bony prominences over contralateral ischium and greater trochanters covered with Mepilex.     Assessment/Plan:   Sacha Ndiaye is a 44 year old male with paraplegia secondary to spinal cord injury and stage IV recurrent left ischial decubitus ulcer who is now s/p re-rotation of left gluteal musculocutaneous flap with concomitant re-advancement of left posterior thigh flap by Dr. Harris 4/12.     - Routine flap cares as per orders.  - Wave mattress.   - Bedrest. Head of bed no greater than 30 degrees. No sitting. Change positions Q2 hours while awake, Q4 hours overnight. OK to lie on side opposite to flap. Please minimize time on flap side.  - Intra-operative cultures and path pending. Appreciate ID recommendations for antibiotics. May need PICC placement if he has long-term IV antibiotic requirements. Previously on long term PO antibiotics reportedly for spine hardware. Off antibiotics until ID recommendations.   - Placement at TCU once antibiotic regimen is determined.   - Continue Rene for now.     Marshal De Leon, PGY4  [phone]

## 2019-04-15 NOTE — PROGRESS NOTES
Social Work Services Progress Note    Hospital Day: 4    Collaborated with:  10A IDT, Admissions at [facility] [phone] f: [phone],    Data:  Discharge plan    Intervention:  Pt has been declined from [facility] [address]. JOSR Spoke w/Admissions at [facility] and faxed referral information. Pending insurance authorization, they would be able to offer bed to pt.    Assessment:  pt will need plan for IV ABX. Once finalized, will be able to discharge hospital    Plan:    Anticipated Disposition:  Facility:  [facility] [phone] f: [phone]    Barriers to d/c plan:  Iv Abx plan    Follow Up:  JOSR con't to follow

## 2019-04-16 ENCOUNTER — APPOINTMENT (OUTPATIENT)
Dept: GENERAL RADIOLOGY | Facility: CLINIC | Age: 44
DRG: 580 | End: 2019-04-16
Attending: SURGERY
Payer: COMMERCIAL

## 2019-04-16 LAB
ANION GAP SERPL CALCULATED.3IONS-SCNC: 12 MMOL/L (ref 3–14)
BACTERIA SPEC CULT: ABNORMAL
BACTERIA SPEC CULT: ABNORMAL
BUN SERPL-MCNC: 23 MG/DL (ref 7–30)
CALCIUM SERPL-MCNC: 8.4 MG/DL (ref 8.5–10.1)
CHLORIDE SERPL-SCNC: 106 MMOL/L (ref 94–109)
CO2 SERPL-SCNC: 22 MMOL/L (ref 20–32)
CREAT SERPL-MCNC: 0.78 MG/DL (ref 0.66–1.25)
GFR SERPL CREATININE-BSD FRML MDRD: >90 ML/MIN/{1.73_M2}
GLUCOSE SERPL-MCNC: 100 MG/DL (ref 70–99)
MAGNESIUM SERPL-MCNC: 2.1 MG/DL (ref 1.6–2.3)
POTASSIUM SERPL-SCNC: 4.5 MMOL/L (ref 3.4–5.3)
SODIUM SERPL-SCNC: 140 MMOL/L (ref 133–144)
SPECIMEN SOURCE: ABNORMAL

## 2019-04-16 PROCEDURE — 36573 INSJ PICC RS&I 5 YR+: CPT

## 2019-04-16 PROCEDURE — 71045 X-RAY EXAM CHEST 1 VIEW: CPT | Mod: 76

## 2019-04-16 PROCEDURE — 82947 ASSAY GLUCOSE BLOOD QUANT: CPT | Performed by: SURGERY

## 2019-04-16 PROCEDURE — 80051 ELECTROLYTE PANEL: CPT | Performed by: SURGERY

## 2019-04-16 PROCEDURE — 25800030 ZZH RX IP 258 OP 636

## 2019-04-16 PROCEDURE — 12000001 ZZH R&B MED SURG/OB UMMC

## 2019-04-16 PROCEDURE — 25000125 ZZHC RX 250: Performed by: INTERNAL MEDICINE

## 2019-04-16 PROCEDURE — 36415 COLL VENOUS BLD VENIPUNCTURE: CPT | Performed by: SURGERY

## 2019-04-16 PROCEDURE — 25000128 H RX IP 250 OP 636

## 2019-04-16 PROCEDURE — 25000132 ZZH RX MED GY IP 250 OP 250 PS 637: Performed by: STUDENT IN AN ORGANIZED HEALTH CARE EDUCATION/TRAINING PROGRAM

## 2019-04-16 PROCEDURE — 83735 ASSAY OF MAGNESIUM: CPT | Performed by: SURGERY

## 2019-04-16 PROCEDURE — 82565 ASSAY OF CREATININE: CPT | Performed by: SURGERY

## 2019-04-16 PROCEDURE — 82310 ASSAY OF CALCIUM: CPT | Performed by: SURGERY

## 2019-04-16 PROCEDURE — 99231 SBSQ HOSP IP/OBS SF/LOW 25: CPT | Performed by: INTERNAL MEDICINE

## 2019-04-16 PROCEDURE — 25000128 H RX IP 250 OP 636: Performed by: STUDENT IN AN ORGANIZED HEALTH CARE EDUCATION/TRAINING PROGRAM

## 2019-04-16 PROCEDURE — 84520 ASSAY OF UREA NITROGEN: CPT | Performed by: SURGERY

## 2019-04-16 PROCEDURE — 71045 X-RAY EXAM CHEST 1 VIEW: CPT

## 2019-04-16 RX ORDER — SODIUM CHLORIDE 9 MG/ML
INJECTION, SOLUTION INTRAVENOUS
Status: DISPENSED
Start: 2019-04-16 | End: 2019-04-16

## 2019-04-16 RX ORDER — CIPROFLOXACIN 750 MG/1
750 TABLET, FILM COATED ORAL EVERY 12 HOURS SCHEDULED
Status: DISCONTINUED | OUTPATIENT
Start: 2019-04-16 | End: 2019-04-17 | Stop reason: HOSPADM

## 2019-04-16 RX ADMIN — VANCOMYCIN HYDROCHLORIDE 1250 MG: 10 INJECTION, POWDER, LYOPHILIZED, FOR SOLUTION INTRAVENOUS at 23:52

## 2019-04-16 RX ADMIN — ENOXAPARIN SODIUM 40 MG: 40 INJECTION SUBCUTANEOUS at 10:48

## 2019-04-16 RX ADMIN — LIDOCAINE HYDROCHLORIDE 1.5 ML: 10 INJECTION, SOLUTION EPIDURAL; INFILTRATION; INTRACAUDAL; PERINEURAL at 10:00

## 2019-04-16 RX ADMIN — VANCOMYCIN HYDROCHLORIDE 1250 MG: 10 INJECTION, POWDER, LYOPHILIZED, FOR SOLUTION INTRAVENOUS at 11:48

## 2019-04-16 RX ADMIN — OXYBUTYNIN CHLORIDE 30 MG: 10 TABLET, FILM COATED, EXTENDED RELEASE ORAL at 09:03

## 2019-04-16 RX ADMIN — VANCOMYCIN HYDROCHLORIDE 1250 MG: 10 INJECTION, POWDER, LYOPHILIZED, FOR SOLUTION INTRAVENOUS at 00:20

## 2019-04-16 RX ADMIN — CIPROFLOXACIN HYDROCHLORIDE 750 MG: 750 TABLET, FILM COATED ORAL at 19:39

## 2019-04-16 ASSESSMENT — ACTIVITIES OF DAILY LIVING (ADL)
ADLS_ACUITY_SCORE: 21

## 2019-04-16 NOTE — PROGRESS NOTES
[facility] Internal Medicine Progress Note            Interval History:   Record reviewed.  Seen with RN.   Doing well clinically.    No CP, SOB, cough, nausea, reflux, abd distention.   BM 4/15 with enema.  Has carrillo.  ID consult reviewed.  Started on Vanco.  Plan for PICC.           Medications:   All medications reviewed today          Physical Exam:   Blood pressure 96/64, pulse 85, temperature 96.6  F (35.9  C), temperature source Oral, resp. rate 16, height 1.829 m (6'), weight 63.5 kg (140 lb), SpO2 97 %.    Intake/Output Summary (Last 24 hours) at 4/15/2019 1011  Last data filed at 4/15/2019 0608  Gross per 24 hour   Intake 3 ml   Output 3490 ml   Net -3487 ml       General:  Alert.  Appropriate.  No distress.  No O2.     Heent:      Neck:    Skin:    Chest:  clear    Cardiac:  Reg without gallop, murmur.  No JVD.     Abdomen:  Non distended, soft.  BS normal.     Extremities:  Perfused.  No edema, calf, posterior thigh induration to suggest DVT.     Neuro:            Data:     Results for orders placed or performed during the hospital encounter of 04/12/19 (from the past 24 hour(s))   Creatinine   Result Value Ref Range    Creatinine 0.53 (L) 0.66 - 1.25 mg/dL    GFR Estimate >90 >60 mL/min/[1.73_m2]    GFR Estimate If Black >90 >60 mL/min/[1.73_m2]   Creatinine   Result Value Ref Range    Creatinine 0.78 0.66 - 1.25 mg/dL    GFR Estimate >90 >60 mL/min/[1.73_m2]    GFR Estimate If Black >90 >60 mL/min/[1.73_m2]   Electrolyte panel   Result Value Ref Range    Sodium 140 133 - 144 mmol/L    Potassium 4.5 3.4 - 5.3 mmol/L    Chloride 106 94 - 109 mmol/L    Carbon Dioxide 22 20 - 32 mmol/L    Anion Gap 12 3 - 14 mmol/L   Urea nitrogen   Result Value Ref Range    Urea Nitrogen 23 7 - 30 mg/dL   Calcium   Result Value Ref Range    Calcium 8.4 (L) 8.5 - 10.1 mg/dL   Glucose   Result Value Ref Range    Glucose 100 (H) 70 - 99 mg/dL   Magnesium   Result Value Ref Range    Magnesium 2.1 1.6 - 2.3 mg/dL    Vascular Access Adult IP Consult    Narrative    Razia Nix     4/16/2019 12:29 PM  Procedure explained, questions answered, consent obtained.  #4 single lumen Bard Solo Power PICC placed in right basilic vein   on first attempt.  3CG technology was fairly inconclusive, so portable xray   obtained.  Spinal hardware obstructed view of catheter tip, so   another portable xray was obtained, this time with patient   rotated.  Per radiology (Odalys Gray), tip terminates near RA.  Report phoned to 10A charge Gloria MIRANDA: PICC ok for immediate   use.  Flush orders entered into Epic           XR Chest Port 1 View    Narrative    EXAM: XR CHEST PORT 1 VW  4/16/2019 10:52 AM     HISTORY:  PICC placement      COMPARISON:  10/28/2015    FINDINGS:   Right arm PICC course visualized to the point of brachiocephalic  confluence, tip obscured by spinal hardware. Spinal hardware with  intact rods and pedicle screws throughout the entire visualized  thoracic spine. Cardiomediastinal silhouette is normal. No right  pleural effusion. Left costophrenic angle is collimated out of view.  The pulmonary vasculature is distinct. No pneumothorax. Lung fields  are clear. The osseous thorax, visualized abdomen and soft tissues are  unremarkable.       Impression    IMPRESSION:   1.  Right arm PICC visualized to the point of the brachiocephalic  confluence, tip obscured by spinal hardware.  2.  Clear lungs.    I have personally reviewed the examination and initial interpretation  and I agree with the findings.    BERT BLANCO MD   XR Chest Port 1 View    Narrative    EXAMINATION: XR CHEST PORT 1 VW, 4/16/2019 11:36 AM    INDICATION: PICC placement    COMPARISON: 4/16/2019    FINDINGS: Single portable RPO radiograph of the chest. Patient is  rotated for visualization of newly placed right-sided PICC line. Tip  projects over the region of the cavoatrial junction. Partial  visualization of the spinal fusion hardware along the thoracic  spine.  No focal airspace opacity. No acute osseous abnormality. The partially  visualized upper abdomen is unremarkable.      Impression    IMPRESSION: Right-sided PICC line projects over the region of the  cavoatrial junction.    I have personally reviewed the examination and initial interpretation  and I agree with the findings.    BERT BLANCO MD     Last Comprehensive Metabolic Panel:  Sodium   Date Value Ref Range Status   04/16/2019 140 133 - 144 mmol/L Final     Potassium   Date Value Ref Range Status   04/16/2019 4.5 3.4 - 5.3 mmol/L Final     Chloride   Date Value Ref Range Status   04/16/2019 106 94 - 109 mmol/L Final     Carbon Dioxide   Date Value Ref Range Status   04/16/2019 22 20 - 32 mmol/L Final     Anion Gap   Date Value Ref Range Status   04/16/2019 12 3 - 14 mmol/L Final     Glucose   Date Value Ref Range Status   04/16/2019 100 (H) 70 - 99 mg/dL Final     Urea Nitrogen   Date Value Ref Range Status   04/16/2019 23 7 - 30 mg/dL Final     Creatinine   Date Value Ref Range Status   04/16/2019 0.78 0.66 - 1.25 mg/dL Final     GFR Estimate   Date Value Ref Range Status   04/16/2019 >90 >60 mL/min/[1.73_m2] Final     Comment:     Non  GFR Calc  Starting 12/18/2018, serum creatinine based estimated GFR (eGFR) will be   calculated using the Chronic Kidney Disease Epidemiology Collaboration   (CKD-EPI) equation.       Calcium   Date Value Ref Range Status   04/16/2019 8.4 (L) 8.5 - 10.1 mg/dL Final     Hemoglobin   Date Value Ref Range Status   04/14/2019 9.0 (L) 13.3 - 17.7 g/dL Final   04/13/2019 8.7 (L) 13.3 - 17.7 g/dL Final   Bone culture 4/12 staph epi and corynebacterium.              Assessment and Plan:   1)  Status post Left Ischial Decubitus Debridement Including Bone.  Re-rotation  Of Left Gluteal Myocutaneous Flap. Readvancement of Left Posterior Thigh Flap 4/12/19 .   ml's.  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  Clinically doing well.   Positive bone culture as above.  On Vanco.  PICC placed.    On lovenox.   2) T6 paraplegia.  3) Neurogenic bladder.  On self catheterizations PTA.  Rene in place.  4)  Anemia 2nd to acute blood loss on more chronic anemia likely 2nd to CD with prior component of iron deficiency.  (intolerant to oral iron).  Adequate.   5)  H/o Left Ischial Debridement, Left Gluteal Rotational Flap. 2017.  6)  Charcot arthropathy, S/P spine fusion 2015 complicated by infection       PLAN:  1)  Review meds, orders.  2)  IV Vanco.  PICC placed.  3)  IS, lovenox, bowel program (enema daily).  4)  trend labs.  Monitor clinically.  Dispo.   Disposition Plan   Expected discharge pending AB plan to transitional care unit.  SW looking into funding for [facility] facility close to home.      Entered: Marc Padilla 04/16/2019, 2:39 PM              Attestation:  I have reviewed today's vital signs, notes, medications, labs and imaging.     Marc Padilla MD

## 2019-04-16 NOTE — PROGRESS NOTES
Plastic Surgery Progress Note    Subjective/Interval History:  PICC placed yesterday.     Objective:  Temp:  [96.6  F (35.9  C)-97.7  F (36.5  C)] 96.6  F (35.9  C)  Heart Rate:  [57-71] 63  Resp:  [15-16] 16  BP: ()/(58-64) 96/64  SpO2:  [96 %-98 %] 97 %    General appearance: in NAD  Pulm: Non-labored breathing; saturating well on RA  CV: Hemodynamically stable  Left buttock: Both gluteal flap and posterior thigh flaps pink and viable with 2 sec cap refill, sutures and staples in place, drain with serosanguinous output. Dressing intact. Stage II pressure injuries at other bony prominences over contralateral ischium and greater trochanters covered with Mepilex.     Assessment/Plan:   Sacha Ndiaye is a 44 year old male with paraplegia secondary to spinal cord injury and stage IV recurrent left ischial decubitus ulcer who is now s/p re-rotation of left gluteal musculocutaneous flap with concomitant re-advancement of left posterior thigh flap by Dr. Harris 4/12.     - Routine flap cares as per orders. Bedrest. Head of bed no greater than 30 degrees. No sitting. Change positions Q2 hours while awake, Q4 hours overnight. OK to lie on side opposite to flap. Please minimize time on flap side.  - Wave mattress.   - Intra-operative cultures growing peptostreptococcus josr, staph epidermitis, and corynebacterium striatum. Currently on vanc. Appreciate ID recommendations for any additional antibiotics.   - Placement at TCU once antibiotic regimen is determined, possibly tomorrow.   - Continue Rene for now.     Marshal De Leon, PGY4  [phone]

## 2019-04-16 NOTE — PLAN OF CARE
A&O x4. Pt denied pain, nausea, SOB, or other new concerns overnight. Pt has no sensation below the midline of the abdomen. Lung sounds clear and equal bilateral. Pt carrillo patent and draining overnight. RENARD patent and stripped this shift. Scheduled Vancomycin administered late on this shift and times adjusted accordingly. Pt able to roll well in bed. Call light within reach and pt able to make needs known.
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
## 2019-04-16 NOTE — PROGRESS NOTES
Social Work Services Progress Note    Hospital Day: 5    Collaborated with:  Pt, 10A IDT, Dr Araya (Plastics), Admissions, [facility] [phone] F:[phone]. [phone]    Data:  discharge plan    Intervention:  Anticipate pt will be ready for discharge as early as Wed, 4/17 provided his IV ABX plan is final. Isabel called and left  voice mail message that they have received insurance authorization for pt to admit there on Wed, 4/17.     paged Dr Ward asking for confirmation pt's IV ABX is ready for pt to go to [facility]. Await response.    Pt states he has used [employer] to [facility] in the past, and would be open to using them again.     Assessment:  pt is agreeable to plan for discharge to [facility] when IV ABX plan is final    Plan:    Anticipated Disposition:  [facility]    Barriers to d/c plan:  IV ABX plan    Follow Up:  JOSR con't to follow      Addendum:    PAS submitted to [facility] Linkage Line, confirmation [id number]

## 2019-04-16 NOTE — PLAN OF CARE
Focus: Physio  D: Doing well. Picc line placed. Denies pain. IV antibiotics infusing. Bedrest. Directing his own care. Edgard patent. P: Continue current plan of care.

## 2019-04-16 NOTE — PROCEDURES
Procedure explained, questions answered, consent obtained.  #4 single lumen Bard Solo Power PICC placed in right basilic vein on first attempt.  3CG technology was fairly inconclusive, so portable xray obtained.  Spinal hardware obstructed view of catheter tip, so another portable xray was obtained, this time with patient rotated.  Per radiology (Odalys Gray), tip terminates near RA.  Report phoned to 10A charge Gloria MIRANDA: PICC ok for immediate use.  Flush orders entered into Epic

## 2019-04-17 ENCOUNTER — TELEPHONE (OUTPATIENT)
Dept: INFECTIOUS DISEASES | Facility: CLINIC | Age: 44
End: 2019-04-17

## 2019-04-17 VITALS
SYSTOLIC BLOOD PRESSURE: 108 MMHG | RESPIRATION RATE: 16 BRPM | DIASTOLIC BLOOD PRESSURE: 66 MMHG | BODY MASS INDEX: 18.96 KG/M2 | TEMPERATURE: 95.6 F | WEIGHT: 140 LBS | HEART RATE: 85 BPM | OXYGEN SATURATION: 99 % | HEIGHT: 72 IN

## 2019-04-17 LAB
CREAT SERPL-MCNC: 0.48 MG/DL (ref 0.66–1.25)
GFR SERPL CREATININE-BSD FRML MDRD: >90 ML/MIN/{1.73_M2}
VANCOMYCIN SERPL-MCNC: 11.2 MG/L

## 2019-04-17 PROCEDURE — 25800030 ZZH RX IP 258 OP 636

## 2019-04-17 PROCEDURE — 99232 SBSQ HOSP IP/OBS MODERATE 35: CPT | Performed by: INTERNAL MEDICINE

## 2019-04-17 PROCEDURE — 25000132 ZZH RX MED GY IP 250 OP 250 PS 637: Performed by: STUDENT IN AN ORGANIZED HEALTH CARE EDUCATION/TRAINING PROGRAM

## 2019-04-17 PROCEDURE — 82565 ASSAY OF CREATININE: CPT | Performed by: SURGERY

## 2019-04-17 PROCEDURE — 25000128 H RX IP 250 OP 636: Performed by: STUDENT IN AN ORGANIZED HEALTH CARE EDUCATION/TRAINING PROGRAM

## 2019-04-17 PROCEDURE — 25000128 H RX IP 250 OP 636

## 2019-04-17 PROCEDURE — 36592 COLLECT BLOOD FROM PICC: CPT | Performed by: SURGERY

## 2019-04-17 PROCEDURE — 80202 ASSAY OF VANCOMYCIN: CPT | Performed by: SURGERY

## 2019-04-17 RX ORDER — CEFAZOLIN SODIUM 1 G/50ML
1250 SOLUTION INTRAVENOUS EVERY 12 HOURS
DISCHARGE
Start: 2019-04-17 | End: 2019-05-28

## 2019-04-17 RX ADMIN — ENOXAPARIN SODIUM 40 MG: 40 INJECTION SUBCUTANEOUS at 10:26

## 2019-04-17 RX ADMIN — CIPROFLOXACIN HYDROCHLORIDE 750 MG: 750 TABLET, FILM COATED ORAL at 09:29

## 2019-04-17 RX ADMIN — ACETAMINOPHEN 650 MG: 325 TABLET, FILM COATED ORAL at 13:08

## 2019-04-17 RX ADMIN — SODIUM PHOSPHATE 1 ENEMA: 7; 19 ENEMA RECTAL at 09:30

## 2019-04-17 RX ADMIN — VANCOMYCIN HYDROCHLORIDE 1250 MG: 10 INJECTION, POWDER, LYOPHILIZED, FOR SOLUTION INTRAVENOUS at 11:46

## 2019-04-17 RX ADMIN — OXYBUTYNIN CHLORIDE 30 MG: 10 TABLET, FILM COATED, EXTENDED RELEASE ORAL at 09:29

## 2019-04-17 ASSESSMENT — ACTIVITIES OF DAILY LIVING (ADL)
ADLS_ACUITY_SCORE: 19
ADLS_ACUITY_SCORE: 21

## 2019-04-17 NOTE — PLAN OF CARE
Patient A & O x 4. VSS. Para no sensation in BLEs but pt able to reposition self with minimal asssist. Rene catheter patent. Patient denies pain. Surgical flap site - staples and sutures intact. RENARD drain in place with no output. PICC SL. Patient on bedrest and HOB less than 30 degrees. Discharge 4/16 to [facility].

## 2019-04-17 NOTE — PLAN OF CARE
Pt on bedrest. Dressing changes completed this AM. Bowel program completed this AM. Denies pain. PICC line patent and saline locked. PCD's in place. Tolerating regular diet. Pt discharging to [facility]. Report given to RN there. Pt discharged at 1330.

## 2019-04-17 NOTE — DISCHARGE SUMMARY
[facility], [place]    Discharge Summary  Plastic and Reconstructive Surgery    Date of Admission:  4/12/2019  Date of Discharge:  4/17/2019  Discharging Provider: Marshal De Leon    Discharge Diagnoses   Patient Active Problem List   Diagnosis     Spinal stenosis     SCI (spinal cord injury)     Hardware complicating wound infection, initial encounter (H)     Charcot's joint, vertebrae     Status post flap graft     Decubitus ulcer of left ischium, stage 4 (H)     Constipation     H/O lumbosacral spine surgery     Hip dislocation, bilateral (H)     Infection and inflammatory reaction due to internal orthopedic device, implant, and graft (H)     Neurogenic bladder     Neurogenic bowel     Osteomyelitis of pelvic region or thigh, acute, left (H)     Osteomyelitis of pelvic region, acute, left (H)     Paraplegia (H)     Traumatic spinal subdural hematoma     Hx of plastic surgery       Procedure/Surgery Information   Procedure: Re-rotation of left gluteal musculocutaneous flap with concomitant re-advancement of left posterior thigh flap        Surgeon(s): Surgeon(s) and Role:     * Cheryl Harris MD - Primary     * Marshal De Leon MD - Resident - Assisting   Specimens: ID Type Source Tests Collected by Time Destination   1 : Left Ischium Bone Bone AFB CULTURE NON BLOOD, ANAEROBIC BACTERIAL CULTURE, BONE CULTURE AEROBIC BACTERIAL, FUNGUS CULTURE Cheryl Harris MD 4/12/2019 12:35 PM    A : Left Ischium Bone Bone SURGICAL PATHOLOGY EXAM Cheryl Harris MD 4/12/2019 12:35 PM    B : Left Ischial Bone Bone Bone SURGICAL PATHOLOGY EXAM Cheryl Harris MD 4/12/2019  1:40 PM       Non-operative procedures PICC line placement      History of Present Illness   Sacha Ndiaye is a 44 year old male with history of paraplegia and a recurrent stage IV pressure ulcer at the left ischial tuberosity who was seen in clinic in March 2019 and scheduled for elective flap  reconstruction. He had previous flaps done at this site and the plan was for re-advancement of old flaps.     Hospital Course   Sacha Ndiaye was admitted on 4/12/2019 post op. He was put on bedrest on a Wave mattress and strict flap precautions. His cultures grew peptostreptococcus josr, staph epidermitis, and corynebacterium striatum and he received a PICC line and IV vancomycin. Bone pathology is pending at the time of discharge. After discussions with infectious disease, his PTA ciprofloxacin was also restarted. He was on doxycycline and ciprofloxacin for infected spine hardware prior to admission. Vancomycin is replacing doxycycline at this point. His end date for vancomycin is 5/28. At that point he should possibly transition back to doxycycline. This will depend on discussions with Dr. Bennett (ID). His current Rene was placed in the OR on 4/12. He received DVT chemoprophylaxis during his stay. His flap remained viable and incisions were intact. He will be on bedrest for a minimum of 3 weeks.     Medications discontinued or adjusted during this hospitalization: Doxycycline held, IV vancomycin started     Discharge Disposition   Discharged to nursing home   Condition at discharge: Stable    Pending Results   Final pathology results: Pending at time of discharge    Unresulted Labs Ordered in the Past 30 Days of this Admission     Date and Time Order Name Status Description    4/12/2019 1237 Surgical pathology exam In process     4/12/2019 1237 Fungus Culture, non-blood Preliminary     4/12/2019 1237 Anaerobic bacterial culture Preliminary     4/12/2019 1237 AFB Culture Non Blood Preliminary         Physical Exam:  General appearance: in NAD  Pulm: Non-labored breathing; saturating well on RA  CV: Hemodynamically stable  Left buttock: Both gluteal flap and posterior thigh flaps pink and viable with 2 sec cap refill, sutures and staples in place, drain with serosanguinous output. Dressing intact. Stage II  pressure injuries at other bony prominences over contralateral ischium and greater trochanters covered with Mepilex.     Primary Care Physician   MATT MELGAR    Consultations This Hospital Stay   SOCIAL WORK IP CONSULT  INTERNAL MEDICINE ADULT IP CONSULT FOR [facility]  INFECTIOUS DISEASE [facility] ADULT IP CONSULT  PHARMACY TO DOSE VANCO  VASCULAR ACCESS ADULT IP CONSULT  PHYSICAL THERAPY ADULT IP CONSULT  OCCUPATIONAL THERAPY ADULT IP CONSULT    Discharge Orders      General info for SNF    Length of Stay Estimate: Long Term Care  Condition at Discharge: Stable  Level of care:skilled   Rehabilitation Potential: Good  Admission H&P remains valid and up-to-date: Yes  Recent Chemotherapy: N/A  Use Nursing Home Standing Orders: Yes     Reason for your hospital stay    Treatment of left ischial tuberosity pressure ulcer     Intake and output    Strip RENARD drain q shift and record output.     Rene catheter    To straight gravity drainage. Change catheter every 2 weeks and PRN for leaking or decreased uring output with signs of bladder distention.     May discontinue Rene if patient is able to straight cath as before while on bedrest. Otherwise, ok to use a condom catheter.     Wound care (specify)    Site:   Left buttock  Instructions:  Replace dressing on flap site with ABDs and tape. Please make sure RENARD drain tubing is not caught under the patient.     He also has left trochanteric, right trochanteric, and right ischial stage II pressure ulcers. Please place Mepilex on these sites and change every other day or so.     IV access    PICC line     Follow Up and recommended labs and tests    Please communicate labs with Dr. Bennett's clinic in infectious disease.     Additional Discharge Instructions    Must be on a pressure relieving air mattress.    IV vancomycin end date is May 28th 2019. After discontinuing vanc, patient should go back on doxycycline per Dr. Bennett's (ID) recommendation. Continue ciprofloxacin  until further instructions from Dr. Bennett.     If patient is able to straight cath as before while on bedrest, ok to remove Rene. If not, may try condom catheter.     Patient may follow up with Dr. Harris in clinic in a few weeks, but if transport is an issue, please communicate with Dr. Harris's clinic regarding RENARD drain, suture/stitches, and status of flaps.     Weight bearing status    Strict bedrest for at least 3 weeks. May have the head of the bed elevated but no greater than 30 degrees. Absolutely no sitting to protect the flap. Please turn q2hr while awake. Turn from supine to right side. Avoid the left side. Avoid any kind of pressure on the flaps.     Full Code     Physical Therapy Adult Consult    Evaluate and treat as clinically indicated.    Reason: Deconditioned post op     Occupational Therapy Adult Consult    Evaluate and treat as clinically indicated.    Reason: Deconditioned post op     Advance Diet as Tolerated    Follow this diet upon discharge: Regular diet     Discharge Medications   Current Discharge Medication List      START taking these medications    Details   enoxaparin (LOVENOX) 40 MG/0.4ML syringe Inject 0.4 mLs (40 mg) Subcutaneous every 24 hours    Comments: While on bedrest  Associated Diagnoses: Hx of plastic surgery      vancomycin 1,250 mg Inject 1,250 mg into the vein every 12 hours    Associated Diagnoses: Hx of plastic surgery         CONTINUE these medications which have NOT CHANGED    Details   acetaminophen (TYLENOL) 325 MG tablet Take 2 tablets (650 mg) by mouth every 4 hours as needed for other (surgical pain)  Qty: 100 tablet    Associated Diagnoses: Decubitus ulcer of left ischium, stage 4 (H)      benzoyl peroxide (PANOXYL) 2.5 % LIQD Externally apply 1 Application topically daily Hold and notify prn excess erythema      bisacodyl (DULCOLAX) 10 MG Suppository Place 1 suppository (10 mg) rectally daily as needed for constipation  Qty: 30 suppository    Associated  Diagnoses: Decubitus ulcer of left ischium, stage 4 (H)      ciprofloxacin (CIPRO) 750 MG tablet TAKE 1 TABLET BY MOUTH EVERY 12 HOURS  Qty: 60 tablet, Refills: 5    Associated Diagnoses: Decubitus ulcer of left ischium, stage 4 (H)      oxybutynin 15 MG TB24 Take 2 tablets (30 mg) by mouth daily  Qty: 30 tablet    Associated Diagnoses: Decubitus ulcer of left ischium, stage 4 (H)      polyethylene glycol (MIRALAX/GLYCOLAX) Packet Take 17 g by mouth daily as needed for constipation  Qty: 7 packet      sildenafil (VIAGRA) 100 MG tablet Take 100 mg by mouth as needed      sodium phosphate (FLEET ENEMA) 7-19 GM/118ML rectal enema Place 1 Bottle (1 enema) rectally every 48 hours  Qty: 1 Bottle, Refills: 0    Associated Diagnoses: Decubitus ulcer of left ischium, stage 4 (H)      order for [facility] Fax [phone]    ABD pad Qty: 30    Length of Need: 1 month  Qty: 30 days, Refills: 0    Associated Diagnoses: Decubitus ulcer of left buttock, stage 4 (H)         STOP taking these medications       doxycycline (VIBRA-TABS) 100 MG tablet Comments:   Reason for Stopping:         doxycycline monohydrate (MONODOX) 100 MG capsule Comments:   Reason for Stopping:             Allergies   Allergies   Allergen Reactions     Econazole Rash     Gentamycin [Gentamicin] Blisters, Unknown and Dermatitis     From topical gentamicin, developed severe blistering on foot

## 2019-04-17 NOTE — PHARMACY-VANCOMYCIN DOSING SERVICE
Pharmacy Vancomycin Note  Date of Service 2019  Patient's  1975   44 year old, male, Paraplegia    Indication: Bone and Joint Infection    bone cultures: Light growth Staph Epidermidis (Vanco ERROL=2) , light growth Corynebacterium striatum (Vanco ERROL=0.5), light growth Finegoldia magna (Peptostreptococcus josr), susceptibility testing not routibely done    Goal Trough Level: 15-20 mg/L  Day of Therapy: started on 19  Current Vancomycin regimen:  1250 mg IV q12h    Current estimated CrCl = Estimated Creatinine Clearance: 178.2 mL/min (A) (based on SCr of 0.48 mg/dL (L)).    Creatinine for last 3 days  4/15/2019:  5:56 PM Creatinine 0.53 mg/dL  2019:  6:21 AM Creatinine 0.78 mg/dL  2019:  6:04 AM Creatinine 0.48 mg/dL     Recent Vancomycin Levels (past 3 days)  2019: 11:23 AM Vancomycin Level 11.2 mg/L    Vancomycin IV Administrations (past 72 hours)                   vancomycin (VANCOCIN) 1,250 mg in sodium chloride 0.9 % 250 mL intermittent infusion (mg) 1,250 mg New Bag 19 1146     1,250 mg New Bag 19 2352     1,250 mg New Bag  1148     1,250 mg New Bag  0020                Nephrotoxins and other renal medications (From now, onward)    Start     Dose/Rate Route Frequency Ordered Stop    19 0000  vancomycin 1,250 mg      1,250 mg  over 90 Minutes Intravenous EVERY 12 HOURS 19 1156 19 2359    04/15/19 1700  vancomycin (VANCOCIN) 1,250 mg in sodium chloride 0.9 % 250 mL intermittent infusion      1,250 mg  over 90 Minutes Intravenous EVERY 12 HOURS 04/15/19 1630               Contrast Orders - past 72 hours (72h ago, onward)    None          Interpretation of levels and current regimen:  Trough level is  Subtherapeutic after 3 doses of vancomycin IV were given.    Has serum creatinine changed > 50% in last 72 hours: No    Urine output:  unable to determine    Renal Function: Stable    Plan:  1.  Continue Current Dose vancomycin 1250 mg IV q12h for  now, which is ~20 mg/kg/dose). Expect vancomycin level reach the goal range in a few days. Note: pt is paraplegia.  2.  Pharmacy will check trough levels as appropriate in 1-3 Days.    3. Serum creatinine levels will be ordered a minimum of twice weekly.      Robles NascimentoD

## 2019-04-17 NOTE — PLAN OF CARE
L hip/IT drsg,CDI.RENARD intact with no output.Rene outputting good amts,clear,yellow urine.VSS.PICC single lumen R arm,patent for IV abx.PIV line L forearm PIV line,saline locked.Minimal assist with turning,alternating back and R side.Denies pain.Plan possibly discharge to [facility] today.

## 2019-04-17 NOTE — PROGRESS NOTES
Social Work Services Discharge Note      Patient Name:  Sacha Ndiaye     Anticipated Discharge Date:  4/17/19    Discharge Disposition:   TCU:  [facility] [phone] f: [phone]    Following MD:  Dr Turner     Pre-Admission Screening (PAS) online form has been completed.  The Level of Care (LOC) is:  Determined  Confirmation Code is:  [id number]  Patient/caregiver informed of referral to [facility] Line for Pre-Admission Screening for skilled nursing facility (SNF) placement and to expect a phone call post discharge from SNF.     Additional Services/Equipment Arranged:  [employer] Ride,  at 1330     Patient / Family response to discharge plan:  agreeable     Persons notified of above discharge plan:  Pt, bedside RN Nasrin, 10A Charge RUBEN Baca, Dr Araya, Dr Padilla    Staff Discharge Instructions:  Please fax discharge orders and signed hard scripts for any controlled substances.  Please print a packet and send with patient.     CTS Handoff completed:  YES    Medicare Notice of Rights provided to the patient/family:  NO- pt does not have Medicare insurance

## 2019-04-17 NOTE — PROGRESS NOTES
[facility] Internal Medicine Progress Note            Interval History:   Record reviewed.  Seen with RN.  Plan for discharge today to [facility].   No interval clinical concerns.  Successful bowel program today.     No CP, SOB, cough, nausea, reflux, abd distention.   Has carrillo.  Has PICC.  Plan for IV vanco and po cipro per ID.           Medications:   All medications reviewed today          Physical Exam:   Blood pressure 108/66, pulse 85, temperature 95.6  F (35.3  C), temperature source Oral, resp. rate 16, height 1.829 m (6'), weight 63.5 kg (140 lb), SpO2 99 %.    Intake/Output Summary (Last 24 hours) at 4/15/2019 1011  Last data filed at 4/15/2019 0608  Gross per 24 hour   Intake 3 ml   Output 3490 ml   Net -3487 ml       General:  Alert.  Appropriate.  No distress.  No O2.     Heent:      Neck:    Skin:    Chest:  clear    Cardiac:  Reg without gallop, murmur.  No JVD.     Abdomen:  Non distended, soft.  BS normal.     Extremities:  Perfused.  No edema, calf, posterior thigh induration to suggest DVT.     Neuro:            Data:     Results for orders placed or performed during the hospital encounter of 04/12/19 (from the past 24 hour(s))   Creatinine   Result Value Ref Range    Creatinine 0.48 (L) 0.66 - 1.25 mg/dL    GFR Estimate >90 >60 mL/min/[1.73_m2]    GFR Estimate If Black >90 >60 mL/min/[1.73_m2]   Vancomycin level   Result Value Ref Range    Vancomycin Level 11.2 mg/L     Last Comprehensive Metabolic Panel:  Sodium   Date Value Ref Range Status   04/16/2019 140 133 - 144 mmol/L Final     Potassium   Date Value Ref Range Status   04/16/2019 4.5 3.4 - 5.3 mmol/L Final     Chloride   Date Value Ref Range Status   04/16/2019 106 94 - 109 mmol/L Final     Carbon Dioxide   Date Value Ref Range Status   04/16/2019 22 20 - 32 mmol/L Final     Anion Gap   Date Value Ref Range Status   04/16/2019 12 3 - 14 mmol/L Final     Glucose   Date Value Ref Range Status   04/16/2019 100 (H) 70 - 99 mg/dL  Final     Urea Nitrogen   Date Value Ref Range Status   04/16/2019 23 7 - 30 mg/dL Final     Creatinine   Date Value Ref Range Status   04/17/2019 0.48 (L) 0.66 - 1.25 mg/dL Final     GFR Estimate   Date Value Ref Range Status   04/17/2019 >90 >60 mL/min/[1.73_m2] Final     Comment:     Non  GFR Calc  Starting 12/18/2018, serum creatinine based estimated GFR (eGFR) will be   calculated using the Chronic Kidney Disease Epidemiology Collaboration   (CKD-EPI) equation.       Calcium   Date Value Ref Range Status   04/16/2019 8.4 (L) 8.5 - 10.1 mg/dL Final     Hemoglobin   Date Value Ref Range Status   04/14/2019 9.0 (L) 13.3 - 17.7 g/dL Final   04/13/2019 8.7 (L) 13.3 - 17.7 g/dL Final   Bone culture 4/12 staph epi and corynebacterium.              Assessment and Plan:   1)  Status post Left Ischial Decubitus Debridement Including Bone.  Re-rotation  Of Left Gluteal Myocutaneous Flap. Readvancement of Left Posterior Thigh Flap 4/12/19 .   ml's.  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  Clinically doing well.  Bone culture 4/12 staph epi and corynebacterium.  AB plan as above.   2) T6 paraplegia.  3) Neurogenic bladder.  On self catheterizations PTA.  Rene in place.  Plastics to address.   4)  Anemia 2nd to acute blood loss on more chronic anemia likely 2nd to CD with prior component of iron deficiency.  (intolerant to oral iron).  Adequate.   5)  H/o Left Ischial Debridement, Left Gluteal Rotational Flap. 2017.  6)  Charcot arthropathy, S/P spine fusion 2015 complicated by infection       PLAN:  1) Clinically stable for discharge to TCU.  2)  Meds, orders reviewed. Arrange follow up labs.   Disposition Plan   Expected discharge today.      Entered: Marc Padilla 04/17/2019, 12:29 PM              Attestation:  I have reviewed today's vital signs, notes, medications, labs and imaging.     Marc Padilla MD

## 2019-04-17 NOTE — DISCHARGE INSTRUCTIONS
Dr Abigail Bennett, Infectious Disease at [address] [address]    Appointments: [phone]   Provider Referrals: [phone]

## 2019-04-17 NOTE — TELEPHONE ENCOUNTER
[facility] Call Center    Phone Message    May a detailed message be left on voicemail: yes    Reason for Call: Other: Jacob from [facility] called and would like clarification if the pharmacy should or need to manage the prescription, vancomycin 1,250 mg. Also Jacob would like to know what labs should be done for pt. Please call back Jacob. Thanks.     Action Taken: Message routed to:  [facility] ([facility] ID

## 2019-04-18 NOTE — TELEPHONE ENCOUNTER
See message below. Called Jacob from [employer] back and let him know the below. Lab results will be faxed to the clinic at [phone].      Lisa Casillas RN  --------------------------------------------------------------------------------    Abigail Bennett MD  You 21 minutes ago (10:57 AM)      That info should have been in his discharge orders - sigh!     Yes, please have pharmacy manage the vancomycin dosing. He should also have a CBC with diff, CRP, creatinine, and vancomycin level weekly while on vancomycin with results faxed to me.     Regarding timing of follow-up, the planned duration of his vancomycin is 6 weeks. We should just get him on my schedule before the end of therapy.     Thanks,   -[facility]    Routing comment       You  Abigail Bennett MD 33 minutes ago (10:45 AM)      Dr. Bennett,          Do you want Sacha to get any labs done before he sees you while he is on the vancomycin? I can call them and let them know what labs you want if you'd like!     Thanks,   Lisa Casillas RN    Routing comment       Sherly Peña routed conversation to [facility] 18 hours ago (4:30 PM)      Jacob - [employer] [phone]  Sherly Peña 18 hours ago (4:27 PM)      ext [phone]    Incoming call       Sherly Peña 18 hours ago (4:27 PM)         [facility] Call Center     Phone Message     May a detailed message be left on voicemail: yes     Reason for Call: Other: Jacob from [employer] called and would like clarification if the pharmacy should or need to manage the prescription, vancomycin 1,250 mg. Also Jacob would like to know what labs should be done for pt. Please call back Jacob. Thanks.      Action Taken: Message routed to:  [facility] [place] [place]               Documentation

## 2019-04-19 LAB
BACTERIA SPEC CULT: ABNORMAL
COPATH REPORT: NORMAL
Lab: ABNORMAL
SPECIMEN SOURCE: ABNORMAL

## 2019-04-22 ENCOUNTER — TRANSFERRED RECORDS (OUTPATIENT)
Dept: HEALTH INFORMATION MANAGEMENT | Facility: CLINIC | Age: 44
End: 2019-04-22

## 2019-04-22 NOTE — OP NOTE
Procedure Date: 04/12/2019      ATTENDING SURGEON:  Cheryl Harris me       RESIDENT SURGEON:  Marshal De Leon MD      PREOPERATIVE DIAGNOSIS:  Recurrent stage IV left ischial decubitus with osteomyelitis.      POSTOPERATIVE DIAGNOSIS:  Recurrent stage IV left ischial decubitus with osteomyelitis.      PROCEDURES:  Sharp excisional debridement of the left ischial decubitus including skin, subcutaneous, muscle and bone.  Readvancement posterior thigh flap, fasciocutaneous thigh flap and re-rotation myocutaneous gluteal flap.      ANESTHESIA:  GET.      ESTIMATED BLOOD LOSS:  100 mL.      IV FLUIDS:  1800 mL.      URINE OUTPUT:  550 mL.      COUNTS:  Correct.      COMPLICATIONS:  None.      DRAINS:  RENARD x 1.      INDICATIONS:  This is a 44-year-old paraplegic gentleman who has had multiple surgeries by our service over the years.  He initially presented with a Charcot deformity of the spine that was addressed by Dr. Welsh in the Orthopedic Department as well as the Neurosurgery team.  We also addressed a left anterior thigh and groin wound of his.  It was after this that he started to develop left ischial decubitus, which was first fixed with a posterior thigh fasciocutaneous flap and then repaired and ultimately the last flap was a gluteal myocutaneous rotation flap.  Unfortunately, he developed a recurrent wound in this area with underlying osteomyelitis and at this point, with conservative wound cares as well as completely changing his seating system to a FlexForm chair, he failed to make progress with the wound.      DESCRIPTION OF PROCEDURE:  The patient was seen in the preoperative waiting area.  The operative site was marked on the anatomical diagram.  Informed consent was obtained after reviewing the possible risks and complications including but not limited to the following:  Infection, bleeding, hematoma, seroma formation, poor healing, possible partial or total necrosis and loss of flap closure,  possible dehiscence, possible recurrent decubitus, possible injury to surrounding neurovascular and musculoskeletal structures as well as colorectal and genitourinary structures, possible residual deformities, possible need for further surgery and anesthetic risks such as DVT, PE and cardiopulmonary arrest.  The patient was then brought to the operating room and after intubation, was placed prone on the OR table.  Great care was taken to provide padding to protect against any further pressure injuries.  He had chest pillows and hip roll.  He had additional padding under his knees and his forelegs.  His arms were placed in superman position.  He had sequential compression devices on his lower extremities prior to induction.  An upper Roberto Hugger was used.  Dressings were removed and preoperative photos were taken.  The area was then prepped and draped in the usual sterile fashion using Techni-Care soap.      After a timeout was taken and proper patient and procedure were identified, the wound lining itself was stained with methylene blue dye.  This would facilitate complete removal of the colonized surface of the wound before closure.  Scalpel and Bovie were used to take away soft tissues and then the base of the wound was debrided with rongeur.  We then used rongeur to take bone samples for culture and path.  This patient had an extremely prominent ischial tuberosity and ramus.  Ultimately, an osteotome was used to shave down the prominence of the bone without altering the area too much that might change the pressure from sitting.  The specimens were taken after irrigating them with saline.  After debridement was complete,  triple antibiotic solution was used with Pulsavac.  Hemostasis was achieved with the cautery.  We then started to assess what we could use to try and fill the defect and cover and close this recurrent wound, hopefully for the last time in view of the fact that he had already had muscle flaps done  in the past.  We first used the Doppler and could sound out not only perforators of the posterior thigh surprisingly, but also could hear  some of the tissue in the gluteal flap.  Spy-Phi was also used to make sure there these perforators also had adequate perfusion zones.  This obviously was done before actually making our small incision through rerotation of the gluteal flap.        We felt that something needed to be moved to pad the prominent zone ischium if at all possible.  We decided to at least initially leave a piece of gluteal flap.  Previous incision was made and we developed our flap laterally as a subfascial fasciocutaneous flap, but when we encountered the previously transposed the gluteal extension, we felt this could be stretched over the bone without having to completely detach and reposition it.  This, however, was the inferior aspect of the decubitus wound that was very close to the scrotal base and pubic bone and would need better skin coverage. For this reason, we re-incised the superior portion of the posterior thigh flap and re-advanced that superiorly and medially.  Thus giving us additional cover over the muscle that was used to cover the bone.  All of this was done using scalpel, cautery and ultimately things were anchored with 2-0 Vicryl figure-of-eight sutures to advance the muscle, protect the bone and tendon to the posterior thigh flap for additional coverage.  The intermediate space where things had been mobilized was tacked down eliminate dead space again with 2-0 Vicryl and then the skin was reapproximated with 3-0 Vicryl deep dermal buried sutures and 3-0 Prolene vertical mattress sutures.  This was a complex layered closure to fill the defect, cover the bone and hold the readvanced, re-rotated flap in position.  This was all done over a #19 channel drain that was brought out through a separate stab wound incision laterally and secured with 3-0 nylon suture.  Spy-Phi eventually had  been done per protocol, reconstituting the ICG dye and injecting just 2 mL followed by 10 mL normal saline chaser.  This was done using the handheld unit, so there is not a recording of the study with just real-time assessment of the tissue and reperfusion.  Once we were happy with closure and the viability of the flaps, RENARD was trimmed to fit bulb suction.  Additional skin closure was done with some skin staples laterally.  The area was dressed with Adaptic and ABD pads and secured with Medipore tape.  The patient was then returned to a supine position on the specialty mattress, extubated in the operating room and taken to the recovery room in satisfactory condition having tolerated the procedure without difficulty or complication.  Specimens were sent to Path in formalin as well as cultures including aerobic, anaerobic, fungal and mycobacterial from the left ischial dome.         REJI GUERRA MD             D: 2019   T: 2019   MT: BRITTANY      Name:     GIA CAMARGO   MRN:      [id number]        Account:        [id number]   :      1975           Procedure Date: 2019      Document: [id number]

## 2019-05-10 LAB
FUNGUS SPEC CULT: NORMAL
SPECIMEN SOURCE: NORMAL

## 2019-05-16 ENCOUNTER — HOSPITAL ENCOUNTER (OUTPATIENT)
Dept: WOUND CARE | Facility: CLINIC | Age: 44
Discharge: HOME | End: 2019-05-16
Attending: SURGERY | Admitting: SURGERY
Payer: COMMERCIAL

## 2019-05-16 VITALS
DIASTOLIC BLOOD PRESSURE: 75 MMHG | TEMPERATURE: 97.8 F | RESPIRATION RATE: 18 BRPM | SYSTOLIC BLOOD PRESSURE: 117 MMHG | HEART RATE: 68 BPM

## 2019-05-16 DIAGNOSIS — Z71.9 HEALTH EDUCATION: ICD-10-CM

## 2019-05-16 DIAGNOSIS — L89.324 DECUBITUS ULCER OF LEFT ISCHIUM, STAGE 4 (H): ICD-10-CM

## 2019-05-16 PROCEDURE — G0463 HOSPITAL OUTPT CLINIC VISIT: HCPCS

## 2019-05-16 NOTE — PROGRESS NOTES
Visit Date:   2019      This is a 44-year-old paraplegic gentleman who is status post redo left gluteal and posterior thigh flaps for recurrent stage IV left ischial decubitus with osteomyelitis.  This was done on  and then he has been doing his postop recovery and antibiotics at [facility] near home.  He has also been continuing to run his microcurrent and for most part is feeling fairly well.  He has been having some issues with his bowels as far as constipation goes, but hoping that the current will help that.  He has not really noticed much of a difference with his back since he has not been able get out of bed into his wheelchair yet.  That did seem to help him quite a bit preoperatively and he noticed a difference when he stopped for 2 weeks.  At this point, there is only a few mL coming out of his drain so the RENARD was removed today.  Sutures and staples were also removed without difficulty.  He has a very nicely healing incision and the tissue feels solid with no evidence for any kind of dehiscence.  He was given instructions for doing sitting protocol at [facility] and ultimately he will get things mapped either by Annette or the folks at [facility].  Then he can go home and continue his increased sitting time at home.  We will see him back in about a month or two.  He can come in his chair that time.  He will contact us should there be any other issues or concerns during his ongoing recovery period.  His vitals today were within normal limits today.         REJI GUERRA MD             D: 2019   T: 2019   MT: MESSI      Name:     GIA CAMARGO   MRN:      [id number]        Account:      [id number]   :      1975           Visit Date:   2019      Document: [id number]

## 2019-05-16 NOTE — DISCHARGE INSTRUCTIONS
[facility]  [address] [facility] [address] [address]  Appointment Phone [phone] [facility] [phone]    Sacha Ndiaye      1975    Important!!!:  After each sitting period/session, the wound or suture site must be checked by the nurse.  Any sign of pressure or damage (e.g. dehiscence of suture line, area of new drainage, poor capillary refill of flap) pushes the protocol back to point of no sign of damage.    All sitting protocols start with the patient IN BED flat on buttocks.    Sitting Protocol    1.  Begin sitting up in bed for 15 minutes, 3-4 times per day.  2.  At 1-2 days move to 30 minutes of sitting in bed, 3-4 times per day.  If OK wound check, then advance sitting period by 15 minutes the next day or two.   a)   To 45 minutes sitting in bed  b)   To 60 minutes sitting in bed  3.  When patient can tolerate sitting in bed for sessions of 60 minutes, get wheelchair cushion mapped (in both the seating position and also in the tilted position if your chair tilts)  4. Move the patient to the chair to sit, starting with sitting for 60 minutes in the chair, 3 times per day, checking the flap and incisions after each sitting session.  If OK wound check, then advance sitting period in the chair by 15 minutes the next day or two.  a) To 1 hour, 15 minutes sitting in the chair  b) To 1 hour, 30 minutes sitting in the chair  c) To 1 hour, 45 minutes sitting in the chair  d) To 2 hours sitting in the chair  5.   When tolerating 2 hour sitting sessions, proceed with usual regimen, in wheelchair, with appropriate off-loading techniques.             Remember: If at any point during the sitting protocol, either in bed or in chair, the wound changes for the worse, return to the previous time period that was tolerated.      Call the [facility] [place] regarding any changes, questions, or concerns at [phone].   Dr. Cheryl Roland  Steven

## 2019-05-20 ENCOUNTER — TRANSFERRED RECORDS (OUTPATIENT)
Dept: HEALTH INFORMATION MANAGEMENT | Facility: CLINIC | Age: 44
End: 2019-05-20

## 2019-05-29 ENCOUNTER — TELEPHONE (OUTPATIENT)
Dept: INFECTIOUS DISEASES | Facility: CLINIC | Age: 44
End: 2019-05-29

## 2019-05-29 NOTE — TELEPHONE ENCOUNTER
[facility] Call Center    Phone Message    May a detailed message be left on voicemail: yes    Reason for Call: Other: Pat from [facility] called and stated pt needs oral medication order. Please call back Pat for further concerns. Thanks.     Action Taken: Message routed to:  [facility] ([facility]): ID

## 2019-05-30 ENCOUNTER — TELEPHONE (OUTPATIENT)
Dept: INFECTIOUS DISEASES | Facility: CLINIC | Age: 44
End: 2019-05-30

## 2019-05-30 NOTE — TELEPHONE ENCOUNTER
Abigail Bennett MD  You 12 minutes ago (1:53 PM)      Yes, thanks.   -[facility]    Routing comment       You  Abigail Bennett MD 2 hours ago (11:19 AM)      Hi Dr. Bennett, ok to send order to pull PICC line and restart doxycycline 100mg BID? Thanks.   Za    Routing comment

## 2019-05-30 NOTE — TELEPHONE ENCOUNTER
[facility] Call Center    Phone Message    May a detailed message be left on voicemail: yes    Reason for Call: Order(s): Other:   Reason for requested:  PICC line removal  Date needed: ASAP - he is leaving the TCU tomorrow.  Please fax order to [facility] in [place], [phone]  Provider name:  Donald.        Action Taken: Message routed to:  [facility] ([facility]): Infectious Disease

## 2019-06-09 LAB
MYCOBACTERIUM SPEC CULT: NORMAL
MYCOBACTERIUM SPEC CULT: NORMAL
SPECIMEN SOURCE: NORMAL

## 2019-06-20 ENCOUNTER — HOSPITAL ENCOUNTER (OUTPATIENT)
Dept: WOUND CARE | Facility: CLINIC | Age: 44
Discharge: HOME | End: 2019-06-20
Attending: SURGERY | Admitting: SURGERY
Payer: COMMERCIAL

## 2019-06-20 VITALS
TEMPERATURE: 96.2 F | RESPIRATION RATE: 18 BRPM | HEART RATE: 123 BPM | DIASTOLIC BLOOD PRESSURE: 83 MMHG | SYSTOLIC BLOOD PRESSURE: 118 MMHG

## 2019-06-20 DIAGNOSIS — Z71.9 HEALTH EDUCATION: ICD-10-CM

## 2019-06-20 PROBLEM — L89.324 DECUBITUS ULCER OF LEFT ISCHIUM, STAGE 4 (H): Status: RESOLVED | Noted: 2017-01-11 | Resolved: 2019-06-20

## 2019-06-20 PROCEDURE — G0463 HOSPITAL OUTPT CLINIC VISIT: HCPCS

## 2019-06-20 RX ORDER — DOXYCYCLINE 100 MG/1
CAPSULE ORAL
Refills: 0 | COMMUNITY
Start: 2019-05-30 | End: 2019-08-08

## 2019-06-20 NOTE — LETTER
[facility] [place]  [address]  [address]  [place] [address]  [phone]    2019    Re: Sacha Ndiaye  [address] [facility] [address]  [phone] (home)     : 1975      To Whom It May Concern:    Patient may return to work duties for 2-3 hour sessions daily as tolerated.      Sincerely,      Dr. Cheryl Harris

## 2019-06-20 NOTE — PROGRESS NOTES
Visit Date:   06/20/2019      This is a 44-year-old paraplegic gentleman who is here for final followup after his redo left gluteal rotation and posterior thigh advancement for his third or fourth procedure for stage IV left ischial decubitus with osteomyelitis.  He completed his postop bed rest and sitting protocol and is now doing 2 to 3 to 4 hour sessions throughout today.  He did return to work and has been doing 2 to 3 hour sessions twice daily with a nice break at home in between, and needs a letter of release from us today.  That was provided to formalize his release.  He also had to go to [place] after our visit today to make sure that he is still mapping well on his Flexform.  He continues to do frequency specific microcurrent therapies and he has started using protein collagen supplements.  Overall, he seems to be in really good spirits, as well as good shape.  The surgical site is well-healed.  It feels like there is nice thick soft tissue coverage over the bone where we transposed the gluteal muscle.  He had originally come in thinking that there was a retained suture that his wife and noticed, but it not visible today so is probably fell out spontaneously.  At this point, Sacha can return on a p.r.n. basis.  We will stay in touch with regard to the microcurrent treatments that he is doing and if he has any other issues or concerns, he can certainly return as needed.   Labs:   Recent Labs   Lab Test 04/14/19  0602 04/13/19  0651 01/17/19  0945  01/13/17  0609  10/27/15  0714   ALBUMIN  --   --  2.9*  --   --    < >  --    HGB 9.0* 8.7*  --    < > 8.9*   < >  --    INR  --   --   --   --   --   --  1.12   WBC  --  8.6  --   --   --    < >  --    A1C  --   --   --   --  4.7  --   --    CRP  --   --  37.8*  --   --    < >  --     < > = values in this interval not displayed.     Nutrition requirements were discussed with patient today.  Vitals:  /83   Pulse 123   Temp 96.2  F (35.7  C)   Resp 18    Wound:     Photo:             REJI GUERRA MD             D: 2019   T: 2019   MT: KE      Name:     GIA CAMARGO   MRN:      [id number]        Account:      [id number]   :      1975           Visit Date:   2019      Document: [id number]

## 2019-07-18 ENCOUNTER — MEDICAL CORRESPONDENCE (OUTPATIENT)
Dept: HEALTH INFORMATION MANAGEMENT | Facility: CLINIC | Age: 44
End: 2019-07-18

## 2019-07-27 DIAGNOSIS — T84.7XXA: ICD-10-CM

## 2019-07-27 DIAGNOSIS — T84.7XXS WOUND INFECTION COMPLICATING HARDWARE, SEQUELA: Primary | ICD-10-CM

## 2019-07-29 RX ORDER — DOXYCYCLINE 100 MG/1
CAPSULE ORAL
Qty: 60 CAPSULE | Refills: 2 | Status: ON HOLD | OUTPATIENT
Start: 2019-07-29 | End: 2024-03-25

## 2019-08-05 ENCOUNTER — TELEPHONE (OUTPATIENT)
Dept: INFECTIOUS DISEASES | Facility: CLINIC | Age: 44
End: 2019-08-05

## 2019-08-08 ENCOUNTER — OFFICE VISIT (OUTPATIENT)
Dept: INFECTIOUS DISEASES | Facility: CLINIC | Age: 44
End: 2019-08-08
Attending: INTERNAL MEDICINE
Payer: COMMERCIAL

## 2019-08-08 VITALS
DIASTOLIC BLOOD PRESSURE: 84 MMHG | OXYGEN SATURATION: 98 % | BODY MASS INDEX: 22.38 KG/M2 | SYSTOLIC BLOOD PRESSURE: 124 MMHG | HEART RATE: 79 BPM | WEIGHT: 165 LBS | TEMPERATURE: 97.6 F

## 2019-08-08 DIAGNOSIS — L89.324 DECUBITUS ULCER OF LEFT ISCHIUM, STAGE 4 (H): ICD-10-CM

## 2019-08-08 PROCEDURE — G0463 HOSPITAL OUTPT CLINIC VISIT: HCPCS | Mod: ZF

## 2019-08-08 RX ORDER — DOXYCYCLINE 100 MG/1
100 CAPSULE ORAL 2 TIMES DAILY
Qty: 60 CAPSULE | Refills: 11 | Status: SHIPPED | OUTPATIENT
Start: 2019-08-08 | End: 2020-09-10

## 2019-08-08 RX ORDER — CIPROFLOXACIN 750 MG/1
750 TABLET, FILM COATED ORAL 2 TIMES DAILY
Qty: 60 TABLET | Refills: 11 | Status: SHIPPED | OUTPATIENT
Start: 2019-08-08 | End: 2020-09-10

## 2019-08-08 ASSESSMENT — PAIN SCALES - GENERAL: PAINLEVEL: NO PAIN (0)

## 2019-08-08 ASSESSMENT — ENCOUNTER SYMPTOMS
MYALGIAS: 1
BACK PAIN: 1
MUSCLE CRAMPS: 0
MUSCLE WEAKNESS: 0
JOINT SWELLING: 0
ARTHRALGIAS: 0
STIFFNESS: 0
NECK PAIN: 1
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
## 2019-08-08 NOTE — NURSING NOTE
Chief Complaint   Patient presents with     RECHECK     follow up          /84 (BP Location: Right arm, Patient Position: Sitting, Cuff Size: Adult Regular)   Pulse 79   Temp 97.6  F (36.4  C)   Wt 74.8 kg (165 lb)   SpO2 98%   BMI 22.38 kg/m        Demario Dow  EMT

## 2020-01-07 ENCOUNTER — HOSPITAL ENCOUNTER (OUTPATIENT)
Dept: WOUND CARE | Facility: CLINIC | Age: 45
Discharge: HOME | End: 2020-01-07
Attending: PHYSICIAN ASSISTANT | Admitting: PHYSICIAN ASSISTANT
Payer: COMMERCIAL

## 2020-01-07 VITALS
WEIGHT: 160 LBS | TEMPERATURE: 97.3 F | SYSTOLIC BLOOD PRESSURE: 121 MMHG | DIASTOLIC BLOOD PRESSURE: 84 MMHG | BODY MASS INDEX: 21.67 KG/M2 | HEART RATE: 100 BPM | HEIGHT: 72 IN

## 2020-01-07 DIAGNOSIS — L89.324 DECUBITUS ULCER OF LEFT BUTTOCK, STAGE 4 (H): ICD-10-CM

## 2020-01-07 DIAGNOSIS — S31.104A NON-HEALING LEFT GROIN OPEN WOUND, INITIAL ENCOUNTER: ICD-10-CM

## 2020-01-07 PROCEDURE — A6209 FOAM DRSG <=16 SQ IN W/O BDR: HCPCS

## 2020-01-07 PROCEDURE — 11042 DBRDMT SUBQ TIS 1ST 20SQCM/<: CPT | Performed by: PHYSICIAN ASSISTANT

## 2020-01-07 PROCEDURE — 11042 DBRDMT SUBQ TIS 1ST 20SQCM/<: CPT

## 2020-01-07 PROCEDURE — A6021 COLLAGEN DRESSING <=16 SQ IN: HCPCS

## 2020-01-07 ASSESSMENT — MIFFLIN-ST. JEOR: SCORE: 1658.76

## 2020-01-07 NOTE — DISCHARGE INSTRUCTIONS
[facility]  [address] [address]    Call us at [phone] if you have any questions about your wounds, have redness or swelling around your wound, have a fever of 101 or greater or if you have any other problems or concerns. We answer the phone Monday through Friday 8 am to 4 pm, please leave a message as we check the voicemail frequently throughout the day.     Sacha Ndiaye      1975    Dressing to left groin    Cleanse wound daily with wound cleanser, apply Endoform Antimicrobial to wound bed, cover with Mepilex and Tegaderm film     Nancy Mendiola PA-C. January 7, 2020    [facility]  Follow up with Steven in February  [phone]

## 2020-01-07 NOTE — PROGRESS NOTES
[facility]    HISTORY OF PRESENT ILLNESS:   Sacha Ndiaye is a 44 year old male who presents today for a groin wound. Mr. Ndiaye is a paraplegic due to a childhood injury. He is a longtime patient of Dr. Harris's. Has had this groin wound on and off. Due to pressure from his abdomen and rubbing from clothing.     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, goes home and lies down for 2 hours then returns for another 3 hour stint    COMPRESSION/OFFLOADING: custom seating system through [employer], per patient he was recently mapped well    DATE WOUND ACQUIRED: ~12/2019    REVIEW OF SYSTEMS:  CONSTITUTIONAL: Denies fevers or acute illness    PAST MEDICAL HISTORY:  has a past medical history of Anemia, Charcot's joint, Chronic UTI, Constipation, Dislocated hip (H), Epicondylitis, History of blood transfusion, UTI (urinary tract infection), Neurogenic bladder, Other chronic pain, Paraplegia following spinal cord injury (H), and Pressure ulcer stage IV.    SOCIAL HISTORY: working  TOBACCO STATUS:  reports that he has quit smoking. His smoking use included cigarettes. He smoked 0.33 packs per day. He has never used smokeless tobacco.    MEDICATIONS:   Current Outpatient Medications   Medication     acetaminophen (TYLENOL) 325 MG tablet     benzoyl peroxide (PANOXYL) 2.5 % LIQD     bisacodyl (DULCOLAX) 10 MG Suppository     ciprofloxacin (CIPRO) 750 MG tablet     doxycycline hyclate (VIBRAMYCIN) 100 MG capsule     doxycycline monohydrate (MONODOX) 100 MG capsule     enoxaparin (LOVENOX) 40 MG/0.4ML syringe     order for DME     oxybutynin 15 MG TB24     polyethylene glycol (MIRALAX/GLYCOLAX) Packet     sildenafil (VIAGRA) 100 MG tablet     sodium phosphate (FLEET ENEMA) 7-19 GM/118ML rectal enema     No current facility-administered medications for this encounter.        VITALS: /84   Pulse 100   Temp 97.3  F (36.3  C) (Temporal)   Ht 1.829 m  (6')   Wt 72.6 kg (160 lb)   BMI 21.70 kg/m       PHYSICAL EXAM:  GENERAL: Patient is alert and oriented and in no acute distress  INTEGUMENTARY:   Wound (used by OP [facility] only) 01/07/20 1312 Left groin pressure injury (Active)   Length (cm) 2.5 1/7/2020  1:00 PM   Width (cm) 1.3 1/7/2020  1:00 PM   Depth (cm) 0.4 1/7/2020  1:00 PM   Wound (cm^2) 3.25 cm^2 1/7/2020  1:00 PM   Wound Volume (cm^3) 1.3 cm^3 1/7/2020  1:00 PM   Dressing Appearance moist drainage 1/7/2020  1:00 PM   Drainage Characteristics/Odor serosanguineous 1/7/2020  1:00 PM   Drainage Amount moderate 1/7/2020  1:00 PM           PROCEDURE: 4% topical lidocaine was applied to the wound by nursing staff. Patient was determined to be capable of making their own medical decisions and informed consent was obtained. Using a sharp curette a surgical debridement was performed down to and including subcutaneous tissue of <20 cm2. Hemostasis was achieved with pressure. The patient tolerated the procedure well.      ASSESSMENT:   1. Stage 3 pressure ulcer of left groin  2. Adherence to plan of care - unknown as this is initial assessment    PLAN/DISCUSSION:   1. Wound care plan: endoform AM + mepilex  2. Continue excellent offloading    FOLLOW-UP: 1 month    LEYLA COOMBS PA-C

## 2020-01-08 ENCOUNTER — TELEPHONE (OUTPATIENT)
Dept: WOUND CARE | Facility: CLINIC | Age: 45
End: 2020-01-08

## 2020-01-08 NOTE — TELEPHONE ENCOUNTER
[facility]    Who is the name of the provider?:  Maury      What is the location you see this provider at?: [place]     Reason for call: Need recent  wound notes with measurements and drainage.    Can we leave a detailed message on this number?  YES

## 2020-02-06 ENCOUNTER — HOSPITAL ENCOUNTER (OUTPATIENT)
Dept: WOUND CARE | Facility: CLINIC | Age: 45
Discharge: HOME | End: 2020-02-06
Attending: SURGERY | Admitting: SURGERY
Payer: COMMERCIAL

## 2020-02-06 VITALS
HEART RATE: 114 BPM | RESPIRATION RATE: 19 BRPM | TEMPERATURE: 97.2 F | SYSTOLIC BLOOD PRESSURE: 154 MMHG | DIASTOLIC BLOOD PRESSURE: 97 MMHG

## 2020-02-06 DIAGNOSIS — S31.104A NON-HEALING LEFT GROIN OPEN WOUND, INITIAL ENCOUNTER: ICD-10-CM

## 2020-02-06 DIAGNOSIS — L89.324 DECUBITUS ULCER OF LEFT BUTTOCK, STAGE 4 (H): ICD-10-CM

## 2020-02-06 DIAGNOSIS — S31.104D NON-HEALING LEFT GROIN OPEN WOUND, SUBSEQUENT ENCOUNTER: Primary | ICD-10-CM

## 2020-02-06 PROCEDURE — 97602 WOUND(S) CARE NON-SELECTIVE: CPT

## 2020-02-06 PROCEDURE — A6212 FOAM DRG <=16 SQ IN W/BORDER: HCPCS

## 2020-02-06 PROCEDURE — A6021 COLLAGEN DRESSING <=16 SQ IN: HCPCS

## 2020-02-06 NOTE — PROGRESS NOTES
Visit Date:   02/06/2020      [facility] VISIT NOTE      This is a 44-year-old paraplegic gentleman who is here today for further evaluation of a recurrent ulceration in his left anterior groin crease.  He has done very well as far as his redo ischial decubitus flap, but has since developed a recurrent ulceration and some old scar in the left inguinal crease scar about 2 months ago.  They tried some Puracol and started to see some improvement when they started using some IodoFoam.  He has now run out of that as well as a Mepilex cover so they have just been using half ABDs.  He continues to work 30 hours a week with some of that time from home.  Usually he tries to offload the area as much as possible to allow for better healing.  Certainly of those mechanical elements of this as well as of the old scar.  We will give him some Vashe today and we would like to initiate a collagen dressing with Endoform AM if possible with a Mepilex border cover.  Hopefully, his supply company can acquire that for him.  As far as any other issues, he continues to use frequency specific microcurrent machine that we lent to him many moons ago to help with wound healing.  He has been using it predominantly in a maintenance manner to help with pain in his neck, back and shoulder and finds that if he skips more than 1-3 treatments in a week or 2's time, he will continue to have recurrence of his chronic pain.  He does not have any kind of feelings of low-grade infection or feeling punk are any issues with his bladder or bowel, but the upper body that he uses to essentially get around in the world and propel himself in his wheelchair starts to recur until he goes back to at least 1-2 treatments a week.  I encouraged him to consider getting his own machine.  This is FDA approved under the TEN's category and he may be able to get coverage so he will check on that with his insurance company.  Also, we could also facilitate getting  programming software for him if he would like to work on some of his other medical issues under our guidance.        Today, his wound is clean with minimal film or slough.  It is within old scar which is somewhat hypertrophic.  The wound itself measures 1.8 x 0.7 x 0.1 today and has been getting smaller.        We will see if he can make progress here with the Endoform AM.  A work note was created for his supervisors to allow him to occasionally work from home to offload pressure from this area and facilitate wound healing of this fragile site.  His vitals were a little elevated today with a /97 and a heart rate of 114, but this usually occurs immediately after transfer to our exam table so am not too concerned.  We will see Jez back in 4-5 weeks when there is clinic time available.      Labs:   Recent Labs   Lab Test 04/14/19  0602 04/13/19  0651 01/17/19  0945  01/13/17  0609  10/27/15  0714   ALBUMIN  --   --  2.9*  --   --    < >  --    HGB 9.0* 8.7*  --    < > 8.9*   < >  --    INR  --   --   --   --   --   --  1.12   WBC  --  8.6  --   --   --    < >  --    A1C  --   --   --   --  4.7  --   --    CRP  --   --  37.8*  --   --    < >  --     < > = values in this interval not displayed.     Nutrition requirements were discussed with patient today.  Vitals:  BP (!) 154/97   Pulse 114   Temp 97.2  F (36.2  C) (Temporal)   Resp 19   Wound:   Wound (used by OP [facility] only) 01/07/20 1312 Left groin pressure injury (Active)   Length (cm) 1.8 2/6/2020  9:00 AM   Width (cm) 0.7 2/6/2020  9:00 AM   Depth (cm) 0.1 2/6/2020  9:00 AM   Wound (cm^2) 1.26 cm^2 2/6/2020  9:00 AM   Wound Volume (cm^3) 0.13 cm^3 2/6/2020  9:00 AM   Wound healing % 61.23 2/6/2020  9:00 AM   Dressing Appearance moist drainage 2/6/2020  9:00 AM   Drainage Characteristics/Odor serosanguineous 2/6/2020  9:00 AM   Drainage Amount moderate 2/6/2020  9:00 AM   Thickness/Stage Stage 3 2/6/2020  9:00 AM   Base red/granulating 2/6/2020  9:00 AM    Red (%), Wound Tissue Color 100 2020  9:00 AM   Periwound intact 2020  9:00 AM   Periwound Temperature warm 2020  9:00 AM   Periwound Skin Turgor firm 2020  9:00 AM   Edges open 2020  9:00 AM   Care, Wound non-select wound debridement performed 2020  9:00 AM      Photo:      REJI GUERRA MD             D: 2020   T: 2020   MT: BEBO      Name:     GIA CAMARGO   MRN:      [id number]        Account:      [id number]   :      1975           Visit Date:   2020      Document: [id number]

## 2020-02-06 NOTE — LETTER
"February 6, 2020      RE: Sacha Ndiaye  [address] [facility] [address]        To whom it may concern:    Sacha Ndiaye is under my professional care for non-healing left groin wound.  This is an \"at-risk\" area due to scarring and previous wounds that make healing more difficult. He may work with the following restrictions/limitations:    30 hours per week (6 hour shifts per day), with the need to offload pressure from the wound area every 3-4 hours. This may require doing some work from home to facilitate positioning to avoid further injury to wound.       Sincerely,      Cheryl Harris MD  "

## 2020-02-06 NOTE — DISCHARGE INSTRUCTIONS
[facility]  [address] [address]    Call us at [phone] if you have any questions about your wounds, have redness or swelling around your wound, have a fever of 101 or greater or if you have any other problems or concerns. We answer the phone Monday through Friday 8 am to 4 pm, please leave a message as we check the voicemail frequently throughout the day.     Sacha Ndiaye      1975    Wound Dressing Change:left groin  Cleanse wound and surrounding skin with: soap and water or wound cleanser.  After cleansing with saline or wound cleanser, apply small amount of VASHE on gauze, lay into wound bed, let sit for 10 minutes, remove gauze (do not rinse) then apply dressing.  Cover wound with Endoform AM  Change dressing Mepilex or ABD and tape       Bull Harris M.D.. February 6, 2020    Follow up with Provider - 4-6 weeks

## 2020-03-12 ENCOUNTER — HOSPITAL ENCOUNTER (OUTPATIENT)
Dept: WOUND CARE | Facility: CLINIC | Age: 45
Discharge: HOME | End: 2020-03-12
Attending: SURGERY | Admitting: SURGERY
Payer: COMMERCIAL

## 2020-03-12 VITALS
RESPIRATION RATE: 18 BRPM | TEMPERATURE: 96.6 F | DIASTOLIC BLOOD PRESSURE: 74 MMHG | SYSTOLIC BLOOD PRESSURE: 140 MMHG | HEART RATE: 67 BPM

## 2020-03-12 DIAGNOSIS — S31.104A NON-HEALING LEFT GROIN OPEN WOUND, INITIAL ENCOUNTER: ICD-10-CM

## 2020-03-12 PROCEDURE — G0463 HOSPITAL OUTPT CLINIC VISIT: HCPCS

## 2020-03-12 NOTE — LETTER
RE: Sacha Ndiaye  : [birth year]  [address] [place] [address]  Letter of Medical Necessity/  E-stim device order      To Whom It May Concern,        May 29, 2020      We are writing this order and letter of medical necessity for an electrical stimulation device (TENS category) to help relieve Mr. Sacha Ndaiye's chronic pain.  Mr. Ndiaye is a paraplegic status-post placement of spinal rods, and history of vertebral Charcot joint and multiple surgeries.  He has suffered from chronic pain for many years due to his past injuries, and has benefited greatly from a trial FSM (Frequency Specific Microcurrent) device that was made available to him free-of-charge from our practice.  When the E-stim device was no longer available to him, Mr. Ndiaye's pain returned to previous levels.   Rather than use narcotics for his chronic pain during our nation's opioid crisis, Mr. Ndiaye has requested coverage to purchase a device for his own use as a non-pharmacologic alternative to help manage his pain given his previous successful results.       Dx:  Chronic pain  338.29    Rx: Frequency Specific Microcurrent (FSM) device x 1 to be used Q day and PRN as directed for symptoms of chronic pain related to spinal cord injury and spinal hardware.     Thank you for your support and cooperation in providing Mr. Ndiaye with this electrical stimulation device for the relief of his chronic pain.       Sincerely,            Bull Harris MD    Department of Surgery  [facility]

## 2020-03-12 NOTE — PROGRESS NOTES
Patient arrived for wound care visit. Certified Wound Care Nurse time spent evaluating patient record, completed a full evaluation and documented wound & jazmine-wound skin; provided recommendation based on treatment plan. Applied dressing, reviewed discharge instructions, patient education, and discussed plan of care with appropriate medical team staff members and patient.

## 2020-03-12 NOTE — PROGRESS NOTES
Visit Date:   03/12/2020      This is a 45-year-old paraplegic gentleman who we have known for many, many years.  He is here for further followup of his left kind of inguinal lateral hip wound.  This is in an area where he had a previous scar and they finally were able to heal that up with some Endoform, as well as some preventive packing to avoid any kind of mechanical forces.  He has just healed up in the last few days and now there is just a fragile callus over the surface.  Because this is in old scar tissue and it is in an area where there is mechanical friction, this could always be a problem for him.  I think he has a pretty good understanding of this and is very savvy about how to treat this.  He has been using Aquaphor for his ischial scar, so will have him start to use that on the anterior groin scar to try and make the skin as supple as possible to prevent breakdown in the future.  We also talked about doing some preventative padding in that area either with an ABD and/or his swatch of GlideWear material.  As far as work is concerned, his employers really were not too happy with him working from home, so for the most part he is back at work, although he will do an occasional hour here and there at home as needed.  As far as microcurrent is concerned, he brought back the machine that we lent him.  He has talked with his insurance company about getting a TENS unit for his chronic pain, but while they will not outright by a machine, they would pay rental for 6 months and essentially end up renting to own.  I will refer him to my private practice colleague who works with these machines.  Interestingly, after his last visit with me, he did stop the FSM for a couple of weeks and he started to have recurrence of his symptoms again, so I think it is definitely something that he wants to invest in for his overall well-being and maintenance of health.  As far as his wounds are concerned, there is nothing to measure  today.  His vital signs were within normal limits other than BP that was a little elevated at 146/101.  We will have Rommel follow up with us on a p.r.n. basis should anything break down in the future.      Labs:   Recent Labs   Lab Test 19  0602 19  0651 19  0945  17  0609  10/27/15  0714   ALBUMIN  --   --  2.9*  --   --    < >  --    HGB 9.0* 8.7*  --    < > 8.9*   < >  --    INR  --   --   --   --   --   --  1.12   WBC  --  8.6  --   --   --    < >  --    A1C  --   --   --   --  4.7  --   --    CRP  --   --  37.8*  --   --    < >  --     < > = values in this interval not displayed.     Nutrition requirements were discussed with patient today.  Vitals:  BP (!) 140/74 (BP Location: Left arm)   Pulse 67   Temp 96.6  F (35.9  C) (Temporal)   Resp 18   Wound:   Wound (used by OP [facility] only) 20 1312 Left groin pressure injury (Active)   Base epithelialization 20 1000      Photo:          REJI GUERRA MD             D: 2020   T: 2020   MT: KE      Name:     GIA CAMARGO   MRN:      [id number]        Account:      [id number]   :      1975           Visit Date:   2020      Document: [id number]

## 2020-03-12 NOTE — DISCHARGE INSTRUCTIONS
[facility]  [address] [address]    Call us at [phone] if you have any questions about your wounds, have redness or swelling around your wound, have a fever of 101 or greater or if you have any other problems or concerns. We answer the phone Monday through Friday 8 am to 4 pm, please leave a message as we check the voicemail frequently throughout the day.     Sacha Ndiaye      1975    Wound Dressing Change:left groin  Cleanse with: soap and water  Cover wound with Aquafor daily, cover with spacer padding (glidewear, gauze or ABD)     Bull Harris M.D.. March 12, 2020    Follow up with Provider - as needed

## 2020-06-15 ENCOUNTER — TELEPHONE (OUTPATIENT)
Dept: WOUND CARE | Facility: CLINIC | Age: 45
End: 2020-06-15

## 2020-06-15 NOTE — TELEPHONE ENCOUNTER
Received letter of medical necessity from Dr. Harris for Patient to send to insurance. Called insurance for patient looking for fax number. They indicated that no LMN was required and these are covered under patients insurance, they were going to fax over the coverage information. All items placed in Dr. Harris's folder.

## 2020-06-15 NOTE — TELEPHONE ENCOUNTER
Spoke to patient. He will contact his insurance company. He already purchased the equipment and is trying to get it reimbursed. Will mail to patient.

## 2020-08-29 DIAGNOSIS — L89.324 DECUBITUS ULCER OF LEFT ISCHIUM, STAGE 4 (H): ICD-10-CM

## 2020-09-10 RX ORDER — DOXYCYCLINE 100 MG/1
100 CAPSULE ORAL 2 TIMES DAILY
Qty: 60 CAPSULE | Refills: 1 | Status: ON HOLD | OUTPATIENT
Start: 2020-09-10 | End: 2024-03-19

## 2020-09-10 RX ORDER — DOXYCYCLINE 100 MG/1
100 CAPSULE ORAL 2 TIMES DAILY
Qty: 60 CAPSULE | Refills: 1 | Status: SHIPPED | OUTPATIENT
Start: 2020-09-10 | End: 2020-09-10

## 2020-09-10 RX ORDER — CIPROFLOXACIN 750 MG/1
750 TABLET, FILM COATED ORAL 2 TIMES DAILY
Qty: 60 TABLET | Refills: 1 | Status: SHIPPED | OUTPATIENT
Start: 2020-09-10 | End: 2020-09-10

## 2020-09-10 RX ORDER — CIPROFLOXACIN 750 MG/1
750 TABLET, FILM COATED ORAL 2 TIMES DAILY
Qty: 60 TABLET | Refills: 1 | Status: ON HOLD | OUTPATIENT
Start: 2020-09-10 | End: 2024-03-25

## 2020-11-24 ENCOUNTER — TELEPHONE (OUTPATIENT)
Dept: WOUND CARE | Facility: CLINIC | Age: 45
End: 2020-11-24

## 2020-11-24 NOTE — TELEPHONE ENCOUNTER
Returned call to patient. Patient states that he has skin irritation to his left groin and has been using an ABD pad in the area. Patient has not been seen since 3/2020. I let him know that I can not order supplies without a recent wound assessment. Patient will purchase ABD pads online.

## 2021-09-13 ENCOUNTER — TRANSCRIBE ORDERS (OUTPATIENT)
Dept: OTHER | Age: 46
End: 2021-09-13

## 2021-09-13 DIAGNOSIS — G82.20 PARAPLEGIA (H): Primary | ICD-10-CM

## 2021-11-04 ENCOUNTER — HOSPITAL ENCOUNTER (OUTPATIENT)
Dept: OCCUPATIONAL THERAPY | Facility: CLINIC | Age: 46
Setting detail: THERAPIES SERIES
End: 2021-11-04
Attending: FAMILY MEDICINE
Payer: COMMERCIAL

## 2021-11-04 DIAGNOSIS — G82.20 PARAPLEGIA (H): ICD-10-CM

## 2021-11-04 PROCEDURE — 97542 WHEELCHAIR MNGMENT TRAINING: CPT | Mod: GO | Performed by: OCCUPATIONAL THERAPIST

## 2021-11-04 NOTE — PROGRESS NOTES
SEATING AND WHEELED MOBILITY ASSESSMENT  11/04/21 1000   Quick Adds   Quick Adds Current Manual Wheelchair   General Information   Rehab Discipline OT   [facility]   Service Outpatient;Occupational Therapy;Seating/Wheeled Mobility Evaluation   Height 6   Weight 145   Start Of Care Date 11/04/21   Referring Physician Dean Ro   Orders Evaluate And Treat As Indicated;Per Therapist Evaluation   Orders Date 09/13/21   Patient/Caregiver Goals Wheelchair replacement   Rehabilitation Technology Supplier Elena LAGUERRE from [facility]   Current Community Support Family/Friend Caregiver   Patient role/Employment history Employed  ()   Fall Risk Screen   Fall screen completed by OT   Have you fallen 2 or more times in the past year? No   Have you fallen and had an injury in the past year? No   Is patient a fall risk? No   Medical History   Onset Of Illness/injury Or Date Of Surgery 9/13/21  (order)   Medical Diagnosis SCI T5   Medical History Charcot spine with hardware removal and additional hardware, 4 flap surgeries   Current Manual Wheelchair   Age 6    Ti lite Zra   Cushion Custom  (tamerack flexform)   Wheelchair Back Solid Curved  (Marv 3)   Footrest Style V front   Settings Used Home;Outdoor Community Mobility;Primary Means of Transportation;Work   Condition Beyond Further Justifiable Repair;Poor   Current Equipment Requires Replacement   Rationale for Equipment Changes Heavy duty wear and tear due to full time use   Home Accessibility   Living Environment House   Primary Entrance Exposed Ramp   All Rooms Wheelchair Accessible Yes   Community ADL   Transportation Van  (truck)   Community Mobility Requirements Medical Appointments;Mandaeism;Work   Cognitive/Visual/Hearing Status   Observations No Problems Observed During Evaluation   Vision Intact   Hearing Intact   ADL Status   Feeding Independent   Grooming/Hygiene Independent   Dressing Independent   Toileting  Independent;Incontinent;Catheter   Bathing Independent;Uses Equipment  (shower chair)   Meal Preparation Independent   Home Management Independent   Balance   Unsupported Sitting Balance Uses Upper Extremities for Balance   Sitting Balance in Chair Uses Upper Extremities for Balance   Standing Balance Unable   Ambulation   Ambulation Non Ambulatory   Transfers   Transfer Assist Independent   Transfer Method Squat Pivot/Scoot Boost   Wheelchair Ability   Wheelchair Ability Quick Adds Manual Chair;Wheelchair Use   Manual Wheelchair Propulsion   Manual Wheelchair Propulsion Independent   Comments Increasing challenges with shoulder due to repetitive full time use.  Soreness and limited participation in full time use.  Needs to take multiple laying down rest brakes a day.   Wheelchair Use   Ability to Perform Weight Shifts Independent   Hours in Wheelchair Daily 9   Hours Spent Alone Daily 24   Neuromuscular   History of Pressure Sores Yes  (prior stage 4 pubic ramus)   Current Pressure Sores Yes  (L groin and scar on pubic ramus)   Pressure Sore location L ant hip crease, pubic ramus    Pain No   Coordination UE Intact;LE Impaired   Tone Hypertonic   Sensory Deficits Reported chest down absent   Head and Neck   Head and Neck Position Functional   Head Control Good   Upper Extremities   Shoulder Position Functional Bilaterally   UE ROM WFL   UE Strength 5/5   Pelvis   Anterior/Posterior Pelvis Position Anterior Tilt;Fixed   Trunk   Anterior/Posterior Trunk Position Increased Lumbar Lordosis;Partially Flexible;Fixed   Lower Extremities   Hip Position Neutral;Flexible   LE ROM Full passive   LE Strength 0   Patient Measurements   Other per atp notes   Education Assessment   Barriers to Learning Physical   Preferred Learning Style Listening;Demonstration   Assessment/Plan   Criteria for Skilled Interventions Met Yes, Treatment Indicated   Treatment Diagnosis impaired participation in MRADLS   Therapy Frequency once    Planned Therapy Interventions Wheelchair Management/Propulsion Training   Planned Therapy Interventions Comments Determined need for new manual chair due to full time heavy duty use.  Patient had a stage 4 pubic ramus wound with recent flap surgery and use of custom flexform cushion has resulted in no new wounds and healing of wound.  cushion straps and base are worn requring replacement in order to continue to prevent further wounds.  Patient reports that this cushion had started to heal wound when no other cushion was assisting, he then proceeded with flap surgery and has had no further problems and has continued to heal this area.    Risks and benefits of treatment have been explained Yes   Patient/family & other staff in agreement with plan of care Yes   Comments CAD drawing to be completed in order to determine appropraite angles and mimic current set up that has been modifed.    Session Time   OT Wheelchair Management Minutes (94691) 60   Adult OT Eval Goals   OT Eval Goals (Adult) 1    OT Goal 1   Goal Identifier wheelchair   Goal Description Patient/family demonstrates understanding of equipment for independent mobility, including benefits/limitations   Goal Progress CAD drawing to be completed for specs of new chair   Target Date 11/04/21   Date Met 11/04/21   Electronically signed by:  Lucila SIERRA/CARMELO, ATP      Occupational Therapist, Assistive   [phone]      fax: [phone]      [email or web address]  Se[facility]- [facility], [address]  [address]  [place] [address]

## 2022-01-06 ENCOUNTER — TELEPHONE (OUTPATIENT)
Dept: WOUND CARE | Facility: CLINIC | Age: 47
End: 2022-01-06
Payer: COMMERCIAL

## 2022-01-06 NOTE — TELEPHONE ENCOUNTER
Left a message to call back and schedule with Sandra for his hip and leg wound.   Last seen Steven in 2020.

## 2022-01-06 NOTE — TELEPHONE ENCOUNTER
Called patient, no appointments with Dr. Harris for months. Ok to establish with Sandra to get care established.

## 2022-01-06 NOTE — TELEPHONE ENCOUNTER
Sacha would like an earlier appt with Sandra and later in the day as he drives a distance.    He is scheduled on 1/21/22 at 9:30am with Sandra.

## 2022-01-06 NOTE — TELEPHONE ENCOUNTER
Sacha has a small hip wound and left knee wound and would like to see Dr. Harris.   He was last here in 2020.    Please call him at .

## 2022-01-06 NOTE — TELEPHONE ENCOUNTER
Pt called back to schedule. Per notes in encounter, I informed him that I was able to schedule an appointment with Sandra to establish care.     He would prefer to see Dr. Harris and will call wound back to schedule.       Kathy Matthews    [facility]   [phone]

## 2022-01-27 ENCOUNTER — HOSPITAL ENCOUNTER (OUTPATIENT)
Dept: WOUND CARE | Facility: CLINIC | Age: 47
Discharge: HOME | End: 2022-01-27
Attending: SURGERY | Admitting: SURGERY
Payer: COMMERCIAL

## 2022-01-27 VITALS — HEART RATE: 96 BPM | SYSTOLIC BLOOD PRESSURE: 118 MMHG | DIASTOLIC BLOOD PRESSURE: 77 MMHG | TEMPERATURE: 97.3 F

## 2022-01-27 DIAGNOSIS — S31.104A NON-HEALING LEFT GROIN OPEN WOUND, INITIAL ENCOUNTER: ICD-10-CM

## 2022-01-27 DIAGNOSIS — L89.223: ICD-10-CM

## 2022-01-27 DIAGNOSIS — S81.002A OPEN WOUND OF KNEE, LEG, AND ANKLE, LEFT, INITIAL ENCOUNTER: Primary | ICD-10-CM

## 2022-01-27 DIAGNOSIS — S91.002A OPEN WOUND OF KNEE, LEG, AND ANKLE, LEFT, INITIAL ENCOUNTER: Primary | ICD-10-CM

## 2022-01-27 DIAGNOSIS — S81.802A OPEN WOUND OF KNEE, LEG, AND ANKLE, LEFT, INITIAL ENCOUNTER: Primary | ICD-10-CM

## 2022-01-27 PROCEDURE — 97597 DBRDMT OPN WND 1ST 20 CM/<: CPT

## 2022-01-27 PROCEDURE — G0463 HOSPITAL OUTPT CLINIC VISIT: HCPCS | Mod: 25
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
## 2022-01-27 NOTE — PROGRESS NOTES
Patient arrived for wound care visit. Certified Wound Care Nurse time spent evaluating patient record, completed a full evaluation and documented wound(s) & jazmine-wound skin; provided recommendation based on treatment plan. Applied dressing, reviewed discharge instructions, patient education, and discussed plan of care with appropriate medical team staff members and patient and/or family members.     All questions and concerns addressed  DME order placed
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
## 2022-01-27 NOTE — DISCHARGE INSTRUCTIONS
Sacha Ndiaye      1975    DME order placed to [facility] phone [phone]   Fax- [phone]    Wound Dressing Change:  left groin; Left Trochanter/ posterior medial Trochanter and Left Knee  Cleanse with: soap and water and pat dry  Cover wounds with Polymem and cover with 4x4 Mepilex with border  Cover wound with gauze or ABD if soiled or unable to secure Mepilex silicone foam    Goggle:    Z eldon or Donut foam per discussion with Dr Harris  Continue repositioning and off loading as directed     Bull Harris M.D.. January 27, 2022    Call us at [phone] if you have any questions about your wounds, have redness or swelling around your wound, have a fever of 101 or greater or if you have any other problems or concerns. We answer the phone Monday through Friday 8 am to 4 pm, please leave a message as we check the voicemail frequently throughout the day.     If you had a positive experience please indicate on your patient satisfaction survey form that [facility] will be sending you.    If you have any billing related questions please call the  [facility] Business office at [phone]. The clinic staff does not handle billing related matters.

## 2022-02-24 NOTE — PROGRESS NOTES
REQUISITION AND JUSTIFICATION FOR DURABLE MEDICAL EQUIPMENT    Patient Name:  Sacha Ndiaye  MR #:  [id number]  :  1975  Age/Gender:  46 year old male  Visit Date:  Sacha Ndiaye seen for seating and wheeled mobility evaluation by Lucila Bird OTR/L,ATP and ATP from [facility] on 21.    CLINICAL CRITERIA FOR MOBILITY ASSISTIVE EQUIPMENT  Coverage Criteria Per Local Coverage Determination  A) Sacha has mobility limitations due to T5 SCI, Charcot spine with hardware removal and 4 flap surgeries that significantly impairs his ability to participate in all of his mobility-related activities of daily living (MRADL). Specifically affected are toileting (being able to get there in time to prevent accidents), dressing, and bathing (getting into the bathroom of designated place). Current equipment used is 5 year old ZRA that is beyond justifiable repair due to full time heavy duty use. This patient needs the new equipment requested to be able to continue to be independent with all MRADL and IADLs including paid work. Please see additional documentation in the seating and wheeled mobility report for details.   Sacha currently uses the same type of chair as the recommended equipment. Sacha is very willing and physically / cognitively able to use the recommended equipment to assist his with mobility-related activities of daily living and general mobility.    B) Sacha's mobility limitation cannot be sufficiently and safely resolved by the use of an appropriately fitted cane or walker because he is non ambulatory due to paralysis. Strength of legs is 0/5 for one maximal repetition. Fatigue also impacts this patient's ability to ambulate, regardless of the gait aid.    C) Sacha does not have sufficient upper extremity function to self-propel a K1-4 manual chair and requires a K5 optimally-configured ultra lighweight manual wheelchair replacement as he is a full time user for all MRADLs and IADLs. Strength of  arms is 5/5.  D) The need for this equipment is LIFETIME.     RECOMMENDATIONS FOR MOBILITY BASE, SEATING SYSTEM AND COMPONENTS  Ti Lite Zra ultra lightweight manual wheelchair - this ultra light weight manual wheelchair is appropriate and necessary for Sacha to be able to assist and complete all of his MRADLs within his residence. He has mobility limitations impacting his ability to ambulate independently or with any ambulation aid. He has had a thorough clinical evaluation, and this manual wheelchair is the best option for this patient.  Any less costly wheelchair option would be unable to accommodate anterior-posterior axle adjustments to maximize propulsion mechanics required for shoulder preservation in full-time, active manual wheelchair propeller.      Titanium frame upgrade- required to provide optimal performance and durability to accommodate for Sacha's highly active lifestyle, including reducing the chair weight to as light as possible for propulsion and for independent transfers into a vehicle for transport; increasing the frame strength for optimal durability; and optimizing the shock absorption/vibration dampening to increase sitting tolerance by decreasing their effects on spasticity and pain.    Smart HealthyTweet MX2+ power assist system- is an add-on power drive that can be installed onto any manual wheelchair. When this device is used, the manual wheelchair can be operated in a way similar to, but not identical to a power wheelchair reducing the effort needed to propel the chair. The device consists of a drive unit with a battery. The total unit weighs 12.5 lbs and can be completely removed from the wheelchair so the chair can be used without the power add-on unit when desired. With a SmartDrive installed, a single 'push' or tap of a button will begin moving the wheelchair forward. Once Sacha gets the wheelchair moving at the desired speed, the SmartDrive will keep it moving at that speed without  additional pushes. This power assist dramatically reduces energy required by him in traveling over any distance. This is important for him because he already has over-use injury and degeneration of his shoulders due to lifelong repetitive wheelchair propulsion. The SmartDrive has improved ergonomics and increased functionality, Sacha will achieve the maximum degree of independence and be able to gain more independent freedom on ramp into home, over thresholds, on carpet, on inclines and other obstacles with greater ease, and will be able to travel further with more endurance to function throughout the day at the level needed to maintain the active lifestyle.   This power assist device provides a more functional and less expensive option (versus a power wheelchair) for him who still has some ability in pushing a manual wheelchair. Without the added assistance of the SmartDrive, will not be able to perform his MRADLs within the home unless in the more restrictive and expensive power wheelchair. The exclusive use of the power chair may cause a him to lose strength and range by not depending on this arm movement for wheeled mobility. The Smart drive can easily be removed from chair when needed for transport for medical appointments, work, family events, in the family vehicle without requiring an fully equipped van. This can enhance Sacha's ability to maintain paid employment, volunteer work and other endeavors to be a productive member of the family and society.    Carrying bag for smart drive- needed in order to provide a place to put device when not in use and keep safe from being broken by rolling around.    Osbaldo billet soft roll casters - for smooth ride not to jar/shake him    Titanium open loop footrests - for leg support    Folding adjustable height back canes- allows for proper integration with backrest and prevents digging into his back with sitting in chair.    [place] plus tires - for a smooth,  jarring free ride with optimal propulsion mechanics in a high end user.    Titanium/Satin handrims- to allow for ergonomic  and increase propulsion techniques    Composite scissor wheel locks- allows for independence use of brakes for safety with chair use.    Side guards- needed to prevent clothing or skin from getting stuck in wheels with use.    Seat pouch-This is used to secure a medical necessity bag to carry essential items such as emergency medications, catheterization/toileting supplies, phone, etc.     Rear anti-tip tubes - for safety to prevent w/c tipping rearward    Pelvic positioning strap - to assist maintaining pelvis position for functional activities    Soldier Flexform custom seating sytem with platform, cover and spill protector- this pressure distribution and positioning seat cushion will optimally  distribute seating pressures to prevent pressure ulcers, but also provide a stable base of support for him to use during MRADLs. This cushion is what he currently is using and has used to heal and prevent major wounds due to severe wound history with surgeries.  After trials of all commercial products this has proven to prevent reoccurrence of future wounds on a compromised patient.      [employer] agility back Solid curved and padded back support - firm and contoured back support is needed to support Sacha's thoracolumbar area in an upright and midline position, with appropriate support pads as needed. This back support is essential to provide sufficient posterior support to maximize his postural alignment and minimize his tendency to develop scoliosis and other secondary complications.    Privacy shield- fabric placed on cushion and backrest to prevent skin from being shown behind patient.    This equipment is reasonable and necessary with reference to accepted standards of medical practice and treatment of this patient's condition and is not being recommended as a convenience item. Without this  recommended equipment, he is highly likely to sustain injuries from falls, develop pressure sores or postural compensation, and/or be bed confined, which those costs far exceed the cost of the requested equipment.    Electronically signed by:  Lucila SALINAS, ATP      Occupational Therapist, Assistive   [phone]      fax: [phone]      [email or web address] [address]  February 24, 2022    I have read and concur with the above recommendations.    Physician Printed Name __________________________________________    Physician SIgnature  _____________________________________________    Date of SIgnature ______________________________    Physician Phone  ______________________________

## 2022-03-24 ENCOUNTER — MEDICAL CORRESPONDENCE (OUTPATIENT)
Dept: HEALTH INFORMATION MANAGEMENT | Facility: CLINIC | Age: 47
End: 2022-03-24

## 2022-03-24 ENCOUNTER — TELEPHONE (OUTPATIENT)
Dept: WOUND CARE | Facility: CLINIC | Age: 47
End: 2022-03-24
Payer: COMMERCIAL

## 2022-03-24 ENCOUNTER — HOSPITAL ENCOUNTER (OUTPATIENT)
Dept: WOUND CARE | Facility: CLINIC | Age: 47
Discharge: HOME | End: 2022-03-24
Attending: SURGERY | Admitting: SURGERY
Payer: COMMERCIAL

## 2022-03-24 VITALS
TEMPERATURE: 97.5 F | SYSTOLIC BLOOD PRESSURE: 158 MMHG | DIASTOLIC BLOOD PRESSURE: 88 MMHG | RESPIRATION RATE: 16 BRPM | HEART RATE: 65 BPM

## 2022-03-24 DIAGNOSIS — S31.104A NON-HEALING LEFT GROIN OPEN WOUND, INITIAL ENCOUNTER: ICD-10-CM

## 2022-03-24 DIAGNOSIS — S31.104D NON-HEALING LEFT GROIN OPEN WOUND, SUBSEQUENT ENCOUNTER: Primary | ICD-10-CM

## 2022-03-24 DIAGNOSIS — S31.104A: ICD-10-CM

## 2022-03-24 PROCEDURE — 11043 DBRDMT MUSC&/FSCA 1ST 20/<: CPT | Performed by: SURGERY

## 2022-03-24 NOTE — PROGRESS NOTES
Visit Date: 03/24/2022    HISTORY OF PRESENT ILLNESS:  This is a 47-year-old paraplegic gentleman we have known for quite some time.  He is here today for further followup of his left knee patellar decubitus.  He also has a small wound in his left anterior groin crease and had a wound over the left trochanter and scar tissue.  The left trochanter has healed since we last saw him in January.  I think he missed his February appointment with Sandra due to his sister dying from metastatic breast cancer.  He also did not receive any of the Medihoney that we had ordered for his wounds from [facility] so they were just using some supplies at home.  Most recently, he has just been using ABDs and tape.  The other issue is that he usually sleeps prone and this grill burn to his left knee was exacerbated by pressure on that area when he sleeps prone.  He has since tried a number of offloading options.  Most recently, was his high profile ROHO, but that actually caused hyperextension of his hip joint and messed with his back, so he is thinking about trying a low profile ROHO.  I also reminded him that he could try a Z-Blaine pillow.  I do not think that registered with him when we last saw him.  The other options are for him to actually try sleeping in lateral positions rather than prone.    PHYSICAL EXAMINATION:  On exam today, the wound has some bruising along the more medial edge.  The entire wound looks kind of rubbery and fibrotic in the base.  Fortunately, there is minimal undermining.  The center, however, is getting pretty deep now.  There are more important joint structures underneath that.     PROCEDURE:  Today with his permission, following informed consent protocol, I used a #15 scalpel to excise the fibrotic lining of the wound, which was into the fascial layer.  Digital pressure and silver nitrate were used for hemostasis.  He tolerated it fine without analgesic due to his paraplegia and lack of sensation.     PLAN:   We will see if we can order some PolyMem and some Mepilex for his wounds to further help with clean up, so we can get some good granulation tissue that will support re-epithelialization.  He finds that it is a little bit more difficult with me since it is a mobile joint.  I do, however, think that at this point, one of the most important things is actually going to be offloading of pressure.  The left groin area currently has a scab in the old scar and is recurrent.  This will probably benefit from PolyMem and Mepilex as well.  It sounds like his sister's death was really very recent and has affected him quite a bit, and he is seriously considering about retiring so he can get on SSDI and get more resources for his wound cares.  If we can help with any paperwork, we are happy to do that.  At this point, we will have to have him follow up with Sandra Mendiola, our physician's assistant, due to my booked schedule so he will see her for a couple months, and then I will see him back in .  Please see nursing notes for dimensions of the wound, vital signs and photos today.    Cheryl Harris MD        D: 2022   T: 2022   MT: MIKE/ROB    Name:     GIA CAMARGO  MRN:      [id number]        Account:    [id number]   :      1975           Visit Date: 2022     Document: [id number]

## 2022-03-24 NOTE — PROGRESS NOTES
Patient Active Problem List   Diagnosis    Spinal stenosis    SCI (spinal cord injury)    Hardware complicating wound infection, initial encounter (H)    Charcot's joint, vertebrae    Status post flap graft    Constipation    H/O lumbosacral spine surgery    Hip dislocation, bilateral (H)    Infection and inflammatory reaction due to internal orthopedic device, implant, and graft (H)    Neurogenic bladder    Neurogenic bowel    Osteomyelitis of pelvic region or thigh, acute, left (H)    Osteomyelitis of pelvic region, acute, left (H)    Paraplegia (H)    Traumatic spinal subdural hematoma    Hx of plastic surgery    Non-healing left groin open wound, initial encounter    Non-healing left groin open wound, subsequent encounter     Past Medical History:   Diagnosis Date    Anemia     Charcot's joint     Chronic UTI     Constipation     Dislocated hip (H)     hyperlordosis    Epicondylitis     History of blood transfusion     Hx: UTI (urinary tract infection)     Neurogenic bladder     Other chronic pain     Paraplegia following spinal cord injury (H)     Pressure ulcer stage IV     left groin     Labs:   Recent Labs   Lab Test 04/14/19  0602 04/13/19  0651 01/17/19  0945 01/14/17  0636 01/13/17  0609 10/28/15  0715 10/27/15  0714   ALBUMIN  --   --  2.9*  --   --    < >  --    HGB 9.0* 8.7*  --    < > 8.9*   < >  --    INR  --   --   --   --   --   --  1.12   WBC  --  8.6  --   --   --    < >  --    A1C  --   --   --   --  4.7  --   --    CRP  --   --  37.8*  --   --    < >  --     < > = values in this interval not displayed.     Nutrition requirements were discussed with patient today.  Vitals:  BP (!) 158/88   Pulse 65   Temp 97.5  F (36.4  C) (Temporal)   Resp 16   Wound:   Wound (used by OP [facility] only) 01/07/20 1312 Left groin pressure injury (Active)   Thickness/Stage Stage 3 03/24/22 0907   Base slough 03/24/22 0907   Periwound intact;pink 03/24/22 0907   Periwound Temperature warm 03/24/22 0907   Periwound  "Skin Turgor firm 03/24/22 0907   Edges rolled/closed 03/24/22 0907   Length (cm) 0.5 03/24/22 0907   Width (cm) 0.4 03/24/22 0907   Depth (cm) 0.3 03/24/22 0907   Wound (cm^2) 0.2 cm^2 03/24/22 0907   Wound Volume (cm^3) 0.06 cm^3 03/24/22 0907   Wound healing % 93.85 03/24/22 0907   Drainage Characteristics/Odor serosanguineous 03/24/22 0907   Drainage Amount moderate 03/24/22 0907   Care, Wound non-select wound debridement performed 03/24/22 0907       Wound (used by OP [facility] only) 01/27/22 0855 Left burn (Active)   Thickness/Stage full thickness 03/24/22 0906   Base slough 03/24/22 0906   Periwound intact;pink 03/24/22 0906   Periwound Temperature warm 03/24/22 0906   Periwound Skin Turgor firm 03/24/22 0906   Edges rolled/closed 03/24/22 0906   Length (cm) 3 03/24/22 0906   Width (cm) 3.5 03/24/22 0906   Depth (cm) 0.7 03/24/22 0906   Wound (cm^2) 10.5 cm^2 03/24/22 0906   Wound Volume (cm^3) 7.35 cm^3 03/24/22 0906   Wound healing % -50 03/24/22 0906   Drainage Characteristics/Odor serosanguineous 03/24/22 0906   Drainage Amount moderate 03/24/22 0906   Care, Wound debrided 03/24/22 0906      Photo:         Further instructions from your care team         Sacha Ndiaye      1975  DME order placed to [facility] phone [phone] Fax- [phone]    Wound Dressing Change: left groin and Left Knee  Cleanse with: soap and water and pat dry  Cover wounds with Pink Polymem and cover with 4x4 Mepilex with border  Cover wound with 4x4 gauze or ABD 5x9 secure with Medipore tape 2\" if soiled or unable to secure Mepilex silicone foam    Goggle: Z eldon or Donut foam per discussion with Dr Harris  Continue repositioning and off loading as directed     Bull Harris M.D. March 24, 2022    Call us at [phone] if you have any questions about your wounds, have redness or swelling around your wound, have a fever of 101 or greater or if you have any other problems or concerns. We answer the " phone Monday through Friday 8 am to 4 pm, please leave a message as we check the voicemail frequently throughout the day.     If you had a positive experience please indicate on your patient satisfaction survey form that [facility] will be sending you.    If you have any billing related questions please call the [facility] Business office at [phone]. The clinic staff does not handle billing related matters.           Durable Medical Equipment Wound Care Orders       Wound Care Order for DME - ONLY FOR DME   As directed      DME Provider: Other (comments) Comment - [facility] phone [phone] Fax- [phone]    Wound Supply Order Options: Complex Wound    Optional: .dmewound can be used to pull in order specific information into documentation    Wound Number:  Wound 1  Wound 2       Wound 1 Location: Left Knee    Wound 1 Dressing Change Frequency: Daily    Wound 1 Length of Need: 30 days    Wound 1 - Dressing Supplies:  Primary  Secondary       Wound 1 - Primary Dressing Dispensing Instructions: Ok to Substitute    Wound 1 - Primary Dressing Types: Foam    Wound 1 - Foam Types: Polymem Max []    Wound 1 - Polymem Max Size: 4.5 x 4.5    Wound 1 - Polymem Max Quantity: 12    Wound 1 - Secondary Dressing Dispensing Instructions: Ok to Substitute    Wound 1 - Secondary Dressing Types: Foam Dressing w/Border    Wound 1 - Foam with Border Types: Mepilex Border []    Wound 1 - Mepilex Border Size: 4 x 4    Wound 1 - Mepilex Border Quantity: 12    Wound 2 Location: left groin    Wound 2 Dressing Change Frequency: Daily    Wound 2 Length of Need: 30 days    Wound 2 - Dressing Supplies:  Primary  Secondary       Wound 2 - Primary Dressing Dispensing Instructions: Ok to Substitute    Wound 2 - Primary Dressing Types: Foam    Wound 2 - Foam Types: Polymem Max []    Wound 2 - Polymem Max Size: 4.5 x 4.5    Wound 2 - Polymem Max Quantity: 12    Wound 2 - Secondary Dressing Dispensing  Instructions: Ok to Substitute    Wound 2 - Secondary Dressing Types: Foam Dressing w/Border    Wound 2 - Foam with Border Types: Mepilex Border []    Wound 2 - Mepilex Border Size: 4 x 4    Wound 2 - Mepilex Border Quantity: 12    Non-healing left groin open wound, initial encounter  Non-healing left groin open wound, subsequent encounter

## 2022-03-24 NOTE — TELEPHONE ENCOUNTER
Call received from home care nurse who states she is not able to get polymem but can get hydrofera blue. Discussed with Dr. Harris who gave ok to use hydrofera blue. AVS updated. Home care nurse called with update. She is able to take a verbal order, no need to fax the updated AVS.

## 2022-03-24 NOTE — DISCHARGE INSTRUCTIONS
"Sacha Ndiaye      1975  DME order placed to [facility] phone [phone] Fax- [phone]    Wound Dressing Change: left groin and Left Knee  Cleanse with: soap and water and pat dry  Cover wounds with hydrofera blue transfer ready and cover with 4x4 Mepilex with border  Cover wound with 4x4 gauze or ABD 5x9 secure with Medipore tape 2\" if soiled or unable to secure Mepilex silicone foam    Goggle: Z eldon or Donut foam per discussion with Dr Harris  Continue repositioning and off loading as directed     Bull Harris M.D. March 24, 2022    Call us at [phone] if you have any questions about your wounds, have redness or swelling around your wound, have a fever of 101 or greater or if you have any other problems or concerns. We answer the phone Monday through Friday 8 am to 4 pm, please leave a message as we check the voicemail frequently throughout the day.     If you had a positive experience please indicate on your patient satisfaction survey form that [facility] will be sending you.    If you have any billing related questions please call the [facility] Business office at [phone]. The clinic staff does not handle billing related matters.    "

## 2022-03-31 ENCOUNTER — TELEPHONE (OUTPATIENT)
Dept: WOUND CARE | Facility: CLINIC | Age: 47
End: 2022-03-31
Payer: COMMERCIAL

## 2022-03-31 NOTE — TELEPHONE ENCOUNTER
Call returned to Sacha. Discussed with him that polymem could not be obtained so hydrofera blue was ordered. Patient verbalized understanding. No further questions or concerns.

## 2022-03-31 NOTE — TELEPHONE ENCOUNTER
[facility] Wound    Who is the name of the provider?:  Steven      What is the location you see this provider at?: [place]    Reason for call:  Please order 4x4 Polymem ( pink foam.)    Can we leave a detailed message on this number?  YES

## 2022-04-29 ENCOUNTER — HOSPITAL ENCOUNTER (OUTPATIENT)
Dept: WOUND CARE | Facility: CLINIC | Age: 47
Discharge: HOME | End: 2022-04-29
Attending: SURGERY | Admitting: SURGERY
Payer: COMMERCIAL

## 2022-04-29 VITALS
TEMPERATURE: 97.2 F | SYSTOLIC BLOOD PRESSURE: 155 MMHG | HEART RATE: 65 BPM | DIASTOLIC BLOOD PRESSURE: 83 MMHG | RESPIRATION RATE: 16 BRPM

## 2022-04-29 DIAGNOSIS — S31.104D NON-HEALING LEFT GROIN OPEN WOUND, SUBSEQUENT ENCOUNTER: ICD-10-CM

## 2022-04-29 DIAGNOSIS — S31.104A NON-HEALING LEFT GROIN OPEN WOUND, INITIAL ENCOUNTER: Primary | ICD-10-CM

## 2022-04-29 DIAGNOSIS — S81.802A OPEN WOUND OF KNEE, LEG, AND ANKLE, LEFT, INITIAL ENCOUNTER: ICD-10-CM

## 2022-04-29 DIAGNOSIS — S91.002A OPEN WOUND OF KNEE, LEG, AND ANKLE, LEFT, INITIAL ENCOUNTER: ICD-10-CM

## 2022-04-29 DIAGNOSIS — S81.002A OPEN WOUND OF KNEE, LEG, AND ANKLE, LEFT, INITIAL ENCOUNTER: ICD-10-CM

## 2022-04-29 PROCEDURE — 97597 DBRDMT OPN WND 1ST 20 CM/<: CPT | Performed by: PHYSICIAN ASSISTANT

## 2022-04-29 NOTE — PROGRESS NOTES
[facility]    ASSESSMENT:   1. Stager 3 pressure ulcer of left knee    PLAN/DISCUSSION:   1. Wound care plan: Hydrofera Blue silicone border  2. Nutrition: vit D 5kIU/day, high protein diet  3. See bottom of note for detailed wound care and patient instructions    HISTORY OF PRESENT ILLNESS:   Sacha Ndiaye is a 47 year old paraplegic gentleman who returns to follow-up on a chronic left knee ulceration. He has also struggled with a left groin wound but this is fortunately resolved today. He notes that he has to protect it with gauze or it will reopen. He has previously been followed by Dr. Harris and I have not seen him for several years. Last month Dr. Harris recommended he use PolyMem on his knee wound, unfortunately he never received the PolyMem so has just been covering with Mepilex. The wound has improved with new granulation tissue but a thick layer of slough. The edges also look better and appear to be coming in. He did not get any kind of cushion to pad this area but has been able to change how he lies in bed.     VITALS: BP (!) 155/83 (BP Location: Left arm, Patient Position: Sitting, Cuff Size: Adult Regular)   Pulse 65   Temp 97.2  F (36.2  C) (Temporal)   Resp 16      PHYSICAL EXAM:  GENERAL: Patient is alert and oriented and in no acute distress  INTEGUMENTARY:   Wound (used by OP [facility] only) 01/07/20 1312 Left groin pressure injury (Active)   Base epithelialization 04/29/22 0901       Wound (used by OP I only) 01/27/22 0855 Left burn (Active)   Thickness/Stage full thickness 04/29/22 0901   Base slough 04/29/22 0901   Periwound intact;pink 04/29/22 0901   Periwound Temperature warm 04/29/22 0901   Periwound Skin Turgor firm 04/29/22 0901   Edges rolled/closed 04/29/22 0901   Length (cm) 2 04/29/22 0901   Width (cm) 2.8 04/29/22 0901   Depth (cm) 0.4 04/29/22 0901   Wound (cm^2) 5.6 cm^2 04/29/22 0901   Wound Volume (cm^3) 2.24 cm^3 04/29/22 0901   Wound healing % 20  04/29/22 0901   Drainage Characteristics/Odor serosanguineous 04/29/22 0901   Drainage Amount moderate 04/29/22 0901   Care, Wound debrided 04/29/22 0901         PROCEDURE: 4% topical lidocaine was applied to the wound by the nursing staff. Patient was determined to be capable of making their own medical decisions and informed consent was obtained. Using a curette a selective debridement of non-viable tissue was performed of <20 cm. Hemostasis was achieved with pressure. The patient tolerated the procedure well.      PATIENT INSTRUCTIONS      Further instructions from your care team       Sacha Ndiaye      1975  DME order placed to [facility] phone [phone] Fax- [phone]    Wound Dressing Change: left groin: Daily  Wash with soap and water, pat dry  Tuck a piece of gauze 4x4 dry and secure with tape    Wound dressing Change: Left Knee- every other day  Cleanse with: soap and water and pat dry, wash with wound spray and 4x4 gauze pads  Cover wounds with hydrofera blue 4x4 with border     Goggle: Z eldon or Donut foam per discussion with Dr Harris  Continue repositioning and off loading as directed     Nancy Mendiola PA-C April 29, 2022    Call us at [phone] if you have any questions about your wounds, have redness or swelling around your wound, have a fever of 101 or greater or if you have any other problems or concerns. We answer the phone Monday through Friday 8 am to 4 pm, please leave a message as we check the voicemail frequently throughout the day.     If you had a positive experience please indicate on your patient satisfaction survey form that [facility] will be sending you.    If you have any billing related questions please call the [facility] Business office at [phone]. The clinic staff does not handle billing related matters.          Durable Medical Equipment Wound Care Orders     Wound Care Order for DME - ONLY FOR DME   As directed      DME Provider:  Other (comments) Comment - [facility] Medical phone [phone] Fax- [phone]    Wound Supply Order Options: Complex Wound    Optional: .dmewound can be used to pull in order specific information into documentation    Wound Number: Wound 1    Wound 1 Location: Left knee    Wound 1 Dressing Change Frequency: QOD    Wound 1 Length of Need: 30 days    Wound 1 - Dressing Supplies:  Primary  Wrap/Gauze       Wound 1 - Primary Dressing Dispensing Instructions: Ok to Substitute    Wound 1 - Primary Dressing Types: Foam Dressing w/ Border    Wound 1 - Foam with Border Types: Other (Comments)    Wound 1 - Other Foam Border Dressing Size: Hydrofera Blue with border 4x4    Wound 1 - Other Foam Border Dressing Quantity: 15    Wound 1 - Wrap/Gauze Types: Loafs    Wound 1 - Loaf Type: Gauze Loaf []    Wound 1 - Gauze Loaf Size: 4 x 4    Wound 1 - Gauze Loaf Quantity: 200    Open wound of knee, leg, and ankle, left, initial encounter          Electronically signed by Nancy Mendiola PA-C on April 29, 2022

## 2022-04-29 NOTE — DISCHARGE INSTRUCTIONS
Sacha Ndiaye      1975  DME order placed to [facility] phone [phone] Fax- [phone]    Wound Dressing Change: left groin: Daily  Wash with soap and water, pat dry  Tuck a piece of gauze 4x4 dry and secure with tape    Wound dressing Change: Left Knee- every other day  Cleanse with: soap and water and pat dry, wash with wound spray and 4x4 gauze pads  Cover wounds with hydrofera blue 4x4 with border     Goggle: Z eldon or Donut foam per discussion with Dr Harris  Continue repositioning and off loading as directed     Nancy Mendiola PA-C April 29, 2022    Call us at [phone] if you have any questions about your wounds, have redness or swelling around your wound, have a fever of 101 or greater or if you have any other problems or concerns. We answer the phone Monday through Friday 8 am to 4 pm, please leave a message as we check the voicemail frequently throughout the day.     If you had a positive experience please indicate on your patient satisfaction survey form that [facility] will be sending you.    If you have any billing related questions please call the [facility] Business office at [phone]. The clinic staff does not handle billing related matters.

## 2022-05-03 ENCOUNTER — TELEPHONE (OUTPATIENT)
Dept: WOUND CARE | Facility: CLINIC | Age: 47
End: 2022-05-03
Payer: COMMERCIAL

## 2022-05-03 NOTE — ADDENDUM NOTE
Encounter addended by: Whit Jean RN on: 5/3/2022 12:17 PM   Actions taken: Diagnosis association updated, Order list changed, LDA properties accepted

## 2022-05-03 NOTE — TELEPHONE ENCOUNTER
Candice from [facility] called regarding a prescription that Dr Harris was to fax to her for new wound care (according to the patient). As of 5/3 she had not yet received this prescription. She is also in need of the last wound notes, measurements and amount of drainage.     Phone: [phone]    Fax: [phone]

## 2022-05-05 ENCOUNTER — MEDICAL CORRESPONDENCE (OUTPATIENT)
Dept: HEALTH INFORMATION MANAGEMENT | Facility: CLINIC | Age: 47
End: 2022-05-05
Payer: COMMERCIAL

## 2022-05-25 ENCOUNTER — HOSPITAL ENCOUNTER (OUTPATIENT)
Dept: WOUND CARE | Facility: CLINIC | Age: 47
Discharge: HOME | End: 2022-05-25
Attending: SURGERY | Admitting: SURGERY
Payer: COMMERCIAL

## 2022-05-25 VITALS
SYSTOLIC BLOOD PRESSURE: 154 MMHG | HEART RATE: 64 BPM | DIASTOLIC BLOOD PRESSURE: 96 MMHG | TEMPERATURE: 97.2 F | RESPIRATION RATE: 18 BRPM

## 2022-05-25 DIAGNOSIS — S81.002A OPEN WOUND OF KNEE, LEG, AND ANKLE, LEFT, INITIAL ENCOUNTER: Primary | ICD-10-CM

## 2022-05-25 DIAGNOSIS — S91.002A OPEN WOUND OF KNEE, LEG, AND ANKLE, LEFT, INITIAL ENCOUNTER: Primary | ICD-10-CM

## 2022-05-25 DIAGNOSIS — S81.802A OPEN WOUND OF KNEE, LEG, AND ANKLE, LEFT, INITIAL ENCOUNTER: Primary | ICD-10-CM

## 2022-05-25 PROCEDURE — 97597 DBRDMT OPN WND 1ST 20 CM/<: CPT | Performed by: PHYSICIAN ASSISTANT

## 2022-05-25 NOTE — PROGRESS NOTES
[facility]    ASSESSMENT/PLAN:   1. Stage 3 pressure ulcer of left knee    Wound care plan: Hydrofera Blue and Mepilex    4% topical lidocaine was applied to the wound by the nursing staff. Patient was determined to be capable of making their own medical decisions and informed consent was obtained. Using a curette a selective debridement of non-viable tissue was performed of <20 cm. Hemostasis was achieved with pressure. The patient tolerated the procedure well.      Nutrition: vit D 5kIU/day, high protein diet  2. See bottom of note for pt instructions       HISTORY OF PRESENT ILLNESS:   Sacha Ndiaye is a 47 year old paraplegic gentleman who returns to follow-up on a chronic left knee ulceration. Last month we attempted to order him silicone bordered Hydrofera Blue but unfortunately this was not available at his medical store. Has been using Hydrofera Blue ready transfer with Mepilex. His wound has significantly improved, base is granular and edges have contracted. Does have a little bit of detached edges on the superior rim.     VITALS: BP (!) 154/96 (BP Location: Left arm, Patient Position: Sitting, Cuff Size: Adult Regular)   Pulse 64   Temp 97.2  F (36.2  C) (Temporal)   Resp 18      PHYSICAL EXAM:  GENERAL: Patient is alert and oriented and in no acute distress  INTEGUMENTARY:   Wound (used by OP [facility] only) 01/07/20 1312 Left groin pressure injury (Active)   Base epithelialization 05/25/22 0851       Wound (used by OP I only) 01/27/22 0855 Left knee burn (Active)   Thickness/Stage full thickness 05/25/22 0851   Base slough;granulating 05/25/22 0851   Periwound intact;pink 05/25/22 0851   Periwound Temperature warm 05/25/22 0851   Periwound Skin Turgor firm 05/25/22 0851   Edges rolled/closed 05/25/22 0851   Length (cm) 1.8 05/25/22 0851   Width (cm) 1.9 05/25/22 0851   Depth (cm) 0.3 05/25/22 0851   Wound (cm^2) 3.42 cm^2 05/25/22 0851   Wound Volume (cm^3) 1.03 cm^3 05/25/22 0851    Wound healing % 51.14 05/25/22 0851   Drainage Characteristics/Odor serosanguineous 05/25/22 0851   Drainage Amount copious 05/25/22 0851   Care, Wound debrided 05/25/22 0851         PATIENT INSTRUCTIONS      Further instructions from your care team       Sacha Ndiaye      1975    DME order placed to [facility] phone [phone] Fax- [phone]    Wound dressing Change: Left Knee  Cleanse with: soap and water and pat dry, wash with wound spray and 4x4 gauze pads  Cover wounds with hydrofera blue transfer ready  Then apply 4x4 mepilex border  Change three times a week     Nancy Mendiola PA-C May 25, 2022      Call us at [phone] if you have any questions about your wounds, have redness or swelling around your wound, have a fever of 101 or greater or if you have any other problems or concerns. We answer the phone Monday through Friday 8 am to 4 pm, please leave a message as we check the voicemail frequently throughout the day.     If you had a positive experience please indicate that on your patient satisfaction survey form that [facility] will be sending you.    It was a pleasure meeting with you today.  Thank you for allowing me and my team the privilege of caring for you today.  YOU are the reason we are here, and I truly hope we provided you with the excellent service you deserve.  Please let us know if there is anything else we can do for you so that we can be sure you are leaving completely satisfied with your care experience.      If you have any billing related questions please call the [facility] Business office at [phone]. The clinic staff does not handle billing related matters.            Durable Medical Equipment Wound Care Orders     Wound Care Order for DME - ONLY FOR DME   As directed      DME Provider: Other (comments) Comment - [facility] Medical phone [phone] Fax- [phone]    Wound Supply Order Options: Complex Wound    Optional:  .dmewound can be used to pull in order specific information into documentation    Wound Number: Wound 1    Wound 1 Location: Left knee    Wound 1 Dressing Change Frequency: Other (Comments) Comment - Three times a week    Wound 1 Length of Need: 30 days    Wound 1 - Dressing Supplies: Secondary    Wound 1 - Secondary Dressing Dispensing Instructions: Ok to Substitute    Wound 1 - Secondary Dressing Types: Foam Dressing w/Border    Wound 1 - Foam with Border Types: Mepilex Border []    Wound 1 - Mepilex Border Size: 4 x 4    Wound 1 - Mepilex Border Quantity: 12    Open wound of knee, leg, and ankle, left, initial encounter          Electronically signed by Nancy Mendiola PA-C on May 25, 2022  Nancy Mendiola PA-C

## 2022-05-25 NOTE — DISCHARGE INSTRUCTIONS
Sacha Ndiaye      1975    DME order placed to [facility] phone [phone] Fax-  [phone]    Wound dressing Change: Left Knee  Cleanse with: soap and water and pat dry, wash with wound spray and 4x4 gauze pads  Cover wounds with hydrofera blue transfer ready  Then apply 4x4 mepilex border  Change three times a week     Nancy Mendiola PA-C May 25, 2022      Call us at [phone] if you have any questions about your wounds, have redness or swelling around your wound, have a fever of 101 or greater or if you have any other problems or concerns. We answer the phone Monday through Friday 8 am to 4 pm, please leave a message as we check the voicemail frequently throughout the day.     If you had a positive experience please indicate that on your patient satisfaction survey form that [facility] will be sending you.    It was a pleasure meeting with you today.  Thank you for allowing me and my team the privilege of caring for you today.  YOU are the reason we are here, and I truly hope we provided you with the excellent service you deserve.  Please let us know if there is anything else we can do for you so that we can be sure you are leaving completely satisfied with your care experience.      If you have any billing related questions please call the [facility] Business office at [phone]. The clinic staff does not handle billing related matters.

## 2022-06-30 ENCOUNTER — HOSPITAL ENCOUNTER (OUTPATIENT)
Dept: WOUND CARE | Facility: CLINIC | Age: 47
Discharge: HOME | End: 2022-06-30
Attending: SURGERY | Admitting: SURGERY
Payer: COMMERCIAL

## 2022-06-30 VITALS — SYSTOLIC BLOOD PRESSURE: 140 MMHG | TEMPERATURE: 98.5 F | HEART RATE: 95 BPM | DIASTOLIC BLOOD PRESSURE: 82 MMHG

## 2022-06-30 DIAGNOSIS — S81.802A OPEN WOUND OF KNEE, LEG, AND ANKLE, LEFT, INITIAL ENCOUNTER: Primary | ICD-10-CM

## 2022-06-30 DIAGNOSIS — S91.002A OPEN WOUND OF KNEE, LEG, AND ANKLE, LEFT, INITIAL ENCOUNTER: Primary | ICD-10-CM

## 2022-06-30 DIAGNOSIS — S81.002A OPEN WOUND OF KNEE, LEG, AND ANKLE, LEFT, INITIAL ENCOUNTER: Primary | ICD-10-CM

## 2022-06-30 PROBLEM — Z79.2 CHRONIC ANTIBIOTIC SUPPRESSION: Status: ACTIVE | Noted: 2021-09-13

## 2022-06-30 PROBLEM — L98.429: Status: ACTIVE | Noted: 2020-09-30

## 2022-06-30 PROBLEM — Z98.890 HX OF SPINAL SURGERY: Status: ACTIVE | Noted: 2020-09-30

## 2022-06-30 PROBLEM — Z96.7 FIXATION HARDWARE IN SPINE: Status: ACTIVE | Noted: 2020-09-30

## 2022-06-30 PROCEDURE — 99212 OFFICE O/P EST SF 10 MIN: CPT | Performed by: SURGERY

## 2022-06-30 PROCEDURE — G0463 HOSPITAL OUTPT CLINIC VISIT: HCPCS

## 2022-06-30 NOTE — PROGRESS NOTES
Visit Date: 06/30/2022    This is a 47-year-old paraplegic gentleman that we have known for many, many years.  Sacha is here for further followup of his most recent left anterior knee wound and also concern for recurrent callus over his left IT, kind of flap incision scar.  He did finally heal the knee, although had a little setback with some Hydrofera Blue, but ultimately was just using Mepilex.  Unfortunately, this created a fungal rash several centimeters in diameter around a healed callus or scab.  As far as his left bottom is concerned, this is a partially adherent callus that develops despite using Aquaphor on a daily basis.  Usually if this occurs, he just puts Mepilex on it and it usually resolves within a week.  I tried to explain that this is due to repetitive friction forces in an area of scar and so trying to treat a non wound or prevent callus from appearing with Mepilex is not going to be covered by insurance.  We did give him a sample of Sween cream to see if that would keep the skin supple enough to avoid some of that dry friction.  We also gave him a sample of antifungal cream for his knee that could be applied at least once daily.  He will either buy any Mepilex borders that he needs p.r.n. or could consider some sort over the counter silicone bandage or Band-Aid.  As far as offloading for his knee wound, he ended up just basically sleeping on the side for offloading although he did get a gel with a pillowcase that worked for a while.  He is still waiting for his new wheelchair from [email or web address].  It sounds like he is getting the same model with a couple modifications to the back, and then he has a FlexForm for his seating surface and has not had that adjusted for the last couple years now.  At this point, we are considering him to be healed and he can follow up on a p.r.n. basis.    Cheryl Harris MD        D: 06/30/2022   T: 06/30/2022   MT: [id number]    Name:     SACHA CAMARGO  REGINO  MRN:      [id number]        Account:    [id number]   :      1975           Visit Date: 2022     Document: [id number]

## 2022-06-30 NOTE — PROGRESS NOTES
Patient Active Problem List   Diagnosis    Spinal stenosis    SCI (spinal cord injury)    Hardware complicating wound infection, initial encounter (H)    Charcot's joint, vertebrae    Status post flap graft    Constipation    H/O lumbosacral spine surgery    Hip dislocation, bilateral (H)    Infection and inflammatory reaction due to internal orthopedic device, implant, and graft (H)    Neurogenic bladder    Neurogenic bowel    Osteomyelitis of pelvic region or thigh, acute, left (H)    Chronic osteomyelitis involving pelvic region and thigh (H)    Paraplegia (H)    Traumatic spinal subdural hematoma    Hx of plastic surgery    Non-healing left groin open wound, initial encounter    Non-healing left groin open wound, subsequent encounter    Open wound of knee, leg, and ankle, left, initial encounter    Chronic antibiotic suppression    Chronic ulcer of sacral region (H)    Fixation hardware in spine    Hx of spinal surgery     Past Medical History:   Diagnosis Date    Anemia     Charcot's joint     Chronic UTI     Constipation     Dislocated hip (H)     hyperlordosis    Epicondylitis     History of blood transfusion     Hx: UTI (urinary tract infection)     Neurogenic bladder     Other chronic pain     Paraplegia following spinal cord injury (H)     Pressure ulcer stage IV     left groin     Labs:   Recent Labs   Lab Test 04/14/19  0602 04/13/19  0651 01/17/19  0945 01/14/17  0636 01/13/17  0609 10/28/15  0715 10/27/15  0714   ALBUMIN  --   --  2.9*  --   --    < >  --    HGB 9.0* 8.7*  --    < > 8.9*   < >  --    INR  --   --   --   --   --   --  1.12   WBC  --  8.6  --   --   --    < >  --    A1C  --   --   --   --  4.7  --   --    CRP  --   --  37.8*  --   --    < >  --     < > = values in this interval not displayed.     Nutrition requirements were discussed with patient today.  Vitals:  BP (!) 140/82   Pulse 95   Temp 98.5  F (36.9  C) (Temporal)   Wound:     Photo:         Further instructions from your care  team         Sacha Ndiaye      1975      [facility]  [address] [facility] [address], [facility] [address]  Appointment Phone [phone]     Sacha KAUFFMAN Zelda      1975    Your wound is healed!! Dressings are no longer required.     Wound Clinic follow up in the future if there are any wound concerns     Skin care: Left Knee   Cleanse as usual during bathing  Apply CriticAid 2% miconazole (or 1% clotrimazole OTC) twice daily (apply to rash and 1 inch beyond) until 1 week after it's resolved    Skin care: Left buttock  Cleanse as usual during bathing  Apply Sween 24 (or similar) twice daily until resolved and if/when it reoccurs.  Can use Mepilex, but not covered by insurance since there is no wound.    Repositioning to prevent wounds:  Bed: Reposition MINIMALLY every 2 hours in bed to relieve pressure and promote perfusion to tissue.  Chair: When up to the chair, do not sit for longer than 2 hours total before returning to bed for at least 60 minutes to relieve pressure and promote perfusion to the tissue.  Completely recline/tilt for 15 minutes each hour if possible.  Sit on a chair cushion when up to the chair.       Bull Harris M.D. June 30, 2022    Call us at [phone] if you have any questions about your wounds, have redness or swelling around your wound, have a fever of 101 or greater or if you have any other problems or concerns. We answer the phone Monday through Friday 8 am to 4 pm, please leave a message as we check the voicemail frequently throughout the day.     If you had a positive experience please indicate that on your patient satisfaction survey form that [facility] will be sending you.    It was a pleasure meeting with you today.  Thank you for allowing me and my team the privilege of caring for you today.  YOU are the reason we are here, and I truly hope we provided you with the excellent service you deserve.  Please let us know if  there is anything else we can do for you so that we can be sure you are leaving completely satisfied with your care experience.      If you have any billing related questions please call the [facility] Business office at [phone]. The clinic staff does not handle billing related matters.

## 2022-06-30 NOTE — PROGRESS NOTES
Patient arrived for wound care visit. Certified Wound Care Nurse time spent evaluating patient record, completed a full evaluation and documented healed wound(s) &  skin; provided recommendation based on treatment plan. Reviewed discharge instructions, patient education, and discussed plan of care with appropriate medical team staff members and patient and/or family members.

## 2022-06-30 NOTE — DISCHARGE INSTRUCTIONS
Sacha DALY Zelda      1975      [facility]  [address] [facility] [address], [place] [facility] [address]  Appointment Phone [phone]     Sacha KAUFFMAN Zelda      1975    Your wound is healed!! Dressings are no longer required.     Wound Clinic follow up in the future if there are any wound concerns     Skin care: Left Knee   Cleanse as usual during bathing  Apply CriticAid 2% miconazole (or 1% clotrimazole OTC) twice daily (apply to rash and 1 inch beyond) until 1 week after it's resolved    Skin care: Left buttock  Cleanse as usual during bathing  Apply Sween 24 (or similar) twice daily until resolved and if/when it reoccurs.  Can use Mepilex, but not covered by insurance since there is no wound.    Repositioning to prevent wounds:  Bed: Reposition MINIMALLY every 2 hours in bed to relieve pressure and promote perfusion to tissue.  Chair: When up to the chair, do not sit for longer than 2 hours total before returning to bed for at least 60 minutes to relieve pressure and promote perfusion to the tissue.  Completely recline/tilt for 15 minutes each hour if possible.  Sit on a chair cushion when up to the chair.       Bull Harris M.D. June 30, 2022    Call us at [phone] if you have any questions about your wounds, have redness or swelling around your wound, have a fever of 101 or greater or if you have any other problems or concerns. We answer the phone Monday through Friday 8 am to 4 pm, please leave a message as we check the voicemail frequently throughout the day.     If you had a positive experience please indicate that on your patient satisfaction survey form that [facility] will be sending you.    It was a pleasure meeting with you today.  Thank you for allowing me and my team the privilege of caring for you today.  YOU are the reason we are here, and I truly hope we provided you with the excellent service you deserve.  Please let us know if there is  anything else we can do for you so that we can be sure you are leaving completely satisfied with your care experience.      If you have any billing related questions please call the [facility] Business office at [phone]. The clinic staff does not handle billing related matters.

## 2022-10-10 NOTE — PLAN OF CARE
Pt on bedrest. Dressing's C/D/I. RENARD patent and stripped. Tolerating regular diet. Rene patent and draining. Repositioned between back and R side. Denies pain. Pt able to make needs known.    Information: Selecting Yes will display possible errors in your note based on the variables you have selected. This validation is only offered as a suggestion for you. PLEASE NOTE THAT THE VALIDATION TEXT WILL BE REMOVED WHEN YOU FINALIZE YOUR NOTE. IF YOU WANT TO FAX A PRELIMINARY NOTE YOU WILL NEED TO TOGGLE THIS TO 'NO' IF YOU DO NOT WANT IT IN YOUR FAXED NOTE.

## 2023-04-12 ENCOUNTER — HOSPITAL ENCOUNTER (OUTPATIENT)
Dept: WOUND CARE | Facility: CLINIC | Age: 48
Discharge: HOME | End: 2023-04-12
Attending: PHYSICIAN ASSISTANT | Admitting: PHYSICIAN ASSISTANT
Payer: COMMERCIAL

## 2023-04-12 VITALS — TEMPERATURE: 98.5 F

## 2023-04-12 DIAGNOSIS — L89.210: Primary | ICD-10-CM

## 2023-04-12 PROCEDURE — 11045 DBRDMT SUBQ TISS EACH ADDL: CPT | Performed by: PHYSICIAN ASSISTANT

## 2023-04-12 PROCEDURE — 11042 DBRDMT SUBQ TIS 1ST 20SQCM/<: CPT | Performed by: PHYSICIAN ASSISTANT

## 2023-04-12 PROCEDURE — 99213 OFFICE O/P EST LOW 20 MIN: CPT | Mod: 25 | Performed by: PHYSICIAN ASSISTANT

## 2023-04-12 NOTE — PROGRESS NOTES
[facility]    ASSESSMENT:  1. Unstageable R troch pressure ulcer (at least stage 3)    PLAN:   1. Start twice daily dressings with Vashe damp 4x4   2. Follow-up in 2-3 weeks for another debridement and likely initiate VAC therapy  3. Avoid sleeping on this area  4. Get chair pressure mapped, we sent order to [facility] however likely needs to reach out to [place] as well  5. Air mattress arriving tomorrow (micheal purchased himself)  6.     HISTORY OF PRESENT ILLNESS:   Micheal Ndiaye is a 47 year old paraplegic gentleman who returns for a new R troch pressure ulcer. He just noticed this in the last week. He believes it is due from sleeping on this side in bed. He lies directly over this area when on this side. He is now sleeping on his stomach and just ordered an air mattress from [employer]. He has a Flexform seating system that has not been mapped in quite some time. Has reached out to both [facility] and [place] to try to get it pressure mapped. He denies systemic sx including n/f/v/c.     VITALS: Temp 98.5  F (36.9  C) (Temporal)      PHYSICAL EXAM:  GENERAL: Patient is alert and oriented and in no acute distress  INTEGUMENTARY:   Wound (used by OP [facility] only) 04/12/23 1308 Right greater trochanter pressure injury (Active)   Thickness/Stage full thickness 04/12/23 1300   Base white;pink 04/12/23 1300   Periwound redness 04/12/23 1300   Periwound Temperature warm 04/12/23 1300   Periwound Skin Turgor soft 04/12/23 1300   Length (cm) 5.6 04/12/23 1300   Width (cm) 5.2 04/12/23 1300   Depth (cm) 1.8 04/12/23 1300   Wound (cm^2) 29.12 cm^2 04/12/23 1300   Wound Volume (cm^3) 52.42 cm^3 04/12/23 1300   Drainage Characteristics/Odor serosanguineous 04/12/23 1300   Drainage Amount moderate 04/12/23 1300   Care, Wound debrided 04/12/23 1300       MDM: 29 minutes was spent on the day of visit reviewing previous chart notes, assessing the patient and developing the plan of care, this excludes time spent on any  "procedures.   PROCEDURE: Nursing staff performed mechanical debridement with dressing removal and cleansing and then applied 4% lidocaine to the wound bed. Patient was determined to be capable of making their own medical decisions and informed consent was obtained. Using a 15 blade a surgical debridement was performed down to and including subcutaneous tissue of >20 cm2 but <40 cm2. Hemostasis was achieved with pressure. The patient tolerated the procedure well.            Further instructions from your care team       Sacha Ndiaye      1975    A DME order for supplies has been placed to [facility]. If there are any issues with your order including not receiving the order please call [facility] at [phone] option 3. They can also provide a tracking number for you if you had supplies shipped to you.    TO DO:   Awaiting arrival of your air mattress to arrive 4/13/23  Complete pressure mapping to your cushion with [employer] at [phone]. We sent an order over today 4/12/23.   Stop sleeping on Right Side    Wound Dressing Change: Right Greater Trochanter   - Wash your hands with soap and water before you begin your dressing change and prepare a clean surface for dressings.  -Soak one 4x4\" gauze with small amount of VASHE and pack into wound  -Cover with 1/2 of 5x9\" ABD   -Secure with 2\" Medipore Tape   Change Twice Daily and as needed for soilage    Repositioning:    Bed: Reposition MINIMALLY every 1-2 hours in bed to relieve pressure and promote perfusion to tissue.    Chair: When up to the chair, do not sit for longer than one hour total before returning to bed for at least 60 minutes to relieve pressure and promote perfusion to the tissue.  Completely recline/tilt for 15 minutes each hour.  o Sit on a chair cushion when up to the chair.      A diet high in protein is important for wound healing, we recommend getting 90 grams of protein per day. Taking protein shakes or bars are a " good way to get extra protein in your diet.     Good sources of protein:    Pork 26g per 3 oz    Whey protein powder - 24g per scoop (on average)    Greek yogurt - 23g per 8oz     Chicken or Turkey - 23g per 3oz    Fish - 20-25g per 3oz    Beef - 18-23g per 3oz    Navy beans - 20g per cup    Cottage cheese - 14g per 1/2 cup     Lentils - 13g per 1/4 cup    Beef jerky 13g per 1oz    2% milk - 8g per cup    Peanut butter - 8g per 2 tablespoons    Eggs - 6g per egg    Mixed nuts - 6g per 2oz      Nancy Mendiola PA-C April 12, 2023    Call us at [phone] if you have any questions about your wounds, have redness or swelling around your wound, have a fever of 101 or greater or if you have any other problems or concerns. We answer the phone Monday through Friday 8 am to 4 pm, please leave a message as we check the voicemail frequently throughout the day.     If you had a positive experience please indicate that on your patient satisfaction survey form that [facility] will be sending you.    It was a pleasure meeting with you today.  Thank you for allowing me and my team the privilege of caring for you today.  YOU are the reason we are here, and I truly hope we provided you with the excellent service you deserve.  Please let us know if there is anything else we can do for you so that we can be sure you are leaving completely satisfied with your care experience.      If you have any billing related questions please call the [facility] Business office at [phone]. The clinic staff does not handle billing related matters.    If you are scheduled to have a follow up appointment, you will receive a reminder call the day before your visit. On the appointment day please arrive 15 minutes prior to your appointment time. If you are unable to keep that appointment, please call the clinic to cancel or reschedule. If you are more than 10 minutes late or greater for your appointment, the clinic policy is that you may be  asked to reschedule.

## 2023-04-12 NOTE — DISCHARGE INSTRUCTIONS
"Sacha Ndiaye      1975    A DME order for supplies has been placed to [facility]. If there are any issues with your order including not receiving the order please call [facility] at [phone] option 3. They can also provide a tracking number for you if you had supplies shipped to you.    TO DO:   Awaiting arrival of your air mattress to arrive 4/13/23  Complete pressure mapping to your cushion with [employer] at [phone]. We sent an order over today 4/12/23.   Stop sleeping on Right Side    Wound Dressing Change: Right Greater Trochanter   - Wash your hands with soap and water before you begin your dressing change and prepare a clean surface for dressings.  -Soak one 4x4\" gauze with small amount of VASHE and pack into wound  -Cover with 1/2 of 5x9\" ABD   -Secure with 2\" Medipore Tape   Change Twice Daily and as needed for soilage    Repositioning:  Bed: Reposition MINIMALLY every 1-2 hours in bed to relieve pressure and promote perfusion to tissue.  Chair: When up to the chair, do not sit for longer than one hour total before returning to bed for at least 60 minutes to relieve pressure and promote perfusion to the tissue.  Completely recline/tilt for 15 minutes each hour.  Sit on a chair cushion when up to the chair.      A diet high in protein is important for wound healing, we recommend getting 90 grams of protein per day. Taking protein shakes or bars are a good way to get extra protein in your diet.   Good sources of protein:  Pork 26g per 3 oz  Whey protein powder - 24g per scoop (on average)  Greek yogurt - 23g per 8oz   Chicken or Turkey - 23g per 3oz  Fish - 20-25g per 3oz  Beef - 18-23g per 3oz  Navy beans - 20g per cup  Cottage cheese - 14g per 1/2 cup   Lentils - 13g per 1/4 cup  Beef jerky 13g per 1oz  2% milk - 8g per cup  Peanut butter - 8g per 2 tablespoons  Eggs - 6g per egg  Mixed nuts - 6g per 2oz      Nancy Mendiola PA-C April 12, 2023    Call us at [phone] " if you have any questions about your wounds, have redness or swelling around your wound, have a fever of 101 or greater or if you have any other problems or concerns. We answer the phone Monday through Friday 8 am to 4 pm, please leave a message as we check the voicemail frequently throughout the day.     If you had a positive experience please indicate that on your patient satisfaction survey form that [facility] will be sending you.    It was a pleasure meeting with you today.  Thank you for allowing me and my team the privilege of caring for you today.  YOU are the reason we are here, and I truly hope we provided you with the excellent service you deserve.  Please let us know if there is anything else we can do for you so that we can be sure you are leaving completely satisfied with your care experience.      If you have any billing related questions please call the [facility] Business office at [phone]. The clinic staff does not handle billing related matters.    If you are scheduled to have a follow up appointment, you will receive a reminder call the day before your visit. On the appointment day please arrive 15 minutes prior to your appointment time. If you are unable to keep that appointment, please call the clinic to cancel or reschedule. If you are more than 10 minutes late or greater for your appointment, the clinic policy is that you may be asked to reschedule.

## 2023-04-28 ENCOUNTER — HOSPITAL ENCOUNTER (OUTPATIENT)
Dept: WOUND CARE | Facility: CLINIC | Age: 48
Discharge: HOME | End: 2023-04-28
Attending: PHYSICIAN ASSISTANT | Admitting: PHYSICIAN ASSISTANT
Payer: COMMERCIAL

## 2023-04-28 VITALS
SYSTOLIC BLOOD PRESSURE: 155 MMHG | DIASTOLIC BLOOD PRESSURE: 87 MMHG | TEMPERATURE: 98.4 F | RESPIRATION RATE: 20 BRPM | HEART RATE: 113 BPM

## 2023-04-28 DIAGNOSIS — L89.223: Primary | ICD-10-CM

## 2023-04-28 DIAGNOSIS — L89.214 PRESSURE INJURY OF RIGHT HIP, STAGE 4 (H): ICD-10-CM

## 2023-04-28 PROCEDURE — 99213 OFFICE O/P EST LOW 20 MIN: CPT | Mod: 25 | Performed by: PHYSICIAN ASSISTANT

## 2023-04-28 PROCEDURE — 11042 DBRDMT SUBQ TIS 1ST 20SQCM/<: CPT | Performed by: PHYSICIAN ASSISTANT

## 2023-04-28 NOTE — DISCHARGE INSTRUCTIONS
"Sacha Ndiaye      1975  A DME order for supplies has been placed to [facility]. If there are any issues with your order including not receiving the order please call [facility] at [phone] option 3. They can also provide a tracking number for you if you had supplies shipped to you.    Wound Dressing Change: Right Greater Trochanter  After cleansing with mild unscented soap (such as Cetaphil, Cerave or Dove) and water,   Apply small amount of VASHE on 2 4x4 sterile gauze, lay into wound bed,  -Cover with 1/2 of 5x9\" ABD  -Secure with 6\" Medipore Tape  Change Twice Daily and as needed for soilage    Wound dressing change: left Greater trochanter  Cleanse with soap and water, wound spray, pat dry  Cover with Mepilex Silicone 4x4 with border  Change 3 times a week    Wound care to Right knee  Swab knee with Betadine and air dry   Leave to scab over as protection    Repositioning:  Bed: Avoid laying on right hip; Patient will need group 2, low air loss mattress due to large, stage 4 ulceration on the patient's pelvis that has failed to improve on a normal mattress despite regular wound cares and repositioning. A group 1 mattress will not be adequate to offload these severe ulcerations. Patient has impaired sensation and is unable to reposition independently.  Reposition MINIMALLY every 1-2 hours in bed to relieve pressure and promote perfusion to tissue.  Chair: When up to the chair, do not sit for longer than one hour total before returning to bed for at least 60 minutes to relieve pressure and promote perfusion to the tissue. Completely recline/tilt for 15 minutes each hour.  Sit on a chair cushion when up to the chair.    A diet high in protein is important for wound healing, we recommend getting 90 grams of protein   Check out PROSTAT for protein shot  Bj/ Arginaid Supplement, one packet into your favorite beverage TWICE a day. You may purchase at any [facility] or " online. A website we recommend is www.medicalmonks.com. Alternatives to Bj is Argiment AT, Abintra, Arginaid, or Medline Active Critical Care.     Nancy Mendiola PA-C April 28, 2023    Call us at [phone] if you have any questions about your wounds, have redness or swelling around your wound, have a fever of 101 or greater or if you have any other problems or concerns. We answer the phone Monday through Friday 8 am to 4 pm, please leave a message as we check the voicemail frequently throughout the day.   If you had a positive experience please indicate that on your patient satisfaction survey form that [facility] will be sending you.  It was a pleasure meeting with you today.  Thank you for allowing me and my team the privilege of caring for you today.  YOU are the reason we are here, and I truly hope we provided you with the excellent service you deserve.  Please let us know if there is anything else we can do for you so that we can be sure you are leaving completely satisfied with your care experience.    If you have any billing related questions please call the [facility] Business office at [phone]. The clinic staff does not handle billing related matters.  If you are scheduled to have a follow up appointment, you will receive a reminder call the day before your visit. On the appointment day please arrive 15 minutes prior to your appointment time. If you are unable to keep that appointment, please call the clinic to cancel or reschedule. If you are more than 10 minutes late or greater for your appointment, the clinic policy is that you may be asked to reschedule.

## 2023-04-28 NOTE — PROGRESS NOTES
[place] [facility]    ASSESSMENT:  1. Stage 4 R troch pressure ulcer  2. Stage 3 L troch pressure ulcer  3. Stage 3 L knee pressure ulcer    PLAN:   1. Will order VAC and plan for wife to apply dressings, will order for delivery in clinic and wife will come for teaching at next appt  2. cont twice daily dressings with Vashe damp 4x4 until next appt  3. Paint knee wound with betadine and leave KALPANA  4. Cover L troch with mepilex  5. Avoid sleeping on this area  6. Chair has now been adjusted  7. Has air mattress    HISTORY OF PRESENT ILLNESS:   Sacha Ndiaye is a 48 year old paraplegic gentleman who returns for a new R troch pressure ulcer. He just noticed this in the last week. He believes it is due from sleeping on this side in bed. He lies directly over this area when on this side. He is now sleeping on his stomach and just ordered an air mattress from [employer]. He has a Flexform seating system that had not been adjusted in some time, now optimized. He denies systemic sx including n/f/v/c. States he has a new spot forming on his L hip and that his L knee wound has recurred with him sleeping on his stomach.    VITALS: BP (!) 155/87 (BP Location: Right arm, Patient Position: Sitting, Cuff Size: Adult Large)   Pulse 113   Temp 98.4  F (36.9  C) (Temporal)   Resp 20      PHYSICAL EXAM:  GENERAL: Patient is alert and oriented and in no acute distress  INTEGUMENTARY:   Wound (used by [facility] only) 04/12/23 1308 Right greater trochanter pressure injury (Active)   Thickness/Stage full thickness 04/28/23 1514   Base white;pink 04/28/23 1514   Periwound intact 04/28/23 1514   Periwound Temperature warm 04/28/23 1514   Periwound Skin Turgor soft 04/28/23 1514   Edges open 04/28/23 1514   Length (cm) 6.2 04/28/23 1514   Width (cm) 3.2 04/28/23 1514   Depth (cm) 2 04/28/23 1514   Wound (cm^2) 19.84 cm^2 04/28/23 1514   Wound Volume (cm^3) 39.68 cm^3 04/28/23 1514   Wound healing % 31.87 04/28/23 1514    Undermining [Depth (cm)/Location] 12-2o'/ 3.8cm 04/28/23 1514   Drainage Characteristics/Odor serosanguineous 04/28/23 1514   Drainage Amount moderate 04/28/23 1514   Care, Wound debrided 04/28/23 1514       Wound (used by [facility] only) 04/28/23 1527 Left greater trochanter pressure injury (Active)   Thickness/Stage Stage 3 04/28/23 1514   Base granulating 04/28/23 1514   Periwound intact;redness 04/28/23 1514   Periwound Temperature warm 04/28/23 1514   Periwound Skin Turgor soft 04/28/23 1514   Edges open 04/28/23 1514   Length (cm) 2 04/28/23 1514   Width (cm) 1 04/28/23 1514   Depth (cm) 0.1 04/28/23 1514   Wound (cm^2) 2 cm^2 04/28/23 1514   Wound Volume (cm^3) 0.2 cm^3 04/28/23 1514   Drainage Characteristics/Odor serosanguineous 04/28/23 1514   Drainage Amount moderate 04/28/23 1514   Care, Wound non-select wound debridement performed 04/28/23 1514     MDM: 29 minutes was spent on the day of visit reviewing previous chart notes, assessing the patient and developing the plan of care, this excludes time spent on any procedures.   PROCEDURE: Nursing staff performed mechanical debridement with dressing removal and cleansing and then applied 4% lidocaine to the wound bed. Patient was determined to be capable of making their own medical decisions and informed consent was obtained. Using a 15 blade a surgical debridement was performed down to and including subcutaneous tissue of <20cm2. Hemostasis was achieved with pressure. The patient tolerated the procedure well.          Further instructions from your care team       Sacha Ndiaye      1975  A DME order for supplies has been placed to [facility]. If there are any issues with your order including not receiving the order please call [facility] at [phone] option 3. They can also provide a tracking number for you if you had supplies shipped to you.    Wound Dressing Change: Right Greater Trochanter  After cleansing with mild  "unscented soap (such as Cetaphil, Cerave or Dove) and water,   Apply small amount of VASHE on 2 4x4 sterile gauze, lay into wound bed,  -Cover with 1/2 of 5x9\" ABD  -Secure with 6\" Medipore Tape  Change Twice Daily and as needed for soilage    Wound dressing change: left Greater trochanter  Cleanse with soap and water, wound spray, pat dry  Cover with Mepilex Silicone 4x4 with border  Change 3 times a week    Wound care to Right knee  Swab knee with Betadine and air dry   Leave to scab over as protection    Repositioning:  Bed: Avoid laying on right hip; Patient will need group 2, low air loss mattress due to large, stage 4 ulceration on the patient's pelvis that has failed to improve on a normal mattress despite regular wound cares and repositioning. A group 1 mattress will not be adequate to offload these severe ulcerations. Patient has impaired sensation and is unable to reposition independently.  Reposition MINIMALLY every 1-2 hours in bed to relieve pressure and promote perfusion to tissue.  Chair: When up to the chair, do not sit for longer than one hour total before returning to bed for at least 60 minutes to relieve pressure and promote perfusion to the tissue. Completely recline/tilt for 15 minutes each hour.  Sit on a chair cushion when up to the chair.    A diet high in protein is important for wound healing, we recommend getting 90 grams of protein   Check out PROSTAT for protein shot  Bj/ Arginaid Supplement, one packet into your favorite beverage TWICE a day. You may purchase at any [facility] or online. A website we recommend is www.Power-One.Meilele. Alternatives to Bj is Argiment AT, Abintra, Arginaid, or Medline Active Critical Care.     Nancy Mendiola PA-C April 28, 2023    Call us at [phone] if you have any questions about your wounds, have redness or swelling around your wound, have a fever of 101 or greater or if you have any other problems or concerns. We answer " the phone Monday through Friday 8 am to 4 pm, please leave a message as we check the voicemail frequently throughout the day.   If you had a positive experience please indicate that on your patient satisfaction survey form that [facility] will be sending you.  It was a pleasure meeting with you today.  Thank you for allowing me and my team the privilege of caring for you today.  YOU are the reason we are here, and I truly hope we provided you with the excellent service you deserve.  Please let us know if there is anything else we can do for you so that we can be sure you are leaving completely satisfied with your care experience.    If you have any billing related questions please call the [facility] Business office at [phone]. The clinic staff does not handle billing related matters.  If you are scheduled to have a follow up appointment, you will receive a reminder call the day before your visit. On the appointment day please arrive 15 minutes prior to your appointment time. If you are unable to keep that appointment, please call the clinic to cancel or reschedule. If you are more than 10 minutes late or greater for your appointment, the clinic policy is that you may be asked to reschedule.

## 2023-05-01 NOTE — ADDENDUM NOTE
Encounter addended by: Juana Hunt RN on: 5/1/2023 2:22 PM   Actions taken: Order list changed, Diagnosis association updated

## 2023-05-11 ENCOUNTER — MEDICAL CORRESPONDENCE (OUTPATIENT)
Dept: HEALTH INFORMATION MANAGEMENT | Facility: CLINIC | Age: 48
End: 2023-05-11
Payer: COMMERCIAL

## 2023-05-18 ENCOUNTER — TELEPHONE (OUTPATIENT)
Dept: WOUND CARE | Facility: CLINIC | Age: 48
End: 2023-05-18
Payer: COMMERCIAL

## 2023-05-18 NOTE — TELEPHONE ENCOUNTER
Patient called to follow up on wound vac.  He thought it was supposed to be put on tomorrow, but he has been told it will be delivered on Monday

## 2023-05-18 NOTE — TELEPHONE ENCOUNTER
Returned call to patient. Discussed with him that his wound VAC was delivered to [facility] clinic so it is here and ready to be applied at tomorrow's visit. No further questions or concerns.

## 2023-05-19 ENCOUNTER — HOSPITAL ENCOUNTER (OUTPATIENT)
Dept: WOUND CARE | Facility: CLINIC | Age: 48
Discharge: HOME | End: 2023-05-19
Attending: PHYSICIAN ASSISTANT | Admitting: PHYSICIAN ASSISTANT
Payer: COMMERCIAL

## 2023-05-19 VITALS
RESPIRATION RATE: 20 BRPM | DIASTOLIC BLOOD PRESSURE: 93 MMHG | HEART RATE: 78 BPM | TEMPERATURE: 98 F | SYSTOLIC BLOOD PRESSURE: 156 MMHG

## 2023-05-19 DIAGNOSIS — L89.214 PRESSURE INJURY OF RIGHT HIP, STAGE 4 (H): Primary | ICD-10-CM

## 2023-05-19 PROCEDURE — 99213 OFFICE O/P EST LOW 20 MIN: CPT | Performed by: PHYSICIAN ASSISTANT

## 2023-05-19 PROCEDURE — 97602 WOUND(S) CARE NON-SELECTIVE: CPT

## 2023-05-19 NOTE — DISCHARGE INSTRUCTIONS
Sacha Ndiaye      1975  A DME order was not completed because supplies were not needed    Wound Dressing Change: Right Trochanter  - Wash your hands with soap and water before you begin your dressing change and prepare a clean surface for dressings.  Remove old dressing and ensure all black foam is removed  Cleanse wound with Vashe moist gauze, leave in place for 10 minutes; remove   Cleanse periwound skin with wound spray, pat dry and apply No Sting Barrier film.    Cut Black Foam to fill the depth of wound but please ensure it is not overstuffed.  Cover wound with VAC dressing tape to seal the foam, cut appropriate size opening for the TRAC pad.  Connect TRAC pad and connect to pump; NPWT -125 mmHg Continuous, ensure no leaks  Change canister when alarms full or weekly  Change dressing three times a week    Document on the outside of the dressing the number of sponges used and the date of the dressing  If dressing is compromised for greater than 2 hours then please remove entire dressing and change or tuck moist Vashe gauze into wound until new dressing can be applied.     Nancy Mendiola PA-C May 19, 2023    Call us at [phone] if you have any questions about your wounds, have redness or swelling around your wound, have a fever of 101 or greater or if you have any other problems or concerns. We answer the phone Monday through Friday 8 am to 4 pm, please leave a message as we check the voicemail frequently throughout the day.     If you had a positive experience please indicate that on your patient satisfaction survey form that [facility] will be sending you.    It was a pleasure meeting with you today.  Thank you for allowing me and my team the privilege of caring for you today.  YOU are the reason we are here, and I truly hope we provided you with the excellent service you deserve.  Please let us know if there is anything else we can do for you so that we can be sure you are leaving  completely satisfied with your care experience.      If you have any billing related questions please call the [facility] Business office at [phone]. The clinic staff does not handle billing related matters.    If you are scheduled to have a follow up appointment, you will receive a reminder call the day before your visit. On the appointment day please arrive 15 minutes prior to your appointment time. If you are unable to keep that appointment, please call the clinic to cancel or reschedule. If you are more than 10 minutes late or greater for your appointment, the clinic policy is that you may be asked to reschedule.

## 2023-05-19 NOTE — PROGRESS NOTES
[place] [facility]    ASSESSMENT:  1. Stage 4 R troch pressure ulcer    PLAN:   1. Start VAC 3x/wk, wife to change dressings - she was trained again on procedure  2. Avoid sleeping on this area  3. Chair has now been adjusted  4. Has air mattress    INTERVAL HX:  5/19: Returns to clinic. VAC has been approved and plan to initiate today. Wife will be applying dressings, she has done this before. Wound nearly 100% granular and decreased in size.     HISTORY OF PRESENT ILLNESS:   Sacha Ndiaye is a 48 year old paraplegic gentleman who returns for a new R troch pressure ulcer. He just noticed this in the last week. He believes it is due from sleeping on this side in bed. He lies directly over this area when on this side. He is now sleeping on his stomach and has purchased an air mattress from [employer]. He has a Flexform seating system that had not been adjusted in some time, now optimized. Last visit we discussed starting NPWT and the area was nearly entirely granular. Has now been approved through insurance and machine has come. Plan for wife to apply dressings. No other concerns.     VITALS: BP (!) 156/93 (BP Location: Right arm, Patient Position: Sitting, Cuff Size: Adult Large)   Pulse 78   Temp 98  F (36.7  C) (Temporal)   Resp 20      PHYSICAL EXAM:  GENERAL: Patient is alert and oriented and in no acute distress  INTEGUMENTARY:   Wound (used by [facility] only) 04/12/23 1308 Right greater trochanter pressure injury (Active)   Thickness/Stage full thickness 05/19/23 1050   Base granulating 05/19/23 1050   Periwound intact 05/19/23 1050   Periwound Temperature warm 05/19/23 1050   Periwound Skin Turgor soft 05/19/23 1050   Edges open 05/19/23 1050   Length (cm) 2.7 05/19/23 1050   Width (cm) 4.8 05/19/23 1050   Depth (cm) 1.6 05/19/23 1050   Wound (cm^2) 12.96 cm^2 05/19/23 1050   Wound Volume (cm^3) 20.74 cm^3 05/19/23 1050   Wound healing % 55.49 05/19/23 1050   Tunneling [Depth (cm)/Location] 3.6  05/19/23 1050   Undermining [Depth (cm)/Location] 9-6o'/ 2cm 05/19/23 1050   Drainage Characteristics/Odor serosanguineous 05/19/23 1050   Drainage Amount moderate 05/19/23 1050       Wound (used by [facility] only) 04/28/23 1527 Left greater trochanter pressure injury (Active)   Thickness/Stage Stage 3 05/19/23 1050   Base granulating 05/19/23 1050   Periwound intact;redness 05/19/23 1050   Periwound Temperature warm 05/19/23 1050   Periwound Skin Turgor soft 05/19/23 1050   Edges open 05/19/23 1050   Length (cm) 0 05/19/23 1050   Width (cm) 0 05/19/23 1050   Depth (cm) 0 05/19/23 1050   Wound (cm^2) 0 cm^2 05/19/23 1050   Wound Volume (cm^3) 0 cm^3 05/19/23 1050   Wound healing % 100 05/19/23 1050   Drainage Characteristics/Odor serosanguineous 05/19/23 1050   Drainage Amount moderate 05/19/23 1050             MDM: 20 minutes was spent on the day of visit reviewing previous chart notes, assessing the patient and developing the plan of care, this excludes time spent on any procedures.         Further instructions from your care team       Sacha Ndiaye      1975  A DME order was not completed because supplies were not needed    Wound Dressing Change: Right Trochanter  - Wash your hands with soap and water before you begin your dressing change and prepare a clean surface for dressings.  Remove old dressing and ensure all black foam is removed  Cleanse wound with Vashe moist gauze, leave in place for 10 minutes; remove   Cleanse periwound skin with wound spray, pat dry and apply No Sting Barrier film.    Cut Black Foam to fill the depth of wound but please ensure it is not overstuffed.  Cover wound with VAC dressing tape to seal the foam, cut appropriate size opening for the TRAC pad.  Connect TRAC pad and connect to pump; NPWT -125 mmHg Continuous, ensure no leaks  Change canister when alarms full or weekly  Change dressing three times a week    Document on the outside of the dressing the number of sponges used  and the date of the dressing  If dressing is compromised for greater than 2 hours then please remove entire dressing and change or tuck moist Vashe gauze into wound until new dressing can be applied.     Nancy Mendiola PA-C May 19, 2023    Call us at [phone] if you have any questions about your wounds, have redness or swelling around your wound, have a fever of 101 or greater or if you have any other problems or concerns. We answer the phone Monday through Friday 8 am to 4 pm, please leave a message as we check the voicemail frequently throughout the day.     If you had a positive experience please indicate that on your patient satisfaction survey form that [facility] will be sending you.    It was a pleasure meeting with you today.  Thank you for allowing me and my team the privilege of caring for you today.  YOU are the reason we are here, and I truly hope we provided you with the excellent service you deserve.  Please let us know if there is anything else we can do for you so that we can be sure you are leaving completely satisfied with your care experience.      If you have any billing related questions please call the [facility] Business office at [phone]. The clinic staff does not handle billing related matters.    If you are scheduled to have a follow up appointment, you will receive a reminder call the day before your visit. On the appointment day please arrive 15 minutes prior to your appointment time. If you are unable to keep that appointment, please call the clinic to cancel or reschedule. If you are more than 10 minutes late or greater for your appointment, the clinic policy is that you may be asked to reschedule.

## 2023-05-19 NOTE — PROGRESS NOTES
Patient Active Problem List   Diagnosis     Spinal stenosis     SCI (spinal cord injury)     Hardware complicating wound infection, initial encounter (H)     Charcot's joint, vertebrae     Status post flap graft     Constipation     H/O lumbosacral spine surgery     Hip dislocation, bilateral (H)     Infection and inflammatory reaction due to internal orthopedic device, implant, and graft (H)     Neurogenic bladder     Neurogenic bowel     Osteomyelitis of pelvic region or thigh, acute, left (H)     Chronic osteomyelitis involving pelvic region and thigh (H)     Paraplegia (H)     Traumatic spinal subdural hematoma     Hx of plastic surgery     Non-healing left groin open wound, initial encounter     Non-healing left groin open wound, subsequent encounter     Open wound of knee, leg, and ankle, left, initial encounter     Chronic antibiotic suppression     Chronic ulcer of sacral region (H)     Fixation hardware in spine     Hx of spinal surgery     Pressure injury of right hip, stage 4 (H)     Pressure injury of trochanteric region of left hip, stage 3 (H)     Past Medical History:   Diagnosis Date     Anemia      Charcot's joint      Chronic UTI      Constipation      Dislocated hip (H)     hyperlordosis     Epicondylitis      History of blood transfusion      Hx: UTI (urinary tract infection)      Neurogenic bladder      Other chronic pain      Paraplegia following spinal cord injury (H)      Pressure ulcer stage IV     left groin     Labs:   Recent Labs   Lab Test 04/14/19  0602 04/13/19  0651 01/17/19  0945 01/14/17  0636 01/13/17  0609 10/28/15  0715 10/27/15  0714   ALBUMIN  --   --  2.9*  --   --    < >  --    HGB 9.0* 8.7*  --    < > 8.9*   < >  --    INR  --   --   --   --   --   --  1.12   WBC  --  8.6  --   --   --    < >  --    A1C  --   --   --   --  4.7  --   --    CRP  --   --  37.8*  --   --    < >  --     < > = values in this interval not displayed.     Nutrition requirements were discussed with  patient today.  Vitals:  BP (!) 156/93 (BP Location: Right arm, Patient Position: Sitting, Cuff Size: Adult Large)   Pulse 78   Temp 98  F (36.7  C) (Temporal)   Resp 20   Wound:   Rash 11/28/16 Bilateral thigh (Active)       Wound (used by [facility] only) 04/12/23 1308 Right greater trochanter pressure injury (Active)   Thickness/Stage full thickness 05/19/23 1050   Base granulating 05/19/23 1050   Periwound intact 05/19/23 1050   Periwound Temperature warm 05/19/23 1050   Periwound Skin Turgor soft 05/19/23 1050   Edges open 05/19/23 1050   Length (cm) 2.7 05/19/23 1050   Width (cm) 4.8 05/19/23 1050   Depth (cm) 1.6 05/19/23 1050   Wound (cm^2) 12.96 cm^2 05/19/23 1050   Wound Volume (cm^3) 20.74 cm^3 05/19/23 1050   Wound healing % 55.49 05/19/23 1050   Tunneling [Depth (cm)/Location] 3.6 05/19/23 1050   Undermining [Depth (cm)/Location] 9-6o'/ 2cm 05/19/23 1050   Drainage Characteristics/Odor serosanguineous 05/19/23 1050   Drainage Amount moderate 05/19/23 1050   Care, Wound non-select wound debridement performed 05/19/23 1050       Wound (used by [facility] only) 04/28/23 1527 Left greater trochanter pressure injury (Active)   Thickness/Stage Stage 3 05/19/23 1050   Base granulating 05/19/23 1050   Periwound intact;redness 05/19/23 1050   Periwound Temperature warm 05/19/23 1050   Periwound Skin Turgor soft 05/19/23 1050   Edges open 05/19/23 1050   Length (cm) 0 05/19/23 1050   Width (cm) 0 05/19/23 1050   Depth (cm) 0 05/19/23 1050   Wound (cm^2) 0 cm^2 05/19/23 1050   Wound Volume (cm^3) 0 cm^3 05/19/23 1050   Wound healing % 100 05/19/23 1050   Drainage Characteristics/Odor serosanguineous 05/19/23 1050   Drainage Amount moderate 05/19/23 1050   Care, Wound non-select wound debridement performed 05/19/23 1050      Photo:   Further instructions from your care team       Sacha Ndiaye      1975  A DME order was not completed because supplies were not needed    Wound Dressing Change: Right Trochanter  -  Wash your hands with soap and water before you begin your dressing change and prepare a clean surface for dressings.  Remove old dressing and ensure all black foam is removed  Cleanse wound with Vashe moist gauze, leave in place for 10 minutes; remove   Cleanse periwound skin with wound spray, pat dry and apply No Sting Barrier film.    Cut Black Foam to fill the depth of wound but please ensure it is not overstuffed.  Cover wound with VAC dressing tape to seal the foam, cut appropriate size opening for the TRAC pad.  Connect TRAC pad and connect to pump; NPWT -125 mmHg Continuous, ensure no leaks  Change canister when alarms full or weekly  Change dressing three times a week    Document on the outside of the dressing the number of sponges used and the date of the dressing  If dressing is compromised for greater than 2 hours then please remove entire dressing and change or tuck moist Vashe gauze into wound until new dressing can be applied.     Nancy Mendiola PA-C May 19, 2023

## 2023-06-16 ENCOUNTER — HOSPITAL ENCOUNTER (OUTPATIENT)
Dept: WOUND CARE | Facility: CLINIC | Age: 48
Discharge: HOME | End: 2023-06-16
Attending: PHYSICIAN ASSISTANT | Admitting: PHYSICIAN ASSISTANT
Payer: COMMERCIAL

## 2023-06-16 VITALS
SYSTOLIC BLOOD PRESSURE: 142 MMHG | TEMPERATURE: 98.3 F | HEART RATE: 71 BPM | DIASTOLIC BLOOD PRESSURE: 87 MMHG | RESPIRATION RATE: 14 BRPM

## 2023-06-16 DIAGNOSIS — S81.002A OPEN WOUND OF KNEE, LEG, AND ANKLE, LEFT, INITIAL ENCOUNTER: ICD-10-CM

## 2023-06-16 DIAGNOSIS — S91.002A OPEN WOUND OF KNEE, LEG, AND ANKLE, LEFT, INITIAL ENCOUNTER: ICD-10-CM

## 2023-06-16 DIAGNOSIS — L89.223: ICD-10-CM

## 2023-06-16 DIAGNOSIS — S81.802A OPEN WOUND OF KNEE, LEG, AND ANKLE, LEFT, INITIAL ENCOUNTER: ICD-10-CM

## 2023-06-16 DIAGNOSIS — L89.214 PRESSURE INJURY OF RIGHT HIP, STAGE 4 (H): Primary | ICD-10-CM

## 2023-06-16 PROCEDURE — 97602 WOUND(S) CARE NON-SELECTIVE: CPT

## 2023-06-16 PROCEDURE — 99213 OFFICE O/P EST LOW 20 MIN: CPT | Performed by: PHYSICIAN ASSISTANT

## 2023-06-16 NOTE — DISCHARGE INSTRUCTIONS
Sacha Ndiaye      1975    A DME order was not completed because supplies were not needed. Notes faxed to [facility] including measurements. Patient will also call [facility] for supplies. Almost out of black foam and tape kits.     Wound Dressing Change: Right Trochanter  - Wash your hands with soap and water before you begin your dressing change and prepare a clean surface for dressings.  - Remove old dressing and ensure all black foam is removed  - Cleanse wound with Vashe moist gauze, leave in place for 10 minutes; remove   - Cleanse periwound skin with wound spray, pat dry and apply No Sting Barrier film.  - Cut Black Foam to fill the depth of wound (5cm at 2 o'clock) but please ensure it is not overstuffed.  - Cover wound with VAC dressing tape to seal the foam, cut appropriate size opening for the TRAC pad.  - Connect TRAC pad and connect to pump; NPWT -125 mmHg Continuous, ensure no leaks  - Change canister when alarms full or weekly  Change dressing three times a week    Document on the outside of the dressing the number of sponges used and the date of the dressing  If dressing is compromised for greater than 2 hours then please remove entire dressing and change or tuck moist Vashe gauze into wound until new dressing can be applied.     Left Buttocks   Cleanse daily   Leave open to air       Nancy Mendiola PA-C June 16, 2023    Call us at [phone] if you have any questions about your wounds, have redness or swelling around your wound, have a fever of 101 degrees Fahrenheit or greater or if you have any other problems or concerns. We answer the phone Monday through Friday 8 am to 4 pm, please leave a message as we check the voicemail frequently throughout the day.     If you had a positive experience please indicate that on your patient satisfaction survey form that [facility] will be sending you.    It was a pleasure meeting with you today.  Thank you for allowing me and my team the privilege of  caring for you today.  YOU are the reason we are here, and I truly hope we provided you with the excellent service you deserve.  Please let us know if there is anything else we can do for you so that we can be sure you are leaving completely satisfied with your care experience.      If you have any billing related questions please call the [facility] Business office at [phone]. The clinic staff does not handle billing related matters.    If you are scheduled to have a follow up appointment, you will receive a reminder call the day before your visit. On the appointment day please arrive 15 minutes prior to your appointment time. If you are unable to keep that appointment, please call the clinic to cancel or reschedule. If you are more than 10 minutes late or greater for your scheduled appointment time, the clinic policy is that you may be asked to reschedule.

## 2023-06-16 NOTE — PROGRESS NOTES
[facility]    ASSESSMENT:  1. Stage 4 R troch pressure ulcer    PLAN:   1. Start VAC 3x/wk, wife to change dressings   2. Avoid sleeping on this area  3. Chair has now been adjusted  4. Has air mattress    INTERVAL HX:  06/16/23: Returns to clinic. Has been using VAC. Depth is 5 cm. Wound is granular but its a tight pocket. Wife likely not packing fully.     HISTORY OF PRESENT ILLNESS:   Sacha Ndiaye is a 48 year old paraplegic gentleman who returns for a new R troch pressure ulcer. He just noticed this in the last week. He believes it is due from sleeping on this side in bed. He lies directly over this area when on this side. He is now sleeping on his stomach and has purchased an air mattress from [employer]. He has a Flexform seating system that had not been adjusted in some time, now optimized. Last visit we discussed starting NPWT and the area was nearly entirely granular. Has now been approved through insurance and machine has come. Plan for wife to apply dressings. No other concerns.     5/19: Returns to clinic. VAC initiated. Wife will be applying dressings, she has done this before. Wound nearly 100% granular and decreased in size.      VITALS: BP (!) 142/87 (BP Location: Left arm, Patient Position: Sitting)   Pulse 71   Temp 98.3  F (36.8  C) (Temporal)   Resp 14      PHYSICAL EXAM:  GENERAL: Patient is alert and oriented and in no acute distress  INTEGUMENTARY:   Wound (used by [facility] only) 04/12/23 1308 Right greater trochanter pressure injury (Active)   Thickness/Stage full thickness 06/16/23 1146   Base granulating;slough;red 06/16/23 1146   Periwound intact;macerated 06/16/23 1146   Periwound Temperature warm 06/16/23 1146   Periwound Skin Turgor soft 06/16/23 1146   Edges rolled/closed 06/16/23 1146   Length (cm) 1.4 06/16/23 1146   Width (cm) 2.9 06/16/23 1146   Depth (cm) 2 06/16/23 1146   Wound (cm^2) 4.06 cm^2 06/16/23 1146   Wound Volume (cm^3) 8.12 cm^3 06/16/23 1146    Wound healing % 86.06 06/16/23 1146   Undermining [Depth (cm)/Location] 12 - 12 o'clock / 5cm at 2 o'clock 06/16/23 1146   Drainage Characteristics/Odor serosanguineous 06/16/23 1146   Drainage Amount moderate 06/16/23 1146       Wound (used by [facility] only) 04/28/23 1527 Left greater trochanter pressure injury (Active)   Thickness/Stage Stage 3 06/16/23 1146   Base granulating 06/16/23 1146   Periwound intact;redness 06/16/23 1146   Periwound Temperature warm 06/16/23 1146   Periwound Skin Turgor soft 06/16/23 1146   Edges open 06/16/23 1146   Length (cm) 0 06/16/23 1146   Width (cm) 0 06/16/23 1146   Depth (cm) 0 06/16/23 1146   Wound (cm^2) 0 cm^2 06/16/23 1146   Wound Volume (cm^3) 0 cm^3 06/16/23 1146   Wound healing % 100 06/16/23 1146   Drainage Characteristics/Odor serosanguineous 06/16/23 1146   Drainage Amount moderate 06/16/23 1146       Wound (used by [facility] only) 06/16/23 1145 Left gluteal pressure injury (Active)   Base red 06/16/23 1146   Periwound excoriated 06/16/23 1146   Periwound Temperature warm 06/16/23 1146   Periwound Skin Turgor soft 06/16/23 1146   Length (cm) 1.2 06/16/23 1146   Width (cm) 1 06/16/23 1146   Depth (cm) 0.1 06/16/23 1146   Wound (cm^2) 1.2 cm^2 06/16/23 1146   Wound Volume (cm^3) 0.12 cm^3 06/16/23 1146   Drainage Characteristics/Odor serosanguineous 06/16/23 1146   Drainage Amount moderate 06/16/23 1146               MDM: 20 minutes was spent on the day of visit reviewing previous chart notes, assessing the patient and developing the plan of care, this excludes time spent on any procedures.         Further instructions from your care team       Sacha Ndiaye      1975    A DME order was not completed because supplies were not needed. Notes faxed to [facility] including measurements. Patient will also call [facility] for supplies. Almost out of black foam and tape kits.     Wound Dressing Change: Right Trochanter  - Wash your hands with soap and water before you begin your  dressing change and prepare a clean surface for dressings.  - Remove old dressing and ensure all black foam is removed  - Cleanse wound with Vashe moist gauze, leave in place for 10 minutes; remove   - Cleanse periwound skin with wound spray, pat dry and apply No Sting Barrier film.  - Cut Black Foam to fill the depth of wound (5cm at 2 o'clock) but please ensure it is not overstuffed.  - Cover wound with VAC dressing tape to seal the foam, cut appropriate size opening for the TRAC pad.  - Connect TRAC pad and connect to pump; NPWT -125 mmHg Continuous, ensure no leaks  - Change canister when alarms full or weekly  Change dressing three times a week    Document on the outside of the dressing the number of sponges used and the date of the dressing  If dressing is compromised for greater than 2 hours then please remove entire dressing and change or tuck moist Vashe gauze into wound until new dressing can be applied.     Left Buttocks   Cleanse daily   Leave open to air       Nancy Mendiola PA-C June 16, 2023    Call us at [phone] if you have any questions about your wounds, have redness or swelling around your wound, have a fever of 101 degrees Fahrenheit or greater or if you have any other problems or concerns. We answer the phone Monday through Friday 8 am to 4 pm, please leave a message as we check the voicemail frequently throughout the day.     If you had a positive experience please indicate that on your patient satisfaction survey form that [facility] will be sending you.    It was a pleasure meeting with you today.  Thank you for allowing me and my team the privilege of caring for you today.  YOU are the reason we are here, and I truly hope we provided you with the excellent service you deserve.  Please let us know if there is anything else we can do for you so that we can be sure you are leaving completely satisfied with your care experience.      If you have any billing related questions  please call the [facility] Business office at [phone]. The clinic staff does not handle billing related matters.    If you are scheduled to have a follow up appointment, you will receive a reminder call the day before your visit. On the appointment day please arrive 15 minutes prior to your appointment time. If you are unable to keep that appointment, please call the clinic to cancel or reschedule. If you are more than 10 minutes late or greater for your scheduled appointment time, the clinic policy is that you may be asked to reschedule.

## 2023-06-16 NOTE — PROGRESS NOTES
Patient Active Problem List   Diagnosis     Spinal stenosis     SCI (spinal cord injury)     Hardware complicating wound infection, initial encounter (H)     Charcot's joint, vertebrae     Status post flap graft     Constipation     H/O lumbosacral spine surgery     Hip dislocation, bilateral (H)     Infection and inflammatory reaction due to internal orthopedic device, implant, and graft (H)     Neurogenic bladder     Neurogenic bowel     Osteomyelitis of pelvic region or thigh, acute, left (H)     Chronic osteomyelitis involving pelvic region and thigh (H)     Paraplegia (H)     Traumatic spinal subdural hematoma     Hx of plastic surgery     Non-healing left groin open wound, initial encounter     Non-healing left groin open wound, subsequent encounter     Open wound of knee, leg, and ankle, left, initial encounter     Chronic antibiotic suppression     Chronic ulcer of sacral region (H)     Fixation hardware in spine     Hx of spinal surgery     Pressure injury of right hip, stage 4 (H)     Pressure injury of trochanteric region of left hip, stage 3 (H)     Past Medical History:   Diagnosis Date     Anemia      Charcot's joint      Chronic UTI      Constipation      Dislocated hip (H)     hyperlordosis     Epicondylitis      History of blood transfusion      Hx: UTI (urinary tract infection)      Neurogenic bladder      Other chronic pain      Paraplegia following spinal cord injury (H)      Pressure ulcer stage IV     left groin     Labs:   Recent Labs   Lab Test 04/14/19  0602 04/13/19  0651 01/17/19  0945 01/14/17  0636 01/13/17  0609 10/28/15  0715 10/27/15  0714   ALBUMIN  --   --  2.9*  --   --    < >  --    HGB 9.0* 8.7*  --    < > 8.9*   < >  --    INR  --   --   --   --   --   --  1.12   WBC  --  8.6  --   --   --    < >  --    A1C  --   --   --   --  4.7  --   --    CRP  --   --  37.8*  --   --    < >  --     < > = values in this interval not displayed.     Nutrition requirements were discussed with  patient today.  Vitals:  BP (!) 142/87 (BP Location: Left arm, Patient Position: Sitting)   Pulse 71   Temp 98.3  F (36.8  C) (Temporal)   Resp 14   Wound:   Rash 11/28/16 Bilateral thigh (Active)       Wound (used by OP [facility] only) 04/12/23 1308 Right greater trochanter pressure injury (Active)   Thickness/Stage full thickness 06/16/23 1146   Base granulating;slough;red 06/16/23 1146   Periwound intact;macerated 06/16/23 1146   Periwound Temperature warm 06/16/23 1146   Periwound Skin Turgor soft 06/16/23 1146   Edges rolled/closed 06/16/23 1146   Length (cm) 1.4 06/16/23 1146   Width (cm) 2.9 06/16/23 1146   Depth (cm) 2 06/16/23 1146   Wound (cm^2) 4.06 cm^2 06/16/23 1146   Wound Volume (cm^3) 8.12 cm^3 06/16/23 1146   Wound healing % 86.06 06/16/23 1146   Tunneling [Depth (cm)/Location] 3.6 05/19/23 1050   Undermining [Depth (cm)/Location] 12 - 12 o'clock / 5cm at 2 o'clock 06/16/23 1146   Drainage Characteristics/Odor serosanguineous 06/16/23 1146   Drainage Amount moderate 06/16/23 1146   Care, Wound non-select wound debridement performed 05/19/23 1050       Wound (used by [facility] only) 04/28/23 1527 Left greater trochanter pressure injury (Active)   Thickness/Stage Stage 3 06/16/23 1146   Base granulating 06/16/23 1146   Periwound intact;redness 06/16/23 1146   Periwound Temperature warm 06/16/23 1146   Periwound Skin Turgor soft 06/16/23 1146   Edges open 06/16/23 1146   Length (cm) 0 06/16/23 1146   Width (cm) 0 06/16/23 1146   Depth (cm) 0 06/16/23 1146   Wound (cm^2) 0 cm^2 06/16/23 1146   Wound Volume (cm^3) 0 cm^3 06/16/23 1146   Wound healing % 100 06/16/23 1146   Drainage Characteristics/Odor serosanguineous 06/16/23 1146   Drainage Amount moderate 06/16/23 1146   Care, Wound non-select wound debridement performed 05/19/23 1050       Wound (used by [facility] only) 06/16/23 1145 Left gluteal pressure injury (Active)   Base red 06/16/23 1146   Periwound excoriated 06/16/23 1146   Periwound Temperature warm  06/16/23 1146   Periwound Skin Turgor soft 06/16/23 1146   Length (cm) 1.2 06/16/23 1146   Width (cm) 1 06/16/23 1146   Depth (cm) 0.1 06/16/23 1146   Wound (cm^2) 1.2 cm^2 06/16/23 1146   Wound Volume (cm^3) 0.12 cm^3 06/16/23 1146   Drainage Characteristics/Odor serosanguineous 06/16/23 1146   Drainage Amount moderate 06/16/23 1146      Photo:           Further instructions from your care team       Sacha Ndiaye      1975    A DME order was not completed because supplies were not needed. Notes faxed to [facility] including measurements. Patient will also call [facility] for supplies. Almost out of black foam and tape kits.     Wound Dressing Change: Right Trochanter  - Wash your hands with soap and water before you begin your dressing change and prepare a clean surface for dressings.  - Remove old dressing and ensure all black foam is removed  - Cleanse wound with Vashe moist gauze, leave in place for 10 minutes; remove   - Cleanse periwound skin with wound spray, pat dry and apply No Sting Barrier film.  - Cut Black Foam to fill the depth of wound (5cm at 2 o'clock) but please ensure it is not overstuffed.  - Cover wound with VAC dressing tape to seal the foam, cut appropriate size opening for the TRAC pad.  - Connect TRAC pad and connect to pump; NPWT -125 mmHg Continuous, ensure no leaks  - Change canister when alarms full or weekly  Change dressing three times a week    Document on the outside of the dressing the number of sponges used and the date of the dressing  If dressing is compromised for greater than 2 hours then please remove entire dressing and change or tuck moist Vashe gauze into wound until new dressing can be applied.     Left Buttocks   Cleanse daily   Leave open to air       Nancy Mendiola PA-C June 16, 2023    Call us at [phone] if you have any questions about your wounds, have redness or swelling around your wound, have a fever of 101 degrees Fahrenheit or greater or if you have any other  problems or concerns. We answer the phone Monday through Friday 8 am to 4 pm, please leave a message as we check the voicemail frequently throughout the day.     If you had a positive experience please indicate that on your patient satisfaction survey form that [facility] will be sending you.    It was a pleasure meeting with you today.  Thank you for allowing me and my team the privilege of caring for you today.  YOU are the reason we are here, and I truly hope we provided you with the excellent service you deserve.  Please let us know if there is anything else we can do for you so that we can be sure you are leaving completely satisfied with your care experience.      If you have any billing related questions please call the [facility] Business office at [phone]. The clinic staff does not handle billing related matters.    If you are scheduled to have a follow up appointment, you will receive a reminder call the day before your visit. On the appointment day please arrive 15 minutes prior to your appointment time. If you are unable to keep that appointment, please call the clinic to cancel or reschedule. If you are more than 10 minutes late or greater for your scheduled appointment time, the clinic policy is that you may be asked to reschedule.

## 2023-07-14 ENCOUNTER — HOSPITAL ENCOUNTER (OUTPATIENT)
Dept: WOUND CARE | Facility: CLINIC | Age: 48
Discharge: HOME | End: 2023-07-14
Attending: PHYSICIAN ASSISTANT | Admitting: PHYSICIAN ASSISTANT
Payer: COMMERCIAL

## 2023-07-14 VITALS — SYSTOLIC BLOOD PRESSURE: 139 MMHG | DIASTOLIC BLOOD PRESSURE: 85 MMHG | TEMPERATURE: 97.9 F | HEART RATE: 70 BPM

## 2023-07-14 DIAGNOSIS — L89.214 PRESSURE INJURY OF RIGHT HIP, STAGE 4 (H): Primary | ICD-10-CM

## 2023-07-14 PROCEDURE — 11042 DBRDMT SUBQ TIS 1ST 20SQCM/<: CPT | Performed by: PHYSICIAN ASSISTANT

## 2023-07-14 NOTE — DISCHARGE INSTRUCTIONS
Sacha Ndiaye      1975      A DME order was not completed because supplies were not needed.     Wound Dressing Change: Right Trochanter  - Wash your hands with soap and water before you begin your dressing change and prepare a clean surface for dressings.  - Remove old dressing and ensure all black foam is removed  - Cleanse wound with Vashe moist gauze, leave in place for 10 minutes; remove   - Cleanse periwound skin with wound spray, pat dry and apply No Sting Barrier film.  - Cut Black Foam to fill the depth of wound (5cm at 2 o'clock) but please ensure it is not overstuffed.  - Cover wound with VAC dressing tape to seal the foam, cut appropriate size opening for the TRAC pad.  - Connect TRAC pad and connect to pump; NPWT -125 mmHg Continuous, ensure no leaks  - Change canister when alarms full or weekly  Change dressing three times a week     Document on the outside of the dressing the number of sponges used and the date of the dressing  If dressing is compromised for greater than 2 hours then please remove entire dressing and change or tuck moist Vashe gauze into wound until new dressing can be applied.     Left Buttocks:   - Cleanse daily   - Leave open to air     Nancy Mendiola PA-C July 14, 2023    Call us at [phone] if you have any questions about your wounds, have redness or swelling around your wound, have a fever of 101 degrees Fahrenheit or greater or if you have any other problems or concerns. We answer the phone Monday through Friday 8 am to 4 pm, please leave a message as we check the voicemail frequently throughout the day.     If you had a positive experience please indicate that on your patient satisfaction survey form that [facility] will be sending you.    It was a pleasure meeting with you today.  Thank you for allowing me and my team the privilege of caring for you today.  YOU are the reason we are here, and I truly hope we provided you with the excellent service you  deserve.  Please let us know if there is anything else we can do for you so that we can be sure you are leaving completely satisfied with your care experience.      If you have any billing related questions please call the [facility] Business office at [phone]. The clinic staff does not handle billing related matters.    If you are scheduled to have a follow up appointment, you will receive a reminder call the day before your visit. On the appointment day please arrive 15 minutes prior to your appointment time. If you are unable to keep that appointment, please call the clinic to cancel or reschedule. If you are more than 10 minutes late or greater for your scheduled appointment time, the clinic policy is that you may be asked to reschedule.

## 2023-07-19 NOTE — PROGRESS NOTES
[facility]    ASSESSMENT:  1. Stage 4 R troch pressure ulcer    PLAN:   1. Wound opened via Bovie to help facilitate dressing application  2. cont VAC 3x/wk, wife to change dressings   3. Avoid sleeping on this area  4. Chair has now been adjusted  5. Has air mattress    INTERVAL HX:  7/14: Returns to clinic. Wound decreasing in size but undermined area still extensive (5.5cm) with tight opening.      HISTORY OF PRESENT ILLNESS:   Sacha Ndiaye is a 48 year old paraplegic gentleman who returns for a new R troch pressure ulcer. He just noticed this in the last week. He believes it is due from sleeping on this side in bed. He lies directly over this area when on this side. He is now sleeping on his stomach and has purchased an air mattress from [employer]. He has a Flexform seating system that had not been adjusted in some time, now optimized. Last visit we discussed starting NPWT and the area was nearly entirely granular. Has now been approved through insurance and machine has come. Plan for wife to apply dressings. No other concerns.     5/19: Returns to clinic. VAC initiated. Wife will be applying dressings, she has done this before. Wound nearly 100% granular and decreased in size.    06/16/23: Returns to clinic. Has been using VAC. Depth is 5 cm. Wound is granular but its a tight pocket. Wife likely not packing fully.     VITALS: /85 (BP Location: Right arm)   Pulse 70   Temp 97.9  F (36.6  C)      PHYSICAL EXAM:  GENERAL: Patient is alert and oriented and in no acute distress  INTEGUMENTARY:   Rash 11/28/16 Bilateral thigh (Active)       Wound (used by [facility] only) 04/12/23 1308 Right greater trochanter pressure injury (Active)   Thickness/Stage full thickness 07/14/23 1544   Base granulating;slough;red 07/14/23 1544   Periwound intact;macerated 07/14/23 1544   Periwound Temperature warm 07/14/23 1544   Periwound Skin Turgor soft 07/14/23 1544   Edges rolled/closed 07/14/23 1544    Length (cm) 4 07/14/23 1544   Width (cm) 6.5 07/14/23 1544   Depth (cm) 2.3 07/14/23 1544   Wound (cm^2) 26 cm^2 07/14/23 1544   Wound Volume (cm^3) 59.8 cm^3 07/14/23 1544   Wound healing % 10.71 07/14/23 1544   Tunneling [Depth (cm)/Location] 0 07/14/23 1544   Undermining [Depth (cm)/Location] 12-3 o'clock/ 5.5cm 07/14/23 1544   Drainage Characteristics/Odor serosanguineous 07/14/23 1544   Drainage Amount moderate 07/14/23 1544   Care, Wound debrided 07/14/23 1500   Dressing Care other (see comments) 07/14/23 1544         PROCEDURE: Nursing staff performed mechanical debridement with dressing removal and cleansing and then applied 4% lidocaine to the wound bed. Patient was determined to be capable of making their own medical decisions and informed consent was obtained. Using a Bovie a surgical debridement was performed down to and including  subcutaneous tissue of <20cm2. Hemostasis was achieved with electrocautery. The patient tolerated the procedure well.          Further instructions from your care team       Sacha Ndiaye      1975      A DME order was not completed because supplies were not needed.     Wound Dressing Change: Right Trochanter  - Wash your hands with soap and water before you begin your dressing change and prepare a clean surface for dressings.  - Remove old dressing and ensure all black foam is removed  - Cleanse wound with Vashe moist gauze, leave in place for 10 minutes; remove   - Cleanse periwound skin with wound spray, pat dry and apply No Sting Barrier film.  - Cut Black Foam to fill the depth of wound (5cm at 2 o'clock) but please ensure it is not overstuffed.  - Cover wound with VAC dressing tape to seal the foam, cut appropriate size opening for the TRAC pad.  - Connect TRAC pad and connect to pump; NPWT -125 mmHg Continuous, ensure no leaks  - Change canister when alarms full or weekly  Change dressing three times a week     Document on the outside of the dressing the number  of sponges used and the date of the dressing  If dressing is compromised for greater than 2 hours then please remove entire dressing and change or tuck moist Vashe gauze into wound until new dressing can be applied.     Left Buttocks:   - Cleanse daily   - Leave open to air     Nancy Mendiola PA-C July 14, 2023    Call us at [phone] if you have any questions about your wounds, have redness or swelling around your wound, have a fever of 101 degrees Fahrenheit or greater or if you have any other problems or concerns. We answer the phone Monday through Friday 8 am to 4 pm, please leave a message as we check the voicemail frequently throughout the day.     If you had a positive experience please indicate that on your patient satisfaction survey form that [facility] will be sending you.    It was a pleasure meeting with you today.  Thank you for allowing me and my team the privilege of caring for you today.  YOU are the reason we are here, and I truly hope we provided you with the excellent service you deserve.  Please let us know if there is anything else we can do for you so that we can be sure you are leaving completely satisfied with your care experience.      If you have any billing related questions please call the [facility] Business office at [phone]. The clinic staff does not handle billing related matters.    If you are scheduled to have a follow up appointment, you will receive a reminder call the day before your visit. On the appointment day please arrive 15 minutes prior to your appointment time. If you are unable to keep that appointment, please call the clinic to cancel or reschedule. If you are more than 10 minutes late or greater for your scheduled appointment time, the clinic policy is that you may be asked to reschedule.

## 2023-08-17 ENCOUNTER — HOSPITAL ENCOUNTER (OUTPATIENT)
Dept: WOUND CARE | Facility: CLINIC | Age: 48
Discharge: HOME | End: 2023-08-17
Attending: SURGERY | Admitting: SURGERY
Payer: COMMERCIAL

## 2023-08-17 VITALS — SYSTOLIC BLOOD PRESSURE: 116 MMHG | TEMPERATURE: 97.2 F | HEART RATE: 70 BPM | DIASTOLIC BLOOD PRESSURE: 78 MMHG

## 2023-08-17 DIAGNOSIS — L89.214 PRESSURE INJURY OF RIGHT HIP, STAGE 4 (H): Primary | ICD-10-CM

## 2023-08-17 LAB
CRP SERPL-MCNC: 62.98 MG/L
ERYTHROCYTE [SEDIMENTATION RATE] IN BLOOD BY WESTERGREN METHOD: 60 MM/HR (ref 0–15)

## 2023-08-17 PROCEDURE — 99213 OFFICE O/P EST LOW 20 MIN: CPT | Performed by: SURGERY

## 2023-08-17 PROCEDURE — 86140 C-REACTIVE PROTEIN: CPT | Performed by: SURGERY

## 2023-08-17 PROCEDURE — 36415 COLL VENOUS BLD VENIPUNCTURE: CPT | Performed by: SURGERY

## 2023-08-17 PROCEDURE — 85652 RBC SED RATE AUTOMATED: CPT | Performed by: SURGERY

## 2023-08-17 PROCEDURE — 97602 WOUND(S) CARE NON-SELECTIVE: CPT

## 2023-08-17 NOTE — PROGRESS NOTES
Patient Active Problem List   Diagnosis    Spinal stenosis    SCI (spinal cord injury)    Hardware complicating wound infection, initial encounter (H)    Charcot's joint, vertebrae    Status post flap graft    Constipation    H/O lumbosacral spine surgery    Hip dislocation, bilateral (H)    Infection and inflammatory reaction due to internal orthopedic device, implant, and graft (H)    Neurogenic bladder    Neurogenic bowel    Osteomyelitis of pelvic region or thigh, acute, left (H)    Chronic osteomyelitis involving pelvic region and thigh (H)    Paraplegia (H)    Traumatic spinal subdural hematoma    Hx of plastic surgery    Non-healing left groin open wound, initial encounter    Non-healing left groin open wound, subsequent encounter    Open wound of knee, leg, and ankle, left, initial encounter    Chronic antibiotic suppression    Chronic ulcer of sacral region (H)    Fixation hardware in spine    Hx of spinal surgery    Pressure injury of right hip, stage 4 (H)    Pressure injury of trochanteric region of left hip, stage 3 (H)     Past Medical History:   Diagnosis Date    Anemia     Charcot's joint     Chronic UTI     Constipation     Dislocated hip (H)     hyperlordosis    Epicondylitis     History of blood transfusion     Hx: UTI (urinary tract infection)     Neurogenic bladder     Other chronic pain     Paraplegia following spinal cord injury (H)     Pressure ulcer stage IV     left groin   Labs:   Recent Labs   Lab Test 04/14/19  0602 04/13/19  0651 01/17/19  0945 01/14/17  0636 01/13/17  0609 10/28/15  0715 10/27/15  0714   ALBUMIN  --   --  2.9*  --   --    < >  --    HGB 9.0* 8.7*  --    < > 8.9*   < >  --    INR  --   --   --   --   --   --  1.12   WBC  --  8.6  --   --   --    < >  --    A1C  --   --   --   --  4.7  --   --    CRP  --   --  37.8*  --   --    < >  --     < > = values in this interval not displayed.   Nutrition requirements were discussed with patient today.  Vitals:  /78 (BP  Location: Right arm, Patient Position: Chair, Cuff Size: Adult Regular)   Pulse 70   Temp 97.2  F (36.2  C) (Temporal)   Wound:   Rash 11/28/16 Bilateral thigh (Active)       Wound (used by OP [facility] only) 04/12/23 1308 Right greater trochanter pressure injury (Active)   Thickness/Stage full thickness 08/17/23 1250   Base granulating;slough;red 08/17/23 1250   Periwound intact;macerated 08/17/23 1250   Periwound Temperature warm 08/17/23 1250   Periwound Skin Turgor soft 08/17/23 1250   Edges rolled/closed 08/17/23 1250   Length (cm) 2.9 08/17/23 1250   Width (cm) 6.6 08/17/23 1250   Depth (cm) 1.5 08/17/23 1250   Wound (cm^2) 19.14 cm^2 08/17/23 1250   Wound Volume (cm^3) 28.71 cm^3 08/17/23 1250   Wound healing % 34.27 08/17/23 1250   Tunneling [Depth (cm)/Location] 3 oclock/ 4 cm 08/17/23 1250   Undermining [Depth (cm)/Location] 4-10 o'/ 2cm 08/17/23 1250   Drainage Characteristics/Odor serosanguineous 08/17/23 1250   Drainage Amount moderate 08/17/23 1250   Care, Wound non-select wound debridement performed 08/17/23 1250   Dressing Care other (see comments) 07/14/23 6414      Photo:   Further instructions from your care team         Sacha Ndiaye      1975  A DME order was not completed because the patient declined the need for supplies  Dressing changes outside of clinic are being performed by Spouse    [facility]    Wound Dressing Change: Right Trochanter  - Wash your hands with soap and water before you begin your dressing change and prepare a clean surface for dressings.  - Remove old dressing and ensure all black foam is removed  - Cleanse wound with Vashe moist gauze, leave in place for 10 minutes; remove   - Cleanse periwound skin with wound spray, pat dry and apply No Sting Barrier film.    - Cut Black Foam like a cinnamon roll to fill undermine areas around wound and to fill the depth of wound   - Cover wound with VAC dressing tape to seal the  foam, cut appropriate size opening for the TRAC pad.  - Connect TRAC pad and connect to pump; NPWT -125 mmHg Continuous, ensure no leaks  - Change canister when alarms full or weekly  Change dressing three times a week     Document on the outside of the dressing the number of sponges used and the date of the dressing  If dressing is compromised for greater than 2 hours then please remove entire dressing and change or tuck moist Vashe gauze into wound until new dressing can be applied.     Repositioning:  Bed: Reposition MINIMALLY every 1-2 hours in bed to relieve pressure and promote perfusion to tissue.  Chair: When up to the chair, do not sit for longer than one hour total without repositioning to relieve pressure and promote perfusion to the tissue.  Completely recline/tilt for 15 minutes each hour.  Sit on a chair cushion when up to the chair.      Bull Harris M.D. August 17, 2023

## 2023-08-17 NOTE — PROGRESS NOTES
Visit Date: 08/17/2023    This is a 48-year-old, paraplegic gentleman that I have known for quite some time who has been seen by our physician's assistant, Sandra Mendiola, since he developed a new right trochanteric stage IV decubitus.  Apparently, he first noticed it on 04/11 and thinks that it might have developed while he was trying to offload his left hip and sleeping in a right lateral decubitus position on their Tempur-Pedic mattress.  Since that time, Sandra has I and D'ed him, and they have managed to get him an alternating pressure mattress at home.  He did pressure map his cushion, and everything mapped perfectly.  He continues to be on Cipro and doxycycline daily basically for the rest of his life due to his previous surgeries and his hardware.  That being said, he has been feeling fine and not at all ill.  There is no systemic signs or symptoms of infection.  He works 3 hours on, 2 hours off then 3 hours on to help offload.  They have been vac'ing the wound at 125 mmHg and changing the canister the usual frequency.  The possible etiology for this might actually be that he has had some previous dislocations of his hip, so one wonders if there is a source of pressure from within, depending on his position.  Interestingly, it really does not bother him when he is sitting or lying down except when someone starts to manipulate the wound, the it becomes somewhat uncomfortable for him.  I do not think we have imaged anything yet, so we will consider that.    On exam, there is a very clean transverse wound over the posterior trochanter.  It has some callus macerated edges but, otherwise, looks clean.  There is undermining around the majority of the wound superiorly and laterally.  On palpation of the wound bed, I could not tell if there was some sort of calcified plaque-like heterotopic ossification or if something else was going on.      At this point, just to clarify the anatomy underlying this wound, we would  like to get a CAT scan of the right hip or pelvis.  We will also send a CRP and sed rate just to make sure there is not some sort of brewing osteo underneath.  Until we know for sure, we will continue with the VAC that is changed by his wife.  He can follow up with Sandra or myself to discuss the results.  If there are any issues with regard to HO or persistent hip dislocation, we may need the patient to go see Dr. Elder in orthopedics about need for a Girdlestone or anything else that might help stabilize things and prevent dislocation.  Please see nursing notes for dimensions of the wounds and vital signs today and, also, photos.    Cheryl Harris MD        D: 2023   T: 2023   MT: [email or web address]    Name:     GIA CAMARGO  MRN:      [id number]        Account:    [id number]   :      1975           Visit Date: 2023     Document: [id number]

## 2023-08-17 NOTE — DISCHARGE INSTRUCTIONS
Sacha Ndiaye      1975  A DME order was not completed because the patient declined the need for supplies  Dressing changes outside of clinic are being performed by Spouse    [facility]vis    Wound Dressing Change: Right Trochanter  - Wash your hands with soap and water before you begin your dressing change and prepare a clean surface for dressings.  - Remove old dressing and ensure all black foam is removed  - Cleanse wound with Vashe moist gauze, leave in place for 10 minutes; remove   - Cleanse periwound skin with wound spray, pat dry and apply No Sting Barrier film.    - Cut Black Foam like a cinnamon roll to fill undermine areas around wound and to fill the depth of wound   - Cover wound with VAC dressing tape to seal the foam, cut appropriate size opening for the TRAC pad.  - Connect TRAC pad and connect to pump; NPWT -125 mmHg Continuous, ensure no leaks  - Change canister when alarms full or weekly  Change dressing three times a week     Document on the outside of the dressing the number of sponges used and the date of the dressing  If dressing is compromised for greater than 2 hours then please remove entire dressing and change or tuck moist Vashe gauze into wound until new dressing can be applied.     Repositioning:  Bed: Reposition MINIMALLY every 1-2 hours in bed to relieve pressure and promote perfusion to tissue.  Chair: When up to the chair, do not sit for longer than one hour total without repositioning to relieve pressure and promote perfusion to the tissue.  Completely recline/tilt for 15 minutes each hour.  Sit on a chair cushion when up to the chair.      Bull Harris M.D. August 17, 2023    Call us at [phone] if you have any questions about your wounds, have redness or swelling around your wound, have a fever of 101 degrees Fahrenheit or greater or if you have any other problems or concerns. We answer the phone Monday through  Friday 8 am to 4 pm, please leave a message as we check the voicemail frequently throughout the day.     If you had a positive experience please indicate that on your patient satisfaction survey form that [facility] will be sending you. It was a pleasure meeting with you today.  Thank you for allowing me and my team the privilege of caring for you today.  YOU are the reason we are here, and I truly hope we provided you with the excellent service you deserve.  Please let us know if there is anything else we can do for you so that we can be sure you are leaving completely satisfied with your care experience.      If you have any billing related questions please call the [facility] Business office at [phone]. The clinic staff does not handle billing related matters. If you are scheduled to have a follow up appointment, you will receive a reminder call the day before your visit. On the appointment day please arrive 15 minutes prior to your appointment time. If you are unable to keep that appointment, please call the clinic to cancel or reschedule. If you are more than 10 minutes late or greater for your scheduled appointment time, the clinic policy is that you may be asked to reschedule.

## 2023-09-13 ENCOUNTER — HOSPITAL ENCOUNTER (OUTPATIENT)
Dept: WOUND CARE | Facility: CLINIC | Age: 48
Discharge: HOME | End: 2023-09-13
Attending: FAMILY MEDICINE | Admitting: FAMILY MEDICINE
Payer: COMMERCIAL

## 2023-09-13 VITALS — DIASTOLIC BLOOD PRESSURE: 72 MMHG | TEMPERATURE: 96.9 F | HEART RATE: 88 BPM | SYSTOLIC BLOOD PRESSURE: 142 MMHG

## 2023-09-13 DIAGNOSIS — L89.214 PRESSURE INJURY OF RIGHT HIP, STAGE 4 (H): Primary | ICD-10-CM

## 2023-09-13 PROCEDURE — 97605 NEG PRS WND THER DME<=50SQCM: CPT

## 2023-09-13 NOTE — PROGRESS NOTES
[facility] [place] Nurse Note    Subject: Sacha KAUFFMAN Zelda presents for nurse only visit for wound check and dressing change. Wound is stable. CT results briefly reviewed, but will have Dr. Harris view and provide interpretation/recommendations.      Exam:  BP (!) 142/72 (BP Location: Right arm, Patient Position: Sitting)   Pulse 88   Temp 96.9  F (36.1  C) (Temporal)   Rash 11/28/16 Bilateral thigh (Active)       Wound (used by OP [facility] only) 04/12/23 1308 Right greater trochanter pressure injury (Active)   Thickness/Stage full thickness 09/13/23 0913   Base granulating;slough 09/13/23 0913   Periwound macerated 09/13/23 0913   Periwound Temperature warm 09/13/23 0913   Periwound Skin Turgor soft 09/13/23 0913   Edges rolled/closed 09/13/23 0913   Length (cm) 3.1 09/13/23 0913   Width (cm) 6 09/13/23 0913   Depth (cm) 1.5 09/13/23 0913   Wound (cm^2) 18.6 cm^2 09/13/23 0913   Wound Volume (cm^3) 27.9 cm^3 09/13/23 0913   Wound healing % 36.13 09/13/23 0913   Tunneling [Depth (cm)/Location] 3 oclock/ 4 cm 08/17/23 1250   Undermining [Depth (cm)/Location] 2.9cm/12-12 o'clock 09/13/23 0913   Drainage Characteristics/Odor serosanguineous 09/13/23 0913   Drainage Amount moderate 09/13/23 0913   Care, Wound non-select wound debridement performed;negative pressure wound therapy 09/13/23 0913   Dressing Care other (see comments) 07/14/23 1544     Procedure: non-selective debridement was performed, no bleeding occurred. Patient tolerated procedure well.  NPWT (VAC) applied. Used KELSEY SEAL to heal periwound skin and provide good seal.    Wound redressed as directed below.    Plan: Patient will return to the clinic in 4 weeks time  September 13, 2023          Further instructions from your care team         Sacha DALY Zelda      1975    A DME order was not completed because the patient declined the need for supplies  Dressing changes outside of clinic are being performed by Spouse     Wound Dressing  Change: Right Trochanter  - Wash your hands with soap and water before you begin your dressing change and prepare a clean surface for dressings.  - Remove old dressing and ensure all black foam is removed  - Cleanse wound with Vashe moist gauze, leave in place for 10 minutes; remove   - Cleanse periwound skin with wound spray, pat dry and apply No Sting Barrier film.  - Stretch and apply barrier ring (Vicente seal, [place] barrier ring, etc) to periwound skin  - Cut Black Foam like a cinnamon roll to fill undermine areas around wound and to fill the depth of wound   - Cover wound with VAC dressing tape to seal the foam, cut appropriate size opening for the TRAC pad.  - Connect TRAC pad and connect to pump; NPWT -125 mmHg Continuous, ensure no leaks  - Change canister when alarms full or weekly  Change dressing three times a week     Document on the outside of the dressing the number of sponges used and the date of the dressing  If dressing is compromised for greater than 2 hours then please remove entire dressing and change or tuck moist Vashe gauze into wound until new dressing can be applied.     Repositioning:  Bed: Reposition MINIMALLY every 1-2 hours in bed to relieve pressure and promote perfusion to tissue. Keep pressure off right hip at all times!  Chair: When up to the chair, do not sit for longer than one hour total without repositioning to relieve pressure and promote perfusion to the tissue.  Completely recline/tilt for 15 minutes each hour.  Sit on a chair cushion when up to the chair.      Danica Nelson RN on 9/13/2023 at 9:16 AM on behalf of Nancy Mendiola PA-C September 13, 2023    Call us at [phone] if you have any questions about your wounds, have redness or swelling around your wound, have a fever of 101 degrees Fahrenheit or greater or if you have any other problems or concerns. We answer the phone Monday through Friday 8 am to 4 pm, please leave a message as we check the voicemail  frequently throughout the day.     If you had a positive experience please indicate that on your patient satisfaction survey form that [facility] will be sending you.    It was a pleasure meeting with you today.  Thank you for allowing me and my team the privilege of caring for you today.  YOU are the reason we are here, and I truly hope we provided you with the excellent service you deserve.  Please let us know if there is anything else we can do for you so that we can be sure you are leaving completely satisfied with your care experience.      If you have any billing related questions please call the [facility] Business office at [phone]. The clinic staff does not handle billing related matters.    If you are scheduled to have a follow up appointment, you will receive a reminder call the day before your visit. On the appointment day please arrive 15 minutes prior to your appointment time. If you are unable to keep that appointment, please call the clinic to cancel or reschedule. If you are more than 10 minutes late or greater for your scheduled appointment time, the clinic policy is that you may be asked to reschedule.

## 2023-09-13 NOTE — DISCHARGE INSTRUCTIONS
Sacha Ndiaye      1975    A DME order was not completed because the patient declined the need for supplies  Dressing changes outside of clinic are being performed by Spouse     Wound Dressing Change: Right Trochanter  - Wash your hands with soap and water before you begin your dressing change and prepare a clean surface for dressings.  - Remove old dressing and ensure all black foam is removed  - Cleanse wound with Vashe moist gauze, leave in place for 10 minutes; remove   - Cleanse periwound skin with wound spray, pat dry and apply No Sting Barrier film.  - Stretch and apply barrier ring (Vicente seal, [place]in[place] barrier ring, etc) to periwound skin  - Cut Black Foam like a cinnamon roll to fill undermine areas around wound and to fill the depth of wound   - Cover wound with VAC dressing tape to seal the foam, cut appropriate size opening for the TRAC pad.  - Connect TRAC pad and connect to pump; NPWT -125 mmHg Continuous, ensure no leaks  - Change canister when alarms full or weekly  Change dressing three times a week     Document on the outside of the dressing the number of sponges used and the date of the dressing  If dressing is compromised for greater than 2 hours then please remove entire dressing and change or tuck moist Vashe gauze into wound until new dressing can be applied.     Repositioning:  Bed: Reposition MINIMALLY every 1-2 hours in bed to relieve pressure and promote perfusion to tissue. Keep pressure off right hip at all times!  Chair: When up to the chair, do not sit for longer than one hour total without repositioning to relieve pressure and promote perfusion to the tissue.  Completely recline/tilt for 15 minutes each hour.  Sit on a chair cushion when up to the chair.      Danica Nelson RN on 9/13/2023 at 9:16 AM on behalf of Nancy Mendiola PA-C September 13, 2023    Call us at [phone] if you have any questions about your wounds, have redness or swelling around your wound,  have a fever of 101 degrees Fahrenheit or greater or if you have any other problems or concerns. We answer the phone Monday through Friday 8 am to 4 pm, please leave a message as we check the voicemail frequently throughout the day.     If you had a positive experience please indicate that on your patient satisfaction survey form that [facility] will be sending you.    It was a pleasure meeting with you today.  Thank you for allowing me and my team the privilege of caring for you today.  YOU are the reason we are here, and I truly hope we provided you with the excellent service you deserve.  Please let us know if there is anything else we can do for you so that we can be sure you are leaving completely satisfied with your care experience.      If you have any billing related questions please call the [facility] Business office at [phone]. The clinic staff does not handle billing related matters.    If you are scheduled to have a follow up appointment, you will receive a reminder call the day before your visit. On the appointment day please arrive 15 minutes prior to your appointment time. If you are unable to keep that appointment, please call the clinic to cancel or reschedule. If you are more than 10 minutes late or greater for your scheduled appointment time, the clinic policy is that you may be asked to reschedule.

## 2023-09-26 ENCOUNTER — MEDICAL CORRESPONDENCE (OUTPATIENT)
Dept: HEALTH INFORMATION MANAGEMENT | Facility: CLINIC | Age: 48
End: 2023-09-26
Payer: COMMERCIAL

## 2023-10-11 ENCOUNTER — HOSPITAL ENCOUNTER (OUTPATIENT)
Dept: WOUND CARE | Facility: CLINIC | Age: 48
Discharge: HOME | End: 2023-10-11
Attending: PHYSICIAN ASSISTANT | Admitting: PHYSICIAN ASSISTANT
Payer: COMMERCIAL

## 2023-10-11 VITALS — DIASTOLIC BLOOD PRESSURE: 69 MMHG | SYSTOLIC BLOOD PRESSURE: 106 MMHG | TEMPERATURE: 97 F | HEART RATE: 109 BPM

## 2023-10-11 DIAGNOSIS — L89.214 PRESSURE INJURY OF RIGHT HIP, STAGE 4 (H): Primary | ICD-10-CM

## 2023-10-11 PROCEDURE — 99214 OFFICE O/P EST MOD 30 MIN: CPT | Performed by: PHYSICIAN ASSISTANT

## 2023-10-11 PROCEDURE — 97602 WOUND(S) CARE NON-SELECTIVE: CPT

## 2023-10-11 NOTE — PROGRESS NOTES
Called [employer] and added VAC white foam to order;  Expected delivery date of 10/13/23 of one case of 10 white foam to patient home address.   #[id number] confirmation number.  Justin as [employer] associate

## 2023-10-11 NOTE — PROGRESS NOTES
"[facility]    ASSESSMENT:  Stage 4 R troch pressure ulcer    PLAN:   Refer to Dr. Elder to get his input on whether girdlestone, HO removal and/or other indication for surgery would help with healing. There is 360 degrees of undermining around the trochanter.   Left message with Dr. Harris about his CT results and her thoughts on whether the \"chronic osteomyelitis\" finding is significant, we could consider bone biopsy if there is no surgical intervention in his future  cont VAC 3x/wk, wife to change dressings - Juana Hunt RN CWON will teach wife new technique with white foam to try to get into the undermining. Will also send him home with CTAs.   Avoid sleeping on this area  Chair has now been adjusted  Has air mattress    INTERVAL HX:  8/17: Met with Dr. Harris who made interesting assessment that his chronic hip dislocation may be causing pressure from within the wound. She ordered CT which showed ongoing dislocation and progressive HO.   10/11/23: Returns to clinic. Wound has not improved in size or undermining. He thinks his wife is hesitant to stick her finger into wound to pack it and recommends CTAs to help with application. Fortunately he is feeling well, has no pain. No f/c/n/v.     HISTORY OF PRESENT ILLNESS:   Sacha Ndiaye is a 48 year old paraplegic gentleman who returns for a R troch pressure ulcer. He noticed this in early April. He believes it is due from sleeping on this side in bed. He lies directly over this area when on this side. He is now sleeping on his stomach and has purchased an air mattress from [employer]. He has a Flexform seating system that had not been adjusted in some time, now optimized.     The wound was debrided at initial visit and NPWT was started in May. The wound decreased in size but cont'd to have undermining. It was opened in clinic in July.     VITALS: /69 (BP Location: Left arm, Patient Position: Chair, Cuff Size: Adult Regular)   Pulse 109 "   Temp 97  F (36.1  C)      PHYSICAL EXAM:  GENERAL: Patient is alert and oriented and in no acute distress  INTEGUMENTARY:   Rash 11/28/16 Bilateral thigh (Active)       Wound (used by OP [facility] only) 04/12/23 1308 Right greater trochanter pressure injury (Active)   Thickness/Stage full thickness 10/11/23 1435   Base granulating;red 10/11/23 1435   Periwound macerated;intact 10/11/23 1435   Periwound Temperature warm 10/11/23 1435   Periwound Skin Turgor soft 10/11/23 1435   Edges rolled/closed;callused 10/11/23 1435   Length (cm) 3 10/11/23 1435   Width (cm) 5.3 10/11/23 1435   Depth (cm) 2.4 10/11/23 1435   Wound (cm^2) 15.9 cm^2 10/11/23 1435   Wound Volume (cm^3) 38.16 cm^3 10/11/23 1435   Wound healing % 45.4 10/11/23 1435   Tunneling [Depth (cm)/Location] 3 oclock/ 4 cm 08/17/23 1250   Undermining [Depth (cm)/Location] 12-12 oclock/ 5 cm 10/11/23 1435   Drainage Characteristics/Odor serosanguineous 10/11/23 1435   Drainage Amount moderate 10/11/23 1435   Care, Wound non-select wound debridement performed. 10/11/23 1435   Dressing Care other (see comments) 07/14/23 1544     MDM: 30 minutes were spent on the date of the visit reviewing previous chart notes, evaluating patient and developing the treatment plan, this excludes any time spent on procedures.         Further instructions from your care team         Sacha Ndiaye      1975    A DME order was not completed because supplies were not needed  Patient calls [employer] for addl NPWT supplies when needed.    Dressing changes outside of clinic are being performed by Spouse    Reviewed CT results. 10/11/23  Referral placed for Dr Elder to offer consult evaluation considering hip stability. 10/11/23    Wound Dressing Change: Right Trochanter  - Wash your hands with soap and water before you begin your dressing change and prepare a clean surface for dressings.  - Remove old dressing and ensure all black foam is removed  - Cleanse wound with Vashe moist gauze,  leave in place for 10 minutes; remove then,  - Cleanse periwound skin with wound spray, pat dry and apply No Sting Barrier film.  - Cut WHITE Foam into three long strips, SHAPED LIKE AN ARROW shape, using a Qtip to extend into the end and pull back slightly, filling pocket and undermined areas around wound and to fill the depth of wound  - Cover wound with VAC dressing tape to seal the foam, cut appropriate size opening for the TRAC pad.  - with BLACK foam, create a bridge to   - Connect TRAC pad and connect to pump; NPWT -200 mmHg Continuous, ensure no leaks  - Change canister when alarms full or weekly  Change dressing three times a week    If tolerating the -200 mmHg, stay at that setting. If not, reduce to -175 mmHg continuous.    Document on the outside of the dressing the number of sponges used and the date of the dressing  If dressing is compromised for greater than 2 hours then please remove entire dressing and change or tuck moist Vashe gauze into wound until new dressing can be applied.    Repositioning:  Bed: Reposition MINIMALLY every 1-2 hours in bed to relieve pressure and promote perfusion to tissue.  Chair: When up to the chair, do not sit for longer than one hour total without repositioning to relieve pressure and promote perfusion to the tissue. Completely recline/tilt for 15 minutes each hour.  Sit on a chair cushion when up to the chair.       Nancy Mendiola PA-C October 11, 2023    Call us at [phone] if you have any questions about your wounds, have redness or swelling around your wound, have a fever of 101 degrees Fahrenheit or greater or if you have any other problems or concerns. We answer the phone Monday through Friday 8 am to 4 pm, please leave a message as we check the voicemail frequently throughout the day.     If you had a positive experience please indicate that on your patient satisfaction survey form that [facility] will be sending you.    It was a pleasure meeting  with you today.  Thank you for allowing me and my team the privilege of caring for you today.  YOU are the reason we are here, and I truly hope we provided you with the excellent service you deserve.  Please let us know if there is anything else we can do for you so that we can be sure you are leaving completely satisfied with your care experience.      If you have any billing related questions please call the [facility] Business office at [phone]. The clinic staff does not handle billing related matters.    If you are scheduled to have a follow up appointment, you will receive a reminder call the day before your visit. On the appointment day please arrive 15 minutes prior to your appointment time. If you are unable to keep that appointment, please call the clinic to cancel or reschedule. If you are more than 10 minutes late or greater for your scheduled appointment time, the clinic policy is that you may be asked to reschedule.

## 2023-10-11 NOTE — ADDENDUM NOTE
Encounter addended by: Juana Hunt RN on: 10/11/2023 4:08 PM   Actions taken: Edited Discharge Instructions

## 2023-10-11 NOTE — DISCHARGE INSTRUCTIONS
"Sacha Ndiaye      1975  A DME order was not completed because supplies were not needed  Patient calls [employer] for addl NPWT supplies when needed-Need WHITE Foam    Dressing changes outside of clinic are being performed by Spouse    Plan: 10/11/23:  Reviewed CT results- Done  Referral placed for Dr Elder to offer consult evaluation considering hip stability.     Wound Dressing Change: Right Trochanter  Wound Undermine area was outlined with Purple Marker, 3 lines drawn to show where the White Foam should be driven to fill the hip capsule. You will need to lift the flesh to drive the white foam into the pocket.     - Wash your hands with soap and water before you begin your dressing change and prepare a clean surface for dressings.  - Remove old dressing and ensure all black foam is removed  - Irrigate pocket with Vashe and gauze, leave in place for 10 minutes; remove   - wipe periwound skin clean, pat dry, Swab entire area with No Sting Barrier film.    Prepare the Foam:   White Foam: premoistened with saline, avoid drying out; cut 2 full length strips about 1/2 - 3/4\" wide  Cut tip like an ARROW  Fold back the tip and snip small bit of the foam  Use the Stick end of the Q tip to DRIVE the white foam into the wound pocket in 3 places: 12 o'clock; 1 o'clock and around 3 o'clock; lift the fleshy part of wound edge to view the pocket and drive to the end.   Starting on Friday: pull back the white foam about 1\"  (this will allow the wound base to knit shut and heal)    Black Foam:  - Cut a small round piece like an upside down MUSHROOM- see picture- this is for wound base  Make sure the White foam is OVER the Black foam to assist with granulation tissue on wound base  - Cut 1 long strip like a cinnamon roll- this is for the bridge out to hip  - cut a large piece of Black foam to act as the BOLSTER over the entire wound defect    Drape Tape:  Picture frame the wound over the marker area and for the bridge out to hip " on front.   Ensure that all the FOAM is touching  Cover all Foam with drape tape  Cut a large 50 cent opening for the TRAC pad connection; Connect  Connect to canister  NPWT -200mmHg; already set, continuous   Change 3 times a week    If tolerating the -200 mmHg, stay at that setting. If not, reduce to -175 mmHg continuous.    Document on the outside of the dressing the number of sponges used and the date of the dressing  If dressing is compromised for greater than 2 hours then please remove entire dressing and change or tuck moist Vashe gauze into wound until new dressing can be applied.    Repositioning:  Bed: Reposition MINIMALLY every 1-2 hours in bed to relieve pressure and promote perfusion to tissue.  Chair: When up to the chair, do not sit for longer than one hour total without repositioning to relieve pressure and promote perfusion to the tissue. Completely recline/tilt for 15 minutes each hour.  Sit on a chair cushion when up to the chair.     Nancy Mendiola PA-C October 11, 2023    Call us at [phone] if you have any questions about your wounds, have redness or swelling around your wound, have a fever of 101 degrees Fahrenheit or greater or if you have any other problems or concerns. We answer the phone Monday through Friday 8 am to 4 pm, please leave a message as we check the voicemail frequently throughout the day.     If you had a positive experience please indicate that on your patient satisfaction survey form that [facility] will be sending you.   It was a pleasure meeting with you today.  Thank you for allowing me and my team the privilege of caring for you today.  YOU are the reason we are here, and I truly hope we provided you with the excellent service you deserve.  Please let us know if there is anything else we can do for you so that we can be sure you are leaving completely satisfied with your care experience.      If you have any billing related questions please call the   [facility] Business office at [phone]. The clinic staff does not handle billing related matters.   If you are scheduled to have a follow up appointment, you will receive a reminder call the day before your visit. On the appointment day please arrive 15 minutes prior to your appointment time. If you are unable to keep that appointment, please call the clinic to cancel or reschedule. If you are more than 10 minutes late or greater for your scheduled appointment time, the clinic policy is that you may be asked to reschedule.

## 2023-10-11 NOTE — ADDENDUM NOTE
Encounter addended by: Mary Ellen Feng RN on: 10/11/2023 4:06 PM   Actions taken: Clinical Note Signed, Flowsheet accepted, Charge Capture section accepted

## 2023-11-16 ENCOUNTER — HOSPITAL ENCOUNTER (OUTPATIENT)
Dept: WOUND CARE | Facility: CLINIC | Age: 48
Discharge: HOME | End: 2023-11-16
Attending: SURGERY | Admitting: SURGERY
Payer: COMMERCIAL

## 2023-11-16 DIAGNOSIS — L89.214 PRESSURE INJURY OF RIGHT HIP, STAGE 4 (H): Primary | ICD-10-CM

## 2023-11-16 PROCEDURE — 97602 WOUND(S) CARE NON-SELECTIVE: CPT

## 2023-11-16 PROCEDURE — 99213 OFFICE O/P EST LOW 20 MIN: CPT | Performed by: SURGERY

## 2023-11-16 NOTE — PROGRESS NOTES
Patient Active Problem List   Diagnosis    Spinal stenosis    SCI (spinal cord injury)    Hardware complicating wound infection, initial encounter (H24)    Charcot's joint, vertebrae    Status post flap graft    Constipation    H/O lumbosacral spine surgery    Hip dislocation, bilateral (H)    Infection and inflammatory reaction due to internal orthopedic device, implant, and graft (H24)    Neurogenic bladder    Neurogenic bowel    Osteomyelitis of pelvic region or thigh, acute, left (H)    Chronic osteomyelitis involving pelvic region and thigh (H)    Paraplegia (H)    Traumatic spinal subdural hematoma    Hx of plastic surgery    Non-healing left groin open wound, initial encounter    Non-healing left groin open wound, subsequent encounter    Open wound of knee, leg, and ankle, left, initial encounter    Chronic antibiotic suppression    Chronic ulcer of sacral region (H)    Fixation hardware in spine    Hx of spinal surgery    Pressure injury of right hip, stage 4 (H)    Pressure injury of trochanteric region of left hip, stage 3 (H)     Past Medical History:   Diagnosis Date    Anemia     Charcot's joint     Chronic UTI     Constipation     Dislocated hip (H)     hyperlordosis    Epicondylitis     History of blood transfusion     Hx: UTI (urinary tract infection)     Neurogenic bladder     Other chronic pain     Paraplegia following spinal cord injury (H)     Pressure ulcer stage IV     left groin     Labs:   Recent Labs   Lab Test 04/14/19  0602 04/13/19  0651 01/17/19  0945 01/14/17  0636 01/13/17  0609 10/28/15  0715 10/27/15  0714   ALBUMIN  --   --  2.9*  --   --    < >  --    HGB 9.0* 8.7*  --    < > 8.9*   < >  --    INR  --   --   --   --   --   --  1.12   WBC  --  8.6  --   --   --    < >  --    A1C  --   --   --   --  4.7  --   --    CRP  --   --  37.8*  --   --    < >  --     < > = values in this interval not displayed.     Nutrition requirements were discussed with patient today.  Vitals:  There were  "no vitals taken for this visit.  Wound:   Rash 11/28/16 Bilateral thigh (Active)       Wound (used by OP [facility] only) 04/12/23 1308 Right greater trochanter pressure injury (Active)   Thickness/Stage full thickness 11/16/23 0840   Base granulating;slough 11/16/23 0840   Periwound macerated;intact 11/16/23 0840   Periwound Temperature warm 11/16/23 0840   Periwound Skin Turgor soft 11/16/23 0840   Edges open 11/16/23 0840   Length (cm) 4.6 11/16/23 0840   Width (cm) 2 11/16/23 0840   Depth (cm) 1.3 11/16/23 0840   Wound (cm^2) 9.2 cm^2 11/16/23 0840   Wound Volume (cm^3) 11.96 cm^3 11/16/23 0840   Wound healing % 68.41 11/16/23 0840   Tunneling [Depth (cm)/Location] 3 oclock/ 4 cm 08/17/23 1250   Undermining [Depth (cm)/Location] 10-8 oclock/ 4.5 cm 11/16/23 0840   Drainage Characteristics/Odor serosanguineous 11/16/23 0840   Drainage Amount moderate 11/16/23 0840   Care, Wound non-select wound debridement performed. 11/16/23 0840   Dressing Care other (see comments) 07/14/23 9478      Photo:        Further instructions from your care team         Sacha Ndiaye      1975  A DME order was not completed because supplies were not needed- Patient calls [employer] for supplies  Dressing changes outside of clinic are being performed by Spouse    Plan: 10/11/23:  Reviewed CT results- Done  Referral placed for Dr Elder to offer consult evaluation considering hip stability.    Wound Dressing Change: Right Trochanter  You will need to lift the flesh to drive the white foam into the pocket.  - Wash your hands with soap and water before you begin your dressing change and prepare a clean surface for dressings.  - Remove old dressing and ensure all black foam is removed  - Irrigate pocket with Vashe and gauze, leave in place for 10 minutes; remove  - wipe periwound skin clean, pat dry, Swab entire area with No Sting Barrier film.    Prepare the Foam:  White Foam: premoistened with saline, avoid drying out; cut strips about 1/2\" " "wide  Cut tip like an ARROW  Fold back the tip and snip small bit of the foam  Use the Stick end of the Q tip to DRIVE the white foam into the wound pocket     Pull back the white foam about 1\" (this will allow the wound base to knit shut and heal)    Black Foam:  - Cut a small round piece like an upside down MUSHROOM this is for wound base   - Make sure the White foam is OVER the Black foam to assist with granulation tissue on wound base  - Cut 1 long strip like a cinnamon roll- this is for the bridge out to hip  - cut a large piece of Black foam to act as the BOLSTER over the entire wound defect     Drape Tape:  Picture frame the wound over the marker area and for the bridge out to hip on front.  Ensure that all the FOAM is touching  Cover all Foam with drape tape  Cut a large 50 cent opening for the TRAC pad connection; Connect  Connect to canister  NPWT -200mmHg; already set, continuous  Change 3 times a week    Document on the outside of the dressing the number of sponges used and the date of the dressing  If dressing is compromised for greater than 2 hours then please remove entire  dressing and change or tuck moist Vashe gauze into wound until new dressing can be applied.    Repositioning:  Bed: Reposition MINIMALLY every 1-2 hours in bed to relieve pressure and promote perfusion to tissue.  Chair: When up to the chair, do not sit for longer than one hour total without  repositioning to relieve pressure and promote perfusion to the tissue. Completely recline/tilt for 15 minutes each hour.  Sit on a chair cushion when up to the chair.     Bull Harris M.D. November 16, 2023  "

## 2023-11-16 NOTE — DISCHARGE INSTRUCTIONS
"Sacha Ndiaye      1975  A DME order was not completed because supplies were not needed- Patient calls [employer] for supplies  Dressing changes outside of clinic are being performed by Spouse    Plan: 10/11/23:  Reviewed CT results- Done  Referral placed for Dr Elder to offer consult evaluation considering hip stability.    Wound Dressing Change: Right Trochanter  You will need to lift the flesh to drive the white foam into the pocket.  - Wash your hands with soap and water before you begin your dressing change and prepare a clean surface for dressings.  - Remove old dressing and ensure all black foam is removed  - Irrigate pocket with Vashe and gauze, leave in place for 10 minutes; remove  - wipe periwound skin clean, pat dry, Swab entire area with No Sting Barrier film.    Prepare the Foam:  White Foam: premoistened with saline, avoid drying out; cut strips about 1/2\" wide  Cut tip like an ARROW  Fold back the tip and snip small bit of the foam  Use the Stick end of the Q tip to DRIVE the white foam into the wound pocket     Pull back the white foam about 1\" (this will allow the wound base to knit shut and heal)    Black Foam:  - Cut a small round piece like an upside down MUSHROOM this is for wound base   - Make sure the White foam is OVER the Black foam to assist with granulation tissue on wound base  - Cut 1 long strip like a cinnamon roll- this is for the bridge out to hip  - cut a large piece of Black foam to act as the BOLSTER over the entire wound defect     Drape Tape:  Picture frame the wound over the marker area and for the bridge out to hip on front.  Ensure that all the FOAM is touching  Cover all Foam with drape tape  Cut a large 50 cent opening for the TRAC pad connection; Connect  Connect to canister  NPWT -200mmHg; already set, continuous  Change 3 times a week    Document on the outside of the dressing the number of sponges used and the date of the dressing  If dressing is compromised for " greater than 2 hours then please remove entire  dressing and change or tuck moist Vashe gauze into wound until new dressing can be applied.    Repositioning:  Bed: Reposition MINIMALLY every 1-2 hours in bed to relieve pressure and promote perfusion to tissue.  Chair: When up to the chair, do not sit for longer than one hour total without  repositioning to relieve pressure and promote perfusion to the tissue. Completely recline/tilt for 15 minutes each hour.  Sit on a chair cushion when up to the chair.     Bull Harris M.D. November 16, 2023    Call us at [phone] if you have any questions about your wounds, have redness or swelling around your wound, have a fever of 101 degrees Fahrenheit or greater or if you have any other problems or concerns. We answer the phone Monday through Friday 8 am to 4 pm, please leave a message as we check the voicemail frequently throughout the day.     If you had a positive experience please indicate that on your patient satisfaction survey form that [facility] will be sending you.  It was a pleasure meeting with you today.  Thank you for allowing me and my team the privilege of caring for you today.  YOU are the reason we are here, and I truly hope we provided you with the excellent service you deserve.  Please let us know if there is anything else we can do for you so that we can be sure you are leaving completely satisfied with your care experience.      If you have any billing related questions please call the [facility] Business office at [phone]. The clinic staff does not handle billing related matters.  If you are scheduled to have a follow up appointment, you will receive a reminder call the day before your visit. On the appointment day please arrive 15 minutes prior to your appointment time. If you are unable to keep that appointment, please call the clinic to cancel or reschedule. If you are more than 10 minutes late or greater for your scheduled  appointment time, the clinic policy is that you may be asked to reschedule.

## 2023-11-16 NOTE — PROGRESS NOTES
[facility]  PROGRESS NOTE    HISTORY OF PRESENT ILLNESS: Rommel is a 49 yo paraplegic male well-known to me since 2015. Has had paraspinal wounds, L IT and now dealing with a R trochanteric ulcer.     INTERVAL HISTORY:   11/16/23: Rommel has been seeing our PA, Sandra Mendiola, for his R troch ulcer. His wife is doing the VAC dressing changes using white sponge, with pressure @ 200 mmHg continuous therapy. (Having to charge extra during daytime hours now). He has noticed that the sponge is often already displaced at the time of the next dressing change. 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  A referral to Dr Elder was submitted last month but Sacha has not been contacted about a consult.     He is trying to offload this area as much as possible, but still working. He takes an offloading break over lunch, then gets off it again when he gets back for the evening. He does frequent weight shifts while at work, but is still in his wheelchair for at least 3-4 hours.    PHYSICAL EXAM:   11/16/23:  R trochanter with moderate undermined pocket extending anteriorly. No bone exposed. Granulation tissue seems a bit boggy, most likely from using white instead of black VAC sponge. Fairly clean. Periwound skin intact. No signs of gross infection.     Please see nursing notes for wound measurements, photos and vital signs.    ASSESSMENT/ PLAN:   11/16/23:  continue with VAC. Have wife pack white sponge just short of wound depth to allow for space to fill in. Will followup with Dr Elder to render an opinion about the options for a dislocating hip in the setting of a pressure ulcer: bone debridement and VAC vs immediate or delayed flap, etc. Rommel is willing to do whatever is necessary to get the wound healed but will need to plan if we decide on flap as far as bedrest time.     FOLLOW-UP:   11/16/23:  scheduled with Sandra on 12/15 and 1/15/24.

## 2023-12-09 NOTE — TELEPHONE ENCOUNTER
Action December 9, 2023 1:08 PM MT   Action Taken Sent a request for imaging from [facility].       DIAGNOSIS: Pressure injury of right hip, stage 4 (H) [L89.214]  - Primary    Patient being seen by Dr. Harris and requests Dr. Elder to colloborate on case to see if girdlestone or other intervention warranted for treatment of this pressure sore.     APPOINTMENT DATE:  12/15/2023   NOTES STATUS DETAILS   OFFICE NOTE from referring provider Internal 11/16/2023 - [facility]   OFFICE NOTE from other specialist Internal 01/06/2016 - Everardo Elder MD - [facility]   OPERATIVE REPORT Internal 04/12/2019, 01/11/2017, 11/25/2016, 05/09/2016, 01/25/2016 - I&D    MEDICATION LIST Internal    LABS     PHOTOGRAPHS Media Tab    CT SCAN PACS [facility]:   08/30/2023, 01/20/2019, 08/09/2017, 02/16/2017, 10/25/2016, 03/07/2016 - Pelvis  10/16/2015, 07/27/2015 - L Spine    Internal   XRAYS (IMAGES & REPORTS) PACS Internal

## 2023-12-15 ENCOUNTER — PRE VISIT (OUTPATIENT)
Dept: ORTHOPEDICS | Facility: CLINIC | Age: 48
End: 2023-12-15

## 2023-12-15 ENCOUNTER — HOSPITAL ENCOUNTER (OUTPATIENT)
Dept: WOUND CARE | Facility: CLINIC | Age: 48
Discharge: HOME | End: 2023-12-15
Attending: PHYSICIAN ASSISTANT | Admitting: PHYSICIAN ASSISTANT
Payer: COMMERCIAL

## 2023-12-15 ENCOUNTER — OFFICE VISIT (OUTPATIENT)
Dept: ORTHOPEDICS | Facility: CLINIC | Age: 48
End: 2023-12-15
Attending: PHYSICIAN ASSISTANT
Payer: COMMERCIAL

## 2023-12-15 VITALS — TEMPERATURE: 97.5 F | DIASTOLIC BLOOD PRESSURE: 72 MMHG | SYSTOLIC BLOOD PRESSURE: 133 MMHG | HEART RATE: 90 BPM

## 2023-12-15 DIAGNOSIS — L89.214 PRESSURE INJURY OF RIGHT HIP, STAGE 4 (H): ICD-10-CM

## 2023-12-15 DIAGNOSIS — L89.214 PRESSURE INJURY OF RIGHT HIP, STAGE 4 (H): Primary | ICD-10-CM

## 2023-12-15 PROCEDURE — 99213 OFFICE O/P EST LOW 20 MIN: CPT | Performed by: PHYSICIAN ASSISTANT

## 2023-12-15 PROCEDURE — 97602 WOUND(S) CARE NON-SELECTIVE: CPT

## 2023-12-15 PROCEDURE — 99204 OFFICE O/P NEW MOD 45 MIN: CPT | Mod: GC | Performed by: ORTHOPAEDIC SURGERY

## 2023-12-15 NOTE — PROGRESS NOTES
"[facility]    ASSESSMENT:  Stage 4 R troch pressure ulcer    PLAN:   Visit today with  Dr. Elder to get his input on whether girdlestone, HO removal and/or other indication for surgery would help with healing. There is nearly 360 degrees of undermining around the trochanter. He will collaborate with Dr. Harris to decide next steps for any surgical intervention.   cont VAC 3x/wk, wife to change dressings - [place] Shilophil MIRANDA CWON will teach wife new technique with black foam to try to get into the undermining. Will also send him home with CTAs.   Avoid sleeping on this area  Chair has now been adjusted  Has air mattress    INTERVAL HX:  11/16: Visit with Dr. Harris. Cont'd VAC with white sponge. Plan to consult with Dr. Elder to \" render an opinion about the options for a dislocating hip in the setting of a pressure ulcer: bone debridement and VAC vs immediate or delayed flap, etc.\"  12/15/23: Returns to clinic. Wound about the same size, possible that pocket may be slightly smaller but still wrapping around troch. Tissues look healthier. Visit with Dr. Elder later today.       HISTORY OF PRESENT ILLNESS:   Sacha Ndiaye is a 48 year old paraplegic gentleman who returns for a R troch pressure ulcer. He noticed this in early April. He believes it is due from sleeping on this side in bed. He lies directly over this area when on this side. He is now sleeping on his stomach and has purchased an air mattress from [employer]. He has a Flexform seating system that had not been adjusted in some time, now optimized.     The wound was debrided at initial visit and NPWT was started in May. The wound decreased in size but cont'd to have undermining. It was opened in clinic in July.     8/17: Met with Dr. Harris who made interesting assessment that his chronic hip dislocation may be causing pressure from within the wound. She ordered CT which showed ongoing dislocation and progressive HO.     10/11/23: " Returns to clinic. Wound has not improved in size or undermining. He thinks his wife is hesitant to stick her finger into wound to pack it and recommends CTAs to help with application. Fortunately he is feeling well, has no pain. No f/c/n/v.     VITALS: /72 (BP Location: Left arm, Patient Position: Chair, Cuff Size: Adult Regular)   Pulse 90   Temp 97.5  F (36.4  C) (Temporal)      PHYSICAL EXAM:  GENERAL: Patient is alert and oriented and in no acute distress  INTEGUMENTARY:   Rash 11/28/16 Bilateral thigh (Active)       Wound (used by OP [facility] only) 04/12/23 1308 Right greater trochanter pressure injury (Active)   Thickness/Stage full thickness 12/15/23 0945   Base granulating;slough 12/15/23 0945   Periwound intact 12/15/23 0945   Periwound Temperature warm 12/15/23 0945   Periwound Skin Turgor soft 12/15/23 0945   Edges open 12/15/23 0945   Length (cm) 3 12/15/23 0945   Width (cm) 3.4 12/15/23 0945   Depth (cm) 1.7 12/15/23 0945   Wound (cm^2) 10.2 cm^2 12/15/23 0945   Wound Volume (cm^3) 17.34 cm^3 12/15/23 0945   Wound healing % 64.97 12/15/23 0945   Tunneling [Depth (cm)/Location] 3 oclock/ 4 cm 08/17/23 1250   Undermining [Depth (cm)/Location] 10-4 oclock/ 5.9 cm 12/15/23 0945   Drainage Characteristics/Odor serosanguineous 12/15/23 0945   Drainage Amount moderate 12/15/23 0945   Care, Wound non-select wound debridement performed. 12/15/23 0945   Dressing Care other (see comments) 07/14/23 1544     MDM: 20 minutes were spent on the date of the visit reviewing previous chart notes, evaluating patient and developing the treatment plan, this excludes any time spent on procedures.         Further instructions from your care team         Sacha Ndiaye      1975    A DME order was not completed because supplies were not needed- Patient calls [employer] for supplies  Dressing changes outside of clinic are being performed by Spouse    Plan 12/15/23:  -Referral placed for Dr Elder to offer consult evaluation  "considering hip stability. -Appt. today 12/15/23  -we discontinued white foam; use all black foam now  -reduced negative pressure to -150 mmHg     Wound Dressing Change: Right Trochanter  - Wash your hands with soap and water before you begin your dressing change and prepare a clean surface for dressings.  - Remove old dressing and ensure all black foam is removed  - Irrigate pocket with Vashe and gauze, leave in place for 10 minutes; remove  - wipe periwound skin clean, pat dry, Swab entire area with No Sting Barrier film.  Prepare the Black Foam: (cut today as one continuous piece of black foam for tunnel and depth and bridging)  Make one end of foam as thinner and tapered to tuck into 12 o'clock tunnel  (You will need to lift the flesh to drive the foam into the pocket)  Use the Stick end of the Q tip to DRIVE the triangular foam end into the wound pocket  Pull back this foam about 1\" (this will allow the wound base to knit shut and heal)  Remainder of foam to fill in wound defect depth and then lay the rest over bridging drape that extends out to hip on front  Drape Tape:  Picture frame the wound over the marker area and for the bridge out to hip on front.  Ensure that all the FOAM is touching  Cover all Foam with drape tape  Cut a large 50 cent opening for the TRAC pad connection;   Connect to canister  NPWT -150mmHg; already set, continuous therapy.  Change 3 times a week  Document on the outside of the dressing the number of sponges used and the date of the dressing  If dressing is compromised for greater than 2 hours then please remove entire dressing and change or tuck moist Vashe gauze into wound until new dressing can be applied.  Repositioning:  Bed: Reposition MINIMALLY every 1-2 hours in bed to relieve pressure and promote  perfusion to tissue.  Chair: When up to the chair, do not sit for longer than one hour total without  repositioning to relieve pressure and promote perfusion to the tissue. " Completely  recline/tilt for 15 minutes each hour.  Sit on a chair cushion when up to the chair.     Nancy Mendiola PA-C December 15, 2023    Call us at [phone] if you have any questions about your wounds, have redness or swelling around your wound, have a fever of 101 degrees Fahrenheit or greater or if you have any other problems or concerns. We answer the phone Monday through Friday 8 am to 4 pm, please leave a message as we check the voicemail frequently throughout the day.     If you had a positive experience please indicate that on your patient satisfaction survey form that [facility] will be sending you.    It was a pleasure meeting with you today.  Thank you for allowing me and my team the privilege of caring for you today.  YOU are the reason we are here, and I truly hope we provided you with the excellent service you deserve.  Please let us know if there is anything else we can do for you so that we can be sure you are leaving completely satisfied with your care experience.      If you have any billing related questions please call the [facility] Business office at [phone]. The clinic staff does not handle billing related matters.    If you are scheduled to have a follow up appointment, you will receive a reminder call the day before your visit. On the appointment day please arrive 15 minutes prior to your appointment time. If you are unable to keep that appointment, please call the clinic to cancel or reschedule. If you are more than 10 minutes late or greater for your scheduled appointment time, the clinic policy is that you may be asked to reschedule.

## 2023-12-15 NOTE — LETTER
12/15/2023         RE: Sacha Ndiaye  [address] [facility] [id number]        Dear Colleague,    Thank you for referring your patient, Sacha Ndiaye, to the [facility] [place]. Please see a copy of my visit note below.    I was present with the resident during the history and exam.  I discussed the case with the resident and agree with the findings as documented in the assessment and plan.    Orthopedic Clinic Note    Sacha is a 48-year-old male who has a 30-year history of paraplegia related to prior trauma.  He has had his longstanding history of issues with pressure ulcers around his low back buttock and pelvis.  He was previously treated by Dr. Elder in 2016 for infection in his pelvis.  We are seeing him today in referral from Dr. Harris in regards to a right greater trochanter pressure ulcer.  The patient has severely degenerated hips bilaterally and there is heterotopic ossification in the area of this right hip ulcer.    The patient is in a freshly placed wound VAC, however wound pictures were available in the media tab for review, his wound appears clean and does appear to track down to the greater trochanter which is corroborated by the note from the plastic surgery team from today.      CT scan of the patient's pelvis was reviewed which does show heterotopic ossification around the area of the full-thickness ulcer as well as significant degeneration of both the acetabulum and femoral head on the right side.    Sacha is a 48-year-old male with paraplegia secondary to trauma in 1993 who has a right greater trochanter full-thickness pressure ulcer being managed by Dr. Harris.  Seen in consultation for review of possible excision of bone as part of a coverage treatment plan.  In our opinion excision of his heterotopic ossification as well as additional bone from the proximal femur would likely be helpful in creating a coverage strategy for him.  This could  include simply removal of the heterotopic ossification all the way to consideration of Girdlestone procedure with removal of the greater trochanter.  The patient was in agreement to this as a plan.  We will coordinate with Dr. Harris in terms of timing for this when she feels he is ready for flap coverage if that is indicated.    Patient seen and discussed with Dr. Jael Dallas MD  Orthopaedic Surgery PGY-4

## 2023-12-15 NOTE — DISCHARGE INSTRUCTIONS
"Sacha Ndiaye      1975    A DME order was not completed because supplies were not needed- Patient calls [employer] for supplies  Dressing changes outside of clinic are being performed by Spouse    Plan 12/15/23:  -Referral placed for Dr Elder to offer consult evaluation considering hip stability. -Appt. today 12/15/23  -we discontinued white foam; use all black foam now  -reduced negative pressure to -150 mmHg     Wound Dressing Change: Right Trochanter  - Wash your hands with soap and water before you begin your dressing change and prepare a clean surface for dressings.  - Remove old dressing and ensure all black foam is removed  - Irrigate pocket with Vashe and gauze, leave in place for 10 minutes; remove  - wipe periwound skin clean, pat dry, Swab entire area with No Sting Barrier film.  Prepare the Black Foam: (cut today as one continuous piece of black foam for tunnel and depth and bridging)  Make one end of foam as thinner and tapered to tuck into 12 o'clock tunnel  (You will need to lift the flesh to drive the foam into the pocket)  Use the Stick end of the Q tip to DRIVE the triangular foam end into the wound pocket  Pull back this foam about 1\" (this will allow the wound base to knit shut and heal)  Remainder of foam to fill in wound defect depth and then lay the rest over bridging drape that extends out to hip on front  Drape Tape:  Picture frame the wound over the marker area and for the bridge out to hip on front.  Ensure that all the FOAM is touching  Cover all Foam with drape tape  Cut a large 50 cent opening for the TRAC pad connection;   Connect to canister  NPWT -150mmHg; already set, continuous therapy.  Change 3 times a week  Document on the outside of the dressing the number of sponges used and the date of the dressing  If dressing is compromised for greater than 2 hours then please remove entire dressing and change or tuck moist Vashe gauze into wound until new dressing can be " applied.  Repositioning:  Bed: Reposition MINIMALLY every 1-2 hours in bed to relieve pressure and promote  perfusion to tissue.  Chair: When up to the chair, do not sit for longer than one hour total without  repositioning to relieve pressure and promote perfusion to the tissue. Completely  recline/tilt for 15 minutes each hour.  Sit on a chair cushion when up to the chair.     Nancy Mendiola PA-C December 15, 2023    Call us at [phone] if you have any questions about your wounds, have redness or swelling around your wound, have a fever of 101 degrees Fahrenheit or greater or if you have any other problems or concerns. We answer the phone Monday through Friday 8 am to 4 pm, please leave a message as we check the voicemail frequently throughout the day.     If you had a positive experience please indicate that on your patient satisfaction survey form that [facility] will be sending you.    It was a pleasure meeting with you today.  Thank you for allowing me and my team the privilege of caring for you today.  YOU are the reason we are here, and I truly hope we provided you with the excellent service you deserve.  Please let us know if there is anything else we can do for you so that we can be sure you are leaving completely satisfied with your care experience.      If you have any billing related questions please call the [facility] Business office at [phone]. The clinic staff does not handle billing related matters.    If you are scheduled to have a follow up appointment, you will receive a reminder call the day before your visit. On the appointment day please arrive 15 minutes prior to your appointment time. If you are unable to keep that appointment, please call the clinic to cancel or reschedule. If you are more than 10 minutes late or greater for your scheduled appointment time, the clinic policy is that you may be asked to reschedule.

## 2023-12-15 NOTE — NURSING NOTE
Reason For Visit:   Chief Complaint   Patient presents with    Consult     Right hip ulcer referred by Dr. Harris to discuss treatment options // been doing wound vac therapy   -pt reports that hips are constantly dislocated        There were no vitals taken for this visit. Pt in wheelchair    Pain Assessment  Patient Currently in Pain: Yes  Primary Pain Location: Hip (lower back and bilateral hips)      Garret Ramirez, ATC

## 2023-12-15 NOTE — PROGRESS NOTES
Orthopedic Clinic Note    Sacha is a 48-year-old male who has a 30-year history of paraplegia related to prior trauma.  He has had his longstanding history of issues with pressure ulcers around his low back buttock and pelvis.  He was previously treated by Dr. Elder in 2016 for infection in his pelvis.  We are seeing him today in referral from Dr. Harris in regards to a right greater trochanter pressure ulcer.  The patient has severely degenerated hips bilaterally and there is heterotopic ossification in the area of this right hip ulcer.    The patient is in a freshly placed wound VAC, however wound pictures were available in the media tab for review, his wound appears clean and does appear to track down to the greater trochanter which is corroborated by the note from the plastic surgery team from today.      CT scan of the patient's pelvis was reviewed which does show heterotopic ossification around the area of the full-thickness ulcer as well as significant degeneration of both the acetabulum and femoral head on the right side.    Sacha is a 48-year-old male with paraplegia secondary to trauma in 1993 who has a right greater trochanter full-thickness pressure ulcer being managed by Dr. Harris.  Seen in consultation for review of possible excision of bone as part of a coverage treatment plan.  In our opinion excision of his heterotopic ossification as well as additional bone from the proximal femur would likely be helpful in creating a coverage strategy for him.  This could include simply removal of the heterotopic ossification all the way to consideration of Girdlestone procedure with removal of the greater trochanter.  The patient was in agreement to this as a plan.  We will coordinate with Dr. Harris in terms of timing for this when she feels he is ready for flap coverage if that is indicated.    Patient seen and discussed with Dr. Jael Dallas MD  Orthopaedic Surgery PGY-4

## 2023-12-21 ENCOUNTER — TELEPHONE (OUTPATIENT)
Dept: WOUND CARE | Facility: CLINIC | Age: 48
End: 2023-12-21
Payer: COMMERCIAL

## 2023-12-21 NOTE — TELEPHONE ENCOUNTER
Entire right leg swollen since last week. No erythema. No pain responses/no other symptoms. Wound is fine.  Recommended that patient call PCP about this.  It doesn't sound related to wound. Recommended that he call today, just in case this sounds like a blood clot or something more urgent to them.  Verbalized understanding. No further questions.

## 2024-01-04 ENCOUNTER — HOSPITAL ENCOUNTER (OUTPATIENT)
Dept: WOUND CARE | Facility: CLINIC | Age: 49
Discharge: HOME | End: 2024-01-04
Attending: SURGERY | Admitting: SURGERY
Payer: COMMERCIAL

## 2024-01-04 VITALS — HEART RATE: 102 BPM | DIASTOLIC BLOOD PRESSURE: 71 MMHG | SYSTOLIC BLOOD PRESSURE: 118 MMHG | TEMPERATURE: 97.6 F

## 2024-01-04 DIAGNOSIS — L89.214 PRESSURE INJURY OF RIGHT HIP, STAGE 4 (H): Primary | ICD-10-CM

## 2024-01-04 PROCEDURE — 97602 WOUND(S) CARE NON-SELECTIVE: CPT

## 2024-01-04 PROCEDURE — 17250 CHEM CAUT OF GRANLTJ TISSUE: CPT | Performed by: SURGERY

## 2024-01-04 PROCEDURE — 99213 OFFICE O/P EST LOW 20 MIN: CPT | Mod: 25 | Performed by: SURGERY

## 2024-01-04 NOTE — PROGRESS NOTES
Patient Active Problem List   Diagnosis    Spinal stenosis    SCI (spinal cord injury)    Hardware complicating wound infection, initial encounter (H24)    Charcot's joint, vertebrae    Status post flap graft    Constipation    H/O lumbosacral spine surgery    Hip dislocation, bilateral (H)    Infection and inflammatory reaction due to internal orthopedic device, implant, and graft (H24)    Neurogenic bladder    Neurogenic bowel    Osteomyelitis of pelvic region or thigh, acute, left (H)    Chronic osteomyelitis involving pelvic region and thigh (H)    Paraplegia (H)    Traumatic spinal subdural hematoma    Hx of plastic surgery    Non-healing left groin open wound, initial encounter    Non-healing left groin open wound, subsequent encounter    Open wound of knee, leg, and ankle, left, initial encounter    Chronic antibiotic suppression    Chronic ulcer of sacral region (H)    Fixation hardware in spine    Hx of spinal surgery    Pressure injury of right hip, stage 4 (H)    Pressure injury of trochanteric region of left hip, stage 3 (H)     Past Medical History:   Diagnosis Date    Anemia     Charcot's joint     Chronic UTI     Constipation     Dislocated hip (H)     hyperlordosis    Epicondylitis     History of blood transfusion     Hx: UTI (urinary tract infection)     Neurogenic bladder     Other chronic pain     Paraplegia following spinal cord injury (H)     Pressure ulcer stage IV     left groin     Labs:   Recent Labs   Lab Test 04/14/19  0602 04/13/19  0651 01/17/19  0945 01/14/17  0636 01/13/17  0609 10/28/15  0715 10/27/15  0714   ALBUMIN  --   --  2.9*  --   --    < >  --    HGB 9.0* 8.7*  --    < > 8.9*   < >  --    INR  --   --   --   --   --   --  1.12   WBC  --  8.6  --   --   --    < >  --    A1C  --   --   --   --  4.7  --   --    CRP  --   --  37.8*  --   --    < >  --     < > = values in this interval not displayed.     Nutrition requirements were discussed with patient today.  Vitals:  /71  (BP Location: Right arm, Patient Position: Sitting, Cuff Size: Adult Regular)   Pulse 102   Temp 97.6  F (36.4  C) (Temporal)   Wound:   Rash 11/28/16 Bilateral thigh (Active)       Wound (used by OP [facility] only) 04/12/23 1308 Right greater trochanter pressure injury (Active)   Thickness/Stage Stage 4 01/04/24 0900   Base granulating;slough 01/04/24 0900   Periwound intact;macerated 01/04/24 0900   Periwound Temperature warm 01/04/24 0900   Periwound Skin Turgor soft 01/04/24 0900   Edges open 01/04/24 0900   Length (cm) 1.8 01/04/24 0900   Width (cm) 2.2 01/04/24 0900   Depth (cm) 1.8 01/04/24 0900   Wound (cm^2) 3.96 cm^2 01/04/24 0900   Wound Volume (cm^3) 7.13 cm^3 01/04/24 0900   Wound healing % 86.4 01/04/24 0900   Tunneling [Depth (cm)/Location] 3 oclock/ 4 cm 08/17/23 1250   Undermining [Depth (cm)/Location] 11-3 o'/ 7cm 01/04/24 0900   Drainage Characteristics/Odor serosanguineous 01/04/24 0900   Drainage Amount moderate 01/04/24 0900   Care, Wound non-select wound debridement performed. 01/04/24 0900   Dressing Care other (see comments) 07/14/23 2064      Photo:       Further instructions from your care team         Sacha Ndiaye      1975  A DME order was not completed because supplies were not needed  Dressing changes outside of clinic are being performed by Spouse    Plan 12/15/23:  -Done: Dr Elder assessed needs Hip bump vs joint with FLAP  Will need to schedule date for surgery with Ervin Harris and Jael in 2 months  Will need History & Physical 30 days prior to surgery     Wound Dressing Change: Right Trochanter  - Wash your hands with soap and water before you begin your dressing change and prepare a clean surface for dressings.  - Remove old dressing and ensure all black foam is removed  - Irrigate pocket with Vashe and gauze, leave in place for 10 minutes; remove  - wipe periwound skin clean, pat dry, Swab entire area with No Sting Barrier film.  Prepare the White Foam: (cut as one  continuous thin finger width piece: tuck into depth of  tunnel wound and ribbon the foam piece in to fill the undermine area like a snake)   Fold end and snip notch to use scissor or Q tip stick end to ribbon back and forth to fill pocket  (Lift the flesh to drive the foam into the pocket)  Fill the remainder of wound defect and Bridge out to hip with Black foam.   Drape Tape:  Picture frame the wound and out to hip on front for the bridge  Ensure that all the FOAM is touching  Cover all Foam with drape tape  Cut a large 50 cent opening for the TRAC pad connection;   Connect to canister  NPWT -175mmHg, continuous therapy.  Change 3 times a week    Document on the outside of the dressing the number of sponges used and the date of the dressing  If dressing is compromised for greater than 2 hours then please remove entire dressing and change or tuck moist Vashe gauze into wound until new dressing can be applied.    Repositioning:  Bed: Alternating Pressure Mattress on top of Tempurpedic mattress Reposition MINIMALLY every 1-2 hours in bed to relieve pressure and promote perfusion to tissue.  Chair: Use chair cushion when in chair, do not sit for longer than one hour total without repositioning to relieve pressure and promote perfusion to the tissue for 15 minutes each hour.       Bull Harris M.D. January 4, 2024

## 2024-01-04 NOTE — DISCHARGE INSTRUCTIONS
Sacha Ndiaye      1975  A DME order was not completed because supplies were not needed  Dressing changes outside of clinic are being performed by Spouse    Plan 12/15/23:  -Done: Dr Elder assessed needs Hip bump vs joint with FLAP  Will need to schedule date for surgery with Ervin Harris and Jael in 2 months  Will need History & Physical 30 days prior to surgery     Wound Dressing Change: Right Trochanter  - Wash your hands with soap and water before you begin your dressing change and prepare a clean surface for dressings.  - Remove old dressing and ensure all black foam is removed  - Irrigate pocket with Vashe and gauze, leave in place for 10 minutes; remove  - wipe periwound skin clean, pat dry, Swab entire area with No Sting Barrier film.  Prepare the White Foam: (cut as one continuous thin finger width piece: tuck into depth of  tunnel wound and ribbon the foam piece in to fill the undermine area like a snake)   Fold end and snip notch to use scissor or Q tip stick end to ribbon back and forth to fill pocket  (Lift the flesh to drive the foam into the pocket)  Fill the remainder of wound defect and Bridge out to hip with Black foam.   Drape Tape:  Picture frame the wound and out to hip on front for the bridge  Ensure that all the FOAM is touching  Cover all Foam with drape tape  Cut a large 50 cent opening for the TRAC pad connection;   Connect to canister  NPWT -175mmHg, continuous therapy.  Change 3 times a week    Document on the outside of the dressing the number of sponges used and the date of the dressing  If dressing is compromised for greater than 2 hours then please remove entire dressing and change or tuck moist Vashe gauze into wound until new dressing can be applied.    Repositioning:  Bed: Alternating Pressure Mattress on top of Tempurpedic mattress Reposition MINIMALLY every 1-2 hours in bed to relieve pressure and promote perfusion to tissue.  Chair: Use chair cushion when in chair, do  not sit for longer than one hour total without repositioning to relieve pressure and promote perfusion to the tissue for 15 minutes each hour.       Bull Harris M.D. January 4, 2024    Call us at [phone] if you have any questions about your wounds, have redness or swelling around your wound, have a fever of 101 degrees Fahrenheit or greater or if you have any other problems or concerns. We answer the phone Monday through Friday 8 am to 4 pm, please leave a message as we check the voicemail frequently throughout the day.     If you had a positive experience please indicate that on your patient satisfaction survey form that [facility] will be sending you.  It was a pleasure meeting with you today.  Thank you for allowing me and my team the privilege of caring for you today.  YOU are the reason we are here, and I truly hope we provided you with the excellent service you deserve.  Please let us know if there is anything else we can do for you so that we can be sure you are leaving completely satisfied with your care experience.      If you have any billing related questions please call the [facility] Business office at [phone]. The clinic staff does not handle billing related matters.  If you are scheduled to have a follow up appointment, you will receive a reminder call the day before your visit. On the appointment day please arrive 15 minutes prior to your appointment time. If you are unable to keep that appointment, please call the clinic to cancel or reschedule. If you are more than 10 minutes late or greater for your scheduled appointment time, the clinic policy is that you may be asked to reschedule.

## 2024-01-04 NOTE — PROGRESS NOTES
[facility]  PROGRESS NOTE     HISTORY OF PRESENT ILLNESS: Rommel is a 47 yo paraplegic male well-known to me since 2015. Has had paraspinal wounds, L IT and now dealing with a R trochanteric ulcer.      INTERVAL HISTORY:   11/16/23: Rommel has been seeing our PA, Sandra Mendiola, for his R troch ulcer. His wife is doing the VAC dressing changes using white sponge, with pressure @ 200 mmHg continuous therapy. (Having to charge extra during daytime hours now). He has noticed that the sponge is often already displaced at the time of the next dressing change. 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  A referral to Dr Elder was submitted last month but Sacha has not been contacted about a consult.      He is trying to offload this area as much as possible, but still working. He takes an offloading break over lunch, then gets off it again when he gets back for the evening. He does frequent weight shifts while at work, but is still in his wheelchair for at least 3-4 hours.    1/4/24:   Patient reports hip swelling has gone away, did not have any drainage. Has been using white sponge with 150 pressure and reports it seemed like it helped. Wound cares done only by wife since he is still working. Feels fine - no symptoms of infection.      PHYSICAL EXAM:   11/16/23:  R trochanter with moderate undermined pocket extending anteriorly. No bone exposed. Granulation tissue seems a bit boggy, most likely from using white instead of black VAC sponge. Fairly clean. Periwound skin intact. No signs of gross infection.     1/4/24: R trochanter - edges callused and not too macerated. Visible wound bed appears clean with granulation tissue and no exposed bone. Undermining tracks anterior to greater trochanter, not sure if any occult tracts to joint space. Patient showed photo of xray with flattened acetabulum. This is  Spoke with the pts , Harmeet. He explained that the pt has had right sided ear pain for 2 weeks. Went to Urgent Care on 7/24 and was frustrated because he felt the pt was not treated fairly and that's why he was asking to have pt admitted. I asked about the symptoms Sarah is having. No cough, no SOB, no fever, no loss of smell or taste. Harmeet explained that she is having right ear pain and some pain when swallowing. But the pain from swallowing comes from pts gastroparesis and is not a new symptom. Harmeet also informed me that the pt is stable and not having any urgent complaints. I informed him that the first availability was for 7/31 at 10AM with Laura, the audiologist, and then pt would see Dr. Kennedy at 10:40AM. Harmeet thanked me and verbalized understanding.    "most likely part of the recurrent hip posterior dislocations.     Measurements on the outside are smaller.      Please see nursing notes for wound measurements, photos and vital signs.     ASSESSMENT/ PLAN:   11/16/23: continue with VAC. Have wife pack white sponge just short of wound depth to allow for space to fill in. Will followup with Dr Elder to render an opinion about the options for a dislocating hip in the setting of a pressure ulcer: bone debridement and VAC vs immediate or delayed flap, etc. Rommel is willing to do whatever is necessary to get the wound healed but will need to plan if we decide on flap as far as bedrest time.     1/4/24: Switch to black foam, ensure undermined area is fully packed by \"ribbon-ing\"/\"snaking\" a finger-width strip as it gets more superficial to the outer skin opening. 175 mmHg continuous pressure.     Work on scheduling excision of H.O. w/ probable TFL flap, maybe even Girdlestone if involves joint, at least as far out as February secondary to patient's work arrangements. Consult with Dr. Elder for combo case.     FOLLOW-UP:   11/16/23: scheduled with Sandra on 12/15 and 1/15/24.   1/4/24: Cancel next appt with Sandra unless need help with dressings. Schedule with me again in February. Will let  contact patient when find combo date.     The documentation for this encounter was entered by Claudette Weiner acting as scribe for Dr. Harris.  The documentation recorded by Claudette Weiner, the scribe, accurately and completely reflects the service(s) I  personally performed and the decisions made by me, Dr. Harris. Jan 4, 2024.    "

## 2024-01-19 DIAGNOSIS — G82.20 PARAPLEGIA (H): ICD-10-CM

## 2024-01-19 DIAGNOSIS — L89.214 DECUBITUS ULCER OF TROCHANTERIC REGION OF RIGHT HIP, STAGE 4 (H): Primary | ICD-10-CM

## 2024-01-24 DIAGNOSIS — M86.451 CHRONIC OSTEOMYELITIS OF RIGHT FEMUR WITH DRAINING SINUS (H): Primary | ICD-10-CM

## 2024-01-29 ENCOUNTER — TELEPHONE (OUTPATIENT)
Dept: WOUND CARE | Facility: CLINIC | Age: 49
End: 2024-01-29
Payer: COMMERCIAL

## 2024-01-29 ENCOUNTER — PREP FOR PROCEDURE (OUTPATIENT)
Dept: ORTHOPEDICS | Facility: CLINIC | Age: 49
End: 2024-01-29
Payer: COMMERCIAL

## 2024-01-29 NOTE — TELEPHONE ENCOUNTER
Left voicemail for patient regarding scheduling surgery with Dr. Harris & Dr. Elder .    Provided direct contact number to discuss.    P: [phone]    __    Yoselin Garcia on 1/29/2024 at 2:51 PM

## 2024-01-30 NOTE — TELEPHONE ENCOUNTER
Surgery is scheduled with Dr. Harris & Dr. Elder  Date: 3/18   Location: [facility]    H&P to be completed by Primary Care team - patient instructed to schedule. Patient stated this will be schedule with MD Dr. Jael Gee requested patient complete a Type [employer] Cross, 2 units. Writer will connect with Dr. Elder's RN care coordinator to help schedule this. Patient is aware.    Post-op visit(s): will be scheduled by [facility]    Patient will receive a phone call from pre-admission nurses 1-2 days prior to surgery with arrival time and NPO instructions. Approximate arrival time/surgery time given to patient: 5:30 AM. Patient aware times are subject to change up until day before surgery.     Spoke with the patient and was able to confirm all scheduled information.       Patient questions/concerns: N/A       Surgery packet to be sent via US mail    __    Yoselin Braunhivera on 1/30/2024 at 1:57 PM  P: [phone]

## 2024-02-08 ENCOUNTER — HOSPITAL ENCOUNTER (OUTPATIENT)
Dept: WOUND CARE | Facility: CLINIC | Age: 49
Discharge: HOME | End: 2024-02-08
Attending: SURGERY | Admitting: SURGERY
Payer: COMMERCIAL

## 2024-02-08 VITALS — SYSTOLIC BLOOD PRESSURE: 135 MMHG | DIASTOLIC BLOOD PRESSURE: 72 MMHG | HEART RATE: 100 BPM | TEMPERATURE: 97.8 F

## 2024-02-08 DIAGNOSIS — L89.214 PRESSURE INJURY OF RIGHT HIP, STAGE 4 (H): Primary | ICD-10-CM

## 2024-02-08 PROCEDURE — 11043 DBRDMT MUSC&/FSCA 1ST 20/<: CPT | Performed by: SURGERY

## 2024-02-08 NOTE — DISCHARGE INSTRUCTIONS
Sacha Ndiaye      1975  A DME order was not completed because supplies were not needed  Dressing changes outside of clinic are being performed by Spouse     Plan 12/15/23:  -Done: Dr Elder and Dr Harris surgery planned 3/18  Will need History & Physical 30 days prior to surgery     Wound Dressing Change: Right Trochanter  - Wash your hands with soap and water before you begin your dressing change and prepare a clean surface for dressings.  - Remove old dressing and ensure all black foam is removed  - Irrigate pocket with Vashe and gauze, leave in place for 10 minutes; remove  - wipe periwound skin clean, pat dry, Swab entire area with No Sting Barrier film.  Prepare the White Foam: (cut as one continuous thin finger width piece: tuck into depth of  tunnel wound and ribbon the foam piece in to fill the undermine area like a snake)  Fold end and snip notch to use scissor or Q tip stick end to ribbon back and forth to fill pocket    (Lift the flesh to drive the foam into the pocket)  Fill the remainder of wound defect and Bridge out to hip with Black foam.   Drape Tape:  Picture frame the wound and out to hip on front for the bridge  Ensure that all the FOAM is touching  Cover all Foam with drape tape  Cut a large 50 cent opening for the TRAC pad connection;   Connect to canister  NPWT -175mmHg, continuous therapy.  Change 3 times a week     Document on the outside of the dressing the number of sponges used and the date of the dressing  If dressing is compromised for greater than 2 hours then please remove entire dressing and change or tuck moist Vashe gauze into wound until new dressing can be applied.     Repositioning:  Bed: Alternating Pressure Mattress on top of Tempurpedic mattress Reposition MINIMALLY every 1-2 hours in bed to relieve pressure and promote perfusion to tissue.  Chair: Use chair cushion when in chair, do not sit for longer than one hour total without repositioning to relieve pressure  and promote perfusion to the tissue for 15 minutes each hour.        Main Provider: Bull Harris M.D. February 8, 2024    Call us at [phone] if you have any questions about your wounds, have redness or swelling around your wound, have a fever of 101 degrees Fahrenheit or greater or if you have any other problems or concerns. We answer the phone Monday through Friday 8 am to 4 pm, please leave a message as we check the voicemail frequently throughout the day.     If you had a positive experience please indicate that on your patient satisfaction survey form that [facility] will be sending you.  It was a pleasure meeting with you today.  Thank you for allowing me and my team the privilege of caring for you today.  YOU are the reason we are here, and I truly hope we provided you with the excellent service you deserve.  Please let us know if there is anything else we can do for you so that we can be sure you are leaving completely satisfied with your care experience.      If you have any billing related questions please call the [facility] Business office at [phone]. The clinic staff does not handle billing related matters.  If you are scheduled to have a follow up appointment, you will receive a reminder call the day before your visit. On the appointment day please arrive 15 minutes prior to your appointment time. If you are unable to keep that appointment, please call the clinic to cancel or reschedule. If you are more than 10 minutes late or greater for your scheduled appointment time, the clinic policy is that you may be asked to reschedule.

## 2024-02-08 NOTE — PROGRESS NOTES
[facility]  PROGRESS NOTE     HISTORY OF PRESENT ILLNESS: Rommel is a 47 yo paraplegic male well-known to me since 2015. Has had paraspinal wounds, L IT and now dealing with a R trochanteric ulcer.      INTERVAL HISTORY:   11/16/23: Rommel has been seeing our PA, Sandra Mendiola, for his R troch ulcer. His wife is doing the VAC dressing changes using white sponge, with pressure @ 200 mmHg continuous therapy. (Having to charge extra during daytime hours now). He has noticed that the sponge is often already displaced at the time of the next dressing change. 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  A referral to Dr Elder was submitted last month but Sacha has not been contacted about a consult.      He is trying to offload this area as much as possible, but still working. He takes an offloading break over lunch, then gets off it again when he gets back for the evening. He does frequent weight shifts while at work, but is still in his wheelchair for at least 3-4 hours.     1/4/24:   Patient reports hip swelling has gone away, did not have any drainage. Has been using white sponge with 150 pressure and reports it seemed like it helped. Wound cares done only by wife since he is still working. Feels fine - no symptoms of infection.     2/8/24: Rommel is here for a final visit before his surgery is scheduled for 3/18 with Dr Elder. He is worried if Girdlestone done how that might affect his sitting and leg mobility. We just won't know what he needs until we get into debridement of pocket to see if connects with joint space.     He is already well-versed on post-flap protocol and will need to go to a facility. Unfortunately, we will not know what skilled nursing cares he will need until after his surgery is completed and microbiology results are back.      PHYSICAL EXAM:   11/16/23:  R trochanter with  moderate undermined pocket extending anteriorly. No bone exposed. Granulation tissue seems a bit boggy, most likely from using white instead of black VAC sponge. Fairly clean. Periwound skin intact. No signs of gross infection.      1/4/24: R trochanter - edges callused and not too macerated. Visible wound bed appears clean with granulation tissue and no exposed bone. Undermining tracks anterior to greater trochanter, not sure if any occult tracts to joint space. Patient showed photo of xray with flattened acetabulum. This is most likely part of the recurrent hip posterior dislocations.      Measurements on the outside are smaller.     2/8/24: R trochanteric ulcer - getting smaller around outside with macerated callused skin edges. Still significant pocket undermining proximally as well as anterior to greater trochanter. Drainage somewhat viscous and serous. Small area of exposed bone proximally.     Please see nursing notes for wound measurements, photos and vital signs.    PROCEDURE: with patient's written informed consent per clinic protocol, the R trochanter wound was sharply excised to enlarge the hole for ease of dressings. Skin, subcutaneous fat and fascial layers were removed with the electric cautery, which was also used for hemostasis. He tolerated the procedure without discomfort since he is insensate below his level of spinal cord injury.  The wound was dressed with white VC sponge.      ASSESSMENT/ PLAN:   11/16/23: continue with VAC. Have wife pack white sponge just short of wound depth to allow for space to fill in. Will followup with Dr Elder to render an opinion about the options for a dislocating hip in the setting of a pressure ulcer: bone debridement and VAC vs immediate or delayed flap, etc. Rommel is willing to do whatever is necessary to get the wound healed but will need to plan if we decide on flap as far as bedrest time.      1/4/24: Switch to black foam, ensure undermined area is fully  "packed by \"ribbon-ing\"/\"snaking\" a finger-width strip as it gets more superficial to the outer skin opening. 175 mmHg continuous pressure.      Work on scheduling excision of H.O. w/ probable TFL flap, maybe even Girdlestone if involves joint, at least as far out as February secondary to patient's work arrangements. Consult with Dr. Elder for combo case.     2/8/24: continue current cares with VASHE soaks and VAC changes with white sponge until surgery date 3/18. He will schedule his H&P once it is within 30 days. They will let us know if they need anything else to tide them over until surgery.      FOLLOW-UP:   11/16/23: scheduled with Sandra on 12/15 and 1/15/24.   1/4/24: Cancel next appt with Sandra unless need help with dressings. Schedule with me again in February. Will let  contact patient when find combo date.     2/8/24: we have appointments scheduled for post-flap check on 4/18 and 5/16.       "

## 2024-02-08 NOTE — PROGRESS NOTES
Patient Active Problem List   Diagnosis    Spinal stenosis    SCI (spinal cord injury)    Hardware complicating wound infection, initial encounter (H24)    Charcot's joint, vertebrae    Status post flap graft    Constipation    H/O lumbosacral spine surgery    Hip dislocation, bilateral (H)    Infection and inflammatory reaction due to internal orthopedic device, implant, and graft (H24)    Neurogenic bladder    Neurogenic bowel    Osteomyelitis of pelvic region or thigh, acute, left (H)    Chronic osteomyelitis involving pelvic region and thigh (H)    Paraplegia (H)    Traumatic spinal subdural hematoma    Hx of plastic surgery    Non-healing left groin open wound, initial encounter    Non-healing left groin open wound, subsequent encounter    Open wound of knee, leg, and ankle, left, initial encounter    Chronic antibiotic suppression    Chronic ulcer of sacral region (H)    Fixation hardware in spine    Hx of spinal surgery    Pressure injury of right hip, stage 4 (H)    Pressure injury of trochanteric region of left hip, stage 3 (H)     Past Medical History:   Diagnosis Date    Anemia     Charcot's joint     Chronic UTI     Constipation     Dislocated hip (H)     hyperlordosis    Epicondylitis     History of blood transfusion     Hx: UTI (urinary tract infection)     Neurogenic bladder     Other chronic pain     Paraplegia following spinal cord injury (H)     Pressure ulcer stage IV     left groin     Labs:   Recent Labs   Lab Test 04/14/19  0602 04/13/19  0651 01/17/19  0945 01/14/17  0636 01/13/17  0609   ALBUMIN  --   --  2.9*  --   --    HGB 9.0* 8.7*  --    < > 8.9*   WBC  --  8.6  --   --   --    A1C  --   --   --   --  4.7   CRP  --   --  37.8*  --   --     < > = values in this interval not displayed.     Nutrition requirements were discussed with patient today.  Vitals:  /72 (BP Location: Left arm, Patient Position: Sitting)   Pulse 100   Temp 97.8  F (36.6  C) (Temporal)   Wound:   Rash 11/28/16  Bilateral thigh (Active)       Wound (used by OP [facility] only) 04/12/23 1308 Right greater trochanter pressure injury (Active)   Thickness/Stage Stage 4 02/08/24 0900   Base granulating 02/08/24 0900   Periwound macerated 02/08/24 0900   Periwound Temperature warm 02/08/24 0900   Periwound Skin Turgor soft 02/08/24 0900   Edges open 02/08/24 0900   Length (cm) 1.4 02/08/24 0900   Width (cm) 1.6 02/08/24 0900   Depth (cm) 1.6 02/08/24 0900   Wound (cm^2) 2.24 cm^2 02/08/24 0900   Wound Volume (cm^3) 3.58 cm^3 02/08/24 0900   Wound healing % 92.31 02/08/24 0900   Tunneling [Depth (cm)/Location] 3 oclock/ 4 cm 08/17/23 1250   Undermining [Depth (cm)/Location] 11 - 3 o'/ 3.5cm 02/08/24 0900   Drainage Characteristics/Odor serosanguineous 02/08/24 0900   Drainage Amount moderate 02/08/24 0900   Care, Wound debrided 02/08/24 0900   Dressing Care other (see comments) 07/14/23 7955      Photo:       Further instructions from your care team         Sacha Ndiaye      1975  A DME order was not completed because supplies were not needed  Dressing changes outside of clinic are being performed by Spouse     Plan 12/15/23:  -Done: Dr Elder and Dr Harris surgery planned 3/18  Will need History & Physical 30 days prior to surgery     Wound Dressing Change: Right Trochanter  - Wash your hands with soap and water before you begin your dressing change and prepare a clean surface for dressings.  - Remove old dressing and ensure all black foam is removed  - Irrigate pocket with Vashe and gauze, leave in place for 10 minutes; remove  - wipe periwound skin clean, pat dry, Swab entire area with No Sting Barrier film.  Prepare the White Foam: (cut as one continuous thin finger width piece: tuck into depth of  tunnel wound and ribbon the foam piece in to fill the undermine area like a snake)  Fold end and snip notch to use scissor or Q tip stick end to ribbon back and forth to fill pocket    (Lift the flesh to drive the foam into the  pocket)  Fill the remainder of wound defect and Bridge out to hip with Black foam.   Drape Tape:  Picture frame the wound and out to hip on front for the bridge  Ensure that all the FOAM is touching  Cover all Foam with drape tape  Cut a large 50 cent opening for the TRAC pad connection;   Connect to canister  NPWT -175mmHg, continuous therapy.  Change 3 times a week     Document on the outside of the dressing the number of sponges used and the date of the dressing  If dressing is compromised for greater than 2 hours then please remove entire dressing and change or tuck moist Vashe gauze into wound until new dressing can be applied.     Repositioning:  Bed: Alternating Pressure Mattress on top of Tempurpedic mattress Reposition MINIMALLY every 1-2 hours in bed to relieve pressure and promote perfusion to tissue.  Chair: Use chair cushion when in chair, do not sit for longer than one hour total without repositioning to relieve pressure and promote perfusion to the tissue for 15 minutes each hour.        Main Provider: Bull Harris M.D. February 8, 2024

## 2024-03-15 ENCOUNTER — MEDICAL CORRESPONDENCE (OUTPATIENT)
Dept: HEALTH INFORMATION MANAGEMENT | Facility: CLINIC | Age: 49
End: 2024-03-15

## 2024-03-17 ENCOUNTER — ANESTHESIA EVENT (OUTPATIENT)
Dept: SURGERY | Facility: CLINIC | Age: 49
DRG: 498 | End: 2024-03-17
Payer: COMMERCIAL

## 2024-03-17 ASSESSMENT — LIFESTYLE VARIABLES: TOBACCO_USE: 1
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
## 2024-03-18 ENCOUNTER — HOSPITAL ENCOUNTER (INPATIENT)
Facility: CLINIC | Age: 49
LOS: 17 days | Discharge: HOME HEALTH | DRG: 498 | End: 2024-04-04
Attending: ORTHOPAEDIC SURGERY | Admitting: SURGERY
Payer: COMMERCIAL

## 2024-03-18 ENCOUNTER — ANESTHESIA (OUTPATIENT)
Dept: SURGERY | Facility: CLINIC | Age: 49
DRG: 498 | End: 2024-03-18
Payer: COMMERCIAL

## 2024-03-18 DIAGNOSIS — Z98.890 HX OF SPINAL SURGERY: ICD-10-CM

## 2024-03-18 DIAGNOSIS — T14.8XXA OPEN WOUND: ICD-10-CM

## 2024-03-18 DIAGNOSIS — Z98.890 STATUS POST FLAP GRAFT: ICD-10-CM

## 2024-03-18 DIAGNOSIS — L89.46: ICD-10-CM

## 2024-03-18 DIAGNOSIS — S71.002A OPEN WOUND OF LEFT HIP, INITIAL ENCOUNTER: ICD-10-CM

## 2024-03-18 DIAGNOSIS — G82.20 PARAPLEGIA (H): ICD-10-CM

## 2024-03-18 DIAGNOSIS — T84.7XXS WOUND INFECTION COMPLICATING HARDWARE, SEQUELA: ICD-10-CM

## 2024-03-18 DIAGNOSIS — L89.214 PRESSURE INJURY OF RIGHT HIP, STAGE 4 (H): ICD-10-CM

## 2024-03-18 DIAGNOSIS — L89.324 DECUBITUS ULCER OF LEFT ISCHIUM, STAGE 4 (H): ICD-10-CM

## 2024-03-18 DIAGNOSIS — Z96.7 FIXATION HARDWARE IN SPINE: Primary | ICD-10-CM

## 2024-03-18 DIAGNOSIS — R21 RASH: ICD-10-CM

## 2024-03-18 PROBLEM — S71.009A: Status: ACTIVE | Noted: 2024-03-18

## 2024-03-18 LAB
ABO/RH(D): NORMAL
ANTIBODY SCREEN: NEGATIVE
GLUCOSE BLDC GLUCOMTR-MCNC: 99 MG/DL (ref 70–99)
SPECIMEN EXPIRATION DATE: NORMAL

## 2024-03-18 PROCEDURE — 250N000011 HC RX IP 250 OP 636: Performed by: ORTHOPAEDIC SURGERY

## 2024-03-18 PROCEDURE — 250N000013 HC RX MED GY IP 250 OP 250 PS 637: Performed by: ORTHOPAEDIC SURGERY

## 2024-03-18 PROCEDURE — 250N000011 HC RX IP 250 OP 636: Performed by: PHYSICIAN ASSISTANT

## 2024-03-18 PROCEDURE — 370N000017 HC ANESTHESIA TECHNICAL FEE, PER MIN: Performed by: ORTHOPAEDIC SURGERY

## 2024-03-18 PROCEDURE — 86900 BLOOD TYPING SEROLOGIC ABO: CPT | Performed by: ORTHOPAEDIC SURGERY

## 2024-03-18 PROCEDURE — 15738 MUSCLE-SKIN GRAFT LEG: CPT | Performed by: SURGERY

## 2024-03-18 PROCEDURE — 710N000010 HC RECOVERY PHASE 1, LEVEL 2, PER MIN: Performed by: ORTHOPAEDIC SURGERY

## 2024-03-18 PROCEDURE — 250N000025 HC SEVOFLURANE, PER MIN: Performed by: ORTHOPAEDIC SURGERY

## 2024-03-18 PROCEDURE — 99222 1ST HOSP IP/OBS MODERATE 55: CPT

## 2024-03-18 PROCEDURE — 250N000013 HC RX MED GY IP 250 OP 250 PS 637: Performed by: PHYSICIAN ASSISTANT

## 2024-03-18 PROCEDURE — 250N000011 HC RX IP 250 OP 636

## 2024-03-18 PROCEDURE — 120N000002 HC R&B MED SURG/OB UMMC

## 2024-03-18 PROCEDURE — 87102 FUNGUS ISOLATION CULTURE: CPT | Performed by: ORTHOPAEDIC SURGERY

## 2024-03-18 PROCEDURE — 272N000001 HC OR GENERAL SUPPLY STERILE: Performed by: ORTHOPAEDIC SURGERY

## 2024-03-18 PROCEDURE — 15738 MUSCLE-SKIN GRAFT LEG: CPT

## 2024-03-18 PROCEDURE — 0QB60ZZ EXCISION OF RIGHT UPPER FEMUR, OPEN APPROACH: ICD-10-PCS | Performed by: ORTHOPAEDIC SURGERY

## 2024-03-18 PROCEDURE — 250N000009 HC RX 250

## 2024-03-18 PROCEDURE — 999N000141 HC STATISTIC PRE-PROCEDURE NURSING ASSESSMENT: Performed by: ORTHOPAEDIC SURGERY

## 2024-03-18 PROCEDURE — 87075 CULTR BACTERIA EXCEPT BLOOD: CPT | Performed by: ORTHOPAEDIC SURGERY

## 2024-03-18 PROCEDURE — 0KXQ0Z2 TRANSFER RIGHT UPPER LEG MUSCLE WITH SKIN AND SUBCUTANEOUS TISSUE, OPEN APPROACH: ICD-10-PCS | Performed by: SURGERY

## 2024-03-18 PROCEDURE — 258N000003 HC RX IP 258 OP 636

## 2024-03-18 PROCEDURE — G0463 HOSPITAL OUTPT CLINIC VISIT: HCPCS | Mod: 25

## 2024-03-18 PROCEDURE — 258N000003 HC RX IP 258 OP 636: Performed by: PHYSICIAN ASSISTANT

## 2024-03-18 PROCEDURE — 87070 CULTURE OTHR SPECIMN AEROBIC: CPT | Performed by: ORTHOPAEDIC SURGERY

## 2024-03-18 PROCEDURE — 360N000084 HC SURGERY LEVEL 4 W/ FLUORO, PER MIN: Performed by: ORTHOPAEDIC SURGERY

## 2024-03-18 PROCEDURE — 88311 DECALCIFY TISSUE: CPT | Mod: 26 | Performed by: PATHOLOGY

## 2024-03-18 PROCEDURE — 88309 TISSUE EXAM BY PATHOLOGIST: CPT | Mod: TC | Performed by: ORTHOPAEDIC SURGERY

## 2024-03-18 PROCEDURE — 15738 MUSCLE-SKIN GRAFT LEG: CPT | Performed by: ANESTHESIOLOGY

## 2024-03-18 PROCEDURE — 88309 TISSUE EXAM BY PATHOLOGIST: CPT | Mod: 26 | Performed by: PATHOLOGY

## 2024-03-18 RX ORDER — AMOXICILLIN 250 MG
1 CAPSULE ORAL 2 TIMES DAILY
Status: DISCONTINUED | OUTPATIENT
Start: 2024-03-18 | End: 2024-04-04 | Stop reason: HOSPADM

## 2024-03-18 RX ORDER — FENTANYL CITRATE 50 UG/ML
INJECTION, SOLUTION INTRAMUSCULAR; INTRAVENOUS PRN
Status: DISCONTINUED | OUTPATIENT
Start: 2024-03-18 | End: 2024-03-18

## 2024-03-18 RX ORDER — LABETALOL HYDROCHLORIDE 5 MG/ML
10 INJECTION, SOLUTION INTRAVENOUS
Status: DISCONTINUED | OUTPATIENT
Start: 2024-03-18 | End: 2024-03-18 | Stop reason: HOSPADM

## 2024-03-18 RX ORDER — LIDOCAINE HYDROCHLORIDE 40 MG/ML
SOLUTION TOPICAL PRN
Status: DISCONTINUED | OUTPATIENT
Start: 2024-03-18 | End: 2024-03-18

## 2024-03-18 RX ORDER — METHOCARBAMOL 750 MG/1
750 TABLET, FILM COATED ORAL EVERY 6 HOURS PRN
Status: DISCONTINUED | OUTPATIENT
Start: 2024-03-18 | End: 2024-04-04 | Stop reason: HOSPADM

## 2024-03-18 RX ORDER — ONDANSETRON 4 MG/1
4 TABLET, ORALLY DISINTEGRATING ORAL EVERY 30 MIN PRN
Status: CANCELLED | OUTPATIENT
Start: 2024-03-18

## 2024-03-18 RX ORDER — ALBUTEROL SULFATE 0.83 MG/ML
2.5 SOLUTION RESPIRATORY (INHALATION) EVERY 4 HOURS PRN
Status: DISCONTINUED | OUTPATIENT
Start: 2024-03-18 | End: 2024-03-18 | Stop reason: HOSPADM

## 2024-03-18 RX ORDER — ONDANSETRON 2 MG/ML
4 INJECTION INTRAMUSCULAR; INTRAVENOUS EVERY 30 MIN PRN
Status: DISCONTINUED | OUTPATIENT
Start: 2024-03-18 | End: 2024-03-18 | Stop reason: HOSPADM

## 2024-03-18 RX ORDER — PROPOFOL 10 MG/ML
INJECTION, EMULSION INTRAVENOUS PRN
Status: DISCONTINUED | OUTPATIENT
Start: 2024-03-18 | End: 2024-03-18

## 2024-03-18 RX ORDER — FENTANYL CITRATE 50 UG/ML
25 INJECTION, SOLUTION INTRAMUSCULAR; INTRAVENOUS EVERY 5 MIN PRN
Status: DISCONTINUED | OUTPATIENT
Start: 2024-03-18 | End: 2024-03-18 | Stop reason: HOSPADM

## 2024-03-18 RX ORDER — LIDOCAINE 40 MG/G
CREAM TOPICAL
Status: DISCONTINUED | OUTPATIENT
Start: 2024-03-18 | End: 2024-04-04 | Stop reason: HOSPADM

## 2024-03-18 RX ORDER — DEXAMETHASONE SODIUM PHOSPHATE 4 MG/ML
INJECTION, SOLUTION INTRA-ARTICULAR; INTRALESIONAL; INTRAMUSCULAR; INTRAVENOUS; SOFT TISSUE PRN
Status: DISCONTINUED | OUTPATIENT
Start: 2024-03-18 | End: 2024-03-18

## 2024-03-18 RX ORDER — BISACODYL 10 MG
10 SUPPOSITORY, RECTAL RECTAL DAILY PRN
Status: DISCONTINUED | OUTPATIENT
Start: 2024-03-21 | End: 2024-04-04 | Stop reason: HOSPADM

## 2024-03-18 RX ORDER — HYDROMORPHONE HYDROCHLORIDE 1 MG/ML
0.2 INJECTION, SOLUTION INTRAMUSCULAR; INTRAVENOUS; SUBCUTANEOUS EVERY 5 MIN PRN
Status: DISCONTINUED | OUTPATIENT
Start: 2024-03-18 | End: 2024-03-18 | Stop reason: HOSPADM

## 2024-03-18 RX ORDER — ACETAMINOPHEN 325 MG/1
975 TABLET ORAL ONCE
Status: CANCELLED | OUTPATIENT
Start: 2024-03-18 | End: 2024-03-18

## 2024-03-18 RX ORDER — SODIUM CHLORIDE, SODIUM LACTATE, POTASSIUM CHLORIDE, CALCIUM CHLORIDE 600; 310; 30; 20 MG/100ML; MG/100ML; MG/100ML; MG/100ML
INJECTION, SOLUTION INTRAVENOUS CONTINUOUS
Status: DISCONTINUED | OUTPATIENT
Start: 2024-03-18 | End: 2024-03-18 | Stop reason: HOSPADM

## 2024-03-18 RX ORDER — PROCHLORPERAZINE MALEATE 10 MG
10 TABLET ORAL EVERY 6 HOURS PRN
Status: DISCONTINUED | OUTPATIENT
Start: 2024-03-18 | End: 2024-04-04 | Stop reason: HOSPADM

## 2024-03-18 RX ORDER — CEFAZOLIN SODIUM/WATER 2 G/20 ML
2 SYRINGE (ML) INTRAVENOUS SEE ADMIN INSTRUCTIONS
Status: DISCONTINUED | OUTPATIENT
Start: 2024-03-18 | End: 2024-03-18

## 2024-03-18 RX ORDER — OXYCODONE HYDROCHLORIDE 10 MG/1
10 TABLET ORAL EVERY 4 HOURS PRN
Status: DISCONTINUED | OUTPATIENT
Start: 2024-03-18 | End: 2024-04-04 | Stop reason: HOSPADM

## 2024-03-18 RX ORDER — OXYCODONE HYDROCHLORIDE 5 MG/1
5 TABLET ORAL
Status: CANCELLED | OUTPATIENT
Start: 2024-03-18

## 2024-03-18 RX ORDER — OXYCODONE HYDROCHLORIDE 5 MG/1
5 TABLET ORAL EVERY 4 HOURS PRN
Status: DISCONTINUED | OUTPATIENT
Start: 2024-03-18 | End: 2024-04-04 | Stop reason: HOSPADM

## 2024-03-18 RX ORDER — FENTANYL CITRATE 50 UG/ML
50 INJECTION, SOLUTION INTRAMUSCULAR; INTRAVENOUS EVERY 5 MIN PRN
Status: DISCONTINUED | OUTPATIENT
Start: 2024-03-18 | End: 2024-03-18 | Stop reason: HOSPADM

## 2024-03-18 RX ORDER — ONDANSETRON 2 MG/ML
INJECTION INTRAMUSCULAR; INTRAVENOUS PRN
Status: DISCONTINUED | OUTPATIENT
Start: 2024-03-18 | End: 2024-03-18

## 2024-03-18 RX ORDER — NALOXONE HYDROCHLORIDE 0.4 MG/ML
0.4 INJECTION, SOLUTION INTRAMUSCULAR; INTRAVENOUS; SUBCUTANEOUS
Status: DISCONTINUED | OUTPATIENT
Start: 2024-03-18 | End: 2024-04-04 | Stop reason: HOSPADM

## 2024-03-18 RX ORDER — DIPHENHYDRAMINE HCL 25 MG
25 CAPSULE ORAL EVERY 6 HOURS PRN
Status: DISCONTINUED | OUTPATIENT
Start: 2024-03-18 | End: 2024-04-04 | Stop reason: HOSPADM

## 2024-03-18 RX ORDER — CALCIUM CARBONATE 500 MG/1
500 TABLET, CHEWABLE ORAL 4 TIMES DAILY PRN
Status: DISCONTINUED | OUTPATIENT
Start: 2024-03-18 | End: 2024-04-04 | Stop reason: HOSPADM

## 2024-03-18 RX ORDER — ONDANSETRON 4 MG/1
4 TABLET, ORALLY DISINTEGRATING ORAL EVERY 30 MIN PRN
Status: DISCONTINUED | OUTPATIENT
Start: 2024-03-18 | End: 2024-03-18 | Stop reason: HOSPADM

## 2024-03-18 RX ORDER — ONDANSETRON 4 MG/1
4 TABLET, ORALLY DISINTEGRATING ORAL EVERY 6 HOURS PRN
Status: DISCONTINUED | OUTPATIENT
Start: 2024-03-18 | End: 2024-04-04 | Stop reason: HOSPADM

## 2024-03-18 RX ORDER — ACETAMINOPHEN 325 MG/1
650 TABLET ORAL EVERY 4 HOURS PRN
Status: DISCONTINUED | OUTPATIENT
Start: 2024-03-21 | End: 2024-04-04 | Stop reason: HOSPADM

## 2024-03-18 RX ORDER — SODIUM CHLORIDE, SODIUM GLUCONATE, SODIUM ACETATE, POTASSIUM CHLORIDE AND MAGNESIUM CHLORIDE 526; 502; 368; 37; 30 MG/100ML; MG/100ML; MG/100ML; MG/100ML; MG/100ML
INJECTION, SOLUTION INTRAVENOUS CONTINUOUS PRN
Status: DISCONTINUED | OUTPATIENT
Start: 2024-03-18 | End: 2024-03-18

## 2024-03-18 RX ORDER — NALOXONE HYDROCHLORIDE 0.4 MG/ML
0.1 INJECTION, SOLUTION INTRAMUSCULAR; INTRAVENOUS; SUBCUTANEOUS
Status: CANCELLED | OUTPATIENT
Start: 2024-03-18

## 2024-03-18 RX ORDER — ACETAMINOPHEN 325 MG/1
975 TABLET ORAL EVERY 8 HOURS
Qty: 27 TABLET | Refills: 0 | Status: COMPLETED | OUTPATIENT
Start: 2024-03-18 | End: 2024-03-21

## 2024-03-18 RX ORDER — POLYETHYLENE GLYCOL 3350 17 G/17G
17 POWDER, FOR SOLUTION ORAL DAILY
Status: DISCONTINUED | OUTPATIENT
Start: 2024-03-19 | End: 2024-04-04 | Stop reason: HOSPADM

## 2024-03-18 RX ORDER — TRANEXAMIC ACID 650 MG/1
1950 TABLET ORAL ONCE
Status: COMPLETED | OUTPATIENT
Start: 2024-03-18 | End: 2024-03-18

## 2024-03-18 RX ORDER — NALOXONE HYDROCHLORIDE 0.4 MG/ML
0.1 INJECTION, SOLUTION INTRAMUSCULAR; INTRAVENOUS; SUBCUTANEOUS
Status: DISCONTINUED | OUTPATIENT
Start: 2024-03-18 | End: 2024-03-18 | Stop reason: HOSPADM

## 2024-03-18 RX ORDER — HYDROMORPHONE HYDROCHLORIDE 1 MG/ML
0.2 INJECTION, SOLUTION INTRAMUSCULAR; INTRAVENOUS; SUBCUTANEOUS
Status: DISCONTINUED | OUTPATIENT
Start: 2024-03-18 | End: 2024-04-04 | Stop reason: HOSPADM

## 2024-03-18 RX ORDER — ONDANSETRON 2 MG/ML
4 INJECTION INTRAMUSCULAR; INTRAVENOUS EVERY 30 MIN PRN
Status: CANCELLED | OUTPATIENT
Start: 2024-03-18

## 2024-03-18 RX ORDER — EPHEDRINE SULFATE 50 MG/ML
INJECTION, SOLUTION INTRAMUSCULAR; INTRAVENOUS; SUBCUTANEOUS PRN
Status: DISCONTINUED | OUTPATIENT
Start: 2024-03-18 | End: 2024-03-18

## 2024-03-18 RX ORDER — ONDANSETRON 2 MG/ML
4 INJECTION INTRAMUSCULAR; INTRAVENOUS EVERY 6 HOURS PRN
Status: DISCONTINUED | OUTPATIENT
Start: 2024-03-18 | End: 2024-04-04 | Stop reason: HOSPADM

## 2024-03-18 RX ORDER — OXYCODONE HYDROCHLORIDE 5 MG/1
10 TABLET ORAL
Status: CANCELLED | OUTPATIENT
Start: 2024-03-18

## 2024-03-18 RX ORDER — HYDROMORPHONE HYDROCHLORIDE 1 MG/ML
0.4 INJECTION, SOLUTION INTRAMUSCULAR; INTRAVENOUS; SUBCUTANEOUS
Status: DISCONTINUED | OUTPATIENT
Start: 2024-03-18 | End: 2024-04-04 | Stop reason: HOSPADM

## 2024-03-18 RX ORDER — NALOXONE HYDROCHLORIDE 0.4 MG/ML
0.2 INJECTION, SOLUTION INTRAMUSCULAR; INTRAVENOUS; SUBCUTANEOUS
Status: DISCONTINUED | OUTPATIENT
Start: 2024-03-18 | End: 2024-04-04 | Stop reason: HOSPADM

## 2024-03-18 RX ORDER — DIPHENHYDRAMINE HYDROCHLORIDE 50 MG/ML
25 INJECTION INTRAMUSCULAR; INTRAVENOUS EVERY 6 HOURS PRN
Status: DISCONTINUED | OUTPATIENT
Start: 2024-03-18 | End: 2024-04-04 | Stop reason: HOSPADM

## 2024-03-18 RX ORDER — CEFAZOLIN SODIUM/WATER 2 G/20 ML
2 SYRINGE (ML) INTRAVENOUS
Status: DISCONTINUED | OUTPATIENT
Start: 2024-03-18 | End: 2024-03-18 | Stop reason: HOSPADM

## 2024-03-18 RX ORDER — CEFAZOLIN SODIUM/WATER 2 G/20 ML
2 SYRINGE (ML) INTRAVENOUS
Status: DISCONTINUED | OUTPATIENT
Start: 2024-03-18 | End: 2024-03-18

## 2024-03-18 RX ORDER — SODIUM CHLORIDE, SODIUM LACTATE, POTASSIUM CHLORIDE, CALCIUM CHLORIDE 600; 310; 30; 20 MG/100ML; MG/100ML; MG/100ML; MG/100ML
INJECTION, SOLUTION INTRAVENOUS CONTINUOUS
Status: DISCONTINUED | OUTPATIENT
Start: 2024-03-18 | End: 2024-03-27

## 2024-03-18 RX ORDER — HYDROMORPHONE HYDROCHLORIDE 1 MG/ML
0.4 INJECTION, SOLUTION INTRAMUSCULAR; INTRAVENOUS; SUBCUTANEOUS EVERY 5 MIN PRN
Status: DISCONTINUED | OUTPATIENT
Start: 2024-03-18 | End: 2024-03-18 | Stop reason: HOSPADM

## 2024-03-18 RX ORDER — LIDOCAINE HYDROCHLORIDE 20 MG/ML
INJECTION, SOLUTION INFILTRATION; PERINEURAL PRN
Status: DISCONTINUED | OUTPATIENT
Start: 2024-03-18 | End: 2024-03-18

## 2024-03-18 RX ORDER — SODIUM CHLORIDE, SODIUM LACTATE, POTASSIUM CHLORIDE, CALCIUM CHLORIDE 600; 310; 30; 20 MG/100ML; MG/100ML; MG/100ML; MG/100ML
INJECTION, SOLUTION INTRAVENOUS CONTINUOUS PRN
Status: DISCONTINUED | OUTPATIENT
Start: 2024-03-18 | End: 2024-03-18

## 2024-03-18 RX ORDER — CEFAZOLIN SODIUM 1 G/3ML
1 INJECTION, POWDER, FOR SOLUTION INTRAMUSCULAR; INTRAVENOUS EVERY 8 HOURS
Qty: 10 ML | Refills: 0 | Status: COMPLETED | OUTPATIENT
Start: 2024-03-18 | End: 2024-03-19

## 2024-03-18 RX ORDER — DIAZEPAM 10 MG/2ML
2.5 INJECTION, SOLUTION INTRAMUSCULAR; INTRAVENOUS
Status: DISCONTINUED | OUTPATIENT
Start: 2024-03-18 | End: 2024-03-18 | Stop reason: HOSPADM

## 2024-03-18 RX ORDER — CEFAZOLIN SODIUM/WATER 2 G/20 ML
2 SYRINGE (ML) INTRAVENOUS SEE ADMIN INSTRUCTIONS
Status: DISCONTINUED | OUTPATIENT
Start: 2024-03-18 | End: 2024-03-18 | Stop reason: HOSPADM

## 2024-03-18 RX ORDER — OXYBUTYNIN CHLORIDE 10 MG/1
30 TABLET, EXTENDED RELEASE ORAL DAILY
Status: DISCONTINUED | OUTPATIENT
Start: 2024-03-19 | End: 2024-04-04 | Stop reason: HOSPADM

## 2024-03-18 RX ADMIN — LIDOCAINE HYDROCHLORIDE 100 MG: 20 INJECTION, SOLUTION INFILTRATION; PERINEURAL at 07:44

## 2024-03-18 RX ADMIN — CEFAZOLIN 1 G: 1 INJECTION, POWDER, FOR SOLUTION INTRAMUSCULAR; INTRAVENOUS at 15:57

## 2024-03-18 RX ADMIN — PHENYLEPHRINE HYDROCHLORIDE 100 MCG: 10 INJECTION INTRAVENOUS at 08:21

## 2024-03-18 RX ADMIN — PROPOFOL 200 MG: 10 INJECTION, EMULSION INTRAVENOUS at 07:45

## 2024-03-18 RX ADMIN — DEXAMETHASONE SODIUM PHOSPHATE 8 MG: 4 INJECTION, SOLUTION INTRA-ARTICULAR; INTRALESIONAL; INTRAMUSCULAR; INTRAVENOUS; SOFT TISSUE at 08:20

## 2024-03-18 RX ADMIN — PHENYLEPHRINE HYDROCHLORIDE 100 MCG: 10 INJECTION INTRAVENOUS at 08:09

## 2024-03-18 RX ADMIN — MIDAZOLAM 2 MG: 1 INJECTION INTRAMUSCULAR; INTRAVENOUS at 07:33

## 2024-03-18 RX ADMIN — ACETAMINOPHEN 975 MG: 325 TABLET, FILM COATED ORAL at 14:38

## 2024-03-18 RX ADMIN — SENNOSIDES AND DOCUSATE SODIUM 1 TABLET: 8.6; 5 TABLET ORAL at 20:25

## 2024-03-18 RX ADMIN — EPHEDRINE SULFATE 10 MG: 5 INJECTION INTRAVENOUS at 08:03

## 2024-03-18 RX ADMIN — LIDOCAINE HYDROCHLORIDE 5 ML: 40 SOLUTION TOPICAL at 07:51

## 2024-03-18 RX ADMIN — Medication 2 G: at 08:39

## 2024-03-18 RX ADMIN — SUGAMMADEX 100 MG: 100 INJECTION, SOLUTION INTRAVENOUS at 10:54

## 2024-03-18 RX ADMIN — TRANEXAMIC ACID 1950 MG: 650 TABLET ORAL at 06:39

## 2024-03-18 RX ADMIN — SODIUM CHLORIDE, POTASSIUM CHLORIDE, SODIUM LACTATE AND CALCIUM CHLORIDE: 600; 310; 30; 20 INJECTION, SOLUTION INTRAVENOUS at 13:04

## 2024-03-18 RX ADMIN — SODIUM CHLORIDE, POTASSIUM CHLORIDE, SODIUM LACTATE AND CALCIUM CHLORIDE: 600; 310; 30; 20 INJECTION, SOLUTION INTRAVENOUS at 07:38

## 2024-03-18 RX ADMIN — HYDROMORPHONE HYDROCHLORIDE 0.25 MG: 1 INJECTION, SOLUTION INTRAMUSCULAR; INTRAVENOUS; SUBCUTANEOUS at 10:45

## 2024-03-18 RX ADMIN — ONDANSETRON 4 MG: 2 INJECTION INTRAMUSCULAR; INTRAVENOUS at 10:47

## 2024-03-18 RX ADMIN — EPHEDRINE SULFATE 5 MG: 5 INJECTION INTRAVENOUS at 07:54

## 2024-03-18 RX ADMIN — PHENYLEPHRINE HYDROCHLORIDE 100 MCG: 10 INJECTION INTRAVENOUS at 07:55

## 2024-03-18 RX ADMIN — PHENYLEPHRINE HYDROCHLORIDE 50 MCG: 10 INJECTION INTRAVENOUS at 07:45

## 2024-03-18 RX ADMIN — ACETAMINOPHEN 975 MG: 325 TABLET, FILM COATED ORAL at 21:48

## 2024-03-18 RX ADMIN — SODIUM CHLORIDE, POTASSIUM CHLORIDE, SODIUM LACTATE AND CALCIUM CHLORIDE: 600; 310; 30; 20 INJECTION, SOLUTION INTRAVENOUS at 10:48

## 2024-03-18 RX ADMIN — CEFAZOLIN 1 G: 1 INJECTION, POWDER, FOR SOLUTION INTRAMUSCULAR; INTRAVENOUS at 23:33

## 2024-03-18 RX ADMIN — Medication 50 MG: at 07:46

## 2024-03-18 RX ADMIN — FENTANYL CITRATE 50 MCG: 50 INJECTION INTRAMUSCULAR; INTRAVENOUS at 07:42

## 2024-03-18 RX ADMIN — SODIUM CHLORIDE, SODIUM GLUCONATE, SODIUM ACETATE, POTASSIUM CHLORIDE AND MAGNESIUM CHLORIDE: 526; 502; 368; 37; 30 INJECTION, SOLUTION INTRAVENOUS at 08:00

## 2024-03-18 RX ADMIN — EPHEDRINE SULFATE 5 MG: 5 INJECTION INTRAVENOUS at 08:10

## 2024-03-18 RX ADMIN — PHENYLEPHRINE HYDROCHLORIDE 150 MCG: 10 INJECTION INTRAVENOUS at 08:03

## 2024-03-18 RX ADMIN — PHENYLEPHRINE HYDROCHLORIDE 0.2 MCG/KG/MIN: 10 INJECTION INTRAVENOUS at 08:18

## 2024-03-18 ASSESSMENT — ACTIVITIES OF DAILY LIVING (ADL)
ADLS_ACUITY_SCORE: 21
ADLS_ACUITY_SCORE: 23
ADLS_ACUITY_SCORE: 24
ADLS_ACUITY_SCORE: 21
ADLS_ACUITY_SCORE: 23
ADLS_ACUITY_SCORE: 24
ADLS_ACUITY_SCORE: 24
ADLS_ACUITY_SCORE: 39
ADLS_ACUITY_SCORE: 23
ADLS_ACUITY_SCORE: 41
ADLS_ACUITY_SCORE: 24
ADLS_ACUITY_SCORE: 39

## 2024-03-18 NOTE — OP NOTE
DATE OF SURGERY: 3/18/2024    PREOPERATIVE DIAGNOSIS: Right hip decubitus ulcer    POSTOPERATIVE DIAGNOSIS: Right hip decubitus ulcer    PROCEDURE: Partial excision right femur    SURGEON: Everardo Elder MD     ASSISTANT: Reva Benoit MD    PATIENT HISTORY: This patient is wheelchair-bound and has a grade 4 decubitus ulcer on the right hip area.  He presents now to have some bone removed soft tissue debrided and flap coverage by plastic surgery.  He understands the risks of infection bleeding pain numbness tingling weakness and alteration in his right hip mobility.    DESCRIPTION OF PROCEDURE: The patient underwent successful induction of anesthesia.  He was positioned lateral held with a beanbag and the right leg and hip area washed and sterilely prepped and draped.  We excised the strictured area of the skin from around his open wound and extended his incision proximally to expose the tip of the greater trochanter.  I used cautery to divide the soft tissues around the edge of the trochanter and then used an osteotome position just distal to the vastus ridge and we remove the majority of the greater trochanter.  I then took some cultures from inside the bone and used a rongeur to smooth off the bone edges.  Prior to osteotomize in the hip we could not find an opening into the hip joint and ranging the hip joint did not produce any fluid.  After using the osteotomy we again were having difficulty palpating any connection to the hip joint but with internal and external rotation we discovered that there was a rent in the hip capsule in the superior anterior aspect of the field and clear joint fluid was evident.  There is no gross infection in the hip joint.  The plan was for plastic surgery to wash this out and proceed with coverage.  We felt that we did not need to do a Girdlestone arthroplasty for infections reasons at this point in time.  At this point I turned the field over to plastic surgery.  The  estimated blood loss for this portion of the surgery is 25 mL.  There were no complications.  I was present for all critical portions of the procedure.  We did send the greater trochanter to pathology and we sent bone cultures from the proximal femur for aerobic and anaerobic analysis.    Everardo Elder MD

## 2024-03-18 NOTE — ANESTHESIA CARE TRANSFER NOTE
Patient: Sacha Ndiaye    Procedure: Procedure(s):  Partial excision right femur  with Girdlestone arthroplasty  IRRIGATION AND DEBRIDEMENT, WOUND, RIGHT HIP REGION, WITH FLAP CLOSURE. Tensor Fascia yissel flap       Diagnosis: Decubitus ulcer of trochanteric region of right hip, stage 4 (H) [L89.214]  Paraplegia (H) [G82.20]  Diagnosis Additional Information: No value filed.    Anesthesia Type:   General     Note:    Oropharynx: oropharynx clear of all foreign objects and spontaneously breathing  Level of Consciousness: awake and drowsy  Oxygen Supplementation: face mask  Level of Supplemental Oxygen (L/min / FiO2): 8  Independent Airway: airway patency satisfactory and stable  Dentition: dentition unchanged  Vital Signs Stable: post-procedure vital signs reviewed and stable  Report to RN Given: handoff report given  Patient transferred to: PACU    Handoff Report: Identifed the Patient, Identified the Reponsible Provider, Reviewed the pertinent medical history, Discussed the surgical course, Reviewed Intra-OP anesthesia mangement and issues during anesthesia, Set expectations for post-procedure period and Allowed opportunity for questions and acknowledgement of understanding      Vitals:  CRNA VITALS  3/18/2024 1031 - 3/18/2024 1105        3/18/2024             NIBP: 102/58    Pulse: 85    NIBP Mean: 69    Temp: 36.4  C (97.6  F)    SpO2: 100 %    Resp Rate (observed): 14             Electronically Signed By: JULIANNE Denney CRNA  March 18, 2024  11:05 AM

## 2024-03-18 NOTE — PROGRESS NOTES
PACU to Inpatient Nursing Handoff    Patient Sacha Ndiaye is a 49 year old male who speaks English.   Procedure Procedure(s):  Partial excision right femur  with Girdlestone arthroplasty  IRRIGATION AND DEBRIDEMENT, WOUND, RIGHT HIP REGION, WITH FLAP CLOSURE. Tensor Fascia yissel flap   Surgeon(s) Primary: Everardo Elder MD  Assisting: Livia Gutierrez PA-C     Allergies   Allergen Reactions    Econazole Rash    Gentamycin [Gentamicin] Blisters, Unknown and Dermatitis     From topical gentamicin, developed severe blistering on foot       Isolation  No active isolations     Past Medical History   has a past medical history of Anemia, Charcot's joint, Chronic UTI, Constipation, Dislocated hip (H), Epicondylitis, History of blood transfusion, UTI (urinary tract infection), Neurogenic bladder, Other chronic pain, Paraplegia following spinal cord injury (H), and Pressure ulcer stage IV.    Anesthesia General   Dermatome Level     Preop Meds Not applicable   Nerve block Not applicable   Intraop Meds dexamethasone (Decadron)  fentanyl (Sublimaze): 50 mcg total  hydromorphone (Dilaudid): 0.25 mg total  ondansetron (Zofran): last given at 1047   Local Meds No   Antibiotics cefazolin (Ancef) - last given at 0839     Pain Patient Currently in Pain: no   PACU meds  Not applicable   PCA / epidural No   Capnography     Telemetry ECG Rhythm: Normal sinus rhythm   Inpatient Telemetry Monitor Ordered? No        Labs Glucose Lab Results   Component Value Date    GLC 99 03/18/2024     04/16/2019       Hgb Lab Results   Component Value Date    HGB 9.0 04/14/2019       INR Lab Results   Component Value Date    INR 1.12 10/27/2015      PACU Imaging Not applicable     Wound/Incision Rash 11/28/16 Bilateral thigh (Active)   Number of days: 2667       Wound (used by OP [facility] only) 04/12/23 1308 Right greater trochanter pressure injury (Active)   Number of days: 341       Incision/Surgical Site 03/18/24  Anterior;Right Hip (Active)   Closure Sutures 03/18/24 1026   Dressing Intervention New dressing applied 03/18/24 1026   Number of days: 0      CMS        Equipment Not applicable   Other LDA       IV Access Peripheral IV 03/18/24 Left Hand (Active)   Site Assessment WDL 03/18/24 1103   Line Status Infusing 03/18/24 1103   Dressing Transparent 03/18/24 1103   Dressing Status clean;dry;intact 03/18/24 0700   Dressing Intervention New dressing  03/18/24 0700   Line Intervention Flushed 03/18/24 0700   Phlebitis Scale 0-->no symptoms 03/18/24 0700   Number of days: 0       Peripheral IV 03/18/24 Right Lower forearm (Active)   Site Assessment WDL 03/18/24 1103   Line Status Saline locked 03/18/24 1103   Dressing Transparent 03/18/24 1103   Number of days: 0      Blood Products Not applicable      mL   Intake/Output Date 03/18/24 0700 - 03/19/24 0659   Shift 5075-8849 4493-3375 1610-4266 24 Hour Total   INTAKE   P.O. 237   237   I.V. 1300   1300   Shift Total(mL/kg) 1537(19.82)   1537(19.82)   OUTPUT   Urine 760   760   Drains 40   40   Blood 100   100   Shift Total(mL/kg) 900(11.6)   900(11.6)   Weight (kg) 77.56 77.56 77.56 77.56      Drains / Rene Closed/Suction Drain Right Thigh Bulb 19 Pashto (Active)   Site Description UTV 03/18/24 1122   Drainage Appearance Serosanguenous 03/18/24 1122   Status To bulb suction 03/18/24 1122   Output (ml) 40 ml 03/18/24 1122   Number of days: 0       Urethral Catheter 03/18/24 Non-latex;Straight-tip;Double-lumen 14 fr (Active)   Collection Container Standard 03/18/24 1103   Securement Method Securing device (Describe) 03/18/24 1103   Rationale for Continued Use Surgical procedure 03/18/24 1103   Number of days: 0      Time of void PreOp Time of Void Prior to Procedure: 0600 (03/18/24 0646)    PostOp      Diapered? No   Bladder Scan      mL (03/18/24 1130)  tolerating sips     Vitals    B/P: 106/76  T: 97.6  F (36.4  C)    Temp src: Axillary  P:  Pulse: 73 (03/18/24  1130)          R: 19  O2:  SpO2: 98 %    O2 Device: Nasal cannula (03/18/24 1130)    Oxygen Delivery: 2 LPM (03/18/24 1130)         Family/support present significant other   Patient belongings     Patient transported on bed   DC meds/scripts (obs/outpt) Not applicable   Inpatient Pain Meds Released? No orders yet         Special needs/considerations Paraplegia. W/C   Tasks needing completion None       Juana Auguste RN

## 2024-03-18 NOTE — PROGRESS NOTES
"  VS: BP (!) 151/71 (BP Location: Left arm)   Pulse 73   Temp 97.4  F (36.3  C) (Oral)   Resp 17   Ht 1.829 m (6' 0.01\")   Wt 77.6 kg (171 lb)   SpO2 96%   BMI 23.19 kg/m      O2: RA   Output: Rene in place   Last BM:    Activity: WC bound, not OOB   Skin: R hip surgical incision, R RENARD drain site, L buttock has a stage 2/3 pressure ulcer, WOC RN assessed this, dry scab on L knee   Pain: Denies pain, appears comfortable   CMS: Paraplegic from T5 and down   Dressing: R hip dressing, RENARD drain dressing   Diet: Reg    LDA: PIVs in L & R hands   Equipment: Ceiling lift if needed, patient's WC in his room   Plan: Monitor & discharge home in a few days   Additional Info:       "

## 2024-03-18 NOTE — LETTER
[facility] MED SURG ORTHOPEDIC  [address] [address] [phone]    FACSIMILE TRANSMITTAL SHEET    TO: Intake  [facility]   FAX NUMBER: [phone]  PHONE NUMBER: [phone]     FROM: [facility]  PHONE: [phone]  DATE: 03/22/24      _____URGENT _____REVIEW ONLY _____PLEASE COMMENT____PLEASE REPLY    NOTES/COMMENTS: PHI- Please see attached referral. Pt has been to this facility before and you are his first pick for discharge on 3/25                                      IF YOU DID NOT RECEIVE THE CORRECT NUMBER OF PAGES OR THE FAX DID NOT COME THROUGH CLEARLY, PLEASE CALL THE SENDER     CONFIDENTIALITY STATEMENT: Confidential information that may accompany this transmission contains protected health information under state and federal law and is legally privileged. This information is intended only for the use of the individual or entity named above and may be used only for carrying out treatment, payment or other healthcare operations. The recipient or person responsible for delivering this information is prohibited by law from disclosing this information without proper authorization to any other party, unless required to do so by law or regulation. If you are not the intended recipient, you are hereby notified that any review, dissemination, distribution, or copying of this message is strictly prohibited. If you have received this communication in error, please destroy the materials and contact us immediately by calling the number listed above. No response indicates that the information was received by the appropriate authorized party

## 2024-03-18 NOTE — CONSULTS
[facility] Nurse Inpatient Assessment     Consulted for: left hip    Summary: found by OR staff     Patient History (according to provider note(s):      This patient is wheelchair-bound and has a grade 4 decubitus ulcer on the right hip area. He presents now to have some bone removed soft tissue debrided and flap coverage by plastic surgery. He understands the risks of infection bleeding pain numbness tingling weakness and alteration in his right hip mobility.     Assessment:      Areas visualized during today's visit: Focused: left hip/ buttock    Pressure Injury Location: left IT    Last photo: 3/18  Wound type: Pressure Injury     Pressure Injury Stage: either Stage 2 or 3 deferring definitive staging until wound base has evolved, present on admission   Wound history/plan of care:   found by OR staff prior to admission to floor     Wound base: 100 % fibrin vs slough     Palpation of the wound bed: normal      Drainage: none     Description of drainage: none     Measurements (length x width x depth, in cm) 0.5  x 0.8  x  0.1 cm      Tunneling N/A     Undermining N/A  Periwound skin: Erythema- blanchable      Color: pink and red      Temperature: normal   Odor: none  Pain: no grimacing or signs of discomfort, none  Pain intervention prior to dressing change: N/A  Treatment goal: Infection control/prevention and Protection  STATUS: initial assessment  Supplies ordered: ordered Iodosrob gel     My PI Risk Assessment     Sensory Perception: 2 - Very Limited     Moisture: 3 - Occasionally moist      Activity: 1 - Bedfast      Mobility: 2 - Very limited     Nutrition: 3 - Adequate     Friction/Shear: 1 - Problem     TOTAL: 12     Left hip with dried scab on scar tissue over firm bone       Treatment Plan:     Left hip/ IT wound(s): Every 3 days and PRN cleanse with SALINE and pat dry. Paint jazmine wound skin with no sting.   Left hip: cover with 4x4 mepilex   Left IT:  "apply nickel thick layer of Iodosorb Gel (order#[id number]) to open wound. Cover with 4x4 mepilex.    Orders: Written    RECOMMEND PRIMARY TEAM ORDER: None, at this time  Education provided: plan of care and wound progress  Discussed plan of care with: Patient, Family, and Nurse  WO nurse follow-up plan: weekly  Notify WOC if wound(s) deteriorate.  Nursing to notify the Provider(s) and re-consult the WOC Nurse if new skin concern.    DATA:     Current support surface: Standard  Standard gel/foam mattress (IsoFlex, Atmos air, etc)  Containment of urine/stool: Incontinent pad in bed  BMI: Body mass index is 23.19 kg/m .   Active diet order: Orders Placed This Encounter      Regular Diet Adult     Output: No intake/output data recorded.     Labs: No lab results found in last 7 days.    Invalid input(s): \"GLUCOMBO\"  Pressure injury risk assessment:                          Za Wayne RN CWOCN   Pager no longer is use, please contact through [employer]: [facility] Nurse [facility]  Dept. Office Number: [phone]      "
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
## 2024-03-18 NOTE — BRIEF OP NOTE
[facility]    Brief Operative Note    Pre-operative diagnosis: Decubitus ulcer of trochanteric region of right hip, stage 4 (H) [L89.214]  Paraplegia (H) [G82.20]  Post-operative diagnosis Same as pre-operative diagnosis    Procedure: Partial excision right femur, Right - Leg  with Girdlestone arthroplasty, Right - Hip  IRRIGATION AND DEBRIDEMENT, WOUND, RIGHT HIP REGION, WITH FLAP CLOSURE. Tensor Fascia yissel flap, Right - Sacrum    Surgeon: Surgeon(s) and Role:  Panel 1:     * Everardo Elder MD - Primary     * Livia Gutierrez PA-C - Assisting  Panel 2:     * Cheryl Harris MD - Primary     * Tawanna Perkins PA-C - Assisting  Anesthesia: General   Estimated Blood Loss: 100 mL from 3/18/2024  7:36 AM to 3/18/2024 11:01 AM      Drains: Duke-Sosa  Specimens:   ID Type Source Tests Collected by Time Destination   1 : Right Trochanter Bone Fragments Hip, Right SURGICAL PATHOLOGY EXAM Everardo Elder MD 3/18/2024  8:35 AM    A : Right Trochanter Bone Fragments Hip, Right ANAEROBIC BACTERIAL CULTURE ROUTINE, FUNGAL OR YEAST CULTURE ROUTINE, AEROBIC BACTERIAL CULTURE ROUTINE Everardo Elder MD 3/18/2024  8:36 AM      Findings:   None.  Complications: None.  Implants: * No implants in log *    Partial excision R femur with ortho  R TFL advancement flap closure with PRS    PLAN:  Admit to PRS  Pressure offload flap, ok for supine and L lateral. Bedrest. WAVE mattress, q2 hr turns while awake.   Leave dressings for 48hrs, ok to change if soiled  Rene to remain, bowel program side laying in bed  RENARD drain care-strip, empty and record output q4-8hr  Follow up bone cultures  Hold PO home antibiotics, continue periop ancef for now  Will need TCU placement post op for sitting restrictions, possible IV antibiotics

## 2024-03-18 NOTE — ANESTHESIA POSTPROCEDURE EVALUATION
Patient: Sacha Ndiaye    Procedure: Procedure(s):  Partial excision right femur  with Girdlestone arthroplasty  IRRIGATION AND DEBRIDEMENT, WOUND, RIGHT HIP REGION, WITH FLAP CLOSURE. Tensor Fascia yissel flap       Anesthesia Type:  General    Note:  Disposition: Inpatient   Postop Pain Control: Uneventful            Sign Out: Well controlled pain   PONV: No   Neuro/Psych: Uneventful            Sign Out: Acceptable/Baseline neuro status   Airway/Respiratory: Uneventful            Sign Out: Acceptable/Baseline resp. status   CV/Hemodynamics: Uneventful            Sign Out: Acceptable CV status; No obvious hypovolemia; No obvious fluid overload   Other NRE: NONE   DID A NON-ROUTINE EVENT OCCUR? No    Event details/Postop Comments:  Pt seen at the bedside.  Fully awake and comfortable. Tolerating po.  VSS.  Will discharge to the floor soon.       Last vitals:  Vitals Value Taken Time   /72 03/18/24 1230   Temp 36.4  C (97.6  F) 03/18/24 1103   Pulse 68 03/18/24 1239   Resp 13 03/18/24 1239   SpO2 96 % 03/18/24 1239   Vitals shown include unfiled device data.    Electronically Signed By: Carolina Reyes MD  March 18, 2024  12:40 PM

## 2024-03-18 NOTE — PROGRESS NOTES
This RN sent a message to plastics PA about whether we could turn the patient onto his R side w/ the fresh flap to look at L hip/buttock area.  [facility] RN already looked at the L hip/buttock area and put in orders.  This RN told the patient and both he & his SO requested that I don't roll him onto his R side.  They don't want the current surgery to be compromised.  This RN assessed the front side of his skin.     [place] back from plastics and it is ok to turn patient onto R side for up to 15 min for wound care to back side.    This RN did the wound care to L buttock area and 2nd RN was philomena Cohen RN.

## 2024-03-18 NOTE — ANESTHESIA PROCEDURE NOTES
Airway       Patient location during procedure: OR       Procedure Start/Stop Times: 3/18/2024 7:51 AM  Staff -        Anesthesiologist:  Carolina Reyes MD       CRNA: Jaime Alberts APRN CRNA       Other Anesthesia Staff: Jaime Sanchez       Performed By: SRNA  Consent for Airway        Urgency: elective  Indications and Patient Condition       Indications for airway management: jazmine-procedural       Induction type:intravenous       Mask difficulty assessment: 1 - vent by mask    Final Airway Details       Final airway type: endotracheal airway       Successful airway: ETT - single and Oral  Endotracheal Airway Details        ETT size (mm): 7.5       Cuffed: yes       Successful intubation technique: direct laryngoscopy       DL Blade Type: Lopez 2       Grade View of Cords: 1       Adjucts: stylet       Position: Right       Measured from: gums/teeth       Secured at (cm): 23       Bite block used: None    Post intubation assessment        Placement verified by: capnometry and equal breath sounds        Number of attempts at approach: 1       Number of other approaches attempted: 0       Secured with: tape       Ease of procedure: easy       Dentition: Unchanged    Medication(s) Administered   Medication Administration Time: 3/18/2024 7:51 AM

## 2024-03-19 LAB
ANION GAP SERPL CALCULATED.3IONS-SCNC: 8 MMOL/L (ref 7–15)
BASOPHILS # BLD AUTO: 0 10E3/UL (ref 0–0.2)
BASOPHILS NFR BLD AUTO: 0 %
BUN SERPL-MCNC: 13.2 MG/DL (ref 6–20)
CALCIUM SERPL-MCNC: 8.5 MG/DL (ref 8.6–10)
CHLORIDE SERPL-SCNC: 103 MMOL/L (ref 98–107)
CREAT SERPL-MCNC: 0.49 MG/DL (ref 0.67–1.17)
DEPRECATED HCO3 PLAS-SCNC: 26 MMOL/L (ref 22–29)
EGFRCR SERPLBLD CKD-EPI 2021: >90 ML/MIN/1.73M2
EOSINOPHIL # BLD AUTO: 0 10E3/UL (ref 0–0.7)
EOSINOPHIL NFR BLD AUTO: 0 %
ERYTHROCYTE [DISTWIDTH] IN BLOOD BY AUTOMATED COUNT: 17.7 % (ref 10–15)
GLUCOSE SERPL-MCNC: 130 MG/DL (ref 70–99)
HCT VFR BLD AUTO: 34.3 % (ref 40–53)
HGB BLD-MCNC: 10.4 G/DL (ref 13.3–17.7)
IMM GRANULOCYTES # BLD: 0 10E3/UL
IMM GRANULOCYTES NFR BLD: 0 %
LYMPHOCYTES # BLD AUTO: 2.1 10E3/UL (ref 0.8–5.3)
LYMPHOCYTES NFR BLD AUTO: 19 %
MCH RBC QN AUTO: 25.2 PG (ref 26.5–33)
MCHC RBC AUTO-ENTMCNC: 30.3 G/DL (ref 31.5–36.5)
MCV RBC AUTO: 83 FL (ref 78–100)
MONOCYTES # BLD AUTO: 0.8 10E3/UL (ref 0–1.3)
MONOCYTES NFR BLD AUTO: 7 %
NEUTROPHILS # BLD AUTO: 7.8 10E3/UL (ref 1.6–8.3)
NEUTROPHILS NFR BLD AUTO: 74 %
NRBC # BLD AUTO: 0 10E3/UL
NRBC BLD AUTO-RTO: 0 /100
PLATELET # BLD AUTO: 263 10E3/UL (ref 150–450)
POTASSIUM SERPL-SCNC: 3.7 MMOL/L (ref 3.4–5.3)
RBC # BLD AUTO: 4.12 10E6/UL (ref 4.4–5.9)
SODIUM SERPL-SCNC: 137 MMOL/L (ref 135–145)
WBC # BLD AUTO: 10.8 10E3/UL (ref 4–11)

## 2024-03-19 PROCEDURE — 82374 ASSAY BLOOD CARBON DIOXIDE: CPT | Performed by: PHYSICIAN ASSISTANT

## 2024-03-19 PROCEDURE — 85041 AUTOMATED RBC COUNT: CPT | Performed by: PHYSICIAN ASSISTANT

## 2024-03-19 PROCEDURE — 250N000013 HC RX MED GY IP 250 OP 250 PS 637: Performed by: PHYSICIAN ASSISTANT

## 2024-03-19 PROCEDURE — 120N000002 HC R&B MED SURG/OB UMMC

## 2024-03-19 PROCEDURE — 250N000011 HC RX IP 250 OP 636: Performed by: PHYSICIAN ASSISTANT

## 2024-03-19 PROCEDURE — 36415 COLL VENOUS BLD VENIPUNCTURE: CPT | Performed by: PHYSICIAN ASSISTANT

## 2024-03-19 PROCEDURE — 99232 SBSQ HOSP IP/OBS MODERATE 35: CPT | Performed by: INTERNAL MEDICINE

## 2024-03-19 RX ORDER — BISACODYL 5 MG
5 TABLET, DELAYED RELEASE (ENTERIC COATED) ORAL DAILY PRN
Status: DISCONTINUED | OUTPATIENT
Start: 2024-03-19 | End: 2024-04-04 | Stop reason: HOSPADM

## 2024-03-19 RX ORDER — ENOXAPARIN SODIUM 100 MG/ML
40 INJECTION SUBCUTANEOUS EVERY 24 HOURS
Status: DISCONTINUED | OUTPATIENT
Start: 2024-03-19 | End: 2024-04-04 | Stop reason: HOSPADM

## 2024-03-19 RX ADMIN — ENOXAPARIN SODIUM 40 MG: 40 INJECTION SUBCUTANEOUS at 09:15

## 2024-03-19 RX ADMIN — OXYBUTYNIN CHLORIDE 30 MG: 10 TABLET, EXTENDED RELEASE ORAL at 09:16

## 2024-03-19 RX ADMIN — ACETAMINOPHEN 975 MG: 325 TABLET, FILM COATED ORAL at 21:18

## 2024-03-19 RX ADMIN — ACETAMINOPHEN 975 MG: 325 TABLET, FILM COATED ORAL at 05:49

## 2024-03-19 RX ADMIN — ACETAMINOPHEN 975 MG: 325 TABLET, FILM COATED ORAL at 13:57

## 2024-03-19 RX ADMIN — SENNOSIDES AND DOCUSATE SODIUM 1 TABLET: 8.6; 5 TABLET ORAL at 08:32

## 2024-03-19 ASSESSMENT — ACTIVITIES OF DAILY LIVING (ADL)
ADLS_ACUITY_SCORE: 37
ADLS_ACUITY_SCORE: 41
ADLS_ACUITY_SCORE: 37
ADLS_ACUITY_SCORE: 41

## 2024-03-19 NOTE — PROGRESS NOTES
VS: Temp: 97.7  F (36.5  C) Temp src: Oral BP: 114/56 Pulse: 70   Resp: 16 SpO2: 97 % O2 Device: None (Room air)      O2: On room air, CAPNO, denies SOB, denies chest pain   Output: Voids spontaneously via urethral catheter   Last BM: 3/17   Activity: Wheelchair bound- paraplegic   Skin: Opening to left IT, scab on a bony prominence on left buttock, surgical incision to right hip area   Pain: 4-5/10- declined narcs, managed with tylenol, repositioning   CMS: intact   Dressing: CD/Intact,   Diet: REG   LDA: PIV to bilateral arms, SL, RENARD bulb to right hip   Equipment: Personal wheel chair, IV pole/pump, CAPNO, low air mattress   Plan: TBD   Additional Info:

## 2024-03-19 NOTE — PROGRESS NOTES
Plastic Surgery Progress Note  Attending:Dr. Harris    Doing well. No significant pain, managing with tylenol. Tolerating diet. Familiar with post flap protocol.     Temp:  [97.4  F (36.3  C)-98.2  F (36.8  C)] 98.2  F (36.8  C)  Pulse:  [60-80] 60  Resp:  [14-25] 16  BP: (100-153)/(56-76) 119/72  SpO2:  [94 %-100 %] 96 %  I/O last 3 completed shifts:  In: 2458.67 [P.O.:637; I.V.:1821.67]  Out: 2580 [Urine:2310; Drains:170; Blood:100]  General: NAD, answers questions appropriately  Resp: NLB on RA  R hip flap warms, soft, intact. Dressings clean. Incisions c/d/I. RENARD with serosang output.     RENARD 125cc yesterday, 45cc last shift    Cbc: pending  Bmp: pending    Bone cultures: pending    ASSESSMENT: 49 year old male PMH paraplegia, neurogenic bladder, chronic R troch wound now POD #1 R hip partial femur resection, R TFL advancement flap closure. Healing as anticipated.    PLAN:   -Bedrest. HOB <30. No sitting. Ok to lay supine and L lateral. Q2 hr turns while awake. WAVE mattress.   -RENARD drain care-strip, empty and record output qshift  -dressing change tomorrow, if soiled ok to replace ABD/tape  -follow up intra op bone cultures  -prophylactic lovenox  -appreciate [facility] recs for L IT wound  -appreciate medicine comanagement  -social work/care coordination, will need TCU at discharge for sitting restrictions, possible IV antibiotics, needs stretcher transport    -discussed with Dr. Steven Perkins PAJoannaC  Plastic and Reconstructive Surgery   [facility]

## 2024-03-19 NOTE — PHARMACY-ADMISSION MEDICATION HISTORY
Pharmacist Admission Medication History    Admission medication history is complete. The information provided in this note is only as accurate as the sources available at the time of the update.    Information Source(s): Patient and CareEverywhere/SureScripts via in-person    Pertinent Information: N/a    Changes made to PTA medication list:  Added: None  Deleted:   Benzoyl Peroxide  Doxycycline (duplicate)  Sildenafil     Changed:   Fleet enema every 48hr -> daily prn     Allergies reviewed with patient and updates made in EHR: yes    Medication History Completed By: [facility] [employer] [facility] 3/19/2024 12:46 PM    PTA Med List   Medication Sig Note Last Dose    acetaminophen (TYLENOL) 325 MG tablet Take 2 tablets (650 mg) by mouth every 4 hours as needed for other (surgical pain)  More than a month    bisacodyl (DULCOLAX) 10 MG Suppository Place 1 suppository (10 mg) rectally daily as needed for constipation 3/14/2024: Not taking   More than a month    ciprofloxacin (CIPRO) 750 MG tablet Take 1 tablet (750 mg) by mouth 2 times daily Please see if PCP will take over refills. (Patient taking differently: Take 750 mg by mouth daily Please see if PCP will take over refills.)  3/17/2024    doxycycline monohydrate (MONODOX) 100 MG capsule TAKE 1 CAPSULE BY MOUTH TWICE DAILY (Patient taking differently: Take 100 mg by mouth daily) 3/14/2024: Not taking   More than a month    oxybutynin 15 MG TB24 Take 2 tablets (30 mg) by mouth daily  3/17/2024    polyethylene glycol (MIRALAX/GLYCOLAX) Packet Take 17 g by mouth daily as needed for constipation  Past Week    sodium phosphate (FLEET ENEMA) 7-19 GM/118ML rectal enema Place 1 Bottle (1 enema) rectally every 48 hours (Patient taking differently: Place 1 enema rectally daily as needed for constipation) 3/14/2024: Not using More than a month

## 2024-03-19 NOTE — PROGRESS NOTES
[facility]    Medicine Progress Note - Hospitalist Service, GOLD TEAM 17    Date of Admission:  3/18/2024    Assessment & Plan   Sacha Ndiaye is a 49 year old male with a history of spinal cord injury and neurogenic bladder, who was admitted to [facility] on 3/18/2024 following debridement of decubitus ulcer by plastic surgery and orthopedics. Medicine was consulted for medical comanagement.     # S/p I&D of decubitus ulcer of right hip and partial excision of right femur  # Decubitus ulcer of right hip  # Chronic osteomyelitis  # Spinal cord injury  Patient wheelchair bound due to prior spinal cord injury. Underwent aforementioned procedure by orthopedics and plastic surgery on 3/18/2024 due to grade 4 decubitus ulcer. Procedure was uncomplicated with EBL of 100 mL. Patient doing well post-operatively.  - Per ortho & plastics   > Likely plan for TCU pending length of antibiotics    # Neurogenic bladder  In the setting of spinal cord injury. PTA on oxybutynin and does intermittent straight cath and condom catheter. Carrillo was placed intra-operatively.  - Continue PTA oxybutynin  - Remove carrillo per primary team when able    # Constipation  - Add bisacodyl to pre-existing bowel regimen  - Continue to monitor bowel movements          Diet: Regular Diet Adult    DVT Prophylaxis: Enoxaparin (Lovenox) SQ and Pneumatic Compression Devices  Carrillo Catheter: PRESENT, indication: Other (Comment) (neurogenic bladder)  Lines: None     Cardiac Monitoring: None  Code Status: Full Code      Clinically Significant Risk Factors                                    Disposition Plan     Expected Discharge Date: 03/20/2024                    Sacha Mccrary DO, DOLLYS  Hospitalist Service, GOLD TEAM 17  [facility]  Securely message with [employer] (more info)  Text page via [employer] Paging/Directory   See signed in provider for up to date coverage  information  ______________________________________________________________________    Interval History   Patient is in remarkably good spirits today.  Patient is hoping to discontinue nasal cannula.  Patient appears to be saturating well.  Patient reports some back discomfort.  Patient reports tolerating oral diet without difficulty.  Patient reports constipation.    Physical Exam   Vital Signs: Temp: 98.2  F (36.8  C) Temp src: Oral BP: 119/72 Pulse: 60   Resp: 16 SpO2: 96 % O2 Device: Nasal cannula Oxygen Delivery: 2 LPM  Weight: 171 lbs 0 oz    GENERAL: Alert and oriented x 3; no acute distress; well-nourished.  HEENT: Normocephalic; atraumatic; PERRLA; MMM.  CV: RRR; normal S1, S2; no rubs, murmurs, or gallops.  RESP: Lung fields clear to aucultation B/L; no wheezing or crepitations.  GI: Abdomen is slightly firm; no organomegaly; normal bowel sounds.  : Deferred genital examination.   MSK: No clubbing, cyanosis, or edema.  DERM: Skin is intact; no rash, lesions, or skin breakdown.  NEURO: Paraplegia.  PSYCH: No active hallucinations; affect, insight appear within normal limits.    Medical Decision Making       45 MINUTES SPENT BY ME on the date of service doing chart review, history, exam, documentation & further activities per the note.      Data         Imaging results reviewed over the past 24 hrs:   No results found for this or any previous visit (from the past 24 hour(s)).

## 2024-03-20 LAB
ANION GAP SERPL CALCULATED.3IONS-SCNC: 6 MMOL/L (ref 7–15)
BASOPHILS # BLD AUTO: 0.1 10E3/UL (ref 0–0.2)
BASOPHILS NFR BLD AUTO: 1 %
BUN SERPL-MCNC: 18.4 MG/DL (ref 6–20)
CALCIUM SERPL-MCNC: 8.6 MG/DL (ref 8.6–10)
CHLORIDE SERPL-SCNC: 103 MMOL/L (ref 98–107)
CREAT SERPL-MCNC: 0.67 MG/DL (ref 0.67–1.17)
DEPRECATED HCO3 PLAS-SCNC: 27 MMOL/L (ref 22–29)
EGFRCR SERPLBLD CKD-EPI 2021: >90 ML/MIN/1.73M2
EOSINOPHIL # BLD AUTO: 0.4 10E3/UL (ref 0–0.7)
EOSINOPHIL NFR BLD AUTO: 5 %
ERYTHROCYTE [DISTWIDTH] IN BLOOD BY AUTOMATED COUNT: 18.1 % (ref 10–15)
GLUCOSE BLDC GLUCOMTR-MCNC: 94 MG/DL (ref 70–99)
GLUCOSE SERPL-MCNC: 90 MG/DL (ref 70–99)
HCT VFR BLD AUTO: 36.3 % (ref 40–53)
HGB BLD-MCNC: 10.7 G/DL (ref 13.3–17.7)
IMM GRANULOCYTES # BLD: 0 10E3/UL
IMM GRANULOCYTES NFR BLD: 0 %
LYMPHOCYTES # BLD AUTO: 2.7 10E3/UL (ref 0.8–5.3)
LYMPHOCYTES NFR BLD AUTO: 31 %
MCH RBC QN AUTO: 25.1 PG (ref 26.5–33)
MCHC RBC AUTO-ENTMCNC: 29.5 G/DL (ref 31.5–36.5)
MCV RBC AUTO: 85 FL (ref 78–100)
MONOCYTES # BLD AUTO: 0.7 10E3/UL (ref 0–1.3)
MONOCYTES NFR BLD AUTO: 8 %
NEUTROPHILS # BLD AUTO: 4.9 10E3/UL (ref 1.6–8.3)
NEUTROPHILS NFR BLD AUTO: 55 %
NRBC # BLD AUTO: 0 10E3/UL
NRBC BLD AUTO-RTO: 0 /100
PLATELET # BLD AUTO: 272 10E3/UL (ref 150–450)
POTASSIUM SERPL-SCNC: 4.3 MMOL/L (ref 3.4–5.3)
RBC # BLD AUTO: 4.26 10E6/UL (ref 4.4–5.9)
SODIUM SERPL-SCNC: 136 MMOL/L (ref 135–145)
WBC # BLD AUTO: 8.7 10E3/UL (ref 4–11)

## 2024-03-20 PROCEDURE — 36415 COLL VENOUS BLD VENIPUNCTURE: CPT | Performed by: INTERNAL MEDICINE

## 2024-03-20 PROCEDURE — 99223 1ST HOSP IP/OBS HIGH 75: CPT | Performed by: INTERNAL MEDICINE

## 2024-03-20 PROCEDURE — 250N000013 HC RX MED GY IP 250 OP 250 PS 637: Performed by: INTERNAL MEDICINE

## 2024-03-20 PROCEDURE — 250N000011 HC RX IP 250 OP 636: Performed by: PHYSICIAN ASSISTANT

## 2024-03-20 PROCEDURE — 80048 BASIC METABOLIC PNL TOTAL CA: CPT | Performed by: INTERNAL MEDICINE

## 2024-03-20 PROCEDURE — 85025 COMPLETE CBC W/AUTO DIFF WBC: CPT | Performed by: INTERNAL MEDICINE

## 2024-03-20 PROCEDURE — 120N000002 HC R&B MED SURG/OB UMMC

## 2024-03-20 PROCEDURE — 99232 SBSQ HOSP IP/OBS MODERATE 35: CPT | Performed by: INTERNAL MEDICINE

## 2024-03-20 PROCEDURE — 250N000011 HC RX IP 250 OP 636: Performed by: SURGERY

## 2024-03-20 PROCEDURE — 250N000013 HC RX MED GY IP 250 OP 250 PS 637: Performed by: PHYSICIAN ASSISTANT

## 2024-03-20 PROCEDURE — 258N000003 HC RX IP 258 OP 636: Performed by: SURGERY

## 2024-03-20 RX ORDER — CIPROFLOXACIN 250 MG/1
750 TABLET, FILM COATED ORAL EVERY 12 HOURS SCHEDULED
Status: DISCONTINUED | OUTPATIENT
Start: 2024-03-20 | End: 2024-04-04 | Stop reason: HOSPADM

## 2024-03-20 RX ADMIN — ACETAMINOPHEN 975 MG: 325 TABLET, FILM COATED ORAL at 13:22

## 2024-03-20 RX ADMIN — CIPROFLOXACIN HYDROCHLORIDE 750 MG: 250 TABLET, FILM COATED ORAL at 19:47

## 2024-03-20 RX ADMIN — VANCOMYCIN HYDROCHLORIDE 1250 MG: 10 INJECTION, POWDER, LYOPHILIZED, FOR SOLUTION INTRAVENOUS at 19:46

## 2024-03-20 RX ADMIN — SENNOSIDES AND DOCUSATE SODIUM 1 TABLET: 8.6; 5 TABLET ORAL at 08:19

## 2024-03-20 RX ADMIN — ACETAMINOPHEN 975 MG: 325 TABLET, FILM COATED ORAL at 22:08

## 2024-03-20 RX ADMIN — OXYBUTYNIN CHLORIDE 30 MG: 10 TABLET, EXTENDED RELEASE ORAL at 08:19

## 2024-03-20 RX ADMIN — SENNOSIDES AND DOCUSATE SODIUM 1 TABLET: 8.6; 5 TABLET ORAL at 19:47

## 2024-03-20 RX ADMIN — ACETAMINOPHEN 975 MG: 325 TABLET, FILM COATED ORAL at 06:17

## 2024-03-20 RX ADMIN — ENOXAPARIN SODIUM 40 MG: 40 INJECTION SUBCUTANEOUS at 08:18

## 2024-03-20 ASSESSMENT — ACTIVITIES OF DAILY LIVING (ADL)
ADLS_ACUITY_SCORE: 35
ADLS_ACUITY_SCORE: 37
ADLS_ACUITY_SCORE: 37
ADLS_ACUITY_SCORE: 35
ADLS_ACUITY_SCORE: 37
ADLS_ACUITY_SCORE: 35
ADLS_ACUITY_SCORE: 35
ADLS_ACUITY_SCORE: 37
ADLS_ACUITY_SCORE: 37
ADLS_ACUITY_SCORE: 35
ADLS_ACUITY_SCORE: 37

## 2024-03-20 NOTE — PROGRESS NOTES
"  VS: /72 (BP Location: Left arm)   Pulse 57   Temp 97.7  F (36.5  C) (Oral)   Resp 16   Ht 1.829 m (6' 0.01\")   Wt 77.6 kg (171 lb)   SpO2 97%   BMI 23.19 kg/m      O2: RA   Output: Rene in place   Last BM: 3/20 digital stim   Activity: Paraplegic, not OOB, turns well in bed   Skin: Stage 2/3 pressure ulcer    Pain: declined   CMS: Paraplegic, no feeling below T5   Dressing: R hip dressing down to above R knee   Diet: reg   LDA: PIV in L hand   Equipment: Ceiling lift if needed, pt's WC in room   Plan: Continue to monitor, monitor RENARD drain output, plan is TCU & possible IV antibiotics   Additional Info:       "

## 2024-03-20 NOTE — PLAN OF CARE
"  VS: /64 (BP Location: Left arm)   Pulse 81   Temp 98.3  F (36.8  C) (Oral)   Resp 16   Ht 1.829 m (6' 0.01\")   Wt 77.6 kg (171 lb)   SpO2 94%   BMI 23.19 kg/m        O2: Pt is on RA. no complaints of SOB or chest pain.   Output: Pt has nreurogenic bladder with Urethral catheter in place.catheter care completed.urine out put 1750 ml     Last BM: 3/19/24   Activity: A2 ceiling lift in transfers and repositioning    Skin: Decubitus ulcer of right hip, pressure injuries on the left buttocks   Pain: denies   CMS: Absent of sensation and movement on LLE and RLE due to paraplegia   Dressing: ABD pad right hip.4x4 mepilex on left buttock   Diet: Regular, tolerating well. No complaints of nausea or vomiting.   LDA: R PIV, L PIV. RENARD drain in place out put 50 ml   Equipment:    Plan: Contiue with POC, discharge plan to be determined   Additional Info:                   "

## 2024-03-20 NOTE — PROGRESS NOTES
[facility]    Medicine Progress Note - Hospitalist Service, GOLD TEAM 17    Date of Admission:  3/18/2024    Assessment & Plan   Sacha Ndiaye is a 49 year old male with a history of spinal cord injury and neurogenic bladder, who was admitted to [facility] on 3/18/2024 following debridement of decubitus ulcer by plastic surgery and orthopedics. Medicine was consulted for medical comanagement.     # S/p I&D of decubitus ulcer of right hip and partial excision of right femur  # Decubitus ulcer of right hip  # Chronic osteomyelitis  # Spinal cord injury  Patient wheelchair bound due to prior spinal cord injury. Underwent aforementioned procedure by orthopedics and plastic surgery on 3/18/2024 due to grade 4 decubitus ulcer. Procedure was uncomplicated with EBL of 100 mL. Patient doing well post-operatively.  - Per ortho & plastics  - Likely plan for TCU pending length of antibiotics  - Infectious disease consultation to clarify abx plan    # Neurogenic bladder  In the setting of spinal cord injury. PTA on oxybutynin and does intermittent straight cath and condom catheter. Carrillo was placed intra-operatively.  - Continue PTA oxybutynin  - Remove carrillo per primary team when able    # Constipation  - Add bisacodyl to pre-existing bowel regimen  - Continue to monitor bowel movements          Diet: Regular Diet Adult    DVT Prophylaxis: Enoxaparin (Lovenox) SQ  Carrillo Catheter: PRESENT, indication: Surgical procedure  Lines: None     Cardiac Monitoring: None  Code Status: Full Code      Clinically Significant Risk Factors                                    Disposition Plan     Expected Discharge Date: 03/20/2024                  ID consult to clarify abx plan.  Patient is pending TCU placement.    Sacha Mccrary DO, S  Hospitalist Service, GOLD TEAM 17  [facility]  Securely message with [employer] (more info)  Text page via [employer]  Paging/Directory   See signed in provider for up to date coverage information  ______________________________________________________________________    Interval History   Patient is in very good spirits.  Patient endorses no specific symptomatology.  Patient is unclear on overall surgical plan.  Reached out to plastic surgery MAURO.  ID consult to clarify abx plan.  Patient is pending TCU placement.    Physical Exam   Vital Signs: Temp: 97.7  F (36.5  C) Temp src: Oral BP: 121/72 Pulse: 57   Resp: 16 SpO2: 97 % O2 Device: None (Room air)    Weight: 171 lbs 0 oz    GENERAL: Alert and oriented x 3; no acute distress; well-nourished.  HEENT: Normocephalic; atraumatic; PERRLA; MMM.  CV: RRR; normal S1, S2; no rubs, murmurs, or gallops.  RESP: Lung fields clear to aucultation B/L; no wheezing or crepitations.  GI: Abdomen is soft, nontender, nondistended; no organomegaly; normal bowel sounds.  : Deferred genital examination.   MSK: No clubbing, cyanosis, or edema.  DERM: Skin is intact; no rash, lesions, or skin breakdown.  NEURO: Paraplegia.    Medical Decision Making       45 MINUTES SPENT BY ME on the date of service doing chart review, history, exam, documentation & further activities per the note.      Data     I have personally reviewed the following data over the past 24 hrs:    8.7  \   10.7 (L)   / 272     136 103 18.4 /  94   4.3 27 0.67 \       Imaging results reviewed over the past 24 hrs:   No results found for this or any previous visit (from the past 24 hour(s)).

## 2024-03-20 NOTE — OP NOTE
OPERATIVE NOTE    DATE OF PROCEDURE: 3/18/24  ATTENDING SURGEON: Bull Harris MD  RESIDENT SURGEON: none  ASSISTANT(S):Tawanna Perkins PA-C (no resident available, PA did 50% of case)  PREOP DIAGNOSIS:stage 4 R trochanteric decubitus with possible oseomyelitis  POSTOP DIAGNOSIS:same  PROCEDURE:partial resection of greater trochanter by Dr Elder. PLASTICS: Sharp excisional debridement of trochanteric ulcer soft tissues, R TFL flap  ANESTHESIA:GETA  EBL:100  IVFS:1300  UO:600  COUNTS:correct  COMPLICATIONS:none  DRAINS:RENARD x 1, #19 channel  SPECIMENS:R trochanteric bone for cultures and path.    INDICATIONS:  This is a 49 year old paraplegic male with a diagnosis of kyle 4 R trochanteric decubitus, possible underlying osteomyelitis, and chronic dislocating R hip. Rommel has had numerous other flap procedures, but this is the first for this location    PROCEDURE:  The patient was seen in the preop waiting area. The operative site was marked either on the patient or on the anatomical diagram. Informed consent was obtained after reviewing the possible risks and complications, including but not limited to the following: infection, bleeding, hematoma/seroma formation,  poor healing, dehiscence, spitting sutures, hypertrophic or keloid scars, injury to surrounding musculoskeletal and neurovascular structures,  residual deformities or asymmetries, need for further surgery, altered sensation of the surgical area, anesthetic risks such as DVT, PE, cardiopulmonary arrest. He understands that this procedure may result in alteration of how he sits in his wheelchair on his cushion. He knows that he will require at least 4 weeks bedrest post-flap to allow for adequate healing before sitting and getting cushion pressure mapped.     The patient was then brought to the operating room. Anesthesia was administered with ETT. A carrillo was placed.  The patient was positioned on the table in a left lateral decubitus  position, with  pillows between arms, legs and axillary roll. Rolled blankets were used under the mattress on either side instead of beanbag to stabilize trunk and allow access to posterior wound.  All areas were padded against pressure injury. Squeezie boots were in place on L lower extremity since R leg was prepped into field prior to induction. The surgical site was prepped and draped in the usual sterile fashion using betadine. Preop photos were  taken. A time out was taken and the proper patient and procedure were identified.    Please see Dr Elder's notes for details regarding bony resection of R trochanter.  Once path and cultures specimens were submitted, we started to sharply debride the inner lining of soft tissues of the ulcer with Misonix, curettes, rongeurs and cautery. The wound was then irrigated with VASHE using the Misonix, particularly into the joint cavity that had been iatrogenically entered during the bone removal. Hemostasis was achieved with cautery.    We then used the doppler to identify the incoming vascular pedicle for a TFL flap. This was found about 12 cms below the ASIS. A VY type flap was drawn that would allow for rotation and advancement to cover the exposed bone. There was no real undermining left to the wound so none of the flap was deepithelialized for being buried.     The flap was incised and the fascial layer was identified distally. The flap was then undermined in this subfascial plane, taking care to not injure the pedicle. Once the flap was adequately mobilized to advance/rotate to cover the defect without undue tension, the entire area was irrigated with Prontosan. A #19 channel drain was introduced from distal aspect of thigh and draped into the joint space.    Our flap was then anchored with 2-0 Vicryl deep figure-8 sutures, followed by additional levels of closure to eradicate dead space and approximate flap to cover bone. Dermis was approximated with 3-0 vicryl deep buried sutures,  and skin was closed with 3-0 nylon vertical mattress sutures and staples. The donor site was closed with deep sutures and staples. The drain was secured with 3-0 nylon. Throughout the procedure, we had good bleeding edges of the flap, and good cap refill.     The flap was then dressed with adaptic and ABD pads secured with Medipore tape. He was returned to a supine position, extub[facility] stable condition.
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
## 2024-03-20 NOTE — PHARMACY-VANCOMYCIN DOSING SERVICE
Pharmacy Vancomycin Initial Note  Date of Service 2024  Patient's  1975  49 year old, male    Indication: Osteomyelitis    Current estimated CrCl = Estimated Creatinine Clearance: 148 mL/min (based on SCr of 0.67 mg/dL).    Creatinine for last 3 days  3/19/2024:  7:56 AM Creatinine 0.49 mg/dL  3/20/2024:  5:49 AM Creatinine 0.67 mg/dL    Recent Vancomycin Level(s) for last 3 days  No results found for requested labs within last 3 days.      Vancomycin IV Administrations (past 72 hours)        No vancomycin orders with administrations in past 72 hours.                    Nephrotoxins and other renal medications (From now, onward)      Start     Dose/Rate Route Frequency Ordered Stop    24 1930  vancomycin (VANCOCIN) 1,250 mg in 0.9% NaCl 250 mL intermittent infusion         1,250 mg  over 90 Minutes Intravenous EVERY 12 HOURS 24 1847              Contrast Orders - past 72 hours (72h ago, onward)      None            Insight[employer] Prediction of Planned Initial Vancomycin Regimen  Loading dose: N/A  Regimen: 1250 mg IV every 12 hours.  Start time: 18:45 on 2024  Exposure target: AUC24 (range)400-600 mg/L.hr   AUC24,ss: 465 mg/L.hr  Probability of AUC24 > 400: 66 %  Ctrough,ss: 13.3 mg/L  Probability of Ctrough,ss > 20: 21 %  Probability of nephrotoxicity (Lodise OLGA ): 8 %          Plan:  Start vancomycin  1250 mg IV q12h.   Vancomycin monitoring method: AUC  Vancomycin therapeutic monitoring goal: 400-600 mg*h/L  Pharmacy will check vancomycin levels as appropriate in 1-3 Days.    Serum creatinine levels will be ordered daily for the first week of therapy and at least twice weekly for subsequent weeks.      Dayana Campoverde, [facility]

## 2024-03-20 NOTE — PROGRESS NOTES
Plastic Surgery Progress Note  Attending:Dr. Harris    Doing well, denies pain. BM x2 yesterday, no issues with bowel program in bed. Has questions about restrictions.     Temp:  [97.7  F (36.5  C)-98.3  F (36.8  C)] 97.7  F (36.5  C)  Pulse:  [57-81] 57  Resp:  [16] 16  BP: (110-121)/(64-72) 121/72  SpO2:  [94 %-97 %] 97 %  I/O last 3 completed shifts:  In: 3 [I.V.:3]  Out: 5190 [Urine:5050; Drains:140]  General: NAD, answers questions appropriately  Resp: NLB on RA  R hip flap warm, soft, intact. Incisions c/d/I. No significant swelling or ecchymosis RENARD with serosang output.     RENARD 135cc yesterday, 50cc last shift    Bone cultures: NGTD    ASSESSMENT: 49 year old male PMH paraplegia, neurogenic bladder, chronic R troch wound now POD #2 R hip partial femur resection, R TFL advancement flap closure. Healing as anticipated.    PLAN:   -Bedrest. HOB <30. No sitting. Ok to lay supine and L lateral. Q2 hr turns while awake. WAVE mattress. Discussed long term restrictions, bed rest ~4 weeks, then at post op appt usually cleared to start a sitting protocol. Dr. Harris mentioned potentially extending his bed rest due to his hip instability, but this depends on how he is healing and will be decided later.  -RENARD drain care-strip, empty and record output qshift  -dressing changed today, start daily abd/tape dressing to R hip with nursing tomorrow  -follow up intra op bone cultures-ID consult for opinion on restarting home PO antibiotics while awaiting intra-op cultures  -prophylactic lovenox  -appreciate [facility] recs for L IT wound  -appreciate medicine comanagement  -social work/care coordination, will need TCU at discharge for sitting restrictions, possible IV antibiotics, needs stretcher transport    -discussed with Dr. Steven Perkins PA-C  Plastic and Reconstructive Surgery   or plastic surgery resident on call in [facility]

## 2024-03-21 LAB
ALBUMIN SERPL BCG-MCNC: 3.4 G/DL (ref 3.5–5.2)
ALP SERPL-CCNC: 59 U/L (ref 40–150)
ALT SERPL W P-5'-P-CCNC: 7 U/L (ref 0–70)
AST SERPL W P-5'-P-CCNC: 15 U/L (ref 0–45)
BILIRUB DIRECT SERPL-MCNC: <0.2 MG/DL (ref 0–0.3)
BILIRUB SERPL-MCNC: 1.2 MG/DL
CREAT SERPL-MCNC: 0.51 MG/DL (ref 0.67–1.17)
CRP SERPL-MCNC: 22.98 MG/L
EGFRCR SERPLBLD CKD-EPI 2021: >90 ML/MIN/1.73M2
PATH REPORT.COMMENTS IMP SPEC: NORMAL
PATH REPORT.COMMENTS IMP SPEC: NORMAL
PATH REPORT.FINAL DX SPEC: NORMAL
PATH REPORT.GROSS SPEC: NORMAL
PATH REPORT.MICROSCOPIC SPEC OTHER STN: NORMAL
PATH REPORT.RELEVANT HX SPEC: NORMAL
PHOTO IMAGE: NORMAL
PLATELET # BLD AUTO: 274 10E3/UL (ref 150–450)
PROT SERPL-MCNC: 6.9 G/DL (ref 6.4–8.3)

## 2024-03-21 PROCEDURE — 85049 AUTOMATED PLATELET COUNT: CPT | Performed by: SURGERY

## 2024-03-21 PROCEDURE — 250N000013 HC RX MED GY IP 250 OP 250 PS 637: Performed by: INTERNAL MEDICINE

## 2024-03-21 PROCEDURE — 250N000011 HC RX IP 250 OP 636: Performed by: SURGERY

## 2024-03-21 PROCEDURE — 250N000011 HC RX IP 250 OP 636: Performed by: PHYSICIAN ASSISTANT

## 2024-03-21 PROCEDURE — 120N000002 HC R&B MED SURG/OB UMMC

## 2024-03-21 PROCEDURE — 82040 ASSAY OF SERUM ALBUMIN: CPT | Performed by: PHYSICIAN ASSISTANT

## 2024-03-21 PROCEDURE — 36415 COLL VENOUS BLD VENIPUNCTURE: CPT | Performed by: SURGERY

## 2024-03-21 PROCEDURE — 258N000003 HC RX IP 258 OP 636: Performed by: SURGERY

## 2024-03-21 PROCEDURE — 250N000013 HC RX MED GY IP 250 OP 250 PS 637: Performed by: PHYSICIAN ASSISTANT

## 2024-03-21 PROCEDURE — 82565 ASSAY OF CREATININE: CPT | Performed by: SURGERY

## 2024-03-21 PROCEDURE — 99232 SBSQ HOSP IP/OBS MODERATE 35: CPT | Performed by: INTERNAL MEDICINE

## 2024-03-21 PROCEDURE — 86140 C-REACTIVE PROTEIN: CPT | Performed by: PHYSICIAN ASSISTANT

## 2024-03-21 RX ADMIN — CIPROFLOXACIN HYDROCHLORIDE 750 MG: 250 TABLET, FILM COATED ORAL at 08:10

## 2024-03-21 RX ADMIN — ENOXAPARIN SODIUM 40 MG: 40 INJECTION SUBCUTANEOUS at 08:10

## 2024-03-21 RX ADMIN — VANCOMYCIN HYDROCHLORIDE 1250 MG: 10 INJECTION, POWDER, LYOPHILIZED, FOR SOLUTION INTRAVENOUS at 08:11

## 2024-03-21 RX ADMIN — SENNOSIDES AND DOCUSATE SODIUM 1 TABLET: 8.6; 5 TABLET ORAL at 08:09

## 2024-03-21 RX ADMIN — SENNOSIDES AND DOCUSATE SODIUM 1 TABLET: 8.6; 5 TABLET ORAL at 20:36

## 2024-03-21 RX ADMIN — CIPROFLOXACIN HYDROCHLORIDE 750 MG: 250 TABLET, FILM COATED ORAL at 20:36

## 2024-03-21 RX ADMIN — VANCOMYCIN HYDROCHLORIDE 1250 MG: 10 INJECTION, POWDER, LYOPHILIZED, FOR SOLUTION INTRAVENOUS at 20:36

## 2024-03-21 RX ADMIN — OXYBUTYNIN CHLORIDE 30 MG: 10 TABLET, EXTENDED RELEASE ORAL at 08:10

## 2024-03-21 ASSESSMENT — ACTIVITIES OF DAILY LIVING (ADL)
ADLS_ACUITY_SCORE: 33
ADLS_ACUITY_SCORE: 33
ADLS_ACUITY_SCORE: 37
ADLS_ACUITY_SCORE: 33
ADLS_ACUITY_SCORE: 37
ADLS_ACUITY_SCORE: 33
ADLS_ACUITY_SCORE: 33
ADLS_ACUITY_SCORE: 37
ADLS_ACUITY_SCORE: 37
ADLS_ACUITY_SCORE: 33
ADLS_ACUITY_SCORE: 37
ADLS_ACUITY_SCORE: 33
ADLS_ACUITY_SCORE: 37
ADLS_ACUITY_SCORE: 33
ADLS_ACUITY_SCORE: 37
ADLS_ACUITY_SCORE: 33

## 2024-03-21 NOTE — PROGRESS NOTES
Plastic Surgery Progress Note  Attending:Dr. Harris    Doing well, no new issues.   ID consult yesterday, started empiric vanc/cipro, awaiting cultures    Temp:  [97.7  F (36.5  C)-98.4  F (36.9  C)] 98.4  F (36.9  C)  Pulse:  [64-87] 64  Resp:  [16-17] 16  BP: (107-127)/(57-74) 125/74  SpO2:  [90 %-97 %] 95 %  I/O last 3 completed shifts:  In: 781 [P.O.:775; I.V.:6]  Out: 4260 [Urine:4175; Drains:85]  General: NAD, answers questions appropriately  Resp: NLB on RA  R hip flap warm, soft, intact. Incisions c/d/I. No significant swelling or ecchymosis RENARD with serosang output.     RENARD 115cc yesterday, 20cc last shift    Bone cultures: NGTD    ASSESSMENT: 49 year old male PMH paraplegia, neurogenic bladder, chronic R troch wound now POD #3 R hip partial femur resection, R TFL advancement flap closure. Healing as anticipated.    PLAN:   -Bedrest. HOB <30. No sitting. Ok to lay supine and L lateral. Q2 hr turns while awake. WAVE mattress. Discussed long term restrictions, bed rest ~4 weeks, then at post op appt usually cleared to start a sitting protocol. Dr. Harris mentioned potentially extending his bed rest due to his hip instability, but this depends on how he is healing and will be decided later.  -RENARD drain care-strip, empty and record output qshift  -start daily abd/tape dressing to R hip with nursing  -follow up intra op bone cultures-ID consult for opinion on restarting home PO antibiotics while awaiting intra-op cultures  -prophylactic lovenox  -appreciate [facility] recs for L IT wound  -appreciate medicine comanagement  -social work/care coordination, will need TCU at discharge for sitting restrictions, possible IV antibiotics, needs stretcher transport    -discussed with Dr. Steven Perkins PA-C  Plastic and Reconstructive Surgery   or plastic surgery resident on call in [facility]

## 2024-03-21 NOTE — PLAN OF CARE
POD #2 R hip partial femur resection and flap closure. ABD dressing c/d/I. Pressure ulcers on L buttock covered with mepilex, c/d/I. Denied pain. Pulses palpable with good capillary refill. Rene catheter in place, draining adequate UO. RENARD drain patent,  emptied a total of 30 ml serosanguinous output.     A/O x4, paraplegic. SCD on. Turned to sides q2 hours. Bony prominences supported with pillows. VS WNL.    Continue daily wound care, IV Vancomycin and Ciprofloxacin per ID recommendation while waiting final result of OR Cx.     Pending TCU placement.

## 2024-03-21 NOTE — PLAN OF CARE
Goal Outcome Evaluation:      Plan of Care Reviewed With: patient    Overall Patient Progress: improvingOverall Patient Progress: improving    New dressing placed on R hip incision, site is WDL. L buttock PI mepilex CDI, to be changed 3/23.  Denies pain. Turning and repositioning IND. Paraplegic, good UE strengths and ROM.   RENARD minimal serous output. Rene patent with clear yellow UO. L PIV infusing NS TKO.   Bowel program: medium BM this morning after digital stimulation.

## 2024-03-21 NOTE — PROGRESS NOTES
[facility]    Medicine Progress Note - Hospitalist Service, GOLD TEAM 17    Date of Admission:  3/18/2024    Assessment & Plan   Sacha Ndiaye is a 49 year old male with a history of spinal cord injury and neurogenic bladder, who was admitted to [facility] on 3/18/2024 following debridement of decubitus ulcer by plastic surgery and orthopedics. Medicine was consulted for medical comanagement.     # S/p I&D of decubitus ulcer of right hip and partial excision of right femur  # Decubitus ulcer of right hip  # Chronic osteomyelitis  # Spinal cord injury  Patient wheelchair bound due to prior spinal cord injury. Underwent aforementioned procedure by orthopedics and plastic surgery on 3/18/2024 due to grade 4 decubitus ulcer. Procedure was uncomplicated with EBL of 100 mL. Patient doing well post-operatively.  - Per ortho & plastics  - Likely plan for TCU pending length of antibiotics  - Infectious disease consultation to clarify abx plan  - Vancomycin, ciprofloxacin started empirically per ID  - Continue to follow culture results    # Neurogenic bladder  In the setting of spinal cord injury. PTA on oxybutynin and does intermittent straight cath and condom catheter. Carrillo was placed intra-operatively.  - Continue PTA oxybutynin  - Remove carrillo per primary team when able    # Constipation  - Add bisacodyl to pre-existing bowel regimen  - Continue to monitor bowel movements          Diet: Regular Diet Adult    DVT Prophylaxis: Enoxaparin (Lovenox) SQ  Carrillo Catheter: PRESENT, indication: Other (Comment);Surgical procedure (neurogenic bladder)  Lines: None     Cardiac Monitoring: None  Code Status: Full Code      Clinically Significant Risk Factors                                    Disposition Plan        Following intraoperative culture results; discharge planning per plastics.    Sacha Mccrary DO, S  Hospitalist Service, GOLD TEAM 17  [facility]  [facility]  Securely message with [employer] (more info)  Text page via [facility] Paging/Directory   See signed in provider for up to date coverage information  ______________________________________________________________________    Interval History   Patient remains in high spirits.  Disposition planning discussed with patient at length.  Infectious disease recommendations reviewed.  Patient reports eating and drinking without difficulty.  Patient inquiring whether he will need a PICC line.    Physical Exam   Vital Signs: Temp: 98.4  F (36.9  C) Temp src: Oral BP: 125/74 Pulse: 64   Resp: 16 SpO2: 95 % O2 Device: None (Room air)    Weight: 171 lbs 0 oz    GENERAL: Alert and oriented x 3; no acute distress; well-nourished.  HEENT: Normocephalic; atraumatic; PERRLA; MMM.  CV: RRR; normal S1, S2; no rubs, murmurs, or gallops.  RESP: Lung fields clear to aucultation B/L; no wheezing or crepitations.  GI: Abdomen is soft, nontender, nondistended; no organomegaly; normal bowel sounds.  : Deferred genital examination.   MSK: No clubbing, cyanosis, or edema.  DERM:Did not closely examine backside.  NEURO: Paraplegia.  PSYCH: No active hallucinations; affect, insight appear within normal limits.    Medical Decision Making       45 MINUTES SPENT BY ME on the date of service doing chart review, history, exam, documentation & further activities per the note.      Data     I have personally reviewed the following data over the past 24 hrs:    N/A  \   N/A   / 274     N/A N/A N/A /  N/A   N/A N/A 0.51 (L) \       Imaging results reviewed over the past 24 hrs:   No results found for this or any previous visit (from the past 24 hour(s)).

## 2024-03-22 LAB
ANION GAP SERPL CALCULATED.3IONS-SCNC: 8 MMOL/L (ref 7–15)
BASOPHILS # BLD AUTO: 0 10E3/UL (ref 0–0.2)
BASOPHILS NFR BLD AUTO: 0 %
BUN SERPL-MCNC: 18 MG/DL (ref 6–20)
CALCIUM SERPL-MCNC: 9 MG/DL (ref 8.6–10)
CHLORIDE SERPL-SCNC: 102 MMOL/L (ref 98–107)
CREAT SERPL-MCNC: 0.52 MG/DL (ref 0.67–1.17)
DEPRECATED HCO3 PLAS-SCNC: 29 MMOL/L (ref 22–29)
EGFRCR SERPLBLD CKD-EPI 2021: >90 ML/MIN/1.73M2
EOSINOPHIL # BLD AUTO: 0.5 10E3/UL (ref 0–0.7)
EOSINOPHIL NFR BLD AUTO: 9 %
ERYTHROCYTE [DISTWIDTH] IN BLOOD BY AUTOMATED COUNT: 17.5 % (ref 10–15)
GLUCOSE SERPL-MCNC: 91 MG/DL (ref 70–99)
HCT VFR BLD AUTO: 39.5 % (ref 40–53)
HGB BLD-MCNC: 11.9 G/DL (ref 13.3–17.7)
IMM GRANULOCYTES # BLD: 0 10E3/UL
IMM GRANULOCYTES NFR BLD: 0 %
LYMPHOCYTES # BLD AUTO: 1.1 10E3/UL (ref 0.8–5.3)
LYMPHOCYTES NFR BLD AUTO: 18 %
MCH RBC QN AUTO: 25.3 PG (ref 26.5–33)
MCHC RBC AUTO-ENTMCNC: 30.1 G/DL (ref 31.5–36.5)
MCV RBC AUTO: 84 FL (ref 78–100)
MONOCYTES # BLD AUTO: 0.6 10E3/UL (ref 0–1.3)
MONOCYTES NFR BLD AUTO: 9 %
NEUTROPHILS # BLD AUTO: 4 10E3/UL (ref 1.6–8.3)
NEUTROPHILS NFR BLD AUTO: 64 %
NRBC # BLD AUTO: 0 10E3/UL
NRBC BLD AUTO-RTO: 0 /100
PLATELET # BLD AUTO: 272 10E3/UL (ref 150–450)
POTASSIUM SERPL-SCNC: 4.1 MMOL/L (ref 3.4–5.3)
RBC # BLD AUTO: 4.71 10E6/UL (ref 4.4–5.9)
SODIUM SERPL-SCNC: 139 MMOL/L (ref 135–145)
VANCOMYCIN SERPL-MCNC: 12 UG/ML
WBC # BLD AUTO: 6.2 10E3/UL (ref 4–11)

## 2024-03-22 PROCEDURE — 80048 BASIC METABOLIC PNL TOTAL CA: CPT | Performed by: INTERNAL MEDICINE

## 2024-03-22 PROCEDURE — 250N000013 HC RX MED GY IP 250 OP 250 PS 637: Performed by: PHYSICIAN ASSISTANT

## 2024-03-22 PROCEDURE — 99233 SBSQ HOSP IP/OBS HIGH 50: CPT | Performed by: INTERNAL MEDICINE

## 2024-03-22 PROCEDURE — 99232 SBSQ HOSP IP/OBS MODERATE 35: CPT | Performed by: INTERNAL MEDICINE

## 2024-03-22 PROCEDURE — 36415 COLL VENOUS BLD VENIPUNCTURE: CPT | Performed by: SURGERY

## 2024-03-22 PROCEDURE — 80202 ASSAY OF VANCOMYCIN: CPT | Performed by: SURGERY

## 2024-03-22 PROCEDURE — 250N000011 HC RX IP 250 OP 636: Performed by: PHYSICIAN ASSISTANT

## 2024-03-22 PROCEDURE — 250N000011 HC RX IP 250 OP 636: Performed by: SURGERY

## 2024-03-22 PROCEDURE — 250N000013 HC RX MED GY IP 250 OP 250 PS 637: Performed by: INTERNAL MEDICINE

## 2024-03-22 PROCEDURE — 85025 COMPLETE CBC W/AUTO DIFF WBC: CPT | Performed by: INTERNAL MEDICINE

## 2024-03-22 PROCEDURE — 258N000003 HC RX IP 258 OP 636: Performed by: SURGERY

## 2024-03-22 PROCEDURE — 120N000002 HC R&B MED SURG/OB UMMC

## 2024-03-22 RX ADMIN — VANCOMYCIN HYDROCHLORIDE 1250 MG: 10 INJECTION, POWDER, LYOPHILIZED, FOR SOLUTION INTRAVENOUS at 09:07

## 2024-03-22 RX ADMIN — ENOXAPARIN SODIUM 40 MG: 40 INJECTION SUBCUTANEOUS at 08:12

## 2024-03-22 RX ADMIN — VANCOMYCIN HYDROCHLORIDE 1250 MG: 10 INJECTION, POWDER, LYOPHILIZED, FOR SOLUTION INTRAVENOUS at 20:10

## 2024-03-22 RX ADMIN — CIPROFLOXACIN HYDROCHLORIDE 750 MG: 250 TABLET, FILM COATED ORAL at 08:11

## 2024-03-22 RX ADMIN — OXYBUTYNIN CHLORIDE 30 MG: 10 TABLET, EXTENDED RELEASE ORAL at 08:11

## 2024-03-22 RX ADMIN — SENNOSIDES AND DOCUSATE SODIUM 1 TABLET: 8.6; 5 TABLET ORAL at 20:10

## 2024-03-22 RX ADMIN — CIPROFLOXACIN HYDROCHLORIDE 750 MG: 250 TABLET, FILM COATED ORAL at 20:09

## 2024-03-22 RX ADMIN — SENNOSIDES AND DOCUSATE SODIUM 1 TABLET: 8.6; 5 TABLET ORAL at 08:11

## 2024-03-22 ASSESSMENT — ACTIVITIES OF DAILY LIVING (ADL)
ADLS_ACUITY_SCORE: 33
DEPENDENT_IADLS:: INDEPENDENT
ADLS_ACUITY_SCORE: 33

## 2024-03-22 NOTE — CONSULTS
Care Management Initial Consult    General Information  Assessment completed with: Sacha Barbosa  Type of CM/SW Visit: Initial Assessment    Primary Care Provider verified and updated as needed: Yes   Readmission within the last 30 days:        Reason for Consult: discharge planning  Advance Care Planning: Advance Care Planning Reviewed: no concerns identified          Communication Assessment  Patient's communication style: spoken language (English or Bilingual)    Hearing Difficulty or Deaf: no   Wear Glasses or Blind: no    Cognitive  Cognitive/Neuro/Behavioral: WDL                      Living Environment:   People in home: significant other  Tanja  Current living Arrangements: house      Able to return to prior arrangements:         Family/Social Support:  Care provided by: self  Provides care for: no one  Marital Status: Lives with Significant Other  Significant Other       Tanja  Description of Support System: Supportive, Involved    Support Assessment: Adequate social supports, Adequate family and caregiver support    Current Resources:   Patient receiving home care services: No     Community Resources: None  Equipment currently used at home: wheelchair, manual, shower chair  Supplies currently used at home: None    Employment/Financial:  Employment Status: employed full-time (on leave)        Financial Concerns:             Does the patient's insurance plan have a 3 day qualifying hospital stay waiver?  No    Lifestyle & Psychosocial Needs:  Social Determinants of Health     Food Insecurity: Not on file   Depression: Not at risk (12/15/2023)    PHQ-2     PHQ-2 Score: 0   Housing Stability: Not on file   Tobacco Use: High Risk (3/18/2024)    Patient History     Smoking Tobacco Use: Some Days     Smokeless Tobacco Use: Never     Passive Exposure: Not on file   Financial Resource Strain: Not on file   Alcohol Use: Not on file   Transportation Needs: Not on file   Physical Activity: Not on file   Interpersonal  "Safety: Not on file   Stress: Not on file   Social Connections: Not on file       Functional Status:  Prior to admission patient needed assistance:   Dependent ADLs:: Independent  Dependent IADLs:: Independent  Assesssment of Functional Status: Not at  functional baseline    Mental Health Status:  Mental Health Status: No Current Concerns       Chemical Dependency Status:  Chemical Dependency Status: No Current Concerns                 Additional Information:  Pt is \"a 49 year old paraplegic male with a diagnosis of kyle 4 R trochanteric decubitus, possible underlying osteomyelitis, and chronic dislocating R hip.\"     Met with patient to introduce self/role and complete initial assessment.     Pt reports that he lives in a home with his significant other Tanja. He uses a manual wheelchair and shower chair at home and is independent in his ADL's and IADL's. 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 He stated he is unsure whether and when he would be able to return to work and that his employer is being flexible. His significant other works full time outside of the home for [employer] emergency services.     [facility] in [place] before and reports this would be his preference for discharge. Pt reports that he will need to be on bedrest for 4 (possibly more) weeks and he does not have the support at home to assist with repositioning. Writer offered to send a referral to [facility] and also to bring pt a list of additional facilities to send referrals too.     Pt is being evaluated by ID for potential need for IV abx. TBD, waiting for cultures.     Pt reports that his hope would be to possibly discharge from the TCU to home after his follow up appt with plastics in 4 weeks. He would additionally appreciate assistance from the TCU with getting a hospital bed in the home for when he discharges from TCU to home.     Adelina Graves RNCC  Covering for 5 Ortho  Phone [phone]  Pager [phone]      RN Care Coordinator     Social Work and Care Management Department      SEARCHABLE in AMCOM - search CARE COORDINATOR       [place] ([phone]) Saturday & Sunday; (0415-2009) [facility] Recognized Holidays     Units: 5A Onc Vocera & 5C Vocera Pager: [phone]    Units: 6B Vocera & 6C Vocera  Pager: [phone]    Units: 7A SOT RNCC Vocera, 7B Med Surg Vocera, & 7C Med Surg Vocera  Pager: [phone]    Units: 6A Vocera & 4A CVICU Vocera, 4C MICU Vocera, and 4E SICU Vocera   Pager: [phone]    Units: 5 Ortho Vocera & 5 Med Surg Vocera  Pager: [phone]    Units: 6 Med Surg Vocera & 8 Med Surg Vocera  Pager [phone]

## 2024-03-22 NOTE — PROGRESS NOTES
[facility]    Medicine Progress Note - Hospitalist Service, GOLD TEAM 17    Date of Admission:  3/18/2024    Assessment & Plan   Sacha Ndiaye is a 49 year old male with a history of spinal cord injury and neurogenic bladder, who was admitted to [facility] on 3/18/2024 following debridement of decubitus ulcer by plastic surgery and orthopedics. Medicine was consulted for medical comanagement.     # S/p I&D of decubitus ulcer of right hip and partial excision of right femur  # Decubitus ulcer of right hip  # Chronic osteomyelitis  # Spinal cord injury  Patient wheelchair bound due to prior spinal cord injury. Underwent aforementioned procedure by orthopedics and plastic surgery on 3/18/2024 due to grade 4 decubitus ulcer. Procedure was uncomplicated with EBL of 100 mL. Patient doing well post-operatively.  - Per ortho & plastics  - Likely plan for TCU pending length of antibiotics  - Infectious disease consultation to clarify abx plan  - Vancomycin, ciprofloxacin started empirically per ID  - Continue to follow culture results    # Neurogenic bladder  In the setting of spinal cord injury. PTA on oxybutynin and does intermittent straight cath and condom catheter. Carrillo was placed intra-operatively.  - Continue PTA oxybutynin  - Remove carrillo per primary team when able    # Constipation  - Add bisacodyl to pre-existing bowel regimen  - Continue to monitor bowel movements          Diet: Regular Diet Adult    DVT Prophylaxis: Enoxaparin (Lovenox) SQ  Carrillo Catheter: PRESENT, indication: Surgical procedure  Lines: None     Cardiac Monitoring: None  Code Status: Full Code      Clinically Significant Risk Factors              # Hypoalbuminemia: Lowest albumin = 3.4 g/dL at 3/21/2024  7:19 AM, will monitor as appropriate                       Disposition Plan        Plastics primary; pending final intraoperative culture results.    Sacha Mccrary DO, S  Hospitalist Service, GOLD TEAM  17  [facility]  Securely message with [employer] (more info)  Text page via [employer] Paging/Directory   See signed in provider for up to date coverage information  ______________________________________________________________________    Interval History   Patient remains in excellent spirits and hasn't complained a single time.  Patient denies chest pain, SOB.  Patient requesting fewer blood draws, if possible.  Culture results reviewed, NTD.    Physical Exam   Vital Signs: Temp: 97.4  F (36.3  C) Temp src: Oral BP: 108/61 Pulse: 62   Resp: 16 SpO2: 95 % O2 Device: None (Room air)    Weight: 171 lbs 0 oz    GENERAL: Alert and oriented x 3; no acute distress; well-nourished.  HEENT: Normocephalic; atraumatic; PERRLA; MMM.  CV: RRR; normal S1, S2; no rubs, murmurs, or gallops.  RESP: Lung fields clear to aucultation B/L; no wheezing or crepitations.  GI: Abdomen is soft, nontender, nondistended; no organomegaly; normal bowel sounds.  : Deferred genital examination.   MSK: No clubbing, cyanosis, or edema.  DERM: Skin is intact; no rash, lesions, or skin breakdown.  NEURO: Paraplegia.  PSYCH: No active hallucinations; affect, insight appear within normal limits.    Medical Decision Making       45 MINUTES SPENT BY ME on the date of service doing chart review, history, exam, documentation & further activities per the note.      Data     I have personally reviewed the following data over the past 24 hrs:    N/A  \   N/A   / N/A     N/A N/A N/A /  N/A   N/A N/A N/A \     ALT: N/A AST: N/A AP: N/A TBILI: N/A   ALB: N/A TOT PROTEIN: N/A LIPASE: N/A     Procal: N/A CRP: N/A Lactic Acid: N/A         Imaging results reviewed over the past 24 hrs:   No results found for this or any previous visit (from the past 24 hour(s)).

## 2024-03-22 NOTE — PLAN OF CARE
A&O X 4, Denies SOB, CP. Afebrile, Stable on RA. Paraplegic, turned and repositioned Q q2hrs. Right hip wound CDI, minimal output from RENARD drain. Adequate output from Rene drain. TKO infusing in right PIV. No acute changes at this time. Continue with POC.

## 2024-03-22 NOTE — PLAN OF CARE
Goal Outcome Evaluation:      Plan of Care Reviewed With: patient    Overall Patient Progress: no changeOverall Patient Progress: no change    Outcome Evaluation: Discharge to TCU to assist with 4 weeks bedrest/repositioning
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
## 2024-03-22 NOTE — PROGRESS NOTES
Plastic Surgery Progress Note  Attending:Dr. Harris    Doing well. No new issues.     Temp:  [97.4  F (36.3  C)-98  F (36.7  C)] 97.4  F (36.3  C)  Pulse:  [62-71] 62  Resp:  [16] 16  BP: (108-118)/(61-79) 108/61  SpO2:  [92 %-95 %] 95 %  I/O last 3 completed shifts:  In: -   Out: 4280 [Urine:4250; Drains:30]  General: NAD, answers questions appropriately  Resp: NLB on RA  R hip flap warm, soft, intact. Incisions c/d/I. No significant swelling or ecchymosis RENARD with serosang output.     RENARD 20cc yesterday, 30cc last shift    Bone cultures: NGTD    Path:   Final Diagnosis   Bone, right trochanter, excision:  - Fibrotic soft tissue and underlying bone with chronic inflammation and reactive changes  - Negative for malignancy and acute osteomyelitis     ASSESSMENT: 49 year old male PMH paraplegia, neurogenic bladder, chronic R troch wound now POD #4 R hip partial femur resection, R TFL advancement flap closure. Healing as anticipated.    PLAN:   -Bedrest. HOB <30. No sitting. Ok to lay supine and L lateral. Q2 hr turns while awake. WAVE mattress. Discussed long term restrictions, bed rest ~4 weeks, then at post op appt usually cleared to start a sitting protocol. Dr. Harris mentioned potentially extending his bed rest due to his hip instability, but this depends on how he is healing and will be decided later.  -RENARD drain care-strip, empty and record output qshift  -start daily abd/tape dressing to R hip with nursing  -follow up intra op bone cultures-ID consult for antibiotics, culture NGTD  -prophylactic lovenox  -appreciate [facility] recs for L IT wound  -appreciate medicine comanagement  -social work/care coordination, will need TCU at discharge for sitting restrictions, possible IV antibiotics, needs stretcher transport    -discussed with Dr. Steven Perkins PA-C  Plastic and Reconstructive Surgery   [facility]

## 2024-03-22 NOTE — PROGRESS NOTES
Care Management Follow Up    Length of Stay (days): 4    Expected Discharge Date: 03/20/2024     Concerns to be Addressed: discharge planning     Patient plan of care discussed at interdisciplinary rounds: Yes    Anticipated Discharge Disposition: Transitional Care     Anticipated Discharge Services: Transportation Services  Anticipated Discharge DME: Other (see comment) (would like assistance from Tcu for hospital bed when discharging from there to home after bedrest)    Patient/family educated on Medicare website which has current facility and service quality ratings:    Education Provided on the Discharge Plan:    Patient/Family in Agreement with the Plan:      Referrals Placed by CM/SW:    Private pay costs discussed: Not applicable    Additional Information:  Writer met with pt to discuss TCU options. Pt reports his preference would be for [facility] as he has been there before and it is convenient for his significant other. Pt's would also be open to [facility] and [facility].     At this time, ID does not recommend IV abx at discharge.     TCU referrals placed  [facility]- Pt's first choice. Referral sent via communications tab. Intake person out of office until 3/25  [address]   [place] [address]  Ph [phone]  Fax [phone]    [facility]- referral sent via Care Management Destination tab  [address] [place] [address]  Ph [phone]    [facility]- referral sent via Care Management Destination tab  [address] [place]  Ph [phone]  Fax [phone]    Adelina Graves RNCC  Covering for 5 Ortho  Phone [phone]  Pager [phone]      RN Care Coordinator     Social Work and Care Management Department      SEARCHABLE in AM[employer] - search CARE COORDINATOR       [place] ([phone]) Saturday & Sunday; (3598-1502) [facility] Recognized Holidays     Units: 5A Onc  Vocera & 5C Vocera Pager: [phone]    Units: 6B Vocera & 6C Vocera  Pager: [phone]    Units: 7A SOT RNCC Vocera, 7B Med Surg Vocera, & 7C Med Surg Vocera  Pager: [phone]    Units: 6A Vocera & 4A CVICU Vocera, 4C MICU Vocera, and 4E SICU Vocera   Pager: [phone]    Units: 5 Ortho Vocera & 5 Med Surg Vocera  Pager: [phone]    Units: 6 Med Surg Vocera & 8 Med Surg Vocera  Pager [phone]

## 2024-03-22 NOTE — PHARMACY-VANCOMYCIN DOSING SERVICE
Pharmacy Vancomycin Note  Date of Service 2024  Patient's  1975   49 year old, male    Indication: Osteomyelitis  Day of Therapy: Started on 3/20/24  Current vancomycin regimen:  1250 mg IV q12h  Current vancomycin monitoring method: AUC  Current vancomycin therapeutic monitoring goal: 400-600 mg*h/L    InsightRX Prediction of Current Vancomycin Regimen  Loading dose: N/A  Regimen: 1250 mg IV every 12 hours.  Start time: 21:07 on 2024  Exposure target: AUC24 (range)400-600 mg/L.hr   AUC24,ss: 473 mg/L.hr  Probability of AUC24 > 400: 87 %  Ctrough,ss: 13.6 mg/L  Probability of Ctrough,ss > 20: 3 %  Probability of nephrotoxicity (Lodise OLGA ): 9 %    Current estimated CrCl = Estimated Creatinine Clearance: 190.7 mL/min (A) (based on SCr of 0.52 mg/dL (L)).    Creatinine for last 3 days  3/20/2024:  5:49 AM Creatinine 0.67 mg/dL  3/21/2024:  7:19 AM Creatinine 0.51 mg/dL  3/22/2024:  7:35 AM Creatinine 0.52 mg/dL    Recent Vancomycin Levels (past 3 days)  3/22/2024:  7:35 AM Vancomycin 12.0 ug/mL    Vancomycin IV Administrations (past 72 hours)                     vancomycin (VANCOCIN) 1,250 mg in 0.9% NaCl 250 mL intermittent infusion (mg) 1,250 mg New Bag 24 0907     1,250 mg New Bag 24     1,250 mg New Bag  0811     1,250 mg New Bag 24 194                    Nephrotoxins and other renal medications (From now, onward)      Start     Dose/Rate Route Frequency Ordered Stop    24 1930  vancomycin (VANCOCIN) 1,250 mg in 0.9% NaCl 250 mL intermittent infusion         1,250 mg  over 90 Minutes Intravenous EVERY 12 HOURS 24 1847                 Contrast Orders - past 72 hours (72h ago, onward)      None            Interpretation of levels and current regimen:  Vancomycin level is reflective of -600    Has serum creatinine changed greater than 50% in last 72 hours: No    Urine output:  unable to determine    Renal Function: Stable    Plan:  Continue  Current Dose  Vancomycin monitoring method: AUC  Vancomycin therapeutic monitoring goal: 400-600 mg*h/L  Pharmacy will check vancomycin levels as appropriate in 1-3 Days.  Serum creatinine levels will be ordered daily for the first week of therapy and at least twice weekly for subsequent weeks.    Mitra Pulliam RPH

## 2024-03-23 PROCEDURE — 99232 SBSQ HOSP IP/OBS MODERATE 35: CPT | Performed by: INTERNAL MEDICINE

## 2024-03-23 PROCEDURE — 120N000002 HC R&B MED SURG/OB UMMC

## 2024-03-23 PROCEDURE — 250N000011 HC RX IP 250 OP 636: Performed by: PHYSICIAN ASSISTANT

## 2024-03-23 PROCEDURE — 250N000013 HC RX MED GY IP 250 OP 250 PS 637: Performed by: PHYSICIAN ASSISTANT

## 2024-03-23 PROCEDURE — 250N000013 HC RX MED GY IP 250 OP 250 PS 637: Performed by: INTERNAL MEDICINE

## 2024-03-23 RX ORDER — DOXYCYCLINE 100 MG/1
100 CAPSULE ORAL EVERY 12 HOURS SCHEDULED
Status: DISCONTINUED | OUTPATIENT
Start: 2024-03-23 | End: 2024-04-04 | Stop reason: HOSPADM

## 2024-03-23 RX ADMIN — SENNOSIDES AND DOCUSATE SODIUM 1 TABLET: 8.6; 5 TABLET ORAL at 07:58

## 2024-03-23 RX ADMIN — ENOXAPARIN SODIUM 40 MG: 40 INJECTION SUBCUTANEOUS at 08:01

## 2024-03-23 RX ADMIN — DOXYCYCLINE HYCLATE 100 MG: 100 CAPSULE ORAL at 08:32

## 2024-03-23 RX ADMIN — CIPROFLOXACIN HYDROCHLORIDE 750 MG: 250 TABLET, FILM COATED ORAL at 07:59

## 2024-03-23 RX ADMIN — CIPROFLOXACIN HYDROCHLORIDE 750 MG: 250 TABLET, FILM COATED ORAL at 20:28

## 2024-03-23 RX ADMIN — DOXYCYCLINE HYCLATE 100 MG: 100 CAPSULE ORAL at 20:28

## 2024-03-23 RX ADMIN — ACETAMINOPHEN 650 MG: 325 TABLET, FILM COATED ORAL at 06:21

## 2024-03-23 RX ADMIN — POLYETHYLENE GLYCOL 3350 17 G: 17 POWDER, FOR SOLUTION ORAL at 07:59

## 2024-03-23 RX ADMIN — OXYBUTYNIN CHLORIDE 30 MG: 10 TABLET, EXTENDED RELEASE ORAL at 07:59

## 2024-03-23 ASSESSMENT — ACTIVITIES OF DAILY LIVING (ADL)
ADLS_ACUITY_SCORE: 32
ADLS_ACUITY_SCORE: 33
ADLS_ACUITY_SCORE: 32
ADLS_ACUITY_SCORE: 33
ADLS_ACUITY_SCORE: 32
ADLS_ACUITY_SCORE: 33
ADLS_ACUITY_SCORE: 32
ADLS_ACUITY_SCORE: 33
ADLS_ACUITY_SCORE: 32
ADLS_ACUITY_SCORE: 32
ADLS_ACUITY_SCORE: 33
ADLS_ACUITY_SCORE: 32
ADLS_ACUITY_SCORE: 33
ADLS_ACUITY_SCORE: 32
ADLS_ACUITY_SCORE: 32
ADLS_ACUITY_SCORE: 33

## 2024-03-23 NOTE — PROGRESS NOTES
[facility]    Medicine Progress Note - Hospitalist Service, GOLD TEAM 17    Date of Admission:  3/18/2024    Assessment & Plan   Sacha Ndiaye is a 49 year old male with a history of spinal cord injury and neurogenic bladder, who was admitted to [facility] on 3/18/2024 following debridement of decubitus ulcer by plastic surgery and orthopedics. Medicine was consulted for medical comanagement.     # S/p I&D of decubitus ulcer of right hip and partial excision of right femur  # Decubitus ulcer of right hip  # Chronic osteomyelitis  # Spinal cord injury  Patient wheelchair bound due to prior spinal cord injury. Underwent aforementioned procedure by orthopedics and plastic surgery on 3/18/2024 due to grade 4 decubitus ulcer. Procedure was uncomplicated with EBL of 100 mL. Patient doing well post-operatively.  - Per ortho & plastics  - Likely plan for TCU discharge Monday  - Infectious disease consultation to clarify abx plan  - Cultures remain NTD  - For now, infectious disease recommending transitioning vancomycin IV back to doxycycline  - Continue to follow culture results    # Neurogenic bladder  In the setting of spinal cord injury. PTA on oxybutynin and does intermittent straight cath and condom catheter. Carrillo was placed intra-operatively.  - Continue PTA oxybutynin  - Remove carrillo per primary team when able    # Constipation  - Add bisacodyl to pre-existing bowel regimen  - Continue to monitor bowel movements          Diet: Regular Diet Adult    DVT Prophylaxis: Enoxaparin (Lovenox) SQ  Carrillo Catheter: PRESENT, indication: Surgical procedure  Lines: None     Cardiac Monitoring: None  Code Status: Full Code      Clinically Significant Risk Factors              # Hypoalbuminemia: Lowest albumin = 3.4 g/dL at 3/21/2024  7:19 AM, will monitor as appropriate                       Disposition Plan        No clear indication for IV abx at present; TCU discharge  pending.    Sacha Mccrary DO, S  Hospitalist Service, GOLD TEAM 17  [facility]  Securely message with [employer] (more info)  Text page via [employer] Paging/Directory   See signed in provider for up to date coverage information  ______________________________________________________________________    Interval History   Patient remains in markedly good spirits.  Patient endorses no specific symptoms.  Care plan discussed with patient at length.  Plastic surgery (primary service) coordinating discharge planning.  Likely discharge to TCU Monday.    Physical Exam   Vital Signs: Temp: 97.7  F (36.5  C) Temp src: Oral BP: 109/71 Pulse: 80   Resp: 16 SpO2: 97 % O2 Device: None (Room air)    Weight: 171 lbs 0 oz    GENERAL: Alert and oriented x 3; no acute distress; well-nourished.  HEENT: Normocephalic; atraumatic; PERRLA; MMM.  CV: RRR; normal S1, S2; no rubs, murmurs, or gallops.  RESP: Lung fields clear to aucultation B/L; no wheezing or crepitations.  GI: Abdomen is soft, nontender, nondistended; no organomegaly; normal bowel sounds.  : Deferred genital examination.   MSK: No clubbing, cyanosis, or edema.  DERM: Did not closely examine backside.  NEURO: Paraplegia.  PSYCH: No active hallucinations; affect, insight appear within normal limits.    Medical Decision Making       45 MINUTES SPENT BY ME on the date of service doing chart review, history, exam, documentation & further activities per the note.      Data     I have personally reviewed the following data over the past 24 hrs:    N/A  \   N/A   / N/A     N/A N/A N/A /  N/A   N/A N/A N/A \       Imaging results reviewed over the past 24 hrs:   No results found for this or any previous visit (from the past 24 hour(s)).

## 2024-03-23 NOTE — PLAN OF CARE
"  VS: /71   Pulse 80   Temp 97.7  F (36.5  C) (Oral)   Resp 16   Ht 1.829 m (6' 0.01\")   Wt 77.6 kg (171 lb)   SpO2 97%   BMI 23.19 kg/m     O2: Sating >90% to room air, denies SOB or chest pain   Output: With carrillo catheter to urine bag, no output noted, with bladder distention noted, bladder scan done and got 843 mL, noted also to be bypassing as linen was wet, messaged provider, was able to get bladder irrigation, after giving bladder irrigation, draining adequately total output 1650   Last BM: 3-, on bowel program, applied digital stimulation   Activity: Bedrest, reposition at intervals   Skin: Pressure injury on (L) IT, surgical wound on (R) lateral hip to lateral thigh, scab wound on (L) knee to room air   Pain: Denies pain   CMS: A&Ox4, paraplegic   Dressing: (L) IT CDI, (R) lateral hip to lateral thigh, dressing changed on this shift, CDI   Diet: Regular diet   LDA: Carrillo catheter, RENARD drain on (R) thigh, output 30 ml serosanguinous   Equipment: Personal items, call light   Plan: Awaiting TCU placement   Additional Info:        "

## 2024-03-23 NOTE — PROGRESS NOTES
[id number]    Pt is Alert orient x4 on RA.  Able to make needs known. Denies pain, Nausea vomiting. LP IV SL and patent.  Rene in place. No change during this shift.   Plan to D/C to TCU

## 2024-03-23 NOTE — PLAN OF CARE
Assumed cares from 1500 to 1930.  A/O x4, bed bound, vitally stable. Denied pain. Dressing on R hip and L buttock c/d/I. SCD in place. RENARD drain emptied with 20 ml serosanguinous output. FC patent and draining well with 750 ml clear yellow UO. No bypassing noted. Had 2 BMs this morning via manual extraction. Turned to sides q2-3 hours.  Pending TCU placement. Continue POC.

## 2024-03-23 NOTE — PROGRESS NOTES
Plastic Surgery Progress Note  Attending:Dr. Harris    Doing well. No new issues.     Temp:  [97.7  F (36.5  C)-98  F (36.7  C)] 97.7  F (36.5  C)  Pulse:  [63-80] 80  Resp:  [16] 16  BP: (109-123)/(64-71) 109/71  SpO2:  [97 %] 97 %  I/O last 3 completed shifts:  In: -   Out: 2590 [Urine:2500; Drains:90]  General: NAD, answers questions appropriately  Resp: NLB on RA  R hip flap warm, soft, intact. Incisions c/d/I. No significant swelling or ecchymosis RENARD with serosang output.     RENARD SSF    Bone cultures: NGTD    Path:   Final Diagnosis   Bone, right trochanter, excision:  - Fibrotic soft tissue and underlying bone with chronic inflammation and reactive changes  - Negative for malignancy and acute osteomyelitis     ASSESSMENT: 49 year old male PMH paraplegia, neurogenic bladder, chronic R troch wound now POD #5 R hip partial femur resection, R TFL advancement flap closure. Healing as anticipated.    PLAN:   -Bedrest. HOB <30. No sitting. Ok to lay supine and L lateral. Q2 hr turns while awake. WAVE mattress. Discussed long term restrictions, bed rest ~4 weeks, then at post op appt usually cleared to start a sitting protocol. Dr. Harris mentioned potentially extending his bed rest due to his hip instability, but this depends on how he is healing and will be decided later.  -RENARD drain care-strip, empty and record output qshift  -start daily abd/tape dressing to R hip with nursing  -follow up intra op bone cultures-ID consult for antibiotics, culture NGTD  -prophylactic lovenox  -appreciate [facility] recs for L IT wound  -appreciate medicine comanagement  -social work/care coordination, will need TCU at discharge for sitting restrictions, possible IV antibiotics, needs stretcher transport    Chris Dodson MD - PGY 8  Plastic Surgery Resident  Pager [phone]

## 2024-03-23 NOTE — PLAN OF CARE
Patient alert and oriented. Slept through this shift. Denied pain or discomfort. Voiding adequately without concern. No SOB or chest pain voiced. Q2 turn and repositioned. Right hip wound dressing C/D/I RENARD drain with minimal output. Right PIV patent and flushes well. Mepilex on left hip for protection.Remain or bowel program no BM this shift. No acute distress noted this shift. Call light within reach. Continue with plan of care.

## 2024-03-24 PROCEDURE — 120N000002 HC R&B MED SURG/OB UMMC

## 2024-03-24 PROCEDURE — 99232 SBSQ HOSP IP/OBS MODERATE 35: CPT | Performed by: INTERNAL MEDICINE

## 2024-03-24 PROCEDURE — 250N000011 HC RX IP 250 OP 636: Performed by: PHYSICIAN ASSISTANT

## 2024-03-24 PROCEDURE — 250N000013 HC RX MED GY IP 250 OP 250 PS 637: Performed by: INTERNAL MEDICINE

## 2024-03-24 PROCEDURE — 250N000013 HC RX MED GY IP 250 OP 250 PS 637: Performed by: PHYSICIAN ASSISTANT

## 2024-03-24 RX ADMIN — ENOXAPARIN SODIUM 40 MG: 40 INJECTION SUBCUTANEOUS at 08:33

## 2024-03-24 RX ADMIN — DOXYCYCLINE HYCLATE 100 MG: 100 CAPSULE ORAL at 08:34

## 2024-03-24 RX ADMIN — CIPROFLOXACIN HYDROCHLORIDE 750 MG: 250 TABLET, FILM COATED ORAL at 20:55

## 2024-03-24 RX ADMIN — OXYBUTYNIN CHLORIDE 30 MG: 10 TABLET, EXTENDED RELEASE ORAL at 08:33

## 2024-03-24 RX ADMIN — CIPROFLOXACIN HYDROCHLORIDE 750 MG: 250 TABLET, FILM COATED ORAL at 08:33

## 2024-03-24 RX ADMIN — DOXYCYCLINE HYCLATE 100 MG: 100 CAPSULE ORAL at 20:55

## 2024-03-24 ASSESSMENT — ACTIVITIES OF DAILY LIVING (ADL)
ADLS_ACUITY_SCORE: 33

## 2024-03-24 NOTE — PLAN OF CARE
Pt is A/O x4, R thin whole. Every 2hrs repositioning(pt may refused). R hip wound dressing done and has a carrillo. Pt is also on a bowel program(manually done). LBM 3/24, Primary team is plastic surgery.Continue with POC.

## 2024-03-24 NOTE — PROGRESS NOTES
[facility]    Medicine Progress Note - Hospitalist Service, GOLD TEAM 17    Date of Admission:  3/18/2024    Assessment & Plan   Sacha Ndiaye is a 49 year old male with a history of spinal cord injury and neurogenic bladder, who was admitted to [facility] on 3/18/2024 following debridement of decubitus ulcer by plastic surgery and orthopedics. Medicine was consulted for medical comanagement.     # S/p I&D of decubitus ulcer of right hip and partial excision of right femur  # Decubitus ulcer of right hip  # Chronic osteomyelitis  # Spinal cord injury  Patient wheelchair bound due to prior spinal cord injury. Underwent aforementioned procedure by orthopedics and plastic surgery on 3/18/2024 due to grade 4 decubitus ulcer. Procedure was uncomplicated with EBL of 100 mL. Patient doing well post-operatively.  - Per ortho & plastics  - Likely plan for TCU discharge Monday  - Infectious disease consultation to clarify abx plan  - Cultures remain NTD  - For now, infectious disease recommending transitioning vancomycin IV back to doxycycline  - Continue to follow culture results    # Neurogenic bladder  In the setting of spinal cord injury. PTA on oxybutynin and does intermittent straight cath and condom catheter. Carrillo was placed intra-operatively.  - Continue PTA oxybutynin  - Remove carrillo per primary team when able    # Constipation  - Add bisacodyl to pre-existing bowel regimen  - Continue to monitor bowel movements          Diet: Regular Diet Adult    DVT Prophylaxis: Enoxaparin (Lovenox) SQ  Carrillo Catheter: PRESENT, indication:  (neurogenic bladder)  Lines: None     Cardiac Monitoring: None  Code Status: Full Code      Clinically Significant Risk Factors              # Hypoalbuminemia: Lowest albumin = 3.4 g/dL at 3/21/2024  7:19 AM, will monitor as appropriate                       Disposition Plan        Patient pending TCU discharge; plastic surgery primary  service.    Sacha Mccrary DO, CYRUS  Hospitalist Service, GOLD TEAM 17  [facility]  Securely message with [employer] (more info)  Text page via [employer] Paging/Directory   See signed in provider for up to date coverage information  ______________________________________________________________________    Interval History   Patient resting comfortably upon entering room.  Plastic surgery documentation reviewed.  Patient endorses no specific symptoms.  Per nursing staff, no acute issues or clinical concerns.  Plastic surgery (primary service) coordinating discharge planning.    Physical Exam   Vital Signs: Temp: 97.9  F (36.6  C) Temp src: Oral BP: 127/71 Pulse: 59   Resp: 16 SpO2: 95 % O2 Device: None (Room air)    Weight: 171 lbs 0 oz    GENERAL: Alert and oriented x 3; no acute distress; well-nourished.  HEENT: Normocephalic; atraumatic; PERRLA; MMM.  CV: RRR; normal S1, S2; no rubs, murmurs, or gallops.  RESP: Lung fields clear to aucultation B/L; no wheezing or crepitations.  GI: Abdomen is soft, nontender, nondistended; no organomegaly; normal bowel sounds.  : Deferred genital examination.   MSK: No clubbing, cyanosis, or edema.  DERM: Did not closely examine backside.  NEURO: Paraplegia.  PSYCH: No active hallucinations; affect, insight appear within normal limits.    Medical Decision Making       45 MINUTES SPENT BY ME on the date of service doing chart review, history, exam, documentation & further activities per the note.      Data         Imaging results reviewed over the past 24 hrs:   No results found for this or any previous visit (from the past 24 hour(s)).

## 2024-03-24 NOTE — PLAN OF CARE
Patient A/Ox4, rae man. VSS. Denies CP, SOB, dizziness/LH. LSCTA. +fl/BS. Voiding through patent carrillo catheter. CMS baseline for paraplegia. Dressing to bottom CDI. Tolerating regular diet without NV. Activity level is baseline for paraplegia, turning Q2-4 hours. Denied pain. Discharge Monday to TCU per report. Patient has demonstrated ability to call appropriately. Patient is resting with call light within reach. Will continue to monitor.

## 2024-03-24 NOTE — PROGRESS NOTES
Plastic Surgery Progress Note  Attending:Dr. Harris    Doing well. No new issues.     Temp:  [97.8  F (36.6  C)-98.1  F (36.7  C)] 97.9  F (36.6  C)  Pulse:  [59-71] 59  Resp:  [16] 16  BP: (123-130)/(71-72) 127/71  SpO2:  [94 %-95 %] 95 %  I/O last 3 completed shifts:  In: 200 [P.O.:200]  Out: 4275 [Urine:4175; Drains:100]  General: NAD, answers questions appropriately  Resp: NLB on RA  R hip flap warm, soft, intact. Incisions c/d/I. No significant swelling or ecchymosis RENARD with serosang output.       RENARD SSF    Bone cultures: NGTD    Path:   Final Diagnosis   Bone, right trochanter, excision:  - Fibrotic soft tissue and underlying bone with chronic inflammation and reactive changes  - Negative for malignancy and acute osteomyelitis     ASSESSMENT: 49 year old male PMH paraplegia, neurogenic bladder, chronic R troch wound now s/p R hip partial femur resection, R TFL advancement flap closure 3/18/24. Healing as anticipated.    PLAN:   -Bedrest. HOB <30. No sitting. Ok to lay supine and L lateral. Q2 hr turns while awake. WAVE mattress. Discussed long term restrictions, bed rest ~4 weeks, then at post op appt usually cleared to start a sitting protocol. Dr. Harris mentioned potentially extending his bed rest due to his hip instability, but this depends on how he is healing and will be decided later.  -RENARD drain care-strip, empty and record output qshift  -start daily abd/tape dressing to R hip with nursing  -follow up intra op bone cultures-ID consult for antibiotics, culture NGTD  -prophylactic lovenox  -appreciate [facility] recs for L IT wound  -appreciate medicine comanagement  -social work/care coordination, will need TCU at discharge for sitting restrictions, possible IV antibiotics, needs stretcher transport    Chris Dodson MD - PGY 8  Plastic Surgery Resident  Pager [phone]

## 2024-03-24 NOTE — PLAN OF CARE
"  VS: /71 (BP Location: Left arm)   Pulse 59   Temp 97.9  F (36.6  C) (Oral)   Resp 16   Ht 1.829 m (6' 0.01\")   Wt 77.6 kg (171 lb)   SpO2 95%   BMI 23.19 kg/m      O2: Sating >90% to room air, denies SOB and chest pain   Output: Voiding adequately with carrillo catheter   Last BM: 3-, with digital stimulation and manual extraction   Activity: Bedrest, repositioned at intervals   Skin: Pressure injury on (L) IT, surgical wound on (R) lateral hip to lateral thigh, scab wound on (L) knee to room air    Pain: Denies   CMS: A&Ox4, baseline paraplegia   Dressing: (L) IT CDI, (R) lateral hip to lateral thigh, dressing changed on this shift, CDI    Diet: Regular diet, denies nausea and vomiting   LDA: Carrillo catheter, RENARD drain on (R) thigh, output serosanguinous    Equipment: Personal items, call light   Plan: Pending culture result and TCU placement   Additional Info:        "

## 2024-03-25 LAB — BACTERIA BONE ANAEROBE+AEROBE CULT: NORMAL

## 2024-03-25 PROCEDURE — G0463 HOSPITAL OUTPT CLINIC VISIT: HCPCS

## 2024-03-25 PROCEDURE — 250N000013 HC RX MED GY IP 250 OP 250 PS 637: Performed by: INTERNAL MEDICINE

## 2024-03-25 PROCEDURE — 99232 SBSQ HOSP IP/OBS MODERATE 35: CPT | Mod: 24 | Performed by: STUDENT IN AN ORGANIZED HEALTH CARE EDUCATION/TRAINING PROGRAM

## 2024-03-25 PROCEDURE — 250N000011 HC RX IP 250 OP 636: Performed by: PHYSICIAN ASSISTANT

## 2024-03-25 PROCEDURE — 120N000002 HC R&B MED SURG/OB UMMC

## 2024-03-25 PROCEDURE — 250N000013 HC RX MED GY IP 250 OP 250 PS 637: Performed by: PHYSICIAN ASSISTANT

## 2024-03-25 PROCEDURE — 99232 SBSQ HOSP IP/OBS MODERATE 35: CPT | Performed by: INTERNAL MEDICINE

## 2024-03-25 RX ORDER — DOXYCYCLINE 100 MG/1
100 CAPSULE ORAL 2 TIMES DAILY
DISCHARGE
Start: 2024-03-25 | End: 2024-03-26

## 2024-03-25 RX ORDER — OXYBUTYNIN CHLORIDE 15 MG/1
30 TABLET, EXTENDED RELEASE ORAL DAILY
DISCHARGE
Start: 2024-03-25

## 2024-03-25 RX ORDER — ACETAMINOPHEN 325 MG/1
650 TABLET ORAL EVERY 4 HOURS PRN
DISCHARGE
Start: 2024-03-25

## 2024-03-25 RX ORDER — BISACODYL 10 MG
10 SUPPOSITORY, RECTAL RECTAL DAILY PRN
DISCHARGE
Start: 2024-03-25

## 2024-03-25 RX ORDER — AMOXICILLIN 250 MG
1 CAPSULE ORAL 2 TIMES DAILY
DISCHARGE
Start: 2024-03-25

## 2024-03-25 RX ORDER — CIPROFLOXACIN 750 MG/1
750 TABLET, FILM COATED ORAL 2 TIMES DAILY
DISCHARGE
Start: 2024-03-25

## 2024-03-25 RX ORDER — POLYETHYLENE GLYCOL 3350 17 G/17G
17 POWDER, FOR SOLUTION ORAL DAILY
DISCHARGE
Start: 2024-03-25

## 2024-03-25 RX ORDER — ENOXAPARIN SODIUM 100 MG/ML
40 INJECTION SUBCUTANEOUS EVERY 24 HOURS
DISCHARGE
Start: 2024-03-25 | End: 2024-03-29

## 2024-03-25 RX ADMIN — SENNOSIDES AND DOCUSATE SODIUM 1 TABLET: 8.6; 5 TABLET ORAL at 08:10

## 2024-03-25 RX ADMIN — DOXYCYCLINE HYCLATE 100 MG: 100 CAPSULE ORAL at 19:50

## 2024-03-25 RX ADMIN — DOXYCYCLINE HYCLATE 100 MG: 100 CAPSULE ORAL at 08:11

## 2024-03-25 RX ADMIN — OXYBUTYNIN CHLORIDE 30 MG: 10 TABLET, EXTENDED RELEASE ORAL at 08:11

## 2024-03-25 RX ADMIN — CIPROFLOXACIN HYDROCHLORIDE 750 MG: 250 TABLET, FILM COATED ORAL at 19:50

## 2024-03-25 RX ADMIN — ENOXAPARIN SODIUM 40 MG: 40 INJECTION SUBCUTANEOUS at 08:10

## 2024-03-25 RX ADMIN — SENNOSIDES AND DOCUSATE SODIUM 1 TABLET: 8.6; 5 TABLET ORAL at 19:50

## 2024-03-25 RX ADMIN — CIPROFLOXACIN HYDROCHLORIDE 750 MG: 250 TABLET, FILM COATED ORAL at 08:10

## 2024-03-25 ASSESSMENT — ACTIVITIES OF DAILY LIVING (ADL)
ADLS_ACUITY_SCORE: 32
ADLS_ACUITY_SCORE: 30
ADLS_ACUITY_SCORE: 32
ADLS_ACUITY_SCORE: 36
ADLS_ACUITY_SCORE: 32
ADLS_ACUITY_SCORE: 30
ADLS_ACUITY_SCORE: 32
ADLS_ACUITY_SCORE: 32
ADLS_ACUITY_SCORE: 36
ADLS_ACUITY_SCORE: 32
ADLS_ACUITY_SCORE: 36
ADLS_ACUITY_SCORE: 36
ADLS_ACUITY_SCORE: 32
ADLS_ACUITY_SCORE: 36
ADLS_ACUITY_SCORE: 33
ADLS_ACUITY_SCORE: 36
ADLS_ACUITY_SCORE: 32
ADLS_ACUITY_SCORE: 36
ADLS_ACUITY_SCORE: 36

## 2024-03-25 NOTE — CONSULTS
[facility]  WO Nurse Inpatient Assessment     Consulted for: left hip    Summary: found by OR staff     Patient History (according to provider note(s):      This patient is wheelchair-bound and has a grade 4 decubitus ulcer on the right hip area. He presents now to have some bone removed soft tissue debrided and flap coverage by plastic surgery. He understands the risks of infection bleeding pain numbness tingling weakness and alteration in his right hip mobility.     Assessment:      Areas visualized during today's visit: Focused: left hip/ buttock    Pressure Injury Location: left IT    Last photo: 3/18  Wound type: Pressure Injury     Pressure Injury Stage: either Stage 2 or 3 deferring definitive staging until wound base has evolved, present on admission   Wound history/plan of care:   found by OR staff prior to admission to floor     Wound base: 100 % fibrin vs slough     Palpation of the wound bed: normal      Drainage: none     Description of drainage: none     Measurements (length x width x depth, in cm) 0.4  x 0.4  x  0.1 cm      Tunneling N/A     Undermining N/A  Periwound skin: Erythema- blanchable      Color: pink and red      Temperature: normal   Odor: none  Pain: no grimacing or signs of discomfort, none  Pain intervention prior to dressing change: N/A  Treatment goal: Infection control/prevention and Protection  STATUS: evolving  Supplies ordered: at bedside    My PI Risk Assessment     Sensory Perception: 2 - Very Limited     Moisture: 3 - Occasionally moist      Activity: 1 - Bedfast      Mobility: 2 - Very limited     Nutrition: 3 - Adequate     Friction/Shear: 1 - Problem     TOTAL: 12      Pressure Injury Location: left hip    Last photo: 3/25  Wound type: Pressure Injury     Pressure Injury Stage: Deep Tissue Pressure Injury (DTPI), hospital acquired   Wound history/plan of care:   found by [facility] on assessment of other wounds.   Wound base: 100 %  maroon, purple, and epidermis     Palpation of the wound bed: firm over bone     Drainage: none     Description of drainage: none     Measurements (length x width x depth, in cm) 1.8  x 3.4  x  0 cm      Tunneling N/A     Undermining N/A  Periwound skin: Intact      Color: normal and consistent with surrounding tissue      Temperature: normal   Odor: none  Pain: no grimacing or signs of discomfort, none  Pain intervention prior to dressing change: N/A  Treatment goal: Heal  and Protection  STATUS: initial assessment  Supplies ordered: at bedside    Treatment Plan:     Left hip/ IT wound(s): Every 3 days and PRN cleanse with SALINE and pat dry. Paint jazmine wound skin with no sting.   Left hip: cover with sacral mepilex   Left IT: apply nickel thick layer of Iodosorb Gel (order#[id number]) to open wound. Cover with 4x4 mepilex.    Orders: Updated    RECOMMEND PRIMARY TEAM ORDER: None, at this time  Education provided: plan of care and wound progress  Discussed plan of care with: Patient, Family, and Nurse  WOC nurse follow-up plan: weekly  Notify WOC if wound(s) deteriorate.  Nursing to notify the Provider(s) and re-consult the WOC Nurse if new skin concern.    DATA:     Current support surface: Standard  Standard gel/foam mattress (IsoFlex, Atmos air, etc)  Containment of urine/stool: Incontinent pad in bed  BMI: Body mass index is 23.19 kg/m .   Active diet order: Orders Placed This Encounter      Regular Diet Adult     Output: I/O last 3 completed shifts:  In: -   Out: 1745 [Urine:1690; Drains:55]     Labs:   Recent Labs   Lab 03/22/24  0735 03/21/24  0719   ALBUMIN  --  3.4*   HGB 11.9*  --    WBC 6.2  --      Pressure injury risk assessment:   Sensory Perception: 2-->very limited  Moisture: 3-->occasionally moist  Activity: 1-->bedfast  Mobility: 2-->very limited  Nutrition: 3-->adequate  Friction and Shear: 1-->problem  Monroe Score: 12    Za Wayne RN CWOCN   Pager no longer is use, please contact  through [place]   Vocmary jane group: [facility] Nurse [facility]  Dept. Office Number: [phone]

## 2024-03-25 NOTE — PLAN OF CARE
Patient A/Ox4, rae gentleman. VSS. Denies CP, SOB, dizziness/LH. LSCTA. +fl/BS. Voiding through carrillo catheter. CMS baseline for paraplegia. Dressing to flap CDI. Tolerating regular diet without NV.  Activity level is fair, pt limited to repositioning due to surgery. Pain rated as negligible throughout shift. Discharge to TCU soon, pt hoping near [place]. Patient has demonstrated ability to call appropriately. Patient is resting with call light within reach. Will continue to monitor.

## 2024-03-25 NOTE — PROGRESS NOTES
Care Management Follow Up    Length of Stay (days): 7    Expected Discharge Date: 03/27/24, pending delivery of pt's hospital bed     Concerns to be Addressed: discharge planning     Patient plan of care discussed at interdisciplinary rounds: Yes    Anticipated Discharge Disposition: Home     Anticipated Discharge Services: Stretcher Transportation Services, home RN/HHA services  Anticipated Discharge DME: Hospital bed    Patient/family educated on Medicare website which has current facility and service quality ratings: Yes   Education Provided on the Discharge Plan:  Yes  Patient/Family in Agreement with the Plan: Yes     Referrals Placed by CM/SW:  Post Acute Facilities    PENDING:  [facility]  [address]   [place] [address]  Ph [phone]  Fax [phone]  3/25: CHW called and LVM requesting a call back       DECLINED:  [facility]  [address]  Ph [phone]  Fax [phone]  3/25: Pt does not currently meet criteria for a skilled TCU bed    [facility]  [address] [place] [address]  Crystal: [phone]  Ph [phone]  3/25: No beds available.    Private pay costs discussed: private room/amenity fees and transportation costs    Additional Information:  Per IDT rounds, pt is medically ready to discharge on oral abx. Pt also doesn't have any wound care needs that would skill him for a TCU.    JOSR received update from [facility] Liaison, Ronald Waite, who confirmed that pt does not have a skilled need and that pt would have to pay privately for LTC, which would much cheater at a community SNF.    1220: JOSR and RNCC met with pt at bedside to discuss the above information and the anticipated daily rate at a SNF, which would be all OOP costs. Pt reported that private pay SNF stay is not an option, stated that he will need to return to home with a hospital bed and HHC services. OOP stretcher ride options discussed ([facility]  vs [facility]). RNCC explained that ordering DME can take a couple of days, will begin working with MD from Plastic Surgery on ordering pt's hospital bed.    Unit RNCC will continue to follow for pt's return to home.        BEBETO Henderson, LSW  5 Ortho   PHONE: [phone]  Pager: [phone]    SW Coverage SEARCHABLE in [employer] - search 5 Ortho SW  (or by name)  Or click on link below:  5 Ortho Vocera

## 2024-03-25 NOTE — PROGRESS NOTES
Debridement of R hip and partial excision of right femur     Patient is A&Ox4. VSS on RA. Patient denied headache, dizziness, and nausea. Patient denied chest pain and denied SOB. CMS baseline for paraplegia. Indwelling catheter in place; draining effectively. Last BM was this AM via bowel program w/ dig stim and extraction. RENARD drain in place; draining effectively. Wound care for R hip done; new pressure injury was found on L hip by WOC; hospitalist and plastics notified.     Activity: Bedrest, reposition Q2H  Diet: Reg diet   Goals for next shift: Encourage regular repositioning, promote rest.     Sarah Orr, RN on 3/25/2024 at 2:47 PM

## 2024-03-25 NOTE — PROGRESS NOTES
Plastic Surgery Progress Note  Attending:Dr. Harris    Doing well. No new issues.     Temp:  [97.9  F (36.6  C)-98.4  F (36.9  C)] 98.1  F (36.7  C)  Pulse:  [59-64] 64  Resp:  [16] 16  BP: (123-127)/(69-80) 123/69  SpO2:  [94 %-95 %] 94 %  I/O last 3 completed shifts:  In: -   Out: 1280 [Urine:1215; Drains:65]  General: NAD, answers questions appropriately  Resp: NLB on RA  R hip flap warm, soft, intact. Incisions c/d/I. No significant swelling or ecchymosis RENARD with serosang output.     RENARD 65 ml yesterday    Bone cultures: NGTD    Path:   Final Diagnosis   Bone, right trochanter, excision:  - Fibrotic soft tissue and underlying bone with chronic inflammation and reactive changes  - Negative for malignancy and acute osteomyelitis     ASSESSMENT: 49 year old male PMH paraplegia, neurogenic bladder, chronic R troch wound now s/p R hip partial femur resection, R TFL advancement flap closure 3/18/24. Healing as anticipated.    PLAN:   -Bedrest. HOB <30. No sitting. Ok to lay supine and L lateral. Q2 hr turns while awake. WAVE mattress. Discussed long term restrictions, bed rest ~4 weeks, then at post op appt usually cleared to start a sitting protocol. Dr. Harris mentioned potentially extending his bed rest due to his hip instability, but this depends on how he is healing and will be decided later.  -RENARD drain care-strip, empty and record output qshift  -start daily abd/tape dressing to R hip with nursing  -ID consult- oral doxy/cipro  -prophylactic lovenox  -appreciate [facility] recs for L IT wound  -appreciate medicine comanagement  -medically ready for discharge. Needs TCU with stretcher transport    Tawanna Perkins PA-C  Plastic and Reconstructive Surgery   or please page plastic surgery on call in [facility]    Hospital Bed Documentation:   Hospital bed is required for body positioning, to allow for safe transfers to wheelchair and standing and frequent changes in body position, not feasible in an ordinary bed     NOTE:  "Patient must have a \"Yes\" in one of the four following questions to qualify for a hospital bed.    1. Does the patient require positioning of the body in ways not feasible with an ordinary bed due to a medical condition that is expected to last at least 1 month? Yes (Please explain): yes, he is a paraplegic who s/p fasciocutaneous flap closure for stage IV pressure ulcer and is on bedrest, no sitting, HOB <30 degrees for 4-8 weeks. Needs to pressure offload other body parts to prevent worsening sores.     2. Does the patient require, for the alleviation of pain, positioning of the body in ways not feasible with an ordinary bed? No     3. Does the patient require the head of the bed to be elevated more than 30 degrees most of the time due to congestive heart failure, chronic pulmonary disease, or aspiration? No    4. Does the patient require traction that can only be attached to a hospital bed? No    Additional Criteria:    Does the patient require frequent changes in body position and/or have an immediate need for change in body position? Yes - Patient qualifies for Semi Electric Bed. Needs q2 hours turns while awake to prevent pressure sores on bedrest     Trapeze Criteria:  (Patient must meet standard hospital bed criteria also)   1. Does patient need this device to sit up because of a respiratory condition, for change in body position for other medical reasons, or to get in or out of bed? Yes (Please explain): needs to be able to change body position q2 hrs to relieve pressure on the flap and prevent new pressure sores.     I, the undersigned, certify that the above prescribed supplies are medically necessary for this patient and is both reasonable and necessary in reference to accepted standards of medical and necessary in reference to accepted standards of medical practice in the treatment of this patient's condition and is not prescribed as a convenience.    DME Documentation:   Describe the reason for need to " support medical necessity: Patient will need a Group 2 mattress due to recent fasciocutaneous flap closure of Right troch stage IV pressure ulceration on the patient's trochanter that has failed to improve on a normal mattress despite regular wound cares and repositioning. A group 1 mattress is not adequate to offload the flap repair and the additional L IT unstageable pressure sore. Patient has impaired sensation and is unable to reposition independently.  .     I, the undersigned, certify that the above prescribed supplies are medically necessary for this patient and is both reasonable and necessary in reference to accepted standards of medical and necessary in reference to accepted standards of medical practice in the treatment of this patient's condition and is not prescribed as a convenience.

## 2024-03-25 NOTE — PROGRESS NOTES
General ID Service: Follow Up Note      Patient:  Sacha Ndiaye, Date of birth 1975, Medical record number [id number]  Date of Visit:  March 25, 2024         Assessment and Recommendations:   Problem List:  Right trochanteric decubitus ulceration  S/p debridement and right TFL flap (3/18/24)  OR cultures/pathology with no growth to date or signs of infection  H/o spinal hardware infection  On indefinite chronic suppressive therapy with ciprofloxacin and doxycycline    Recommendations:  Continue indefinite chronic suppressive therapy with ciprofloxacin and doxycycline  Has follow-up appointment with Dr. 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 He was admitted 3/18/24 for planned debridement of right trochanteric decubitus ulceration and is now s/p right hip partial femur resection and TFL flap (performed 3/18). He was on vancomycin and ciprofloxacin empirically while awaiting culture data, and has now been de-escalated back to prior suppressive regimen. Cultures have shown n growth to date and histopath not consistent with acute, active infection. Can continue suppressive regimen and keep planned ID follow-up on 4/25/24.    ID will sign off at this time, please do not hesitate to call with any questions!    Paul Anne MD  Division of Infectious Diseases and International Medicine  P: [phone]        Interval History:     Seen and examined - in good spirits, no active complaints.Discussed in-person vs virtual follow-up in ID clinic to help with  transportation/distance.         Review of Systems:     Full 9 pt ROS obtained and negative unless noted above in assessment and interval history.         Current Antimicrobials     Doxycycline  Ciprofloxacin         Physical Exam:   Ranges for vital signs:  Temp:  [97.5  F (36.4  C)-98.4  F (36.9  C)] 97.5  F (36.4  C)  Pulse:  [60-64] 60  Resp:  [14-16] 14  BP: (123-131)/(69-80) 131/75  SpO2:  [94 %-96 %] 96 %    Intake/Output Summary (Last 24 hours) at 3/25/2024 1158  Last data filed at 3/25/2024 1034  Gross per 24 hour   Intake --   Output 2060 ml   Net -2060 ml     Exam:   GENERAL:  well-developed, well-nourished, sitting in bed in no acute distress.   ENT:  Head is normocephalic, atraumatic. Oropharynx is moist without exudates or ulcers.  EYES:  Eyes have anicteric sclerae.    LUNGS:  Breathing easily on room air  EXT: Extremities warm and without edema.  SKIN:  No acute rashes. Flap site not examined today.  NEUROLOGIC:  Grossly nonfocal.         Laboratory Data:   Reviewed.  Pertinent for:    Microbiology:  Culture   Date Value Ref Range Status   03/18/2024 No anaerobic organisms isolated  Final   03/18/2024 No growth after 7 days  Preliminary   03/18/2024 No growth after 5 days  Preliminary     Inflammatory Markers    Recent Labs   Lab Test 08/17/23  1316 01/17/19  0945 01/05/17  1015 11/29/16  0636 11/26/16  0658 05/11/16  0803   SED 60* 72* 30*  --   --   --    CRP  --  37.8* 15.5* 69.3* 24.9* 133.0*     Metabolic Studies       Recent Labs   Lab Test 03/22/24  0735 03/21/24  0719 03/20/24  0627 03/20/24  0549 03/19/24  0756 03/18/24  0618 04/17/19  0604 04/16/19  0621 04/14/19  0602 04/13/19  0651 04/12/19  1620 01/14/17  0636 01/13/17  0609 11/30/16  0600 11/29/16  0636     --   --  136 137  --   --  140  --  143  --  142 139   < > 140   POTASSIUM 4.1  --   --  4.3 3.7  --   --  4.5  --  4.3  --  3.9 3.8   < > 4.0   CHLORIDE 102  --   --  103 103  --   --  106  --  111*  --  108 106   < > 106    CO2 29  --   --  27 26  --   --  22  --  26  --  27 26   < > 27   ANIONGAP 8  --   --  6* 8  --   --  12  --  6  --  7 7   < > 7   BUN 18.0  --   --  18.4 13.2  --   --  23  --  12  --  11 14   < > 25   CR 0.52* 0.51*  --  0.67 0.49*  --  0.48* 0.78   < > 0.55*   < > 0.51* 0.64*   < > 0.65*   GFRESTIMATED >90 >90  --  >90 >90  --  >90 >90   < > >90   < > >90  Non  GFR Calc   >90  Non  GFR Calc     < > >90  Non  GFR Calc     GLC 91  --  94 90 130* 99  --  100*   < > 95  --  108* 112*   < > 102*   A1C  --   --   --   --   --   --   --   --   --   --   --   --  4.7  --   --    LANDON 9.0  --   --  8.6 8.5*  --   --  8.4*  --  7.4*  --  8.3* 7.6*   < > 8.9   MAG  --   --   --   --   --   --   --  2.1  --   --   --   --   --   --  1.9    < > = values in this interval not displayed.     Hepatic Studies    Recent Labs   Lab Test 03/21/24  0719 01/17/19  0945 01/05/17  1015 03/01/16  0509 02/23/16  0516 02/16/16  0455   BILITOTAL 1.2  --   --   --   --   --    ALKPHOS 59  --   --   --   --   --    ALBUMIN 3.4* 2.9* 3.8  --   --   --    AST 15  --   --   --   --   --    ALT 7  --   --  24 25 31     Hematology Studies      Recent Labs   Lab Test 03/22/24  0735 03/21/24  0719 03/20/24  0549 03/19/24  0756 04/15/19  0711 04/14/19  0602 04/13/19  0651 04/12/19  1620 01/14/17  0636 01/13/17  0609 01/12/17  1215 12/01/16  0530 11/29/16  0636 01/27/16  0555 01/26/16  0529   WBC 6.2  --  8.7 10.8  --   --  8.6  --   --   --  7.6  --  7.4   < > 7.7   ANEU  --   --   --   --   --   --  6.3  --   --   --  5.9  --   --   --  6.3   ALYM  --   --   --   --   --   --  1.5  --   --   --  1.1  --   --   --  1.0   MONSE  --   --   --   --   --   --  0.5  --   --   --  0.4  --   --   --  0.4   AEOS  --   --   --   --   --   --  0.3  --   --   --  0.2  --   --   --  0.0   HGB 11.9*  --  10.7* 10.4*  --  9.0* 8.7*  --  9.0*   < > 9.3*  --  11.7*   < > 8.9*   HCT 39.5*  --  36.3* 34.3*  --   --  30.0*   --   --   --  29.3*  --  36.8*   < > 29.8*    274 272 263   < >  --  312   < > 249  --  206   < > 281   < > 334    < > = values in this interval not displayed.     Vancomycin Levels     Recent Labs   Lab Test 03/22/24  0735 04/17/19  1123 11/30/16  1330 05/16/16  0933 05/13/16  2110 02/01/16  1007   VANCOMYCIN 12.0 11.2 19.6 13.1 12.2 12.6         Latest Ref Rng & Units 3/22/2024     7:35 AM 3/21/2024     7:19 AM 3/20/2024     5:49 AM 3/19/2024     7:56 AM 4/17/2019     6:04 AM   Transplant Immunosuppression Labs   Creat 0.67 - 1.17 mg/dL 0.52  0.51  0.67  0.49  0.48    Urea Nitrogen 6.0 - 20.0 mg/dL 18.0   18.4  13.2     WBC 4.0 - 11.0 10e3/uL 6.2   8.7  10.8     Neutrophil % 64   55  74

## 2024-03-25 NOTE — PROGRESS NOTES
3/25/24    Care Management Follow Up    Update: CHW called Jimi from [facility] ([phone]) to ask for a quote to take pt back [place]. Jimi said it would be $1,000- this was notified to the RNCC on the unit.    Pending:    [facility]  [address]   [place] [address]  Ph [phone]  Fax [phone]  3/25: CHW called and LVM requesting a call back       Declined:    [facility]  [address]  Ph [phone]  Fax [phone]  3/25: Pt does not currently meet criteria for a skilled TCU [facility]  [address] [place] [address]  [place]rystal: [phone]  Ph [phone]  3/25: CHW called and LVM requesting a call back. Declined due to bed availability     Beatris Cortez  Inpatient CHW  [facility] 5 Ortho, 8 Med Surge, & ED  PH: [phone]

## 2024-03-25 NOTE — PROGRESS NOTES
[facility]    Medicine Progress Note - Hospitalist Service, GOLD TEAM 17    Date of Admission:  3/18/2024    Assessment & Plan   Sacha Ndiaye is a 49 year old male with a history of spinal cord injury and neurogenic bladder, who was admitted to [facility] on 3/18/2024 following debridement of decubitus ulcer by plastic surgery and orthopedics. Medicine was consulted for medical comanagement.     # S/p I&D of decubitus ulcer of right hip and partial excision of right femur  # Decubitus ulcer of right hip  # Chronic osteomyelitis  # Spinal cord injury  Patient wheelchair bound due to prior spinal cord injury. Underwent aforementioned procedure by orthopedics and plastic surgery on 3/18/2024 due to grade 4 decubitus ulcer. Procedure was uncomplicated with EBL of 100 mL. Patient doing well post-operatively.  - Per ortho & plastics  - Likely plan for TCU discharge Monday  - Infectious disease consultation to clarify abx plan  - Cultures remain NTD  - For now, infectious disease recommending transitioning vancomycin IV back to doxycycline  - Continue to follow culture results    # Neurogenic bladder  In the setting of spinal cord injury. PTA on oxybutynin and does intermittent straight cath and condom catheter. Carrillo was placed intra-operatively.  - Continue PTA oxybutynin  - Remove carrillo per primary team when able    # Constipation  - Add bisacodyl to pre-existing bowel regimen  - Continue to monitor bowel movements          Diet: Regular Diet Adult    DVT Prophylaxis: Enoxaparin (Lovenox) SQ  Carrillo Catheter: PRESENT, indication:  (neurogenic bladder)  Lines: None     Cardiac Monitoring: None  Code Status: Full Code      Clinically Significant Risk Factors              # Hypoalbuminemia: Lowest albumin = 3.4 g/dL at 3/21/2024  7:19 AM, will monitor as appropriate                       Disposition Plan        Patient medically ready for discharge to TCU.    Sacha Mccrary, DO,  S  Hospitalist Service, GOLD TEAM 17  M [facility]  Securely message with [employer] (more info)  Text page via [employer] Paging/Directory   See signed in provider for up to date coverage information  ______________________________________________________________________    Interval History   Patient is in incredible spirits.  Patient endorses no specific symptoms.  Cultures remain NTD.  Plastic surgery (primary service) coordinating discharge planning.    Physical Exam   Vital Signs: Temp: 97.5  F (36.4  C) Temp src: Oral BP: 131/75 Pulse: 60   Resp: 14 SpO2: 96 % O2 Device: None (Room air)    Weight: 171 lbs 0 oz    GENERAL: Alert and oriented x 3; no acute distress; well-nourished.  HEENT: Normocephalic; atraumatic; PERRLA; MMM.  CV: RRR; normal S1, S2; no rubs, murmurs, or gallops.  RESP: Lung fields clear to aucultation B/L; no wheezing or crepitations.  GI: Abdomen is soft, nontender, nondistended; no organomegaly; normal bowel sounds.  : Deferred genital examination.   MSK: No clubbing, cyanosis, or edema.  DERM: Skin is intact; no rash, lesions, or skin breakdown.  NEURO: No focal deficits appreciated; strength & sensorium are grossly intact.  PSYCH: No active hallucinations; affect, insight appear within normal limits.    Medical Decision Making       45 MINUTES SPENT BY ME on the date of service doing chart review, history, exam, documentation & further activities per the note.      Data         Imaging results reviewed over the past 24 hrs:   No results found for this or any previous visit (from the past 24 hour(s)).

## 2024-03-26 ENCOUNTER — MEDICAL CORRESPONDENCE (OUTPATIENT)
Dept: HEALTH INFORMATION MANAGEMENT | Facility: CLINIC | Age: 49
End: 2024-03-26
Payer: COMMERCIAL

## 2024-03-26 LAB — BACTERIA BONE ANAEROBE+AEROBE CULT: NO GROWTH

## 2024-03-26 PROCEDURE — 250N000013 HC RX MED GY IP 250 OP 250 PS 637: Performed by: INTERNAL MEDICINE

## 2024-03-26 PROCEDURE — 250N000011 HC RX IP 250 OP 636: Performed by: PHYSICIAN ASSISTANT

## 2024-03-26 PROCEDURE — 99232 SBSQ HOSP IP/OBS MODERATE 35: CPT | Performed by: INTERNAL MEDICINE

## 2024-03-26 PROCEDURE — 120N000002 HC R&B MED SURG/OB UMMC

## 2024-03-26 PROCEDURE — 250N000013 HC RX MED GY IP 250 OP 250 PS 637: Performed by: PHYSICIAN ASSISTANT

## 2024-03-26 RX ORDER — DOXYCYCLINE 100 MG/1
100 CAPSULE ORAL 2 TIMES DAILY
DISCHARGE
Start: 2024-03-26

## 2024-03-26 RX ADMIN — OXYBUTYNIN CHLORIDE 30 MG: 10 TABLET, EXTENDED RELEASE ORAL at 08:04

## 2024-03-26 RX ADMIN — CIPROFLOXACIN HYDROCHLORIDE 750 MG: 250 TABLET, FILM COATED ORAL at 08:04

## 2024-03-26 RX ADMIN — CIPROFLOXACIN HYDROCHLORIDE 750 MG: 250 TABLET, FILM COATED ORAL at 20:07

## 2024-03-26 RX ADMIN — DOXYCYCLINE HYCLATE 100 MG: 100 CAPSULE ORAL at 20:07

## 2024-03-26 RX ADMIN — ENOXAPARIN SODIUM 40 MG: 40 INJECTION SUBCUTANEOUS at 08:03

## 2024-03-26 RX ADMIN — SENNOSIDES AND DOCUSATE SODIUM 1 TABLET: 8.6; 5 TABLET ORAL at 20:07

## 2024-03-26 RX ADMIN — SENNOSIDES AND DOCUSATE SODIUM 1 TABLET: 8.6; 5 TABLET ORAL at 08:04

## 2024-03-26 RX ADMIN — DOXYCYCLINE HYCLATE 100 MG: 100 CAPSULE ORAL at 08:04

## 2024-03-26 ASSESSMENT — ACTIVITIES OF DAILY LIVING (ADL)
ADLS_ACUITY_SCORE: 30
ADLS_ACUITY_SCORE: 32
ADLS_ACUITY_SCORE: 30
ADLS_ACUITY_SCORE: 30
ADLS_ACUITY_SCORE: 32
ADLS_ACUITY_SCORE: 30
ADLS_ACUITY_SCORE: 30
ADLS_ACUITY_SCORE: 32
ADLS_ACUITY_SCORE: 32
ADLS_ACUITY_SCORE: 30
ADLS_ACUITY_SCORE: 32
ADLS_ACUITY_SCORE: 30
ADLS_ACUITY_SCORE: 32
ADLS_ACUITY_SCORE: 30
ADLS_ACUITY_SCORE: 30
ADLS_ACUITY_SCORE: 32
ADLS_ACUITY_SCORE: 30
ADLS_ACUITY_SCORE: 30
ADLS_ACUITY_SCORE: 32

## 2024-03-26 NOTE — PLAN OF CARE
Goal Outcome Evaluation:      Plan of Care Reviewed With: patient      Patient alert and oriented x4. VSS. Denies pain. Rene patent and draining adequate amount. LMB 3/25/24,with bowel program. Patient is on bedrest and HOB <30 degree, turn and reposition Q2hrs. Seen by BE this morning, new pressure injury noted to left hip, MD notified. Discharge pending patient delivery of pt's hospital bed.

## 2024-03-26 NOTE — PROGRESS NOTES
3/26/24    Care Management Follow Up    CHW faxed over DME orders for a hospital bed and mattress to [employer] fax [phone]. This was notified to the RNCC on the unit.    Beatris Cortez  Inpatient CHW  [facility] 5 Ortho, 8 Med Surge, & ED  PH: [phone]

## 2024-03-26 NOTE — PROGRESS NOTES
[facility]    Medicine Progress Note - Hospitalist Service, GOLD TEAM 16    Date of Admission:  3/18/2024    Assessment & Plan   Sacha Ndiaye is a 49 year old male with a history of spinal cord injury and neurogenic bladder, who was admitted to [facility] on 3/18/2024 following debridement of decubitus ulcer by plastic surgery and orthopedics. Medicine was consulted for medical comanagement.     # S/p I&D of decubitus ulcer of right hip and partial excision of right femur  # Decubitus ulcer of right hip  # Chronic osteomyelitis  # Spinal cord injury  Patient wheelchair bound due to prior spinal cord injury. Underwent aforementioned procedure by orthopedics and plastic surgery on 3/18/2024 due to grade 4 decubitus ulcer. Procedure was uncomplicated with EBL of 100 mL. Patient doing well post-operatively.  - Per ortho & plastics  - Likely plan for TCU discharge Monday  - Infectious disease consultation to clarify abx plan  - Cultures remain NTD  - For now, infectious disease recommending transitioning vancomycin IV back to doxycycline  - Continue to follow culture results    # Neurogenic bladder  In the setting of spinal cord injury. PTA on oxybutynin and does intermittent straight cath and condom catheter. Carrillo was placed intra-operatively.  - Continue PTA oxybutynin  - Remove carrillo per primary team when able    # Constipation  - Add bisacodyl to pre-existing bowel regimen  - Continue to monitor bowel movements          Diet: Regular Diet Adult  Diet    DVT Prophylaxis: Enoxaparin (Lovenox) SQ  Carrillo Catheter: PRESENT, indication: Wound deterioration and failed external collection device  Lines: None     Cardiac Monitoring: None  Code Status:  Full resuscitation status    Clinically Significant Risk Factors              # Hypoalbuminemia: Lowest albumin = 3.4 g/dL at 3/21/2024  7:19 AM, will monitor as appropriate                       Disposition Plan      Expected  Discharge Date: 03/27/2024,  3:00 PM    Destination: home with family  Discharge Comments: Needs hospital bed at home.            Sacha Mccrary DO, CYRUS  Hospitalist Service, GOLD TEAM 16  M [facility]  Securely message with [employer] (more info)  Text page via [facility] Paging/Directory   See signed in provider for up to date coverage information  ______________________________________________________________________    Interval History   Patient remains in strong spirits.  Patient endorses no specific symptoms.  Per nursing staff, patient is having regular bowel movements.  The plan now is for discharge home with home health.  Pending delivery of hospital bed to patient's home.  Plastic surgery (primary service) coordinating discharge planning.    Physical Exam   Vital Signs: Temp: 97.7  F (36.5  C) Temp src: Oral BP: 119/76 Pulse: 72   Resp: 18 SpO2: 99 % O2 Device: None (Room air)    Weight: 171 lbs 0 oz    GENERAL: Alert and oriented x 3; no acute distress; well-nourished.  HEENT: Normocephalic; atraumatic; PERRLA; MMM.  CV: RRR; normal S1, S2; no rubs, murmurs, or gallops.  RESP: Lung fields clear to aucultation B/L; no wheezing or crepitations.  GI: Abdomen is soft, nontender, nondistended; no organomegaly; normal bowel sounds.  : Deferred genital examination.   MSK: No clubbing, cyanosis, or edema.  DERM: Did not closely examine backside.  NEURO: No focal deficits appreciated; strength & sensorium are grossly intact.  PSYCH: No active hallucinations; affect, insight appear within normal limits.    Medical Decision Making       45 MINUTES SPENT BY ME on the date of service doing chart review, history, exam, documentation & further activities per the note.      Data         Imaging results reviewed over the past 24 hrs:   No results found for this or any previous visit (from the past 24 hour(s)).

## 2024-03-26 NOTE — PROGRESS NOTES
"CLINICAL NUTRITION SERVICES - ASSESSMENT NOTE     Nutrition Prescription    RECOMMENDATIONS FOR MDs/PROVIDERS TO ORDER:  None at this time    Malnutrition Status:    Patient does not meet two of the established criteria necessary for diagnosing malnutrition    Recommendations already ordered by Registered Dietitian (RD):  Ensure Max BID  Special K bar once daily    Future/Additional Recommendations:  Monitor appetite/po intake, acceptance of ONS, wt trends, wound healing.     REASON FOR ASSESSMENT  Sacha Ndiaye is a/an 49 year old male assessed by the dietitian for LOS and Provider order - protein drink recommendations     CLINICAL HISTORY  PMH paraplegia, neurogenic bladder, chronic R troch wound now s/p R hip partial femur resection, R TFL advancement flap closure 3/18/24.     NUTRITION HISTORY  Pt reports good appetite and eating well. Reports he has been using protein drinks/supplements for many years and has tried most brands. Is growing tired of them, but still using them because he knows they will be beneficial to him. Currently interested in getting Ensure Max BID and special K bar once daily. Reports he is likely to discharge tomorrow. Reports that after discharge, he will be using Bj. Encouraged pt to take this BID for at least 2 weeks for effectiveness.    CURRENT NUTRITION ORDERS  Diet: Regular  Intake/Tolerance: 100% intakes documented in flowsheets, ordering 7-day averrage of 1615 kcals and 74 g protein per [facility] records. If eating 100%, would have met 83% estimated kcal and 80% estimated protein needs.    LABS  BUN 18 (WNL), Cr 0.52 (L)  CRP 22.98 (H)    MEDICATIONS  Ciprofloxacin  Miralax  Senokot    ANTHROPOMETRICS  Height: 182.9 cm (6' .008\")  Most Recent Weight: 77.6 kg (171 lb)    IBW: 80.9 kg  BMI: Normal BMI  Weight History:   Wt Readings from Last 15 Encounters:   03/18/24 77.6 kg (171 lb)   01/07/20 72.6 kg (160 lb)   08/08/19 74.8 kg (165 lb)   04/12/19 63.5 kg (140 lb) "   05/02/18 79.2 kg (174 lb 11.2 oz)   03/23/17 83.9 kg (185 lb)   03/23/17 83.9 kg (185 lb)   01/11/17 77.1 kg (170 lb)   11/25/16 74.8 kg (165 lb)   05/09/16 70.3 kg (155 lb)   01/25/16 63.5 kg (140 lb)   01/06/16 63.5 kg (140 lb)   12/17/15 61.2 kg (135 lb)   12/17/15 61.2 kg (135 lb)   11/05/15 61.7 kg (136 lb)   Lack of recent wt hx available    Dosing Weight: 77.6 kg (actual wt)    ASSESSED NUTRITION NEEDS  Estimated Energy Needs: 1940 - 2330 kcals/day (25 - 30 kcals/kg)  Justification: Maintenance  Estimated Protein Needs: 93 - 116 grams protein/day (1.2 - 1.5 grams of pro/kg)  Justification: Wound healing  Estimated Fluid Needs: 1940 - 2330 mL/day (1 mL/kcal)   Justification: Maintenance    PHYSICAL FINDINGS  Stage 2 or 3 Pressure injury to L hip -> see last WOCN note 3/25    MALNUTRITION  % Intake: Decreased intake does not meet criteria  % Weight Loss: Unable to assess -> lack of wt hx available  Subcutaneous Fat Loss: None observed  Muscle Loss: None observed  Fluid Accumulation/Edema: None noted  Malnutrition Diagnosis: Patient does not meet two of the established criteria necessary for diagnosing malnutrition    NUTRITION DIAGNOSIS  Increased nutrient needs (protein) related to wound healing as evidenced by assessed protein needs 1.2-1.5 g/kg.      INTERVENTIONS  Implementation  Nutrition Education: RD role in care, protein for wound healing   Discussed methods to try for variety in supplement routine. Encouraged trying different brands, using unflavored protein powder in coffee, mixing protein drinks with ice cream to make milkshakes, etc.  Medical food supplement therapy - Ensure Max BID + Special K bar once/day    Goals  Patient to consume % of nutritionally adequate meal trays TID, or the equivalent with supplements/snacks.     Monitoring/Evaluation  Progress toward goals will be monitored and evaluated per protocol.    Marley Nowak, MPS, RD, LD  Available on [employer]  Leonard: MARAL (7:00-3:30)  Ortho Clinical Dietitian  Weekend/Holiday (7:00-3:30) - Weekend Clinical Dietitian      **Clinical Dietitians are no longer available by pager

## 2024-03-26 NOTE — PROGRESS NOTES
Care Management Follow Up    Length of Stay (days): 8    Expected Discharge Date: 03/27/2024     Concerns to be Addressed: discharge planning     Patient plan of care discussed at interdisciplinary rounds: Yes    Anticipated Discharge Disposition: Home     Anticipated Discharge Services:  (Stretcher Transportation Services, home RN/HHA services)  Anticipated Discharge DME:  (Hospital bed)    Patient/family educated on Medicare website which has current facility and service quality ratings: yes  Education Provided on the Discharge Plan: Yes  Patient/Family in Agreement with the Plan: yes    Referrals Placed by CM/SW: Post Acute Facilities (and [facility] agencies)  Private pay costs discussed: transportation costs    Additional Information:  Plan for patient to discharge to home once hospital bed has been delivered to patient's home.   [facility]  Phone [phone]  Fax [phone]    Merlyn García RN, BSN  Care Coordinator, [address] [place] [address]  Phone [phone]  Pager [phone]

## 2024-03-26 NOTE — PLAN OF CARE
Goal Outcome Evaluation:      Plan of Care Reviewed With: patient    Overall Patient Progress: improving    VS: Temp: 98.1  F (36.7  C) Temp src: Oral BP: 129/71 Pulse: 68   Resp: 17 SpO2: 96 % O2 Device: None (Room air)      O2: SpO2 > 95% and stable on RA. LS clear and equal bilaterally. Denies chest pain and SOB.    Output: Rene in place patent & with adequate UOP. Catheter care was done this shift.    Last BM: 03/25/2024 pt on bowel regiment.   Activity: Pt is Para; repositioning q2hr to the R & back with Ax1. Pt is able to shift weight.   Skin: WDL except, R hip surgical incision. L hip & IT wounds.    Pain: Pt denied pain.   CMS: Intact, AOx4. Pt is paraplegic.    Dressing: R hip, L hip & IT dressing CDI.    Diet: Regular diet. Denies nausea/vomiting.    LDA: No PIV. RENARD drain to the R hip.   Equipment: IV pole, personal belongings,    Plan: Continue with plan of care. Call light within reach, pt able to make needs known.

## 2024-03-26 NOTE — PROGRESS NOTES
Debridement of R hip and partial excision of right femur     Patient is A&Ox4. VSS on RA. Patient denied headache, dizziness, and nausea. Patient denied chest pain and denied SOB. CMS baseline for paraplegia. Indwelling catheter in place; draining effectively. Last BM was this AM via bowel program w/ digital stimulation and extraction. RENARD drain in place; draining effectively. Wound care for R hip and L buttocks completed this shift. Discharge pending hospital bed delivery to home.     Activty: Bedrest, reposition Q2H  Diet: Regular diet   Goals: Encourage regular repositioning    Sarah Orr RN on 3/26/2024 at 3:23 PM

## 2024-03-26 NOTE — PLAN OF CARE
Goal Outcome Evaluation:      Plan of Care Reviewed With: patient    Overall Patient Progress: no changeOverall Patient Progress: no change    Outcome Evaluation: See RD note 3/26    EMILY Romano, RD, LD  Available on [employer]  Vocera: M-F (7:00-3:30) Ortho Clinical Dietitian  Weekend/Holiday (7:00-3:30) - Weekend Clinical Dietitian      **Clinical Dietitians are no longer available by pager

## 2024-03-26 NOTE — PROGRESS NOTES
Plastic Surgery Progress Note  Attending:Dr. Harris    Doing well. Mentions he would like protein supplements. Also considering sleeping on belly, needs to pressure offload L knee. WOC found new pressure injury to L hip.     Temp:  [97.5  F (36.4  C)-98.1  F (36.7  C)] 98.1  F (36.7  C)  Pulse:  [60-68] 68  Resp:  [14-17] 17  BP: (129-131)/(71-75) 129/71  SpO2:  [96 %] 96 %  I/O last 3 completed shifts:  In: -   Out: 2805 [Urine:2725; Drains:80]  General: NAD, answers questions appropriately  Resp: NLB on RA  R hip flap warm, soft, intact. Incisions c/d/I. No significant swelling or ecchymosis RENARD with serosang output.     RENARD 60 ml yesterday    Bone cultures: NGTD    ASSESSMENT: 49 year old male PMH paraplegia, neurogenic bladder, chronic R troch wound now s/p R hip partial femur resection, R TFL advancement flap closure 3/18/24. Healing as anticipated.    PLAN:   -Bedrest. HOB <30. No sitting. Ok to lay supine and L lateral. Q2 hr turns while awake. WAVE mattress. Discussed long term restrictions, bed rest ~4 weeks, then at post op appt usually cleared to start a sitting protocol. Dr. Harris mentioned potentially extending his bed rest due to his hip instability, but this depends on how he is healing and will be decided later.  -RENARD drain care-strip, empty and record output qshift  -start daily abd/tape dressing to R hip with nursing  -ID consult- oral doxy/cipro  -prophylactic lovenox  -appreciate WO recs for L IT wound and L hip  -appreciate medicine comanagement  -nutrition consult  -medically ready for discharge. Needs TCU with stretcher transport. Does not qualify for TCU, will need hospital bed/mattress at home.     Tawanna Perkins PA-C  Plastic and Reconstructive Surgery   or please page plastic surgery on call in [facility]    Hospital Bed Documentation:   Hospital bed is required for body positioning, to allow for safe transfers to wheelchair and standing and frequent changes in body position, not  "feasible in an ordinary bed     NOTE: Patient must have a \"Yes\" in one of the four following questions to qualify for a hospital bed.    1. Does the patient require positioning of the body in ways not feasible with an ordinary bed due to a medical condition that is expected to last at least 1 month? Yes (Please explain): yes, he is a paraplegic who s/p fasciocutaneous flap closure for stage IV pressure ulcer and is on bedrest, no sitting, HOB <30 degrees for 4-8 weeks. Needs to pressure offload other body parts to prevent worsening sores.     2. Does the patient require, for the alleviation of pain, positioning of the body in ways not feasible with an ordinary bed? No     3. Does the patient require the head of the bed to be elevated more than 30 degrees most of the time due to congestive heart failure, chronic pulmonary disease, or aspiration? No    4. Does the patient require traction that can only be attached to a hospital bed? No    Additional Criteria:    Does the patient require frequent changes in body position and/or have an immediate need for change in body position? Yes - Patient qualifies for Semi Electric Bed. Needs q2 hours turns while awake to prevent pressure sores on bedrest     Trapeze Criteria:  (Patient must meet standard hospital bed criteria also)   1. Does patient need this device to sit up because of a respiratory condition, for change in body position for other medical reasons, or to get in or out of bed? Yes (Please explain): needs to be able to change body position q2 hrs to relieve pressure on the flap and prevent new pressure sores.     I, the undersigned, certify that the above prescribed supplies are medically necessary for this patient and is both reasonable and necessary in reference to accepted standards of medical and necessary in reference to accepted standards of medical practice in the treatment of this patient's condition and is not prescribed as a convenience.    DME " Documentation:   Describe the reason for need to support medical necessity: Patient will need a Group 2 mattress due to recent fasciocutaneous flap closure of Right troch stage IV pressure ulceration on the patient's trochanter that has failed to improve on a normal mattress despite regular wound cares and repositioning. A group 1 mattress is not adequate to offload the flap repair and the additional L IT unstageable pressure sore. Patient has impaired sensation and is unable to reposition independently.  .     I, the undersigned, certify that the above prescribed supplies are medically necessary for this patient and is both reasonable and necessary in reference to accepted standards of medical and necessary in reference to accepted standards of medical practice in the treatment of this patient's condition and is not prescribed as a convenience.

## 2024-03-27 LAB
ANION GAP SERPL CALCULATED.3IONS-SCNC: 8 MMOL/L (ref 7–15)
BUN SERPL-MCNC: 21 MG/DL (ref 6–20)
CALCIUM SERPL-MCNC: 8.7 MG/DL (ref 8.6–10)
CHLORIDE SERPL-SCNC: 103 MMOL/L (ref 98–107)
CREAT SERPL-MCNC: 0.55 MG/DL (ref 0.67–1.17)
DEPRECATED HCO3 PLAS-SCNC: 26 MMOL/L (ref 22–29)
EGFRCR SERPLBLD CKD-EPI 2021: >90 ML/MIN/1.73M2
ERYTHROCYTE [DISTWIDTH] IN BLOOD BY AUTOMATED COUNT: 17.2 % (ref 10–15)
GLUCOSE SERPL-MCNC: 95 MG/DL (ref 70–99)
HCT VFR BLD AUTO: 36.9 % (ref 40–53)
HGB BLD-MCNC: 11.4 G/DL (ref 13.3–17.7)
MCH RBC QN AUTO: 25.7 PG (ref 26.5–33)
MCHC RBC AUTO-ENTMCNC: 30.9 G/DL (ref 31.5–36.5)
MCV RBC AUTO: 83 FL (ref 78–100)
PLATELET # BLD AUTO: 309 10E3/UL (ref 150–450)
POTASSIUM SERPL-SCNC: 4.2 MMOL/L (ref 3.4–5.3)
RBC # BLD AUTO: 4.44 10E6/UL (ref 4.4–5.9)
SODIUM SERPL-SCNC: 137 MMOL/L (ref 135–145)
WBC # BLD AUTO: 9 10E3/UL (ref 4–11)

## 2024-03-27 PROCEDURE — 99232 SBSQ HOSP IP/OBS MODERATE 35: CPT | Performed by: INTERNAL MEDICINE

## 2024-03-27 PROCEDURE — 85027 COMPLETE CBC AUTOMATED: CPT | Performed by: INTERNAL MEDICINE

## 2024-03-27 PROCEDURE — 250N000011 HC RX IP 250 OP 636: Performed by: PHYSICIAN ASSISTANT

## 2024-03-27 PROCEDURE — 250N000013 HC RX MED GY IP 250 OP 250 PS 637: Performed by: PHYSICIAN ASSISTANT

## 2024-03-27 PROCEDURE — 120N000002 HC R&B MED SURG/OB UMMC

## 2024-03-27 PROCEDURE — 36415 COLL VENOUS BLD VENIPUNCTURE: CPT | Performed by: INTERNAL MEDICINE

## 2024-03-27 PROCEDURE — 82374 ASSAY BLOOD CARBON DIOXIDE: CPT | Performed by: INTERNAL MEDICINE

## 2024-03-27 PROCEDURE — 250N000013 HC RX MED GY IP 250 OP 250 PS 637: Performed by: INTERNAL MEDICINE

## 2024-03-27 RX ADMIN — CIPROFLOXACIN HYDROCHLORIDE 750 MG: 250 TABLET, FILM COATED ORAL at 20:52

## 2024-03-27 RX ADMIN — OXYBUTYNIN CHLORIDE 30 MG: 10 TABLET, EXTENDED RELEASE ORAL at 08:20

## 2024-03-27 RX ADMIN — CIPROFLOXACIN HYDROCHLORIDE 750 MG: 250 TABLET, FILM COATED ORAL at 08:20

## 2024-03-27 RX ADMIN — SENNOSIDES AND DOCUSATE SODIUM 1 TABLET: 8.6; 5 TABLET ORAL at 08:20

## 2024-03-27 RX ADMIN — SENNOSIDES AND DOCUSATE SODIUM 1 TABLET: 8.6; 5 TABLET ORAL at 20:52

## 2024-03-27 RX ADMIN — DOXYCYCLINE HYCLATE 100 MG: 100 CAPSULE ORAL at 08:20

## 2024-03-27 RX ADMIN — DOXYCYCLINE HYCLATE 100 MG: 100 CAPSULE ORAL at 20:52

## 2024-03-27 RX ADMIN — ENOXAPARIN SODIUM 40 MG: 40 INJECTION SUBCUTANEOUS at 08:22

## 2024-03-27 ASSESSMENT — ACTIVITIES OF DAILY LIVING (ADL)
ADLS_ACUITY_SCORE: 31
ADLS_ACUITY_SCORE: 32
ADLS_ACUITY_SCORE: 31
ADLS_ACUITY_SCORE: 31
ADLS_ACUITY_SCORE: 32
ADLS_ACUITY_SCORE: 31
ADLS_ACUITY_SCORE: 32
ADLS_ACUITY_SCORE: 32
ADLS_ACUITY_SCORE: 31
ADLS_ACUITY_SCORE: 32

## 2024-03-27 NOTE — PROGRESS NOTES
"Plastic Surgery Progress Note  Attending:Dr. Harris    Doing ok, no new issues. Waiting on breakfast delivery.     Temp:  [97.7  F (36.5  C)-98.1  F (36.7  C)] 97.9  F (36.6  C)  Pulse:  [62-72] 62  Resp:  [16] 16  BP: (115-135)/(70-77) 135/76  SpO2:  [94 %-96 %] 94 %  I/O last 3 completed shifts:  In: -   Out: 2823 [Urine:2800; Drains:23]  General: NAD, answers questions appropriately  Resp: NLB on RA  R hip flap warm, soft, intact. Incisions c/d/I. No significant swelling or ecchymosis RENARD with serosang output.     RENARD 63 ml yesterday    Bone cultures: NGTD    ASSESSMENT: 49 year old male PMH paraplegia, neurogenic bladder, chronic R troch wound now s/p R hip partial femur resection, R TFL advancement flap closure 3/18/24. Healing as anticipated.    PLAN:   -Bedrest. HOB <30. No sitting. Ok to lay supine and L lateral. Q2 hr turns while awake. WAVE mattress. Discussed long term restrictions, bed rest ~4 weeks, then at post op appt usually cleared to start a sitting protocol. Dr. Harris mentioned potentially extending his bed rest due to his hip instability, but this depends on how he is healing and will be decided later.  -RENARD drain care-strip, empty and record output qshift  -start daily abd/tape dressing to R hip with nursing  -ID consult- oral doxy/cipro  -prophylactic lovenox  -appreciate [facility] recs for L IT wound and L hip  -appreciate medicine comanagement  -nutrition consult  -medically ready for discharge. Needs TCU with stretcher transport. Does not qualify for TCU, will need hospital bed/mattress at home.     Tawanna Perkins PA-C  Plastic and Reconstructive Surgery   or please page plastic surgery on call in [facility]    Hospital Bed Documentation:   Hospital bed is required for body positioning, to allow for safe transfers to wheelchair and standing and frequent changes in body position, not feasible in an ordinary bed     NOTE: Patient must have a \"Yes\" in one of the four following questions to qualify " for a hospital bed.    1. Does the patient require positioning of the body in ways not feasible with an ordinary bed due to a medical condition that is expected to last at least 1 month? Yes (Please explain): yes, he is a paraplegic who s/p fasciocutaneous flap closure for stage IV pressure ulcer and is on bedrest, no sitting, HOB <30 degrees for 4-8 weeks. Needs to pressure offload other body parts to prevent worsening sores.     2. Does the patient require, for the alleviation of pain, positioning of the body in ways not feasible with an ordinary bed? No     3. Does the patient require the head of the bed to be elevated more than 30 degrees most of the time due to congestive heart failure, chronic pulmonary disease, or aspiration? No    4. Does the patient require traction that can only be attached to a hospital bed? No    Additional Criteria:    Does the patient require frequent changes in body position and/or have an immediate need for change in body position? Yes - Patient qualifies for Semi Electric Bed. Needs q2 hours turns while awake to prevent pressure sores on bedrest     Trapeze Criteria:  (Patient must meet standard hospital bed criteria also)   1. Does patient need this device to sit up because of a respiratory condition, for change in body position for other medical reasons, or to get in or out of bed? Yes (Please explain): needs to be able to change body position q2 hrs to relieve pressure on the flap and prevent new pressure sores.     I, the undersigned, certify that the above prescribed supplies are medically necessary for this patient and is both reasonable and necessary in reference to accepted standards of medical and necessary in reference to accepted standards of medical practice in the treatment of this patient's condition and is not prescribed as a convenience.    DME Documentation:   Describe the reason for need to support medical necessity: Patient will need a Group 2 mattress due to  recent fasciocutaneous flap closure of Right troch stage IV pressure ulceration on the patient's trochanter that has failed to improve on a normal mattress despite regular wound cares and repositioning. A group 1 mattress is not adequate to offload the flap repair and the additional L IT unstageable pressure sore. Patient has impaired sensation and is unable to reposition independently.  .     I, the undersigned, certify that the above prescribed supplies are medically necessary for this patient and is both reasonable and necessary in reference to accepted standards of medical and necessary in reference to accepted standards of medical practice in the treatment of this patient's condition and is not prescribed as a convenience.

## 2024-03-27 NOTE — PLAN OF CARE
Pt doing well this shift:    Pt A/O x4 all shift, is pleasant and cooperative. Pt denies chest pain, SOB, and rates pain a 3/10 which is his normal. Pt did not want pain meds during shift. Pt Bowel regimen was done (small BM) at 1300 with carrillo emptied (output 1000mL). Wound to L buttock dressing changed due to drainage and dressing not sticking to the skin. 1 large and 1 small foam dressing applied. Dressing changed due 3/30.       VS: See MAR    O2: Room air    Output: Carrillo: 1000 mL   Last BM: 3/27/24 bowel regimen done with assistance from RN    Activity: Not OOB repo Q2H x1 person pt turns well     Skin: Pt has surgical site and 2 pressure wounds one on knee which is open to air and scabbed over. The other is larger on the Left buttock. Dressing was changed on L buttock. 2 mep pads applied. Surgical site is clean dry and intact.   Pain: Pt denies 3/10 is pt norm    CMS: Intact, A/Ox4, Pt is paraplegic   Dressing: Surgical dressing C/D/I, L buttock dressing changed and now C/D/I, RENARD dressing C/D/I   Diet: Regular    LDA: RENARD on R hip    Equipment: Personal belonging   Plan: To discharge home once hospital bed arrives to pt home. JOSR says that will most likely happen sometime tomorrow.    Additional Info: Pt is on bowel regimen. Pt can not feel from belly button down. Pt does refuse Repos at time.

## 2024-03-27 NOTE — PROGRESS NOTES
3/27/24  Care Management Follow Up    CHW faxed over DME orders for a hospital bed and matters over to [facility] in [place], [place]. Fax number is [phone] and the PH: [phone] with an extension of [phone]. FAX was sent over at 1100.    1145: CHW called [facility] to ask if they recived the fax and they said they have not seen the order yet. It could be because they have not looked at the recent faxes. CHW to call in an hour.    1320: CHW called back asking if they received the faxed orders and they said that they did not. CHW then refaxed it to the same fax number.     1415: CHW got a call back stating that they got the fax, but need pt's height and weight. Also pt's wound measurements. This was notified to the RNCC. Medical supply company also stated that pt will need a prior auth before they can deliver the bed. It would take one to two days to get the auth.    1435: CHW faxed over the additional information that was requested by the DME company.      Beatris Cortez  Inpatient CHW  [facility] 5 Ortho, 8 Med Surge, & ED  PH: [phone]

## 2024-03-27 NOTE — PROGRESS NOTES
Hospital Bed to be delivered to patient's home by [facility]. [facility] outside of delivery area, referral cancelled.    Anticipate delivery date 3/28/2024    [facility]  Phone: [phone]  Fax: [phone]    Home care orders to be faxed at time of discharge:   Skilled nursing to be provided by:  [facility]   PH: [phone]   FAX: [phone].     Merlyn García RN, BSN  Care Coordinator,  Ortho  Phone [phone]  Pager [phone]

## 2024-03-27 NOTE — PLAN OF CARE
Goal Outcome Evaluation:      Plan of Care Reviewed With: patient    Overall Patient Progress: improving    VS: Temp: 98.1  F (36.7  C) Temp src: Oral BP: 115/70 Pulse: 68   Resp: 16 SpO2: 96 % O2 Device: None (Room air)      O2: SpO2 > 95% and stable on RA. LS clear and equal bilaterally. Denies chest pain and SOB.    Output: Rene in place patent & with adequate UOP. Catheter care was done this shift.    Last BM: 03/26/2024 pt on bowel regiment.   Activity: Pt is Para; repositioning q2hr to the R & back with Ax1. Pt is able to shift weight.   Skin: WDL except, R hip surgical incision. L hip & IT wounds.    Pain: Pt denied pain.   CMS: Intact, AOx4. Pt is paraplegic.    Dressing: R hip, L hip & IT dressing CDI.    Diet: Regular diet. Denies nausea/vomiting.    LDA: No PIV. RENARD drain to the R hip.   Equipment: IV pole, personal belongings,    Plan: To be discharged home once hospital bed has bed delivered to pt home. Continue with plan of care. Call light within reach, pt able to make needs known.

## 2024-03-27 NOTE — PROGRESS NOTES
[facility]    Medicine Progress Note - Hospitalist Service, GOLD TEAM 16    Date of Admission:  3/18/2024    Assessment & Plan   Sacha Ndiaye is a 49 year old male with a history of spinal cord injury and neurogenic bladder, who was admitted to [facility] on 3/18/2024 following debridement of decubitus ulcer by plastic surgery and orthopedics. Medicine was consulted for medical comanagement.     # S/p I&D of decubitus ulcer of right hip and partial excision of right femur  # Decubitus ulcer of right hip  # Chronic osteomyelitis  # Spinal cord injury  Patient wheelchair bound due to prior spinal cord injury. Underwent aforementioned procedure by orthopedics and plastic surgery on 3/18/2024 due to grade 4 decubitus ulcer. Procedure was uncomplicated with EBL of 100 mL. Patient doing well post-operatively.  - Per ortho & plastics  - Likely plan for TCU discharge Monday  - Infectious disease consultation to clarify abx plan  - Cultures remain NTD  - For now, infectious disease recommending transitioning vancomycin IV back to doxycycline  - Continue to follow culture results    # Neurogenic bladder  In the setting of spinal cord injury. PTA on oxybutynin and does intermittent straight cath and condom catheter. Carrillo was placed intra-operatively.  - Continue PTA oxybutynin  - Remove carrillo per primary team when able    # Constipation  - Add bisacodyl to pre-existing bowel regimen  - Continue to monitor bowel movements          Diet: Regular Diet Adult  Diet  Snacks/Supplements Adult: Other; see comments; Between Meals    DVT Prophylaxis: Enoxaparin (Lovenox) SQ  Carrillo Catheter: PRESENT, indication: Other (Comment);Surgical procedure (neurogenic bladder and surgery)  Lines: None     Cardiac Monitoring: None  Code Status:  Full resuscitation status    Clinically Significant Risk Factors              # Hypoalbuminemia: Lowest albumin = 3.4 g/dL at 3/21/2024  7:19 AM, will monitor as  appropriate                       Disposition Plan     Expected Discharge Date: 03/27/2024,  3:00 PM    Destination: home with family  Discharge Comments: Needs hospital bed at home.            Sacha Mccrary DO, CYRUS  Hospitalist Service, GOLD TEAM 16  [facility]  Securely message with [employer] (more info)  Text page via [facility] Paging/Directory   See signed in provider for up to date coverage information  ______________________________________________________________________    Interval History   Patient remains in excellent spirits.  Patient reports his breakfast is late.  Patient curious regarding timing on hospital bed deliver.    Physical Exam   Vital Signs: Temp: 97.9  F (36.6  C) Temp src: Oral BP: 135/76 Pulse: 62   Resp: 16 SpO2: 94 % O2 Device: None (Room air)    Weight: 171 lbs 0 oz    GENERAL: Alert and oriented x 3; no acute distress; well-nourished.  HEENT: Normocephalic; atraumatic; PERRLA; MMM.  CV: RRR; normal S1, S2; no rubs, murmurs, or gallops.  RESP: Lung fields clear to aucultation B/L; no wheezing or crepitations.  GI: Abdomen is soft, nontender, nondistended; no organomegaly; normal bowel sounds.  : Deferred genital examination.   MSK: No clubbing, cyanosis, or edema.  DERM: Did not closely examine backside.  NEURO: Paraplegia.  PSYCH: No active hallucinations; affect, insight appear within normal limits.    Medical Decision Making       45 MINUTES SPENT BY ME on the date of service doing chart review, history, exam, documentation & further activities per the note.      Data     I have personally reviewed the following data over the past 24 hrs:    9.0  \   11.4 (L)   / 309     137 103 21.0 (H) /  95   4.2 26 0.55 (L) \       Imaging results reviewed over the past 24 hrs:   No results found for this or any previous visit (from the past 24 hour(s)).

## 2024-03-27 NOTE — PROGRESS NOTES
Waiting on [facility] to process request for hospital bed. Anticipate delivery date of 3/28/2024.    [facility]  [phone]  fax [phone]     Merlyn García RN, BSN  Care Coordinator,  Ortho  Phone [phone]  Pager [phone].

## 2024-03-27 NOTE — PLAN OF CARE
Goal Outcome Evaluation:      Plan of Care Reviewed With: patient    Overall Patient Progress: improvingOverall Patient Progress: improving    Outcome Evaluation: Pt Denies pain. Patient repositioned independently in bed only left to supine position. Surgical incision to R thigh/hip and wound left hip CDI. RENARD in place. Plan for discharge home once medical bed delivered. Pt is able to make needs know. Call light in reach will continue to monitor.

## 2024-03-28 PROCEDURE — 99232 SBSQ HOSP IP/OBS MODERATE 35: CPT | Performed by: INTERNAL MEDICINE

## 2024-03-28 PROCEDURE — G0463 HOSPITAL OUTPT CLINIC VISIT: HCPCS

## 2024-03-28 PROCEDURE — 250N000011 HC RX IP 250 OP 636: Performed by: PHYSICIAN ASSISTANT

## 2024-03-28 PROCEDURE — 250N000013 HC RX MED GY IP 250 OP 250 PS 637: Performed by: INTERNAL MEDICINE

## 2024-03-28 PROCEDURE — 120N000002 HC R&B MED SURG/OB UMMC

## 2024-03-28 PROCEDURE — 250N000013 HC RX MED GY IP 250 OP 250 PS 637: Performed by: PHYSICIAN ASSISTANT

## 2024-03-28 RX ADMIN — OXYBUTYNIN CHLORIDE 30 MG: 10 TABLET, EXTENDED RELEASE ORAL at 08:23

## 2024-03-28 RX ADMIN — ENOXAPARIN SODIUM 40 MG: 40 INJECTION SUBCUTANEOUS at 08:23

## 2024-03-28 RX ADMIN — DOXYCYCLINE HYCLATE 100 MG: 100 CAPSULE ORAL at 20:21

## 2024-03-28 RX ADMIN — CIPROFLOXACIN HYDROCHLORIDE 750 MG: 250 TABLET, FILM COATED ORAL at 08:23

## 2024-03-28 RX ADMIN — DOXYCYCLINE HYCLATE 100 MG: 100 CAPSULE ORAL at 08:23

## 2024-03-28 RX ADMIN — CIPROFLOXACIN HYDROCHLORIDE 750 MG: 250 TABLET, FILM COATED ORAL at 20:21

## 2024-03-28 ASSESSMENT — ACTIVITIES OF DAILY LIVING (ADL)
ADLS_ACUITY_SCORE: 31
ADLS_ACUITY_SCORE: 32
ADLS_ACUITY_SCORE: 31
ADLS_ACUITY_SCORE: 31
ADLS_ACUITY_SCORE: 32
ADLS_ACUITY_SCORE: 32
ADLS_ACUITY_SCORE: 31
ADLS_ACUITY_SCORE: 31
ADLS_ACUITY_SCORE: 32
ADLS_ACUITY_SCORE: 31
ADLS_ACUITY_SCORE: 32
ADLS_ACUITY_SCORE: 31
ADLS_ACUITY_SCORE: 32
ADLS_ACUITY_SCORE: 31
ADLS_ACUITY_SCORE: 32

## 2024-03-28 NOTE — PLAN OF CARE
Goal Outcome Evaluation:      Plan of Care Reviewed With: patient    Overall Patient Progress: improvingOverall Patient Progress: improving    Outcome Evaluation: Pt denies pain. Repositioning to L side and back q2-3 hours. Rene catheter draining. Plan is to discharge home when hospital bed is delivered to home.

## 2024-03-28 NOTE — PROGRESS NOTES
Plastic Surgery Progress Note  Attending:Dr. Harris    Doing well. Asking about carrillo removal, feels he can straight cath without sitting, he normally does it laying down. Also asking about from home to clinic for post op appointment.     Temp:  [97.9  F (36.6  C)-98.1  F (36.7  C)] 98.1  F (36.7  C)  Pulse:  [58-70] 70  Resp:  [16] 16  BP: (122-142)/(74-80) 122/74  SpO2:  [95 %-97 %] 95 %  I/O last 3 completed shifts:  In: -   Out: 3250 [Urine:3200; Drains:50]  General: NAD, answers questions appropriately  Resp: NLB on RA  R hip flap warm, soft, intact. Incisions c/d/I. No significant swelling or ecchymosis RENARD with serosang output.     RENARD 45 ml yesterday    Bone cultures: NGTD    ASSESSMENT: 49 year old male PMH paraplegia, neurogenic bladder, chronic R troch wound now s/p R hip partial femur resection, R TFL advancement flap closure 3/18/24. Healing as anticipated.    PLAN:   -Bedrest. HOB <30. No sitting. Ok to lay supine and L lateral. Q2 hr turns while awake. WAVE mattress. Discussed long term restrictions, bed rest ~4 weeks, then at post op appt usually cleared to start a sitting protocol. Dr. Harris mentioned potentially extending his bed rest due to his hip instability, but this depends on how he is healing and will be decided later.  -RENARD drain care-strip, empty and record output qshift  -start daily abd/tape dressing to R hip with nursing  -ID consult- oral doxy/cipro  -prophylactic lovenox  -appreciate [facility] recs for L IT wound and L hip  -appreciate medicine comanagement  -nutrition consult  -discontinue Carrillo, ok to straight cath/condom cath per patient preference  -medically ready for discharge. Needs TCU with stretcher transport. Does not qualify for TCU, will need hospital bed/mattress at home.   -Ok for virtual follow up appointment with Dr. Harris if home health able to remove sutures/staples/drain    Tawanna Perkins PA-C  Plastic and Reconstructive Surgery   or please page plastic surgery on  call in [facility]

## 2024-03-28 NOTE — PROGRESS NOTES
[facility]    Medicine Progress Note - Hospitalist Service, GOLD TEAM 16    Date of Admission:  3/18/2024    Assessment & Plan   Sacha Ndiaye is a 49 year old male with a history of spinal cord injury and neurogenic bladder, who was admitted to [facility] on 3/18/2024 following debridement of decubitus ulcer by plastic surgery and orthopedics. Medicine was consulted for medical comanagement.     # S/p I&D of decubitus ulcer of right hip and partial excision of right femur  # Decubitus ulcer of right hip  # Chronic osteomyelitis  # Spinal cord injury  Patient wheelchair bound due to prior spinal cord injury. Underwent aforementioned procedure by orthopedics and plastic surgery on 3/18/2024 due to grade 4 decubitus ulcer. Procedure was uncomplicated with EBL of 100 mL. Patient doing well post-operatively.  - Per ortho & plastics  - Likely plan for TCU discharge Monday  - Infectious disease consultation to clarify abx plan  - Cultures remain NTD  - For now, infectious disease recommending transitioning vancomycin IV back to doxycycline  - Continue to follow culture results    # Neurogenic bladder  In the setting of spinal cord injury. PTA on oxybutynin and does intermittent straight cath and condom catheter. Carrillo was placed intra-operatively.  - Continue PTA oxybutynin  - Remove carrillo per primary team when able    # Constipation  - Add bisacodyl to pre-existing bowel regimen  - Continue to monitor bowel movements          Diet: Regular Diet Adult  Diet  Snacks/Supplements Adult: Other; see comments; Between Meals    DVT Prophylaxis: Enoxaparin (Lovenox) SQ  Carrillo Catheter: PRESENT, indication: Other (Comment) (neurogenic bladder)  Lines: None     Cardiac Monitoring: None  Code Status:  Full resuscitation status    Clinically Significant Risk Factors              # Hypoalbuminemia: Lowest albumin = 3.4 g/dL at 3/21/2024  7:19 AM, will monitor as appropriate                        Disposition Plan     Expected Discharge Date: 03/28/2024,  3:00 PM    Destination: home with family  Discharge Comments: Needs hospital bed at home.            Sacha Mccrary DO, CYRUS  Hospitalist Service, GOLD TEAM [address] [place] [address]  Securely message with [employer] (more info)  Text page via [facility] Paging/Directory   See signed in provider for up to date coverage information  ______________________________________________________________________    Interval History   Patient remains in excellent spirits.  Patient specifically has no complaints.  Reportedly hospital bed is pending pre-authorization.  Patient is inquiring regarding transportation.    Physical Exam   Vital Signs: Temp: 98.1  F (36.7  C) Temp src: Oral BP: 122/74 Pulse: 70   Resp: 16 SpO2: 95 % O2 Device: None (Room air)    Weight: 171 lbs 0 oz    GENERAL: Alert and oriented x 3; no acute distress; well-nourished.  HEENT: Normocephalic; atraumatic; PERRLA; MMM.  CV: RRR; normal S1, S2; no rubs, murmurs, or gallops.  RESP: Lung fields clear to aucultation B/L; no wheezing or crepitations.  GI: Abdomen is soft, nontender, nondistended; no organomegaly; normal bowel sounds.  : Deferred genital examination.   MSK: No clubbing, cyanosis, or edema.  DERM: Did not closely examine backside.  NEURO: Paraplegia.  PSYCH: No active hallucinations; affect, insight appear within normal limits.    Medical Decision Making       45 MINUTES SPENT BY ME on the date of service doing chart review, history, exam, documentation & further activities per the note.      Data         Imaging results reviewed over the past 24 hrs:   No results found for this or any previous visit (from the past 24 hour(s)).

## 2024-03-28 NOTE — PROVIDER NOTIFICATION
"Notified MARIBEL Perkins that patient is requesting to leave carrillo in place until back home and a home care RN could remove if possible, so that he can use his own personal supplies. Educated patient that we should be able to obtain needed supplies, although they may slightly differ from his home supplies. Pt requested to reach out to PA and see if its okay to leave in place for 1-2 more days until patient is back home and can resume his normal routine. Also inquired if ABD pads are necessary, as he is used to  incision being KALPANA. Let PA know that pt felt his wife would be able to remove the RENARD drain, \"although she may not want to, she can and she will\". Pt stated it is too expensive for him to get transportation to an appt as he is already paying 1000 dollars to get home via stretcher.     MARIBEL Perkins stated she will provide drain removal instructions to pt tomorrow and Dr. Harris will explain it during virtual visit as well. Verified that incision is ok to leave KALPANA at this point.   "

## 2024-03-28 NOTE — PLAN OF CARE
Goal Outcome Evaluation:      Plan of Care Reviewed With: patient    Overall Patient Progress: no changeOverall Patient Progress: no change    Outcome Evaluation: Denies pain. Medically stable for discharge. On PO abx. Carrillo catheter. RENARD drain. R hip dressing, L wound dressing (due 3/30). Awaiting hospital bed delivery to home and then will discharge. Repositioning q2-3 hr.      VS: VSS  Temp: 98.1  F (36.7  C) Temp src: Oral BP: 122/74 Pulse: 70   Resp: 16 SpO2: 95 % O2 Device: None (Room air)     O2: >90% on RA, denies SOB or CP. LSCTA   Output: Voiding via carrillo catheter in adequate amounts   Last BM: LBM 3/28, on bowel regimen. Declined stool softeners this AM. Digital stimulation provided and resulted in hard medium sized BM.   Activity: Pt not OOB due to surgery. Reposition q2-3h from L to supine. Pt able to boost himself up in bed with arms.   Skin: Skin intact ex for R hip flap site, L pressure wound to buttocks   Pain: Reports at baseline pain 3/10 to lower back, declines interventions   CMS: Baseline paraplegic, sensation absent below abdominal area.    Dressing: Dressing to R hip CDI (per plastics, this is OK to leave KALPANA now if dressing falls off), L wound dressing-- WOCN said to reapply at 6pm tonight due to moisture in dressing earlier.   Diet: Regular diet, denies N/V. Adequate intake of PO fluids. Tray set up.   LDA: No PIV access  RENARD drain- 30mL output    Equipment: Personal belongings, w/c   Plan: Discharge to home once hospital bed is delivered. Hospital transportation to be set up once bed is delivered. CC following. Will also need [facility] services.   Additional Info: Awaiting prior authorization for hospital bed  Need to ensure home health care RN can remove carrillo and remove sutures/staples.

## 2024-03-28 NOTE — PROGRESS NOTES
[facility]  WO Nurse Inpatient Assessment     Consulted for: Left hip    Summary: found by OR staff     Patient History (according to provider note(s):      This patient is wheelchair-bound and has a grade 4 decubitus ulcer on the right hip area. He presents now to have some bone removed soft tissue debrided and flap coverage by plastic surgery. He understands the risks of infection bleeding pain numbness tingling weakness and alteration in his right hip mobility.     Assessment:      Areas visualized during today's visit: Focused: left hip/ buttock    Pressure Injury Location: left IT    Last photo: 3/18  Wound type: Pressure Injury     Pressure Injury Stage: either Stage 2 or 3 deferring definitive staging until wound base has evolved, present on admission   Wound history/plan of care:   found by OR staff prior to admission to floor     Wound base: 100 % fibrin vs slough     Palpation of the wound bed: normal      Drainage: none     Description of drainage: none     Measurements (length x width x depth, in cm) 0.4  x 0.4  x  0.1 cm      Tunneling N/A     Undermining N/A  Periwound skin: Erythema- blanchable      Color: pink and red      Temperature: normal   Odor: none  Pain: no grimacing or signs of discomfort, none  Pain intervention prior to dressing change: N/A  Treatment goal: Infection control/prevention and Protection  STATUS: unchanged  Supplies ordered: at bedside    My PI Risk Assessment     Sensory Perception: 2 - Very Limited     Moisture: 3 - Occasionally moist      Activity: 1 - Bedfast      Mobility: 2 - Very limited     Nutrition: 3 - Adequate     Friction/Shear: 1 - Problem     TOTAL: 12      Pressure Injury Location: Left hip    Last photo: 3/25  Wound type: Pressure Injury     Pressure Injury Stage: Deep Tissue Pressure Injury (DTPI), hospital acquired   Wound history/plan of care:   found by [facility] on assessment of other wounds.   Wound base: 100 %  pink epidermis- pressure injury resolved.   STATUS: healed      Treatment Plan:     Left hip/ IT wound(s): Every 3 days and PRN cleanse with SALINE and pat dry. Paint jazmine wound skin with no sting.   Left hip: cover with sacral mepilex   Left IT: apply nickel thick layer of Iodosorb Gel (order#[id number]) to open wound. Cover with 4x4 mepilex.    Orders: Reviewed    RECOMMEND PRIMARY TEAM ORDER: None, at this time  Education provided: plan of care and wound progress  Discussed plan of care with: Patient, Family, and Nurse  [facility] nurse follow-up plan: weekly  Notify WOC if wound(s) deteriorate.  Nursing to notify the Provider(s) and re-consult the WO Nurse if new skin concern.    DATA:     Current support surface: Standard  Standard gel/foam mattress (IsoFlex, Atmos air, etc)  Containment of urine/stool: Incontinent pad in bed  BMI: Body mass index is 23.19 kg/m .   Active diet order: Orders Placed This Encounter      Regular Diet Adult      Diet     Output: I/O last 3 completed shifts:  In: -   Out: 3250 [Urine:3200; Drains:50]     Labs:   Recent Labs   Lab 03/27/24  0731   HGB 11.4*   WBC 9.0     Pressure injury risk assessment:   Sensory Perception: 2-->very limited  Moisture: 3-->occasionally moist  Activity: 1-->bedfast  Mobility: 2-->very limited  Nutrition: 3-->adequate  Friction and Shear: 1-->problem  Monroe Score: 12    Hilda Buenrostro RN CWOCN   Pager no longer is use, please contact through Views[employer]mary jane group: [facility] Nurse [facility]  Dept. Office Number: [phone]

## 2024-03-28 NOTE — PROGRESS NOTES
3/28/24  Care Management Follow Up    CHW called [facility] ([phone], extension [phone]) to follow up on the process of the hospital bed. CHW was told that they are working on it, but are currently awaiting the prior Auth to be approved. This could take 24 to 48 hours. They submitted the prior auth yesterday around 1730ish. Person working on the hospital bed will call CHW when they get an update.     Beatris Cortez  Inpatient CHW  [facility] 5 Ortho, 8 Med Surge, & ED  PH: [phone]

## 2024-03-29 PROCEDURE — 99232 SBSQ HOSP IP/OBS MODERATE 35: CPT | Performed by: INTERNAL MEDICINE

## 2024-03-29 PROCEDURE — 120N000002 HC R&B MED SURG/OB UMMC

## 2024-03-29 PROCEDURE — 250N000013 HC RX MED GY IP 250 OP 250 PS 637: Performed by: INTERNAL MEDICINE

## 2024-03-29 PROCEDURE — 250N000011 HC RX IP 250 OP 636: Performed by: PHYSICIAN ASSISTANT

## 2024-03-29 PROCEDURE — 250N000013 HC RX MED GY IP 250 OP 250 PS 637: Performed by: PHYSICIAN ASSISTANT

## 2024-03-29 RX ADMIN — CIPROFLOXACIN HYDROCHLORIDE 750 MG: 250 TABLET, FILM COATED ORAL at 20:49

## 2024-03-29 RX ADMIN — DOXYCYCLINE HYCLATE 100 MG: 100 CAPSULE ORAL at 08:20

## 2024-03-29 RX ADMIN — SENNOSIDES AND DOCUSATE SODIUM 1 TABLET: 8.6; 5 TABLET ORAL at 20:49

## 2024-03-29 RX ADMIN — POLYETHYLENE GLYCOL 3350 17 G: 17 POWDER, FOR SOLUTION ORAL at 08:20

## 2024-03-29 RX ADMIN — ENOXAPARIN SODIUM 40 MG: 40 INJECTION SUBCUTANEOUS at 08:20

## 2024-03-29 RX ADMIN — DOXYCYCLINE HYCLATE 100 MG: 100 CAPSULE ORAL at 20:50

## 2024-03-29 RX ADMIN — OXYBUTYNIN CHLORIDE 30 MG: 10 TABLET, EXTENDED RELEASE ORAL at 08:20

## 2024-03-29 RX ADMIN — CIPROFLOXACIN HYDROCHLORIDE 750 MG: 250 TABLET, FILM COATED ORAL at 08:20

## 2024-03-29 ASSESSMENT — ACTIVITIES OF DAILY LIVING (ADL)
ADLS_ACUITY_SCORE: 31
ADLS_ACUITY_SCORE: 32
ADLS_ACUITY_SCORE: 31
ADLS_ACUITY_SCORE: 32
ADLS_ACUITY_SCORE: 32
ADLS_ACUITY_SCORE: 31
ADLS_ACUITY_SCORE: 32
ADLS_ACUITY_SCORE: 32
ADLS_ACUITY_SCORE: 31
ADLS_ACUITY_SCORE: 32
ADLS_ACUITY_SCORE: 32
ADLS_ACUITY_SCORE: 31
ADLS_ACUITY_SCORE: 32
ADLS_ACUITY_SCORE: 31

## 2024-03-29 NOTE — PROGRESS NOTES
[facility]    Medicine Progress Note - Hospitalist Service, GOLD TEAM 16    Date of Admission:  3/18/2024    Assessment & Plan   Sacha Ndiaye is a 49 year old male with a history of spinal cord injury and neurogenic bladder, who was admitted to [facility] on 3/18/2024 following debridement of decubitus ulcer by plastic surgery and orthopedics. Medicine was consulted for medical comanagement.     Today's changes: 3/29/2024  Doing well.  No new medical concern, changes. Aw hospital bed for home.     # S/p I&D of decubitus ulcer of right hip and partial excision of right femur  # Decubitus ulcer of right hip  # Chronic osteomyelitis  # Spinal cord injury  Patient wheelchair bound due to prior spinal cord injury. Underwent aforementioned procedure by orthopedics and plastic surgery on 3/18/2024 due to grade 4 decubitus ulcer. Procedure was uncomplicated with EBL of 100 mL. Patient doing well post-operatively.  - Per ortho & plastics  - Likely plan for TCU discharge Monday  - Infectious disease consultation to clarify abx plan  - Cultures remain NTD  - For now, infectious disease recommending transitioning vancomycin IV back to doxycycline  - Continue to follow culture results    # Neurogenic bladder  In the setting of spinal cord injury. PTA on oxybutynin and does intermittent straight cath and condom catheter. Carrillo was placed intra-operatively.  - Continue PTA oxybutynin  - Remove carrillo per primary team when able    # Constipation  - Add bisacodyl to pre-existing bowel regimen  - Continue to monitor bowel movements          Diet: Regular Diet Adult  Diet  Snacks/Supplements Adult: Other; see comments; Between Meals    DVT Prophylaxis: Enoxaparin (Lovenox) SQ  Carrillo Catheter: PRESENT, indication:  (neurogenic bladder)  Lines: None     Cardiac Monitoring: None  Code Status: Full CodeFull resuscitation status    Clinically Significant Risk Factors              #  Hypoalbuminemia: Lowest albumin = 3.4 g/dL at 3/21/2024  7:19 AM, will monitor as appropriate                       Disposition Plan             Cory Chen MD, MHS  Hospitalist Service, GOLD TEAM 16  [facility]  Securely message with [place] (more info)  Text page via [facility] Paging/Directory   See signed in provider for up to date coverage information  ______________________________________________________________________    Interval History   Patient remains in excellent spirits.  Patient specifically has no complaints.  Reportedly hospital bed is pending pre-authorization.  Patient is inquiring regarding transportation.    Physical Exam   Vital Signs: Temp: 97.6  F (36.4  C) Temp src: Oral BP: 122/74 Pulse: 60   Resp: 16 SpO2: 96 % O2 Device: None (Room air)    Weight: 171 lbs 0 oz    GENERAL: Alert and oriented x 3; no acute distress  HEENT: Normocephalic; atraumatic; Moist MM.  CV: RRR;   RESP: non labored. On RA  GI: nondistended  MSK: No  edema.  NEURO: Paraplegia.  PSYCH: stable mood    Medical Decision Making       30 MINUTES SPENT BY ME on the date of service doing chart review, history, exam, documentation & further activities per the note.      Data         Imaging results reviewed over the past 24 hrs:   No results found for this or any previous visit (from the past 24 hour(s)).

## 2024-03-29 NOTE — PROGRESS NOTES
3/29/24  Care Management Follow Up    CHW called [facility] ([phone], extension [phone]) to follow up on the process of the hospital bed. CHW was told that they are working on it, but are still awaiting the auth from insurance. CHW was told that because of the holiday this could take longer then expected. It would take up to 14 days to get the auth. This was stated to the RNCC and they took over.     Beatris Cortez  Inpatient CHW  [facility] 5 Ortho, 8 Med Surge, & ED  PH: [phone]

## 2024-03-29 NOTE — PROGRESS NOTES
Care Management Follow Up    Length of Stay (days): 11    Expected Discharge Date: 03/28/2024     Concerns to be Addressed: discharge planning     Patient plan of care discussed at interdisciplinary rounds: Yes    Anticipated Discharge Disposition: Home     Anticipated Discharge Services:  (Stretcher Transportation Services, home RN/HHA services)  Anticipated Discharge DME:  (Hospital bed)    Patient/family educated on Medicare website which has current facility and service quality ratings: yes  Education Provided on the Discharge Plan: Yes  Patient/Family in Agreement with the Plan: yes    Referrals Placed by CM/SW: Post Acute Facilities (and [facility] agencies)  Private pay costs discussed: Not applicable    Additional Information:  RNCC spoke with Elizabeth from [facility] updating her that patient wouldn't be discharging over the weekend.      RNCC faxed [employer] Authorization for specialty [employer] [facility] Medical Review Form to Tawanna KAISER at [email or web address].        RNCC to follow up with any discharge needs.      Rebeka Evans, RUBEN    Nurse Coordinator     Covering for: Merlyn LyleO    Phone: *[phone]    Social Work and Care Management Department    SEARCHABLE in [employer] - search CARE COORDINATOR    [place] & [place] ([phone]) Saturday & Sunday; [phone]) [facility] Recognized Holidays    Units: 5A, 5B & 5C  Pager: [phone]    Units: 6B, 6C & 6D    Pager: [phone]    Units: 7A, 7B, 7C & 7D    Pager: [phone]    Units: 6A & ICU   Pager: [phone]    Units: 5 Ortho, 5MS & WB ED Pager: [phone]    Units: 6MS, 8A & 10 ICU  Pager [phone]

## 2024-03-29 NOTE — PROGRESS NOTES
Plastic Surgery Progress Note  Attending:Dr. Harris    Doing well, no new concerns.     Temp:  [98.1  F (36.7  C)-98.2  F (36.8  C)] 98.1  F (36.7  C)  Pulse:  [63-70] 63  Resp:  [16] 16  BP: ()/(48-74) 94/48  SpO2:  [95 %] 95 %  I/O last 3 completed shifts:  In: -   Out: 2910 [Urine:2850; Drains:60]  General: NAD, answers questions appropriately  Resp: NLB on RA  R hip flap warm, soft, intact. Incisions c/d/I. No significant swelling or ecchymosis RENARD with serosang output.     RENARD 60 ml yesterday    Bone cultures: NGTD    ASSESSMENT: 49 year old male PMH paraplegia, neurogenic bladder, chronic R troch wound now s/p R hip partial femur resection, R TFL advancement flap closure 3/18/24. Healing as anticipated.    PLAN:   -Bedrest. HOB <30. No sitting. Ok to lay supine and L lateral. Q2 hr turns while awake. WAVE mattress. Discussed long term restrictions, bed rest ~4 weeks, then at post op appt usually cleared to start a sitting protocol. Dr. Harris mentioned potentially extending his bed rest due to his hip instability, but this depends on how he is healing and will be decided later.  -RENARD drain care-strip, empty and record output qshift  -start daily abd/tape dressing to R hip with nursing  -ID consult- oral doxy/cipro  -prophylactic lovenox  -appreciate [facility] recs for L IT wound and L hip  -appreciate medicine comanagement  -nutrition consult  -carrillo- ok to maintain for now and remove per home nursing so he can use his own straight cath supplies  -medically ready for discharge. Needs TCU with stretcher transport. Does not qualify for TCU, will need hospital bed/mattress at home.   -Ok for virtual follow up appointment with Dr. Harris for follow up    Tawanna Perkins PA-C  Plastic and Reconstructive Surgery   or please page plastic surgery on call in [facility]

## 2024-03-29 NOTE — PLAN OF CARE
"Goal Outcome Evaluation:      Plan of Care Reviewed With: patient        VS: Blood pressure 94/48, pulse 63, temperature 98.1  F (36.7  C), temperature source Oral, resp. rate 16, height 1.829 m (6' 0.01\"), weight 77.6 kg (171 lb), SpO2 95%.    O2: Stable on room . Denies SOB, CP   Output: Rene cath in place. Draining adequately    Last BM: 3/28   Activity: Paraplegic. Wheelchair bound. Lift device with assist of 2  Independent/stand by with  bed repositioning   Skin: Right hip/thigh surgical incision   Left hip and IT wounds   Pain: denies   CMS: AXO X4, no sensation below waist. paraplegic   Dressing: Right/thigh CDI  IT wound-CDI   Diet: Reg diet   LDA: No IV access  RENARD drain with minimal output   Plan: Plan to discharge home when hospital bed is delivered home.    Additional Info: Makes needs known. Call light within reach.                "

## 2024-03-29 NOTE — PLAN OF CARE
"Goal Outcome Evaluation:      Plan of Care Reviewed With: patient    Overall Patient Progress: improving    Outcome Evaluation: Bowel program done this morning. Dressing changes done on both L,R hip. R hip WDL, staples in place, ABDs coving. L hip dressing change, small amount of drainage, foam dressing. Pt denies any pain and is able to reposition independently in bed.    VS:       Pt A/O X 4. Afebrile. VSS./74 (BP Location: Left arm)   Pulse 60   Temp 97.6  F (36.4  C) (Oral)   Resp 16   Ht 1.829 m (6' 0.01\")   Wt 77.6 kg (171 lb)   SpO2 96%   BMI 23.19 kg/m    Lungs- clear bilaterally with both anterior and posterior. IS encouraged. Denies nausea, shortness of breath, and chest pain.     Output:       Bowels- active in all four quadrants. Dig stim and extraction for bowel program. Rene catheter in place, neurogenic bladder.      Activity:       Pt up assist x2 with lift. Not oob during this shift.      Skin:   R hip surgical incision. L hip pressure injury, moderate amount of clear yellow drainage. Scab on L knee, open to air.      Pain:       Denies pain during this shift.      CMS:       CMS and Neuro's are intact. Denies numbness and tingling in upper extremities. Sensation is absent in BLEs, baseline paraplegic.     Dressing:       R hip incisional dressing is CDI.      Diet:       Pt is on a regular diet and appetite was good this shift.       LDA:       No PIV.  RENARD drain is patent in the R hip  Rene catheter in place     Equipment:       Pt denies PCD's on BLE's. Bilateral heels are elevated off the bed.     Plan:       Pt is able to make needs known and the call light is within the pt's reach. Continue to monitor.       Additional Info:              "

## 2024-03-29 NOTE — PLAN OF CARE
Goal Outcome Evaluation:      Plan of Care Reviewed With: patient    Overall Patient Progress: improvingOverall Patient Progress: improving    Outcome Evaluation: Pt Denies pain. Patient repositioned independently in bed only left to supine position per pt preferance. Surgical incision on R thigh/hip  and new dressing applied to L hip. All CDI. RENARD in place. Plan for discharge home once hospital bed delivered to his home . Pt is able to make needs known. Call light in reach will continue to monitor.

## 2024-03-29 NOTE — PROGRESS NOTES
Care Management Follow Up    Length of Stay (days): 11    Expected Discharge Date: 03/28/2024     Concerns to be Addressed: discharge planning     Patient plan of care discussed at interdisciplinary rounds: Yes    Anticipated Discharge Disposition: Home     Anticipated Discharge Services:  (Stretcher Transportation Services, home RN/HHA services)  Anticipated Discharge DME:  (Hospital bed)    Patient/family educated on Medicare website which has current facility and service quality ratings: yes  Education Provided on the Discharge Plan: Yes  Patient/Family in Agreement with the Plan: yes    Referrals Placed by CM/SW: Post Acute Facilities (and [facility] agencies)  Private pay costs discussed: transportation costs    Additional Information:  Prior Authorization for hospital bed and group II mattress still in progress. [facility] stated patient's insurance company([employer]) is requiring additional information to authorize bed. CHW to send paper work to Tawanna Perkins MD, fill out, sign and send back to CM for faxing.     Patient does not want to pay the $855 monthly charge for the bed. He also doesn't want to take the chance of not being reimbursed from the insurance company for the monthly charge. Patient has opted to wait until bed is authorized prior to discharging. Authorization could take up to 14 days. RNCC will continue to follow.      Merlyn García RN, BSN  Care Coordinator, 5 Ortho  Phone [phone]  Pager [phone]

## 2024-03-30 LAB
CREAT SERPL-MCNC: 0.61 MG/DL (ref 0.67–1.17)
EGFRCR SERPLBLD CKD-EPI 2021: >90 ML/MIN/1.73M2
ERYTHROCYTE [DISTWIDTH] IN BLOOD BY AUTOMATED COUNT: 16.9 % (ref 10–15)
HCT VFR BLD AUTO: 37.9 % (ref 40–53)
HGB BLD-MCNC: 11.6 G/DL (ref 13.3–17.7)
MCH RBC QN AUTO: 26 PG (ref 26.5–33)
MCHC RBC AUTO-ENTMCNC: 30.6 G/DL (ref 31.5–36.5)
MCV RBC AUTO: 85 FL (ref 78–100)
PLATELET # BLD AUTO: 282 10E3/UL (ref 150–450)
PLATELET # BLD AUTO: 295 10E3/UL (ref 150–450)
RBC # BLD AUTO: 4.46 10E6/UL (ref 4.4–5.9)
WBC # BLD AUTO: 11.9 10E3/UL (ref 4–11)

## 2024-03-30 PROCEDURE — 86140 C-REACTIVE PROTEIN: CPT | Performed by: STUDENT IN AN ORGANIZED HEALTH CARE EDUCATION/TRAINING PROGRAM

## 2024-03-30 PROCEDURE — 120N000002 HC R&B MED SURG/OB UMMC

## 2024-03-30 PROCEDURE — 250N000013 HC RX MED GY IP 250 OP 250 PS 637: Performed by: PHYSICIAN ASSISTANT

## 2024-03-30 PROCEDURE — 250N000013 HC RX MED GY IP 250 OP 250 PS 637: Performed by: INTERNAL MEDICINE

## 2024-03-30 PROCEDURE — 36415 COLL VENOUS BLD VENIPUNCTURE: CPT

## 2024-03-30 PROCEDURE — 36415 COLL VENOUS BLD VENIPUNCTURE: CPT | Performed by: SURGERY

## 2024-03-30 PROCEDURE — 82565 ASSAY OF CREATININE: CPT | Performed by: SURGERY

## 2024-03-30 PROCEDURE — 99232 SBSQ HOSP IP/OBS MODERATE 35: CPT | Performed by: INTERNAL MEDICINE

## 2024-03-30 PROCEDURE — 85049 AUTOMATED PLATELET COUNT: CPT

## 2024-03-30 PROCEDURE — 85049 AUTOMATED PLATELET COUNT: CPT | Performed by: SURGERY

## 2024-03-30 PROCEDURE — 250N000011 HC RX IP 250 OP 636: Performed by: PHYSICIAN ASSISTANT

## 2024-03-30 RX ADMIN — DOXYCYCLINE HYCLATE 100 MG: 100 CAPSULE ORAL at 09:37

## 2024-03-30 RX ADMIN — OXYBUTYNIN CHLORIDE 30 MG: 10 TABLET, EXTENDED RELEASE ORAL at 09:37

## 2024-03-30 RX ADMIN — CIPROFLOXACIN HYDROCHLORIDE 750 MG: 250 TABLET, FILM COATED ORAL at 20:38

## 2024-03-30 RX ADMIN — ENOXAPARIN SODIUM 40 MG: 40 INJECTION SUBCUTANEOUS at 09:38

## 2024-03-30 RX ADMIN — SENNOSIDES AND DOCUSATE SODIUM 1 TABLET: 8.6; 5 TABLET ORAL at 20:39

## 2024-03-30 RX ADMIN — CIPROFLOXACIN HYDROCHLORIDE 750 MG: 250 TABLET, FILM COATED ORAL at 09:37

## 2024-03-30 RX ADMIN — DOXYCYCLINE HYCLATE 100 MG: 100 CAPSULE ORAL at 20:39

## 2024-03-30 ASSESSMENT — ACTIVITIES OF DAILY LIVING (ADL)
ADLS_ACUITY_SCORE: 33
ADLS_ACUITY_SCORE: 35
ADLS_ACUITY_SCORE: 33
ADLS_ACUITY_SCORE: 31
ADLS_ACUITY_SCORE: 35
ADLS_ACUITY_SCORE: 33
ADLS_ACUITY_SCORE: 31
ADLS_ACUITY_SCORE: 33
ADLS_ACUITY_SCORE: 31
ADLS_ACUITY_SCORE: 33

## 2024-03-30 NOTE — PROGRESS NOTES
[facility]    Medicine Progress Note - Hospitalist Service, GOLD TEAM 16    Date of Admission:  3/18/2024    Assessment & Plan     Sacha Ndiaye is a 49 year old male with a history of spinal cord injury and neurogenic bladder, who was admitted to [facility] on 3/18/2024 following debridement of decubitus ulcer by plastic surgery and orthopedics. Medicine was consulted for medical comanagement.     Today's changes:  Doing well.  No new medical concern, changes. Aw hospital bed for home.     # S/p I&D of decubitus ulcer of right hip and partial excision of right femur  # Decubitus ulcer of right hip  # Chronic osteomyelitis  # Spinal cord injury  Patient wheelchair bound due to prior spinal cord injury. Underwent aforementioned procedure by orthopedics and plastic surgery on 3/18/2024 due to grade 4 decubitus ulcer. Procedure was uncomplicated with EBL of 100 mL. Patient doing well post-operatively.  - Per ortho & plastics  - Likely plan for TCU discharge Monday  - Infectious disease consultation to clarify abx plan  - Cultures remain NTD  - For now, infectious disease recommending transitioning vancomycin IV back to doxycycline  - Continue to follow culture results    # Neurogenic bladder  In the setting of spinal cord injury. PTA on oxybutynin and does intermittent straight cath and condom catheter. Carrillo was placed intra-operatively.  - Continue PTA oxybutynin  - Remove carrillo per primary team when able    # Constipation  - Add bisacodyl to pre-existing bowel regimen  - Continue to monitor bowel movements          Diet: Regular Diet Adult  Diet  Snacks/Supplements Adult: Other; see comments; Between Meals    DVT Prophylaxis: Enoxaparin (Lovenox) SQ  Carrillo Catheter: PRESENT, indication:  (neurogenic bladder)  Lines: None     Cardiac Monitoring: None  Code Status: Full CodeFull resuscitation status    Clinically Significant Risk Factors              # Hypoalbuminemia: Lowest  albumin = 3.4 g/dL at 3/21/2024  7:19 AM, will monitor as appropriate                       Disposition Plan             Cory Chen MD, MHS  Hospitalist Service, GOLD TEAM 16  [facility]  Securely message with [employer] (more info)  Text page via [facility] Paging/Directory   See signed in provider for up to date coverage information  ______________________________________________________________________    Interval History   Patient remains in excellent spirits.  Patient specifically has no complaints.  Reportedly hospital bed is pending pre-authorization.  Patient is inquiring regarding transportation.    Physical Exam   Vital Signs: Temp: 97.6  F (36.4  C) Temp src: Oral BP: 120/64 Pulse: 60   Resp: 16 SpO2: 97 % O2 Device: None (Room air)    Weight: 171 lbs 0 oz    GENERAL: Alert and oriented x 3; no acute distress  HEENT: Normocephalic; atraumatic; Moist MM.  CV: RRR;   RESP: non labored. On RA  GI: nondistended  MSK: No  edema.  NEURO: Paraplegia.  PSYCH: stable mood    Medical Decision Making       30 MINUTES SPENT BY ME on the date of service doing chart review, history, exam, documentation & further activities per the note.      Data     I have personally reviewed the following data over the past 24 hrs:    N/A  \   N/A   / 295     N/A N/A N/A /  N/A   N/A N/A 0.61 (L) \       Imaging results reviewed over the past 24 hrs:   No results found for this or any previous visit (from the past 24 hour(s)).

## 2024-03-30 NOTE — PLAN OF CARE
"Goal Outcome Evaluation:      Plan of Care Reviewed With: patient    Overall Patient Progress: no change Overall Patient Progress: no change      VS: Vital signs:  Temp: 97.8  F (36.6  C) Temp src: Oral BP: 120/58 Pulse: 70   Resp: 16 SpO2: 97 % O2 Device: None (Room air)        Estimated body mass index is 23.19 kg/m  as calculated from the following:    Height as of this encounter: 1.829 m (6' 0.01\").    Weight as of this encounter: 77.6 kg (171 lb).   O2: SPO2 > 90% on RA.    Output: Pt on bowel program. Neurogenic bladder, carrillo catheter in place with adequate output.   Last BM: 3/29/2024   Activity: Pt is paraplegic. Reposition q4 hrs PM per order, independent when repositioning to side.   Skin: Surgical incision (R) hip. Pressure wound (L) hip and (L) IT. Dried scab (L) knee.    Pain: Pt denied pain during this shift.   Neuro: A&Ox4   Dressing: Dresing change done this shift. Pt able to tolerate without pain- Done on the following: Sacral dressing (L) buttocks near the hip, 4x4 mepilex lower (L) buttocks, ABD dressing (R) thigh.   Diet: Regular.   LDA: No PIV. RENARD drain (R) thigh.   Equipment: Personal belongings   Plan: Continue POC.    Additional Info: Pt's (L) buttocks pressure wound continues to have drainage, as result, would recommend changes more frequently (around 1-2 a day instead of every 3rd day).    Per CHW note, awaiting insurance authorization to acquire hospital bed so pt can be discharged home.               "

## 2024-03-30 NOTE — PLAN OF CARE
"Goal Outcome Evaluation:      VS: /70 (BP Location: Left arm)   Pulse 64   Temp 98  F (36.7  C) (Oral)   Resp 16   Ht 1.829 m (6' 0.01\")   Wt 77.6 kg (171 lb)   SpO2 99%   BMI 23.19 kg/m       O2: 99% RA   Output: Adequate output   Last BM: 3/30/2024- bowel program-   Activity: As tolerated; right/left turn independent; x2 ceiling lift   Skin: R surgical incision- staples, sacrum wound- mepilex, R RENARD drain, L skin peeling/irritation/drainage   Pain: denies   CMS: Denies SOB, chest pain, headache, numbness or tingling   Dressing: CDI- R hip incision abd. dressing, sacrum- CDI, L IT- CDI   Diet: Regular    LDA: Rene    Equipment: W/C   Plan: Continue POC   Additional Info:                         "

## 2024-03-30 NOTE — PROGRESS NOTES
"Plastic Surgery Progress Note  Attending:Dr. Harris    Doing well, reporting new left buttock drainage over the last few days where he notices a fluid collection in the morning after lying lateral overnight. Collection decompresses with \"significant amount of drainage\".     Temp:  [97.6  F (36.4  C)-97.9  F (36.6  C)] 97.6  F (36.4  C)  Pulse:  [60-70] 60  Resp:  [16] 16  BP: (113-120)/(58-68) 120/64  SpO2:  [95 %-97 %] 97 %  I/O last 3 completed shifts:  In: -   Out: 1735 [Urine:1720; Drains:15]  General: NAD, answers questions appropriately  Resp: NLB on RA  L buttock with mepilex saturated with serous fluid. Gluteal skin superficial breakdown without erythema or fluctuance. No perceptible point of drainage. WWP  R hip flap warm, soft, intact. Incisions c/d/I. No significant swelling or ecchymosis RENARD with serosang output.     RENARD 15 (60) ml yesterday    Bone cultures: NGTD    ASSESSMENT: 49 year old male PMH paraplegia, neurogenic bladder, chronic R troch wound now s/p R hip partial femur resection, R TFL advancement flap closure 3/18/24. Healing as anticipated. New left buttock serous drainage.    PLAN:   -Bedrest. HOB <30. No sitting. Ok to lay supine and L lateral. Q2 hr turns while awake. WAVE mattress. Discussed long term restrictions, bed rest ~4 weeks, then at post op appt usually cleared to start a sitting protocol. Dr. Harris mentioned potentially extending his bed rest due to his hip instability, but this depends on how he is healing and will be decided later.  -RENARD drain care-strip, empty and record output qshift  -start daily abd/tape dressing to R hip with nursing  -ID consult- oral doxy/cipro  -prophylactic lovenox  -appreciate [facility] recs for L IT wound and L hip  -appreciate medicine comanagement  -nutrition consult  -carrillo- ok to maintain for now and remove per home nursing so he can use his own straight cath supplies  -medically ready for discharge. Needs TCU with stretcher transport. Does not " qualify for TCU, will need hospital bed/mattress at home.   -Ok for virtual follow up appointment with Dr. Harris for follow up  -will discuss new left buttock drainage with team, please change Mepilex prn.     Kirsty Gómez MD  Plastic and Reconstructive Surgery   or please page plastic surgery on call in [facility]

## 2024-03-30 NOTE — PLAN OF CARE
Goal Outcome Evaluation:      Plan of Care Reviewed With: patient    Overall Patient Progress: improvingOverall Patient Progress: improving    Outcome Evaluation: Pt Denies pain. Patient repositions independently in bed; only left to supine position per pt preferance. Surgical incision on R thigh/hip  and wound dressing to left hip CDI. RENARD in place. Plan for discharge home once hospital bed delivered. Pt is able to make needs known. Call light in reach will continue to monitor.

## 2024-03-31 ENCOUNTER — APPOINTMENT (OUTPATIENT)
Dept: ULTRASOUND IMAGING | Facility: CLINIC | Age: 49
DRG: 498 | End: 2024-03-31
Payer: COMMERCIAL

## 2024-03-31 LAB — CRP SERPL-MCNC: 11.98 MG/L

## 2024-03-31 PROCEDURE — 120N000002 HC R&B MED SURG/OB UMMC

## 2024-03-31 PROCEDURE — 250N000011 HC RX IP 250 OP 636: Performed by: PHYSICIAN ASSISTANT

## 2024-03-31 PROCEDURE — 250N000013 HC RX MED GY IP 250 OP 250 PS 637: Performed by: INTERNAL MEDICINE

## 2024-03-31 PROCEDURE — 76882 US LMTD JT/FCL EVL NVASC XTR: CPT | Mod: LT

## 2024-03-31 PROCEDURE — 99232 SBSQ HOSP IP/OBS MODERATE 35: CPT | Performed by: INTERNAL MEDICINE

## 2024-03-31 PROCEDURE — 250N000013 HC RX MED GY IP 250 OP 250 PS 637: Performed by: PHYSICIAN ASSISTANT

## 2024-03-31 RX ADMIN — CIPROFLOXACIN HYDROCHLORIDE 750 MG: 250 TABLET, FILM COATED ORAL at 08:25

## 2024-03-31 RX ADMIN — DOXYCYCLINE HYCLATE 100 MG: 100 CAPSULE ORAL at 20:06

## 2024-03-31 RX ADMIN — POLYETHYLENE GLYCOL 3350 17 G: 17 POWDER, FOR SOLUTION ORAL at 08:25

## 2024-03-31 RX ADMIN — ENOXAPARIN SODIUM 40 MG: 40 INJECTION SUBCUTANEOUS at 08:25

## 2024-03-31 RX ADMIN — CIPROFLOXACIN HYDROCHLORIDE 750 MG: 250 TABLET, FILM COATED ORAL at 20:06

## 2024-03-31 RX ADMIN — OXYBUTYNIN CHLORIDE 30 MG: 10 TABLET, EXTENDED RELEASE ORAL at 08:25

## 2024-03-31 RX ADMIN — SENNOSIDES AND DOCUSATE SODIUM 1 TABLET: 8.6; 5 TABLET ORAL at 08:25

## 2024-03-31 RX ADMIN — DOXYCYCLINE HYCLATE 100 MG: 100 CAPSULE ORAL at 08:25

## 2024-03-31 NOTE — PROGRESS NOTES
[facility]    Medicine Progress Note - Hospitalist Service, GOLD TEAM 16    Date of Admission:  3/18/2024    Assessment & Plan     Sacha Ndiaye is a 49 year old male with a history of spinal cord injury and neurogenic bladder, who was admitted to [facility] on 3/18/2024 following debridement of decubitus ulcer by plastic surgery and orthopedics. Medicine was consulted for medical comanagement.     Today's changes:  Doing well.  No new medical concern, changes. Aw hospital bed for home.     # S/p I&D of decubitus ulcer of right hip and partial excision of right femur  # Decubitus ulcer of right hip  # Chronic osteomyelitis  # Spinal cord injury  Patient wheelchair bound due to prior spinal cord injury. Underwent aforementioned procedure by orthopedics and plastic surgery on 3/18/2024 due to grade 4 decubitus ulcer. Procedure was uncomplicated with EBL of 100 mL. Patient doing well post-operatively.  Bone cultures: NGTD   - Post op cares --Per ortho & plastics     Plastic Surgery:   3/31/2024    -Bedrest. HOB <30. No sitting. Ok to lay supine and L lateral. Q2 hr turns while awake. WAVE mattress. Discussed long term restrictions, bed rest ~4 weeks, then at post op appt usually cleared to start a sitting protocol. Dr. Harris mentioned potentially extending his bed rest due to his hip instability, but this depends on how he is healing and will be decided later.  -reiterated importance of q2h position changes with nursing staff today.  -RENARD drain care-strip, empty and record output qshift  -start daily abd/tape dressing to R hip with nursing  -ID consult- oral doxy/cipro  -prophylactic lovenox  - [facility] recs for L IT wound and L hip  - Nutrition consult.   - carrillo- ok to maintain for now and remove per home nursing so he can use his own straight cath supplies  -medically ready for discharge. Needs TCU with stretcher transport. Does not qualify for TCU, will need hospital  bed/mattress at home.   -Ok for virtual follow up appointment with Dr. Harris for follow up  -left buttock wound appears to be pressure offloading wound with superimposed epidermolysis, please change Mepilex daily and follow updated wound care order  -soft tissue ultrasound of left hip with specific concern for left IT to assess for fluid collection       # Neurogenic bladder  In the setting of spinal cord injury. PTA on oxybutynin and does intermittent straight cath and condom catheter. Carrillo was placed intra-operatively.  - Continue PTA oxybutynin  - Remove carrillo per primary team when able    # Constipation  - Add bisacodyl to pre-existing bowel regimen  - Continue to monitor bowel movements          Diet: Regular Diet Adult  Diet  Snacks/Supplements Adult: Other; see comments; Between Meals    DVT Prophylaxis: Enoxaparin (Lovenox) SQ  Carrillo Catheter: PRESENT, indication:  (neurogenic bladder)  Lines: None     Cardiac Monitoring: None  Code Status: Full CodeFull resuscitation status    Clinically Significant Risk Factors              # Hypoalbuminemia: Lowest albumin = 3.4 g/dL at 3/21/2024  7:19 AM, will monitor as appropriate                       Disposition Plan             Cory Chen MD, S  Hospitalist Service, GOLD TEAM 16  [facility]  Securely message with [employer] (more info)  Text page via [facility] Paging/Directory   See signed in provider for up to date coverage information  ______________________________________________________________________    Interval History   Patient remains in excellent spirits.  Patient specifically has no medical complaints.  Reportedly hospital bed is pending pre-authorization.  Patient is inquiring regarding transportation.    Physical Exam   Vital Signs: Temp: 97.6  F (36.4  C) Temp src: Oral BP: 121/75 Pulse: 58   Resp: 16 SpO2: 95 % O2 Device: None (Room air)    Weight: 171 lbs 0 oz    GENERAL: Alert and oriented x 3; no  acute distress  HEENT: Normocephalic; atraumatic; Moist MM.  CV: RRR;   RESP: non labored. On RA  GI: nondistended  MSK: No  edema.  NEURO: Paraplegia.  PSYCH: stable mood    Medical Decision Making       30 MINUTES SPENT BY ME on the date of service doing chart review, history, exam, documentation & further activities per the note.      Data     I have personally reviewed the following data over the past 24 hrs:    Procal: N/A CRP: N/A Lactic Acid: N/A         Imaging results reviewed over the past 24 hrs:   No results found for this or any previous visit (from the past 24 hour(s)).

## 2024-03-31 NOTE — PROGRESS NOTES
Plastic Surgery Progress Note  Attending:Dr. Harris    Left buttock mepilex was changed at 12am this morning per patient and was saturated on exam this morning. Per patient, he has not been getting repositioned as frequently as prescribed. He reports getting turned at 12 am then waking up at 6am in the same position a couple of times this week.    Temp:  [97.6  F (36.4  C)-98  F (36.7  C)] 97.6  F (36.4  C)  Pulse:  [58-64] 58  Resp:  [16] 16  BP: (118-125)/(66-75) 121/75  SpO2:  [95 %-99 %] 95 %  I/O last 3 completed shifts:  In: -   Out: 2200 [Urine:2150; Drains:50]  General: NAD, answers questions appropriately  Resp: NLB on RA  L buttock with mepilex saturated with serous fluid. Gluteal skin with worsening superficial breakdown  with erythema of macerated area, no fluctuance. No perceptible point of drainage. WWP  R hip flap warm, soft, intact. Incisions c/d/I. No significant swelling or ecchymosis RENARD with serosang output.   Left buttock wound:          RENARD 50 (15) ml yesterday, serous    Bone cultures: NGTD    ASSESSMENT: 49 year old male PMH paraplegia, neurogenic bladder, chronic R troch wound now s/p R hip partial femur resection, R TFL advancement flap closure 3/18/24. Healing as anticipated. New left buttock serous drainage with epidermolysis.    PLAN:   -Bedrest. HOB <30. No sitting. Ok to lay supine and L lateral. Q2 hr turns while awake. WAVE mattress. Discussed long term restrictions, bed rest ~4 weeks, then at post op appt usually cleared to start a sitting protocol. Dr. Harris mentioned potentially extending his bed rest due to his hip instability, but this depends on how he is healing and will be decided later.  -reiterated importance of q2h position changes with nursing staff today.  -RENARD drain care-strip, empty and record output qshift  -start daily abd/tape dressing to R hip with nursing  -ID consult- oral doxy/cipro  -prophylactic lovenox  -appreciate [facility] recs for L IT wound and L  hip  -appreciate medicine comanagement  -nutrition consult  -gerardo- ok to maintain for now and remove per home nursing so he can use his own straight cath supplies  -medically ready for discharge. Needs TCU with stretcher transport. Does not qualify for TCU, will need hospital bed/mattress at home.   -Ok for virtual follow up appointment with Dr. Harris for follow up  -left buttock wound appears to be pressure offloading wound with superimposed epidermolysis, please change Mepilex daily and follow updated wound care order  -soft tissue ultrasound of left hip with specific concern for left IT to assess for fluid collection     Kirsty Gómez MD  Plastic and Reconstructive Surgery   or please page plastic surgery on call in [place]

## 2024-03-31 NOTE — PLAN OF CARE
"  VS: /66 (BP Location: Left arm)   Pulse 63   Temp 97.7  F (36.5  C) (Oral)   Resp 16   Ht 1.829 m (6' 0.01\")   Wt 77.6 kg (171 lb)   SpO2 98%   BMI 23.19 kg/m       O2: Stable at room air    Output: Has patent carrillo catheter in place with adequate output.    Last BM: 03/30/24. Patient on bowel program.    Activity: Repositioning done, pt can help in turning right/left.   Assist of 1 with cares; Assist of 2 using ceiling lift for transfer   Skin: R hip surgical incision- staples.  L hip pressure injury   L IT pressure wound   L knee scab   Pain: Denies    CMS: Paraplegia. Denies numbness and tingling on BUE. .   Dressing: CDI.   Left IT/hip dressing changed this shift.    Diet: Regular    LDA: No IV access.   Has carrillo and RENARD drain.    Equipment: Personal belongings   Plan: Cont plan of care    Additional Info:                            "

## 2024-03-31 NOTE — PLAN OF CARE
"  VS: /75 (BP Location: Left arm)   Pulse 58   Temp 97.6  F (36.4  C) (Oral)   Resp 16   Ht 1.829 m (6' 0.01\")   Wt 77.6 kg (171 lb)   SpO2 95%   BMI 23.19 kg/m     O2: Room air   Output: Rene, bowel program   Last BM: 3/31/24   Activity: Paraplegic, ceiling lift for transfer   Skin: Right hip flap type wound closure, LT hip IT wound   Pain: denies   CMS: LE no sensation per normal   Dressing: CDI   Diet: Regular, good intake   LDA: No IV    Equipment: Ceiling lift   Plan: Patient awaiting final decision for TCU vs home with medical bed   Additional Info: Patient is alert and oriented. Pleasant. Directs own cares. Able to make needs known. Bowel program completed this morning with positive results. Plastics team here to assess wounds today with new dressing orders.                              "

## 2024-04-01 LAB
ANION GAP SERPL CALCULATED.3IONS-SCNC: 12 MMOL/L (ref 7–15)
BUN SERPL-MCNC: 19.8 MG/DL (ref 6–20)
CALCIUM SERPL-MCNC: 8.9 MG/DL (ref 8.6–10)
CHLORIDE SERPL-SCNC: 104 MMOL/L (ref 98–107)
CREAT SERPL-MCNC: 0.48 MG/DL (ref 0.67–1.17)
DEPRECATED HCO3 PLAS-SCNC: 22 MMOL/L (ref 22–29)
EGFRCR SERPLBLD CKD-EPI 2021: >90 ML/MIN/1.73M2
ERYTHROCYTE [DISTWIDTH] IN BLOOD BY AUTOMATED COUNT: 16.9 % (ref 10–15)
GLUCOSE SERPL-MCNC: 120 MG/DL (ref 70–99)
HCT VFR BLD AUTO: 38.9 % (ref 40–53)
HGB BLD-MCNC: 11.9 G/DL (ref 13.3–17.7)
MCH RBC QN AUTO: 25.5 PG (ref 26.5–33)
MCHC RBC AUTO-ENTMCNC: 30.6 G/DL (ref 31.5–36.5)
MCV RBC AUTO: 83 FL (ref 78–100)
PLATELET # BLD AUTO: 300 10E3/UL (ref 150–450)
POTASSIUM SERPL-SCNC: 3.8 MMOL/L (ref 3.4–5.3)
RBC # BLD AUTO: 4.67 10E6/UL (ref 4.4–5.9)
SODIUM SERPL-SCNC: 138 MMOL/L (ref 135–145)
WBC # BLD AUTO: 8.7 10E3/UL (ref 4–11)

## 2024-04-01 PROCEDURE — 250N000013 HC RX MED GY IP 250 OP 250 PS 637: Performed by: INTERNAL MEDICINE

## 2024-04-01 PROCEDURE — G0463 HOSPITAL OUTPT CLINIC VISIT: HCPCS

## 2024-04-01 PROCEDURE — 99232 SBSQ HOSP IP/OBS MODERATE 35: CPT | Performed by: INTERNAL MEDICINE

## 2024-04-01 PROCEDURE — 250N000013 HC RX MED GY IP 250 OP 250 PS 637: Performed by: PHYSICIAN ASSISTANT

## 2024-04-01 PROCEDURE — 36415 COLL VENOUS BLD VENIPUNCTURE: CPT | Performed by: INTERNAL MEDICINE

## 2024-04-01 PROCEDURE — 250N000011 HC RX IP 250 OP 636: Performed by: PHYSICIAN ASSISTANT

## 2024-04-01 PROCEDURE — 80048 BASIC METABOLIC PNL TOTAL CA: CPT | Performed by: INTERNAL MEDICINE

## 2024-04-01 PROCEDURE — 85027 COMPLETE CBC AUTOMATED: CPT | Performed by: INTERNAL MEDICINE

## 2024-04-01 PROCEDURE — 120N000002 HC R&B MED SURG/OB UMMC

## 2024-04-01 RX ORDER — CORN STARCH
POWDER (GRAM) MISCELLANEOUS
Status: CANCELLED | OUTPATIENT
Start: 2024-04-01

## 2024-04-01 RX ADMIN — SENNOSIDES AND DOCUSATE SODIUM 1 TABLET: 8.6; 5 TABLET ORAL at 21:06

## 2024-04-01 RX ADMIN — CIPROFLOXACIN HYDROCHLORIDE 750 MG: 250 TABLET, FILM COATED ORAL at 08:30

## 2024-04-01 RX ADMIN — CIPROFLOXACIN HYDROCHLORIDE 750 MG: 250 TABLET, FILM COATED ORAL at 21:06

## 2024-04-01 RX ADMIN — DOXYCYCLINE HYCLATE 100 MG: 100 CAPSULE ORAL at 21:06

## 2024-04-01 RX ADMIN — OXYBUTYNIN CHLORIDE 30 MG: 10 TABLET, EXTENDED RELEASE ORAL at 08:31

## 2024-04-01 RX ADMIN — POLYETHYLENE GLYCOL 3350 17 G: 17 POWDER, FOR SOLUTION ORAL at 08:30

## 2024-04-01 RX ADMIN — DOXYCYCLINE HYCLATE 100 MG: 100 CAPSULE ORAL at 08:31

## 2024-04-01 RX ADMIN — ENOXAPARIN SODIUM 40 MG: 40 INJECTION SUBCUTANEOUS at 08:31

## 2024-04-01 NOTE — PLAN OF CARE
"Goal Outcome Evaluation:        VS: /64 (BP Location: Left arm)   Pulse 66   Temp 98.4  F (36.9  C) (Oral)   Resp 18   Ht 1.829 m (6' 0.01\")   Wt 77.6 kg (171 lb)   SpO2 95%   BMI 23.19 kg/m       O2: >90% on RA    Activity: Pt declined to be fully repositioned- wants to be off L hip for a while, cannot lay on R side. Repo q 2 as pt allows. Assist of 1 with repos/cares    Skin: R hp surgical incision, L hip wound drainage   Pain: Denies    CMS: Para   Dressing: L hip Changed, R hip CDI   Diet: Regular, tolerating well.    LDA: RENARD drain , carrillo   Equipment: Wave mattress    Plan: TBD   Additional Info:  cont poc      "

## 2024-04-01 NOTE — CONSULTS
[facility]  WO Nurse Inpatient Assessment     Consulted for: Left hip  4/1: left hip new weeping skin     Summary: found by OR staff     Patient History (according to provider note(s):      This patient is wheelchair-bound and has a grade 4 decubitus ulcer on the right hip area. He presents now to have some bone removed soft tissue debrided and flap coverage by plastic surgery. He understands the risks of infection bleeding pain numbness tingling weakness and alteration in his right hip mobility.     Assessment:      Areas visualized during today's visit: Focused: left hip/ buttock    Pressure Injury Location: left IT    Last photo: 3/18  Wound type: Pressure Injury     Pressure Injury Stage: either Stage 2 or 3 deferring definitive staging until wound base has evolved, present on admission   Wound history/plan of care:   found by OR staff prior to admission to floor     STATUS: healed  Supplies ordered: at bedside    My PI Risk Assessment     Sensory Perception: 2 - Very Limited     Moisture: 3 - Occasionally moist      Activity: 1 - Bedfast      Mobility: 2 - Very limited     Nutrition: 3 - Adequate     Friction/Shear: 1 - Problem     TOTAL: 12    Wound location: left hip    Last photo: 4/1  Wound due to: Moisture Associated Skin Damage (MASD)  Wound history/plan of care: left hip began weeping threw mepilex, now leaking threw ABD on to chux  Wound base 100% epidermis peeling with pinpoint areas open causing moderate weeping     Palpation of the wound bed: normal      Drainage: moderate     Description of drainage: serous     Measurements (length x width x depth, in cm): see photo      Tunneling: N/A     Undermining: N/A  Periwound skin: Intact      Color: normal and consistent with surrounding tissue      Temperature: normal   Odor: none  Pain: no grimacing or signs of discomfort, none  Pain interventions prior to dressing change: N/A  Treatment goal: Drainage  control and Decrease moisture  STATUS: initial assessment  Supplies ordered: discussed with RN and discussed with patient     Treatment Plan:     Left hip wound(s): BID and PRN cleanse with wound cleanser and pat dry. Apply dusting of antifungal powder(per MAR) cover with ABD and secure with tape.    Orders: Updated    RECOMMEND PRIMARY TEAM ORDER: None, at this time  Education provided: plan of care and wound progress  Discussed plan of care with: Patient, Family, and Nurse  WO nurse follow-up plan: weekly  Notify WOC if wound(s) deteriorate.  Nursing to notify the Provider(s) and re-consult the WO Nurse if new skin concern.    DATA:     Current support surface: Standard  Standard gel/foam mattress (IsoFlex, Atmos air, etc)  Containment of urine/stool: Incontinent pad in bed  BMI: Body mass index is 23.19 kg/m .   Active diet order: Orders Placed This Encounter      Regular Diet Adult      Diet     Output: I/O last 3 completed shifts:  In: -   Out: 3080 [Urine:3025; Drains:55]     Labs:   Recent Labs   Lab 04/01/24  0837   HGB 11.9*   WBC 8.7     Pressure injury risk assessment:   Sensory Perception: 2-->very limited  Moisture: 3-->occasionally moist  Activity: 1-->bedfast  Mobility: 2-->very limited  Nutrition: 3-->adequate  Friction and Shear: 1-->problem  Monroe Score: 12    Za Wayne RN  CWOCN   Pager no longer is use, please contact through [employer] group: [facility] Nurse [facility]  Dept. Office Number: [phone]

## 2024-04-01 NOTE — PLAN OF CARE
"  VS: /70 (BP Location: Left arm)   Pulse 61   Temp 97.7  F (36.5  C) (Oral)   Resp 16   Ht 1.829 m (6' 0.01\")   Wt 77.6 kg (171 lb)   SpO2 97%   BMI 23.19 kg/m     O2: Room air   Output: Rene catheter, continent of bowel   Last BM: 4/1/24   Activity: bedrest   Skin: R hip muscle flap incision, L hip pressure wounds   Pain: denies   CMS: Paraplegic, LE no sensation   Dressing: CDI, WOC nurse here this morning to address L side wounds   Diet: regular   LDA: RENARD drain   Equipment: Ceiling lift   Plan: Patient to discharge home when medical bed available for home use   Additional Info: Patient is alert and oriented, able to make needs known. Positioned (weight shifted) per self.                              "

## 2024-04-01 NOTE — PROGRESS NOTES
Plastic Surgery Progress Note  Attending:Dr. Harris    Doing well. Endorses drainage from L buttock/hip area over the weekend. Awaiting hospital bed approval.     Temp:  [97.7  F (36.5  C)-97.9  F (36.6  C)] 97.7  F (36.5  C)  Pulse:  [59-88] 61  Resp:  [16] 16  BP: (116-133)/(64-72) 126/70  SpO2:  [95 %-99 %] 97 %  I/O last 3 completed shifts:  In: -   Out: 3080 [Urine:3025; Drains:55]  General: NAD, answers questions appropriately  Resp: NLB on RA  L buttock with friable, weeping skin, mild pink irritation/rash and some yellow serous drainage.   R hip flap warm, soft, intact. Incisions c/d/I. No significant swelling or ecchymosis RENARD with serosang output.     Hbg 8.7    RENARD 60 ml yesterday, serous    ASSESSMENT: 49 year old male PMH paraplegia, neurogenic bladder, chronic R troch wound now s/p R hip partial femur resection, R TFL advancement flap closure 3/18/24. Healing as anticipated. New left buttock serous drainage with epidermolysis.    PLAN:   -Bedrest. HOB <30. No sitting. Ok to lay supine and L lateral. Q2 hr turns while awake. WAVE mattress.   -RENARD drain care-strip, empty and record output qshift  -start daily abd/tape dressing to R hip with nursing  -ID consult- oral doxy/cipro  -prophylactic lovenox  -appreciate WOC recs for L IT wound and L hip. Start topical antifungal powder BID in addition to WOC recs.   -appreciate medicine comanagement  -nutrition consult  -carrillo- ok to maintain for now and remove per home nursing so he can use his own straight cath supplies  -medically ready for discharge. Needs TCU with stretcher transport. Does not qualify for TCU, will need hospital bed/mattress at home. Spoke to [facility] about additional prone cushion DME.   -Ok for virtual follow up appointment with Dr. Harris for follow up    Tawanna Perkins PA-C  Plastic and Reconstructive Surgery   or plastic surgery resident on call in [facility]

## 2024-04-01 NOTE — PLAN OF CARE
Patient A/Ox4. VSS. Denies CP, SOB, dizziness/LH. LSCTA. +fl/BS. Voiding through carrillo catheter. CMS paraplegia. Dressing to L side in place, abd and tape. Tolerating regular diet without NV. Denies pain. Dispo to home when insurance clears specialty bed. Patient has demonstrated ability to call appropriately. Patient is resting with call light within reach. Will continue to monitor.

## 2024-04-01 NOTE — PLAN OF CARE
Goal Outcome Evaluation:      Plan of Care Reviewed With: patient    Overall Patient Progress: no change      VS: VSS. Denies CP/SOB. A/O x4.   O2: >90% on RA    Activity: Pt declined to be fully repositioned- wants to be off L hip for a while, cannot lay on R side. Repo q 2 as pt allows. Assist of 1 with repos/cares    Skin: R hp surgical incision, L hip wound drainage   Pain: Denies    CMS: Para   Dressing: L hip Changed, R hip CDI   Diet: Regular, tolerating well.    LDA: RENARD drain , carrillo   Equipment: Wave mattress    Plan: TBD   Additional Info:

## 2024-04-01 NOTE — PLAN OF CARE
A/O x4, bed bound, vitally stable. Denied pain. Dressing on R hip and L buttock c/d/I. SCD in place. RENARD drain emptied with 20 ml serosanguinous output. FC patent and draining well with 750 ml clear yellow UO. No bypassing noted. Turned to sides q2 hours. WOC consult for pressure ulcer on the L buttock. Continue POC.

## 2024-04-01 NOTE — CONSULTS
[facility]  WO Nurse Inpatient Assessment     Consulted for: Left hip  4/1: left hip new weeping skin     Summary: found by OR staff     Patient History (according to provider note(s):      This patient is wheelchair-bound and has a grade 4 decubitus ulcer on the right hip area. He presents now to have some bone removed soft tissue debrided and flap coverage by plastic surgery. He understands the risks of infection bleeding pain numbness tingling weakness and alteration in his right hip mobility.     Assessment:      Areas visualized during today's visit: Focused: left hip/ buttock    Pressure Injury Location: left IT    Last photo: 3/18  Wound type: Pressure Injury     Pressure Injury Stage: either Stage 2 or 3 deferring definitive staging until wound base has evolved, present on admission   Wound history/plan of care:   found by OR staff prior to admission to floor     STATUS: healed  Supplies ordered: at bedside    My PI Risk Assessment     Sensory Perception: 2 - Very Limited     Moisture: 3 - Occasionally moist      Activity: 1 - Bedfast      Mobility: 2 - Very limited     Nutrition: 3 - Adequate     Friction/Shear: 1 - Problem     TOTAL: 12    Wound location: left hip    Last photo: 4/1  Wound due to: Moisture Associated Skin Damage (MASD)  Wound history/plan of care: left hip began weeping threw mepilex, now leaking threw ABD on to chux  Wound base 100% epidermis peeling with pinpoint areas open causing moderate weeping     Palpation of the wound bed: normal      Drainage: moderate     Description of drainage: serous     Measurements (length x width x depth, in cm): see photo      Tunneling: N/A     Undermining: N/A  Periwound skin: Intact      Color: normal and consistent with surrounding tissue      Temperature: normal   Odor: none  Pain: no grimacing or signs of discomfort, none  Pain interventions prior to dressing change: N/A  Treatment goal: Drainage  control and Decrease moisture  STATUS: initial assessment  Supplies ordered: discussed with RN and discussed with patient     4/1: not using antifungal powder because pt reported adverse reaction in the past.       Treatment Plan:     Left hip wound(s): BID and PRN cleanse with wound cleanser and pat dry. Paint entire area with no sting, cover with ABD and secure with tape.      Orders: Updated    RECOMMEND PRIMARY TEAM ORDER: None, at this time  Education provided: plan of care and wound progress  Discussed plan of care with: Patient, Family, and Nurse  [facility] nurse follow-up plan: weekly  Notify WO if wound(s) deteriorate.  Nursing to notify the Provider(s) and re-consult the [facility] Nurse if new skin concern.    DATA:     Current support surface: Standard  Standard gel/foam mattress (IsoFlex, Atmos air, etc)  Containment of urine/stool: Incontinent pad in bed  BMI: Body mass index is 23.19 kg/m .   Active diet order: Orders Placed This Encounter      Regular Diet Adult      Diet     Output: I/O last 3 completed shifts:  In: -   Out: 3080 [Urine:3025; Drains:55]     Labs:   Recent Labs   Lab 04/01/24  0837   HGB 11.9*   WBC 8.7     Pressure injury risk assessment:   Sensory Perception: 2-->very limited  Moisture: 3-->occasionally moist  Activity: 1-->bedfast  Mobility: 2-->very limited  Nutrition: 3-->adequate  Friction and Shear: 1-->problem  Monroe Score: 12    Za Wayne RN  CWOCN   Pager no longer is use, please contact through [place] group: [facility] Nurse [facility]  Dept. Office Number: [phone]

## 2024-04-01 NOTE — PROGRESS NOTES
[facility]    Medicine Progress Note - Hospitalist Service, GOLD TEAM 16    Date of Admission:  3/18/2024    Assessment & Plan     Sacha Ndiaye is a 49 year old male with a PMH spinal cord injury, paraplegia, neurogenic bladder, chronic R troch wound now s/p R hip partial femur resection, R TFL advancement flap closure 3/18/24.  Medicine was consulted for medical comanagement.     Today's changes:  Doing well.  No new medical concern, changes. Aw hospital bed for home.   Plastic surgery and wocn following.     # S/p I&D of decubitus ulcer of right hip and partial excision of right femur  # Decubitus ulcer of right hip  # Chronic osteomyelitis  # Spinal cord injury  #  New left buttock serous drainage with epidermolysis.     Patient wheelchair bound due to prior spinal cord injury. Underwent aforementioned procedure by orthopedics and plastic surgery on 3/18/2024 due to grade 4 decubitus ulcer. Procedure was uncomplicated with EBL of 100 mL. Patient doing well post-operatively.  Bone cultures: NGTD   - Post op cares --Per ortho & plastics  - WOCN following.      Plastic Surgery:   4/1/2024     -Bedrest. HOB <30. No sitting. Ok to lay supine and L lateral. Q2 hr turns while awake. WAVE mattress.   -RENARD drain care-strip, empty and record output qshift  -start daily abd/tape dressing to R hip with nursing  -ID consult- oral doxy/cipro  -prophylactic lovenox  -appreciate WOC recs for L IT wound and L hip. Start topical antifungal powder BID in addition to WOC recs. (patient said allergic to antifungal, WOCN RN to try something else)  -appreciate medicine comanagement  -nutrition consult  -carrillo- ok to maintain for now and remove per home nursing so he can use his own straight cath supplies  -medically ready for discharge. Needs TCU with stretcher transport. Does not qualify for TCU, will need hospital bed/mattress at home. Spoke to [facility] about additional prone cushion DME.    -Ok for virtual follow up appointment with Dr. Harris for follow up   -soft tissue ultrasound of left hip with specific concern for left IT to assess for fluid collection - Pending.       # Neurogenic bladder  In the setting of spinal cord injury. PTA on oxybutynin and does intermittent straight cath and condom catheter. Carrillo was placed intra-operatively.  - Continue PTA oxybutynin  - Remove carrillo per primary team when able    # Constipation  - Add bisacodyl to pre-existing bowel regimen  - Continue to monitor bowel movements          Diet: Regular Diet Adult  Diet  Snacks/Supplements Adult: Other; see comments; Between Meals    DVT Prophylaxis: Enoxaparin (Lovenox) SQ  Carrillo Catheter: PRESENT, indication: Immobilization due to spinal injuries or other multiple traumatic injuries  Lines: None     Cardiac Monitoring: None  Code Status: Full CodeFull resuscitation status    Clinically Significant Risk Factors              # Hypoalbuminemia: Lowest albumin = 3.4 g/dL at 3/21/2024  7:19 AM, will monitor as appropriate                       Disposition Plan             Cory Chen MD, S  Hospitalist Service, GOLD TEAM 16  [facility]  Securely message with [employer] (more info)  Text page via [facility] Paging/Directory   See signed in provider for up to date coverage information  ______________________________________________________________________    Interval History   Patient remains in excellent spirits.  No fever or chills.   No cough or cp or sob.   Endorses drainage from L buttock/hip area   No new medical concern.       Physical Exam   Vital Signs: Temp: 97.7  F (36.5  C) Temp src: Oral BP: 126/70 Pulse: 61   Resp: 16 SpO2: 97 % O2 Device: None (Room air)    Weight: 171 lbs 0 oz    GENERAL: Alert and oriented x 3; no acute distress  HEENT: Normocephalic; atraumatic; Moist MM.  CV: RRR;   RESP: non labored. On RA  GI: nondistended  MSK: No  edema.  NEURO:  Paraplegia.  PSYCH: stable mood  Skin: wounds not examined.     Medical Decision Making       30 MINUTES SPENT BY ME on the date of service doing chart review, history, exam, documentation & further activities per the note.      Data     I have personally reviewed the following data over the past 24 hrs:    8.7  \   11.9 (L)   / 300     138 104 19.8 /  120 (H)   3.8 22 0.48 (L) \       Imaging results reviewed over the past 24 hrs:   No results found for this or any previous visit (from the past 24 hour(s)).

## 2024-04-02 PROCEDURE — 250N000013 HC RX MED GY IP 250 OP 250 PS 637: Performed by: PHYSICIAN ASSISTANT

## 2024-04-02 PROCEDURE — 250N000013 HC RX MED GY IP 250 OP 250 PS 637: Performed by: INTERNAL MEDICINE

## 2024-04-02 PROCEDURE — 120N000002 HC R&B MED SURG/OB UMMC

## 2024-04-02 PROCEDURE — 250N000009 HC RX 250: Performed by: PHYSICIAN ASSISTANT

## 2024-04-02 PROCEDURE — 250N000011 HC RX IP 250 OP 636: Performed by: PHYSICIAN ASSISTANT

## 2024-04-02 PROCEDURE — 99233 SBSQ HOSP IP/OBS HIGH 50: CPT | Mod: 24 | Performed by: STUDENT IN AN ORGANIZED HEALTH CARE EDUCATION/TRAINING PROGRAM

## 2024-04-02 PROCEDURE — 99232 SBSQ HOSP IP/OBS MODERATE 35: CPT | Performed by: INTERNAL MEDICINE

## 2024-04-02 RX ORDER — MUPIROCIN 20 MG/G
OINTMENT TOPICAL 2 TIMES DAILY
Qty: 308 G | Refills: 0 | Status: DISCONTINUED | OUTPATIENT
Start: 2024-04-02 | End: 2024-04-04 | Stop reason: HOSPADM

## 2024-04-02 RX ADMIN — SENNOSIDES AND DOCUSATE SODIUM 1 TABLET: 8.6; 5 TABLET ORAL at 19:34

## 2024-04-02 RX ADMIN — MUPIROCIN: 20 OINTMENT TOPICAL at 19:35

## 2024-04-02 RX ADMIN — ENOXAPARIN SODIUM 40 MG: 40 INJECTION SUBCUTANEOUS at 08:31

## 2024-04-02 RX ADMIN — MUPIROCIN: 20 OINTMENT TOPICAL at 10:04

## 2024-04-02 RX ADMIN — OXYBUTYNIN CHLORIDE 30 MG: 10 TABLET, EXTENDED RELEASE ORAL at 08:31

## 2024-04-02 RX ADMIN — CIPROFLOXACIN HYDROCHLORIDE 750 MG: 250 TABLET, FILM COATED ORAL at 08:37

## 2024-04-02 RX ADMIN — CIPROFLOXACIN HYDROCHLORIDE 750 MG: 250 TABLET, FILM COATED ORAL at 19:34

## 2024-04-02 RX ADMIN — DOXYCYCLINE HYCLATE 100 MG: 100 CAPSULE ORAL at 08:31

## 2024-04-02 RX ADMIN — DOXYCYCLINE HYCLATE 100 MG: 100 CAPSULE ORAL at 19:35

## 2024-04-02 ASSESSMENT — ACTIVITIES OF DAILY LIVING (ADL)
ADLS_ACUITY_SCORE: 33
ADLS_ACUITY_SCORE: 34
ADLS_ACUITY_SCORE: 35
ADLS_ACUITY_SCORE: 34
ADLS_ACUITY_SCORE: 35
ADLS_ACUITY_SCORE: 34
ADLS_ACUITY_SCORE: 35
ADLS_ACUITY_SCORE: 33
ADLS_ACUITY_SCORE: 34
ADLS_ACUITY_SCORE: 35
ADLS_ACUITY_SCORE: 35
ADLS_ACUITY_SCORE: 34
ADLS_ACUITY_SCORE: 33
ADLS_ACUITY_SCORE: 33
ADLS_ACUITY_SCORE: 34
ADLS_ACUITY_SCORE: 33
ADLS_ACUITY_SCORE: 35

## 2024-04-02 NOTE — PROGRESS NOTES
General ID Service: Follow Up Note      Patient:  Sacha Ndiaye, Date of birth 1975, Medical record number [id number]  Date of Visit:  April 2, 2024         Assessment and Recommendations:   Problem List:  Right trochanteric decubitus ulceration  S/p debridement and right TFL flap (3/18/24)  OR cultures/pathology with no growth to date or signs of infection  H/o spinal hardware infection  On indefinite chronic suppressive therapy with ciprofloxacin and doxycycline    Recommendations:  Recommend starting topical miconazole BID x 2 weeks  Discussed prior allergy with patient as well as ID pharmacist - seems unlikely this was drug reaction, maybe worsening of a concomitant infection?  Would keep an eye on his skin at the site where miconazole is applied for the first couple of hours to ensure no eruption/swelling occurs, and if it dose, would wash off the miconazole (though again, suspect he will tolerate it)  Agree with topical mupirocin  Continue indefinite chronic suppressive therapy with ciprofloxacin and doxycycline  Has follow-up appointment with Dr. 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 He was admitted 3/18/24 for planned debridement of right trochanteric decubitus ulceration and is now s/p right hip partial femur resection and TFL flap (performed 3/18). He was on vancomycin and ciprofloxacin empirically while awaiting culture data, and has now been de-escalated back to prior suppressive regimen. Cultures have shown no growth to date and histopath not consistent  "with acute, active infection. Can continue suppressive regimen and keep planned ID follow-up on 4/25/24.     More recently, patient has developed fungal-appearing erythema on left hip/buttock with some almonte crusting over the wound. Topical mupirocin was started today. In discussion with pt, he is unsure which drug he had a reaction to in the past (says something was \"put on my foot and it swelled up, I lost toes because of it\") - possible this was the econazole. I discussed possible treatment options, topical azole vs oral, and he is willing to try the topical azole as long as we remove it should there be any reaction.    Paul Anne MD  Division of Infectious Diseases and International Medicine  P: [phone]        Interval History:     Seen and examined - rash on left buttock does appear Candidal in nature. Discussed prior possible drug reaction to econazole (he thinks this was the topical drug he received) - he is willing to try topical miconazole.         Review of Systems:     Full 9 pt ROS obtained and negative unless noted above in assessment and interval history.         Current Antimicrobials     Doxycycline  Ciprofloxacin         Physical Exam:   Ranges for vital signs:  Temp:  [97.4  F (36.3  C)-98.4  F (36.9  C)] 97.5  F (36.4  C)  Pulse:  [59-67] 59  Resp:  [14-18] 15  BP: (105-125)/(58-76) 125/76  SpO2:  [95 %-97 %] 97 %    Intake/Output Summary (Last 24 hours) at 3/25/2024 1158  Last data filed at 3/25/2024 1034  Gross per 24 hour   Intake --   Output 2060 ml   Net -2060 ml     Exam:   GENERAL:  well-developed, well-nourished, sitting in bed in no acute distress.   ENT:  Head is normocephalic, atraumatic. Oropharynx is moist without exudates or ulcers.  EYES:  Eyes have anicteric sclerae.    LUNGS:  Breathing easily on room air  EXT: Extremities warm and without edema.  SKIN:  Left buttock with large erythematous patch that fades at periphery, possibly a few developing satellite lesions. " Carroll crusting over wound, as well. No matthew induration or purulence.   NEUROLOGIC:  Grossly nonfocal.         Laboratory Data:   Reviewed.  Pertinent for:    Microbiology:  Culture   Date Value Ref Range Status   03/18/2024 No anaerobic organisms isolated  Final   03/18/2024 No growth after 15 days  Preliminary   03/18/2024 No Growth  Final     Inflammatory Markers    Recent Labs   Lab Test 08/17/23  1316 01/17/19  0945 01/05/17  1015 11/29/16  0636 11/26/16  0658 05/11/16  0803   SED 60* 72* 30*  --   --   --    CRP  --  37.8* 15.5* 69.3* 24.9* 133.0*     Metabolic Studies       Recent Labs   Lab Test 04/01/24  0837 03/30/24  0737 03/27/24  0731 03/22/24  0735 03/21/24  0719 03/20/24  0627 03/20/24  0549 03/19/24  0756 04/17/19  0604 04/16/19  0621 01/14/17  0636 01/13/17  0609 11/30/16  0600 11/29/16  0636     --  137 139  --   --  136 137  --  140   < > 139   < > 140   POTASSIUM 3.8  --  4.2 4.1  --   --  4.3 3.7  --  4.5   < > 3.8   < > 4.0   CHLORIDE 104  --  103 102  --   --  103 103  --  106   < > 106   < > 106   CO2 22  --  26 29  --   --  27 26  --  22   < > 26   < > 27   ANIONGAP 12  --  8 8  --   --  6* 8  --  12   < > 7   < > 7   BUN 19.8  --  21.0* 18.0  --   --  18.4 13.2  --  23   < > 14   < > 25   CR 0.48* 0.61* 0.55* 0.52* 0.51*  --  0.67 0.49*   < > 0.78   < > 0.64*   < > 0.65*   GFRESTIMATED >90 >90 >90 >90 >90  --  >90 >90   < > >90   < > >90  Non  GFR Calc     < > >90  Non  GFR Calc     *  --  95 91  --  94 90 130*   < > 100*   < > 112*   < > 102*   A1C  --   --   --   --   --   --   --   --   --   --   --  4.7  --   --    LANDON 8.9  --  8.7 9.0  --   --  8.6 8.5*  --  8.4*   < > 7.6*   < > 8.9   MAG  --   --   --   --   --   --   --   --   --  2.1  --   --   --  1.9    < > = values in this interval not displayed.     Hepatic Studies    Recent Labs   Lab Test 03/21/24  0719 01/17/19  0945 01/05/17  1015 03/01/16  0509 02/23/16  0516 02/16/16  0455    BILITOTAL 1.2  --   --   --   --   --    ALKPHOS 59  --   --   --   --   --    ALBUMIN 3.4* 2.9* 3.8  --   --   --    AST 15  --   --   --   --   --    ALT 7  --   --  24 25 31     Hematology Studies      Recent Labs   Lab Test 04/01/24  0837 03/30/24  1428 03/30/24  0737 03/27/24  0731 03/22/24  0735 03/21/24  0719 03/20/24  0549 03/19/24  0756 04/14/19  0602 04/13/19  0651 01/13/17  0609 01/12/17  1215 01/27/16  0555 01/26/16  0529   WBC 8.7 11.9*  --  9.0 6.2  --  8.7 10.8  --  8.6  --  7.6   < > 7.7   ANEU  --   --   --   --   --   --   --   --   --  6.3  --  5.9  --  6.3   ALYM  --   --   --   --   --   --   --   --   --  1.5  --  1.1  --  1.0   MONSE  --   --   --   --   --   --   --   --   --  0.5  --  0.4  --  0.4   AEOS  --   --   --   --   --   --   --   --   --  0.3  --  0.2  --  0.0   HGB 11.9* 11.6*  --  11.4* 11.9*  --  10.7* 10.4*   < > 8.7*   < > 9.3*   < > 8.9*   HCT 38.9* 37.9*  --  36.9* 39.5*  --  36.3* 34.3*  --  30.0*  --  29.3*   < > 29.8*    282 295 309 272   < > 272 263   < > 312   < > 206   < > 334    < > = values in this interval not displayed.     Vancomycin Levels     Recent Labs   Lab Test 03/22/24  0735 04/17/19  1123 11/30/16  1330 05/16/16  0933 05/13/16  2110 02/01/16  1007   VANCOMYCIN 12.0 11.2 19.6 13.1 12.2 12.6         Latest Ref Rng & Units 4/1/2024     8:37 AM 3/30/2024     2:28 PM 3/30/2024     7:37 AM 3/27/2024     7:31 AM 3/22/2024     7:35 AM   Transplant Immunosuppression Labs   Creat 0.67 - 1.17 mg/dL 0.48   0.61  0.55  0.52    Urea Nitrogen 6.0 - 20.0 mg/dL 19.8    21.0  18.0    WBC 4.0 - 11.0 10e3/uL 8.7  11.9   9.0  6.2    Neutrophil %     64

## 2024-04-02 NOTE — PLAN OF CARE
Goal Outcome Evaluation:      Plan of Care Reviewed With: patient    Overall Patient Progress: improvingOverall Patient Progress: improving    Outcome Evaluation: Dsg changed per POC. Bowel program completed. Repositions independently and can make needs known.

## 2024-04-02 NOTE — PLAN OF CARE
"  VS: /66   Pulse 69   Temp 98.1  F (36.7  C) (Oral)   Resp 16   Ht 1.829 m (6' 0.01\")   Wt 68.4 kg (150 lb 12.7 oz)   SpO2 96%   BMI 20.45 kg/m     O2: Sating >90% to room air   Output: With carrillo catheter to urine bag, catheter care done   Last BM: 4-2-2024 using bowel program   Activity: Bedrest, turned to sides at interval, HOB kept <30 degrees   Skin: Surgical wound (R) lateral hip to thigh, pressure wound on (L) gluteal area, scabbed wound on (L) knee   Pain: Denies   CMS: A&Ox4, baseline paraplegia, no numbness and tingling on BUE   Dressing: (R) lateral hip to thigh, pressure wound on (L) gluteal area CDI   Diet: Regular diet   LDA: Carrillo catheter to urine bag and RENARD drain   Equipment: Personal items, call light   Plan: Awaiting hospital bed to be delivered at home   Additional Info:        "

## 2024-04-02 NOTE — PROGRESS NOTES
"VS: /76 (BP Location: Left arm)   Pulse 59   Temp 97.5  F (36.4  C) (Oral)   Resp 15   Ht 1.829 m (6' 0.01\")   Wt 68.4 kg (150 lb 12.7 oz)   SpO2 97%   BMI 20.45 kg/m       O2: Room air   Output: Rene catheter, continent of bowel   Last BM: 4/224 via dig stim   Activity: bedrest   Skin: R hip muscle flap incision, L hip pressure wounds   Pain: denies   CMS: Paraplegic, LE no sensation   Dressing: CDI, Dsg changed on both hips.   Diet: regular   LDA: RENARD drain   Equipment: Ceiling lift   Plan: Patient to discharge home when medical bed available for home use   Additional Info: Patient is alert and oriented, able to make needs known. Positioned (weight shifted) per self. Can make his needs known.     "

## 2024-04-02 NOTE — CARE PLAN
"Shift: 1900 - 0730    /58   Pulse 67   Temp 97.4  F (36.3  C) (Oral)   Resp 14   Ht 1.829 m (6' 0.01\")   Wt 77.6 kg (171 lb)   SpO2 97%   BMI 23.19 kg/m      Pt is alert and oriented this shift, No change. Pt slept through out the night. Rene patent  and draining 650 ml this shift and 15 ml from RENARD drain. Pt denies SOB, chest pain. Call light is in reach and Pt is able to make needs known. Continue with POC.  "

## 2024-04-02 NOTE — PROGRESS NOTES
[facility]    Medicine Progress Note - Hospitalist Service, GOLD TEAM 17    Date of Admission:  3/18/2024    Assessment & Plan     Sacha Ndiaye is a 49 year old male with a PMH spinal cord injury, paraplegia, neurogenic bladder, chronic R troch wound now s/p R hip partial femur resection, R TFL advancement flap closure 3/18/24.  Medicine was consulted for medical comanagement.     Today's changes:  Doing well.  No new medical concern, changes. Aw hospital bed for home.   Plastic surgery and wocn following.     # S/p I&D of decubitus ulcer of right hip and partial excision of right femur  # Decubitus ulcer of right hip  # Chronic osteomyelitis  # Spinal cord injury  #  New left buttock serous drainage with epidermolysis.     Patient wheelchair bound due to prior spinal cord injury. Underwent aforementioned procedure by orthopedics and plastic surgery on 3/18/2024 due to grade 4 decubitus ulcer. Procedure was uncomplicated with EBL of 100 mL. Patient doing well post-operatively.  Bone cultures: NGTD   - Post op cares --Per ortho & plastics  - WOCN following.      Plastic Surgery:   4/1/2024     -Bedrest. HOB <30. No sitting. Ok to lay supine and L lateral. Q2 hr turns while awake. WAVE mattress.   -RENARD drain care-strip, empty and record output qshift  -start daily abd/tape dressing to R hip with nursing  -ID consult- oral doxy/cipro  -prophylactic lovenox  -appreciate WOC recs for L IT wound and L hip. Start topical antifungal powder BID in addition to WOC recs. (patient said allergic to antifungal, WOCN RN to try something else, see WOCN note)  -nutrition consult  -carrillo- ok to maintain for now and remove per home nursing so he can use his own straight cath supplies  -medically ready for discharge. Needs TCU with stretcher transport. Does not qualify for TCU, will need hospital bed/mattress at home. Spoke to RNCC about additional prone cushion DME.   -Ok for virtual  follow up appointment with Dr. Harris for follow up   -soft tissue ultrasound of left hip with specific concern for left IT to assess for fluid collection - No evidence for organized fluid collection or discrete localized mass.  Edema or cellulitis within the soft tissues of the left buttock.     - ID follow-up for the L buttock cellulitis.     # Neurogenic bladder  In the setting of spinal cord injury. PTA on oxybutynin and does intermittent straight cath and condom catheter. Carrillo was placed intra-operatively.  - Continue PTA oxybutynin  - Remove carrillo per primary team when able    # Constipation  - Add bisacodyl to pre-existing bowel regimen  - Continue to monitor bowel movements          Diet: Regular Diet Adult  Diet  Snacks/Supplements Adult: Other; see comments; Between Meals    DVT Prophylaxis: Enoxaparin (Lovenox) SQ  Carrillo Catheter: PRESENT, indication: Immobilization due to spinal injuries or other multiple traumatic injuries  Lines: None     Cardiac Monitoring: None  Code Status: Full CodeFull resuscitation status    Clinically Significant Risk Factors              # Hypoalbuminemia: Lowest albumin = 3.4 g/dL at 3/21/2024  7:19 AM, will monitor as appropriate                       Disposition Plan     Expected Discharge Date: 04/02/2024,  3:00 PM    Destination: home with family  Discharge Comments: Needs hospital bed at home.          Cory Chen MD, MHS  Hospitalist Service, GOLD TEAM [address] [place] [address]  Securely message with [employer] (more info)  Text page via [facility] Paging/Directory   See signed in provider for up to date coverage information  ______________________________________________________________________    Interval History   Patient remains in excellent spirits.  No fever or chills.   No cough or cp or sob.   Endorses drainage from L buttock/hip area   No new medical concern.       Physical Exam   Vital Signs: Temp: 97.5  F (36.4  C) Temp src:  Oral BP: 125/76 Pulse: 59   Resp: 15 SpO2: 97 % O2 Device: None (Room air)    Weight: 171 lbs 0 oz    GENERAL: Alert and oriented x 3; no acute distress  HEENT: Normocephalic; atraumatic; Moist MM.  CV: RRR;   RESP: non labored. On RA  GI: nondistended  MSK: No  edema.  NEURO: Paraplegia.  PSYCH: stable mood  Skin: wounds not examined.     Medical Decision Making       35 MINUTES SPENT BY ME on the date of service doing chart review, history, exam, documentation & further activities per the note.      Data         Imaging results reviewed over the past 24 hrs:   No results found for this or any previous visit (from the past 24 hour(s)).

## 2024-04-02 NOTE — PROGRESS NOTES
Plastic Surgery Progress Note  Attending:Dr. Steven MCDERMOTT. Refusing topical antifungal powder. Has allergy listed to different antifungal listed in chart with reaction listed as rash.     Temp:  [97.4  F (36.3  C)-98.4  F (36.9  C)] 97.4  F (36.3  C)  Pulse:  [61-67] 67  Resp:  [14-18] 14  BP: (105-126)/(58-70) 105/58  SpO2:  [95 %-97 %] 97 %  I/O last 3 completed shifts:  In: -   Out: 3065 [Urine:3025; Drains:40]  General: NAD, answers questions appropriately  Resp: NLB on RA  L buttock with friable, weeping skin, with yellow crusting and mild erythema.   R hip flap warm, soft, intact. Incisions c/d/I. No significant swelling or ecchymosis RENARD with serosang output.     RENARD 35 ml yesterday, serous    ASSESSMENT: 49 year old male PMH paraplegia, neurogenic bladder, chronic R troch wound now s/p R hip partial femur resection, R TFL advancement flap closure 3/18/24. Healing as anticipated. New left buttock serous drainage with epidermolysis.    PLAN:   -Bedrest. HOB <30. No sitting. Ok to lay supine and L lateral. Q2 hr turns while awake. WAVE mattress.   -RENARD drain care-strip, empty and record output qshift  -start daily abd/tape dressing to R hip with nursing  -ID consult- oral doxy/cipro  -prophylactic lovenox  -appreciate WO recs for L IT wound and L hip.-patient refusing antifungal powder, will try mupirocin for crusting. Consider getting ID opinion  -appreciate medicine comanagement  -nutrition consult  -carrillo- ok to maintain for now and remove per home nursing so he can use his own straight cath supplies  -medically ready for discharge. Needs TCU with stretcher transport. Does not qualify for TCU, will need hospital bed/mattress at home. Spoke to RNCC about additional prone cushion DME.   -Ok for virtual follow up appointment with Dr. Harris for follow up    -discussed with EMILY GambleC  Plastic and Reconstructive Surgery   or plastic surgery resident on call in [facility]

## 2024-04-02 NOTE — PROGRESS NOTES
4/2/24  Care Management Follow Up    CHW called [facility] ([phone], extension [phone])  to follow up on the DME that was ordered last week. CHW was not able to get a hold of anyone. They LVM requesting a call back with an update.    951: Jacqueline called CHW back and notified them that there was update. The insurance auth can take up to 14 days. She had asked for the auth to be expiated, but that is the length time for that. Insurance stated that they will not rush the order since it is not a matter of life and death. This was all related to the RNCC.    Beatris Cortez  Inpatient CHW  [facility] 5 Ortho, 8 Med Surge, & ED  PH: [phone]

## 2024-04-03 PROCEDURE — 99233 SBSQ HOSP IP/OBS HIGH 50: CPT | Mod: 24 | Performed by: STUDENT IN AN ORGANIZED HEALTH CARE EDUCATION/TRAINING PROGRAM

## 2024-04-03 PROCEDURE — 250N000011 HC RX IP 250 OP 636: Performed by: PHYSICIAN ASSISTANT

## 2024-04-03 PROCEDURE — 250N000013 HC RX MED GY IP 250 OP 250 PS 637: Performed by: INTERNAL MEDICINE

## 2024-04-03 PROCEDURE — 120N000002 HC R&B MED SURG/OB UMMC

## 2024-04-03 PROCEDURE — 250N000013 HC RX MED GY IP 250 OP 250 PS 637: Performed by: PHYSICIAN ASSISTANT

## 2024-04-03 PROCEDURE — 99232 SBSQ HOSP IP/OBS MODERATE 35: CPT | Performed by: INTERNAL MEDICINE

## 2024-04-03 RX ADMIN — POLYETHYLENE GLYCOL 3350 17 G: 17 POWDER, FOR SOLUTION ORAL at 08:36

## 2024-04-03 RX ADMIN — ENOXAPARIN SODIUM 40 MG: 40 INJECTION SUBCUTANEOUS at 08:37

## 2024-04-03 RX ADMIN — DOXYCYCLINE HYCLATE 100 MG: 100 CAPSULE ORAL at 08:37

## 2024-04-03 RX ADMIN — CIPROFLOXACIN HYDROCHLORIDE 750 MG: 250 TABLET, FILM COATED ORAL at 20:42

## 2024-04-03 RX ADMIN — MUPIROCIN: 20 OINTMENT TOPICAL at 10:09

## 2024-04-03 RX ADMIN — MICONAZOLE NITRATE: 20 POWDER TOPICAL at 20:43

## 2024-04-03 RX ADMIN — CIPROFLOXACIN HYDROCHLORIDE 750 MG: 250 TABLET, FILM COATED ORAL at 08:36

## 2024-04-03 RX ADMIN — DOXYCYCLINE HYCLATE 100 MG: 100 CAPSULE ORAL at 20:41

## 2024-04-03 RX ADMIN — MUPIROCIN: 20 OINTMENT TOPICAL at 20:44

## 2024-04-03 RX ADMIN — OXYBUTYNIN CHLORIDE 30 MG: 10 TABLET, EXTENDED RELEASE ORAL at 08:36

## 2024-04-03 RX ADMIN — SENNOSIDES AND DOCUSATE SODIUM 1 TABLET: 8.6; 5 TABLET ORAL at 08:37

## 2024-04-03 ASSESSMENT — ACTIVITIES OF DAILY LIVING (ADL)
ADLS_ACUITY_SCORE: 35
ADLS_ACUITY_SCORE: 34
ADLS_ACUITY_SCORE: 36
ADLS_ACUITY_SCORE: 34
ADLS_ACUITY_SCORE: 36
ADLS_ACUITY_SCORE: 33
ADLS_ACUITY_SCORE: 33
ADLS_ACUITY_SCORE: 34
ADLS_ACUITY_SCORE: 34
ADLS_ACUITY_SCORE: 33
ADLS_ACUITY_SCORE: 34
ADLS_ACUITY_SCORE: 33
ADLS_ACUITY_SCORE: 33
ADLS_ACUITY_SCORE: 36
ADLS_ACUITY_SCORE: 34
ADLS_ACUITY_SCORE: 36

## 2024-04-03 NOTE — PROGRESS NOTES
Care Management Follow Up    Length of Stay (days): 16    Expected Discharge Date: 04/02/2024     Concerns to be Addressed: discharge planning     Patient plan of care discussed at interdisciplinary rounds: Yes    Anticipated Discharge Disposition: Home     Anticipated Discharge Services:  (Stretcher Transportation Services, home RN/HHA services)  Anticipated Discharge DME:  (Hospital bed)    Patient/family educated on Medicare website which has current facility and service quality ratings: yes  Education Provided on the Discharge Plan: Yes  Patient/Family in Agreement with the Plan: yes    Referrals Placed by CM/SW: Post Acute Facilities (and [facility] agencies)  Private pay costs discussed: transportation costs    Additional Information:  Met with patient this morning at bedside. Patient stated he spoke with his insurance yesterday inquiring about the prior authorization for his hospital bed and group II air mattress. Patient states his wife received a phone call from his insurance company today stating the bed and mattress had been approved. This writer contacted Pepper, at [facility], she stated they have not received any authorization to release the bed or mattress. Pepper called this writer back with an update regarding the DME. Pepper stated the insurance company has authorized the mattress, but not officially, the paperwork should be available on 4/4. The hospital bed prior authorization is still pending. Pepper stated, she has updated the patient's wife. RNCC will continue to follow.    Merlyn García RN, BSN  Care Coordinator, 5 Ortho  Phone [phone]  Pager [phone]

## 2024-04-03 NOTE — PROGRESS NOTES
Care Management Follow Up    Length of Stay (days): 16    Expected Discharge Date: 04/02/2024     Concerns to be Addressed: discharge planning     Patient plan of care discussed at interdisciplinary rounds: Yes    Anticipated Discharge Disposition: Home     Anticipated Discharge Services:  (Stretcher Transportation Services, home RN/HHA services)  Anticipated Discharge DME:  (Hospital bed)    Patient/family educated on Medicare website which has current facility and service quality ratings: yes  Education Provided on the Discharge Plan: Yes  Patient/Family in Agreement with the Plan: yes    Referrals Placed by CM/SW: Post Acute Facilities (and [facility] agencies)  Private pay costs discussed: transportation costs    Additional Information:  This RNCC reached out to [facility] to follow up on approval/delivery of hospital bed to patients home. Was directed to speak with Pepper @ [phone] XT: [phone] as this case has been assigned to her. Reached VM and left message.    Received return call from Pepper, they have not received approval yet from insurance company. When received will be in contact with patients spouse and notifying CM.    Mone GARCIAN RN CCM  RN Care Coordinator 5 MS and [address] [place] [address]  [email or web address]   Office:   [phone]   Pager: [phone]     Weekend [place] Pager:5 ortho,5 Med/Surg & WB ED  [phone]   Weekend 6MS, 8A, 10ICU- Pager: [phone]     For weekend RN care coordinator needs (home discharge with needs including home care, assisted living facility returns, durable medical equip, IV antibiotics  Units: 5 Ortho Vocera & 5 Med Surg Vocera  Pager: [phone]  Units: 6 Med Surg Vocera & 8 Med Surg Vocera  Pager [phone]

## 2024-04-03 NOTE — PROGRESS NOTES
CLINICAL NUTRITION SERVICES - REASSESSMENT NOTE     Nutrition Prescription    RECOMMENDATIONS FOR MDs/PROVIDERS TO ORDER:  None    Malnutrition Status:    Patient does not meet two of the established criteria necessary for diagnosing malnutrition    Recommendations already ordered by Registered Dietitian (RD):  Encouraged intakes  Continue current supplements    Future/Additional Recommendations:  Monitor labs, intakes, and weight trends.     EVALUATION OF THE PROGRESS TOWARD GOALS   Diet: Regular  Intake/[place]: 100% of documented meals.  Snacks/supplements: Ensure Max with breakfast and @ HS snack Special K bar with lunch.      NEW FINDINGS/REVIEW OF SYSTEMS    Nutrition/GI: RD met with pt at bedside. Pt reports eating well, no questions or concerns at this time. No problems chewing/swallowing or GI symptoms. Encouraged intakes and use of supplements    Weights: Pt with limited weight history prior to admission. Pt reports usual body weight of 160 lb. States that 150 is likely inaccurate  Wt Readings from Last Encounters:   04/02/24 68.4 kg (150 lb 12.7 oz)   03/18/24 77.6 kg (171 lb)   03/11/24 77.6 kg (171 lb)      Skin: surgical incision, hip and buttocks wound     Labs reviewed     Medications reviewed: Cipro, vibramycin, miralax, senna    MALNUTRITION  % Intake: Decreased intake does not meet criteria  % Weight Loss: Unable to assess  Subcutaneous Fat Loss: None observed  Muscle Loss: Upper arm mild  Fluid Accumulation/Edema: None noted  Malnutrition Diagnosis: Patient does not meet two of the established criteria necessary for diagnosing malnutrition    Previous Goals   Patient to consume % of nutritionally adequate meal trays TID, or the equivalent with supplements/snacks.  Evaluation: Met    Previous Nutrition Diagnosis  Increased nutrient needs (protein) related to wound healing as evidenced by assessed protein needs 1.2-1.5 g/kg.    Evaluation: No change    CURRENT NUTRITION DIAGNOSIS  Increased  nutrient needs (protein) related to wound healing as evidenced by assessed protein needs 1.2-1.5 g/kg.      INTERVENTIONS  Implementation  Continue current supplements    Goals  Patient to consume % of nutritionally adequate meal trays TID, or the equivalent with supplements/snacks.    Monitoring/Evaluation  Progress toward goals will be monitored and evaluated per protocol.    Earline Sherwood MS, RDN, LD  TCU/OB/Ortho Clinical Dietitian  Available via phone and Vocera  Phone: [phone]  Vocera: TCU Clinical Dietitian, Ortho Clinical Dietitian, 4B OB Clinical Dietitian, Birthplace 4C OB Clinical Dietitian  Weekend/Holiday Vocera: Weekend Holiday Clinical Dietitian [Multi Site Groups]

## 2024-04-03 NOTE — PROGRESS NOTES
[facility]    Medicine Progress Note - Hospitalist Service, GOLD TEAM 17    Date of Admission:  3/18/2024    Assessment & Plan     Sacha Ndiaye is a 49 year old male with a PMH spinal cord injury, paraplegia, neurogenic bladder, chronic R troch wound now s/p R hip partial femur resection, R TFL advancement flap closure 3/18/24.  Medicine was consulted for medical comanagement.     Today's changes:  Doing well.  No new medical concern, changes. Aw hospital bed for home.   Plastic surgery, ID and wocn following.       # S/p I&D of decubitus ulcer of right hip and partial excision of right femur  # Decubitus ulcer of right hip  # Chronic osteomyelitis  # Spinal cord injury  #  New left buttock serous drainage with epidermolysis.     Patient wheelchair bound due to prior spinal cord injury. Underwent aforementioned procedure by orthopedics and plastic surgery on 3/18/2024 due to grade 4 decubitus ulcer. Procedure was uncomplicated with EBL of 100 mL. Patient doing well post-operatively.  Bone cultures: NGTD   - Post op cares --Per ortho & plastics  - WOCN following.      Plastic Surgery:   4/1/2024     -Bedrest. HOB <30. No sitting. Ok to lay supine and L lateral. Q2 hr turns while awake. WAVE mattress.   -RENARD drain care-strip, empty and record output qshift  -start daily abd/tape dressing to R hip with nursing  -ID consult- oral doxy/cipro  -prophylactic lovenox  -WOC recs for L IT wound and L hip.   -nutrition consult  -carrillo- ok to maintain for now and remove per home nursing so he can use his own straight cath supplies  -medically ready for discharge. Needs TCU with stretcher transport. Does not qualify for TCU, will need hospital bed/mattress at home. Spoke to RNCC about additional prone cushion DME.   -Ok for virtual follow up appointment with Dr. Harris for follow up   -soft tissue ultrasound of left hip with specific concern for left IT to assess for fluid  collection - No evidence for organized fluid collection or discrete localized mass.  Edema or cellulitis within the soft tissues of the left buttock.     - ID follow-up for the L buttock cellulitis.   4/2:   Per ID: topical miconazole BID x 2 weeks  and topical mupirocin.   Continue indefinite chronic suppressive therapy with ciprofloxacin and doxycycline  Has follow-up appointment with Dr. Bennett on 4/25/24    # Neurogenic bladder  In the setting of spinal cord injury. PTA on oxybutynin and does intermittent straight cath and condom catheter. Carrillo was placed intra-operatively.  - Continue PTA oxybutynin  - Remove carrillo per primary team when able    # Constipation  - Add bisacodyl to pre-existing bowel regimen  - Continue to monitor bowel movements          Diet: Regular Diet Adult  Diet  Snacks/Supplements Adult: Other; see comments; Between Meals    DVT Prophylaxis: Enoxaparin (Lovenox) SQ  Carrillo Catheter: PRESENT, indication:  (neurogenic bladder)  Lines: None     Cardiac Monitoring: None  Code Status: Full CodeFull resuscitation status    Clinically Significant Risk Factors              # Hypoalbuminemia: Lowest albumin = 3.4 g/dL at 3/21/2024  7:19 AM, will monitor as appropriate                       Disposition Plan             Cory Chen MD, S  Hospitalist Service, GOLD TEAM 17  [facility]  Securely message with [employer] (more info)  Text page via [employer] Paging/Directory   See signed in provider for up to date coverage information  ______________________________________________________________________    Interval History   Patient remains in excellent spirits.  No fever or chills.   No cough or cp or sob.   No problem with antifungal topical so far.   No new medical concern.       Physical Exam   Vital Signs: Temp: 97.8  F (36.6  C) Temp src: Oral BP: 115/64 Pulse: 74   Resp: (P) 16 SpO2: 100 % O2 Device: None (Room air)    Weight: 150 lbs 12.71  oz    GENERAL: Alert and oriented x 3; no acute distress  HEENT: Normocephalic; atraumatic; Moist MM.  CV: RRR;   RESP: non labored. On RA  GI: nondistended  MSK: No  edema.  NEURO: Paraplegia.  PSYCH: stable mood  Skin: wounds not examined.     Medical Decision Making       35 MINUTES SPENT BY ME on the date of service doing chart review, history, exam, documentation & further activities per the note.      Data         Imaging results reviewed over the past 24 hrs:   No results found for this or any previous visit (from the past 24 hour(s)).

## 2024-04-03 NOTE — PROGRESS NOTES
"VS: /64 (BP Location: Left arm)   Pulse 74   Temp 97.8  F (36.6  C) (Oral)   Resp 16   Ht 1.829 m (6' 0.01\")   Wt 68.4 kg (150 lb 12.7 oz)   SpO2 100%   BMI 20.45 kg/m          O2: Room air   Output: Rene catheter, continent of bowel   Last BM: 4/324 via dig stim   Activity: bedrest   Skin: R hip muscle flap incision, L hip pressure wounds   Pain: denies   CMS: Paraplegic, LE no sensation   Dressing: CDI, Dsg changed on L hip per POC   Diet: regular   LDA: RENARD drain   Equipment: Ceiling lift   Plan: Patient to discharge home when medical bed available for home use as soon as today. Insurance auth is approved.   Additional Info: Patient is alert and oriented, able to make needs known. Positioned (weight shifted) per self.      "

## 2024-04-03 NOTE — PROGRESS NOTES
General ID Service: Follow Up Note      Patient:  Sacha Ndiaye, Date of birth 1975, Medical record number [id number]  Date of Visit:  April 2, 2024         Assessment and Recommendations:   Problem List:  Right trochanteric decubitus ulceration  S/p debridement and right TFL flap (3/18/24)  OR cultures/pathology with no growth to date or signs of infection  H/o spinal hardware infection  On indefinite chronic suppressive therapy with ciprofloxacin and doxycycline    Recommendations:  Recommend starting topical miconazole BID x 2 weeks  Discussed prior rash possibly related to econazole with patient as well as ID pharmacist - worth a trial of miconazole  Would keep an eye on his skin at the site where miconazole is applied for the first couple of hours to ensure no eruption/swelling occurs, and if it dose, would wash off the miconazole (though again, suspect he will tolerate it)  Agree with topical mupirocin  Continue indefinite chronic suppressive therapy with ciprofloxacin and doxycycline  Has follow-up appointment with Dr. 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 He was admitted 3/18/24 for planned debridement of right trochanteric decubitus ulceration and is now s/p right hip partial femur resection and TFL flap (performed 3/18). He was on vancomycin and ciprofloxacin empirically while awaiting culture data, and has now been de-escalated back to prior suppressive regimen. Cultures have shown no growth to date and histopath not consistent with acute, active infection.  "Can continue suppressive regimen and keep planned ID follow-up on 4/25/24.     More recently, patient has developed fungal-appearing erythema on left hip/buttock with some almonte crusting over the wound. Topical mupirocin was started yesterday. In discussion with pt, he is unsure which drug he had a reaction to in the past (says something was \"put on my foot and it swelled up, I lost toes because of it\") - possible this was the econazole, though seems more likely it was topical gentamicin. Also has report of possible rash after econazole. I discussed possible treatment options, topical azole vs oral, and he is willing to try the topical azole as long as we remove it should there be any reaction. This antifungal was ordered for today. Will follow-up his response.    Paul Anne MD  Division of Infectious Diseases and International Medicine  P: [phone]        Interval History:     Seen and examined - in good spirits, says he has no complaints today and is in the same state of health as yesterday.         Review of Systems:     Full 9 pt ROS obtained and negative unless noted above in assessment and interval history.         Current Antimicrobials     Doxycycline  Ciprofloxacin         Physical Exam:   Ranges for vital signs:  Temp:  [97.8  F (36.6  C)-98.1  F (36.7  C)] 97.8  F (36.6  C)  Pulse:  [67-74] 74  Resp:  [14-16] (P) 16  BP: (110-118)/(64-67) 115/64  SpO2:  [95 %-100 %] 100 %    Intake/Output Summary (Last 24 hours) at 3/25/2024 1158  Last data filed at 3/25/2024 1034  Gross per 24 hour   Intake --   Output 2060 ml   Net -2060 ml     Exam:   GENERAL:  well-developed, well-nourished, sitting in bed in no acute distress.   ENT:  Head is normocephalic, atraumatic. Oropharynx is moist without exudates or ulcers.  EYES:  Eyes have anicteric sclerae.    LUNGS:  Breathing easily on room air  EXT: Extremities warm and without edema.  SKIN:  Left buttock not examined today - photo reviewed, erythema is less " vibrant, though crusting is about the same.  NEUROLOGIC:  Grossly nonfocal.         Laboratory Data:   Reviewed.  Pertinent for:    Microbiology:  Culture   Date Value Ref Range Status   03/18/2024 No anaerobic organisms isolated  Final   03/18/2024 No growth after 15 days  Preliminary   03/18/2024 No Growth  Final     Inflammatory Markers    Recent Labs   Lab Test 08/17/23  1316 01/17/19  0945 01/05/17  1015 11/29/16  0636 11/26/16  0658 05/11/16  0803   SED 60* 72* 30*  --   --   --    CRP  --  37.8* 15.5* 69.3* 24.9* 133.0*     Metabolic Studies       Recent Labs   Lab Test 04/01/24  0837 03/30/24  0737 03/27/24  0731 03/22/24  0735 03/21/24  0719 03/20/24  0627 03/20/24  0549 03/19/24  0756 04/17/19  0604 04/16/19  0621 01/14/17  0636 01/13/17  0609 11/30/16  0600 11/29/16  0636     --  137 139  --   --  136 137  --  140   < > 139   < > 140   POTASSIUM 3.8  --  4.2 4.1  --   --  4.3 3.7  --  4.5   < > 3.8   < > 4.0   CHLORIDE 104  --  103 102  --   --  103 103  --  106   < > 106   < > 106   CO2 22  --  26 29  --   --  27 26  --  22   < > 26   < > 27   ANIONGAP 12  --  8 8  --   --  6* 8  --  12   < > 7   < > 7   BUN 19.8  --  21.0* 18.0  --   --  18.4 13.2  --  23   < > 14   < > 25   CR 0.48* 0.61* 0.55* 0.52* 0.51*  --  0.67 0.49*   < > 0.78   < > 0.64*   < > 0.65*   GFRESTIMATED >90 >90 >90 >90 >90  --  >90 >90   < > >90   < > >90  Non  GFR Calc     < > >90  Non  GFR Calc     *  --  95 91  --  94 90 130*   < > 100*   < > 112*   < > 102*   A1C  --   --   --   --   --   --   --   --   --   --   --  4.7  --   --    LANDON 8.9  --  8.7 9.0  --   --  8.6 8.5*  --  8.4*   < > 7.6*   < > 8.9   MAG  --   --   --   --   --   --   --   --   --  2.1  --   --   --  1.9    < > = values in this interval not displayed.     Hepatic Studies    Recent Labs   Lab Test 03/21/24  0719 01/17/19  0945 01/05/17  1015 03/01/16  0509 02/23/16  0516 02/16/16  0455   BILITOTAL 1.2  --   --   --    --   --    ALKPHOS 59  --   --   --   --   --    ALBUMIN 3.4* 2.9* 3.8  --   --   --    AST 15  --   --   --   --   --    ALT 7  --   --  24 25 31     Hematology Studies      Recent Labs   Lab Test 04/01/24  0837 03/30/24  1428 03/30/24  0737 03/27/24  0731 03/22/24  0735 03/21/24  0719 03/20/24  0549 03/19/24  0756 04/14/19  0602 04/13/19  0651 01/13/17  0609 01/12/17  1215 01/27/16  0555 01/26/16  0529   WBC 8.7 11.9*  --  9.0 6.2  --  8.7 10.8  --  8.6  --  7.6   < > 7.7   ANEU  --   --   --   --   --   --   --   --   --  6.3  --  5.9  --  6.3   ALYM  --   --   --   --   --   --   --   --   --  1.5  --  1.1  --  1.0   MONSE  --   --   --   --   --   --   --   --   --  0.5  --  0.4  --  0.4   AEOS  --   --   --   --   --   --   --   --   --  0.3  --  0.2  --  0.0   HGB 11.9* 11.6*  --  11.4* 11.9*  --  10.7* 10.4*   < > 8.7*   < > 9.3*   < > 8.9*   HCT 38.9* 37.9*  --  36.9* 39.5*  --  36.3* 34.3*  --  30.0*  --  29.3*   < > 29.8*    282 295 309 272   < > 272 263   < > 312   < > 206   < > 334    < > = values in this interval not displayed.     Vancomycin Levels     Recent Labs   Lab Test 03/22/24  0735 04/17/19  1123 11/30/16  1330 05/16/16  0933 05/13/16  2110 02/01/16  1007   VANCOMYCIN 12.0 11.2 19.6 13.1 12.2 12.6         Latest Ref Rng & Units 4/1/2024     8:37 AM 3/30/2024     2:28 PM 3/30/2024     7:37 AM 3/27/2024     7:31 AM 3/22/2024     7:35 AM   Transplant Immunosuppression Labs   Creat 0.67 - 1.17 mg/dL 0.48   0.61  0.55  0.52    Urea Nitrogen 6.0 - 20.0 mg/dL 19.8    21.0  18.0    WBC 4.0 - 11.0 10e3/uL 8.7  11.9   9.0  6.2    Neutrophil %     64

## 2024-04-03 NOTE — PLAN OF CARE
Goal Outcome Evaluation:      Plan of Care Reviewed With: patient    Overall Patient Progress: improvingOverall Patient Progress: improving    Pt Denies pain. Patient repositions independently in bed; only left to supine position. Surgical incision on R thigh/hip  and wound dressing to left hip CDI. RENARD and carrillo in place. Plan for discharge home once hospital bed  approved and delivered. Pt is able to make needs known. Call light in reach will continue to monitor.

## 2024-04-03 NOTE — PROGRESS NOTES
Plastic Surgery Progress Note  Attending:Dr. Steven MCDERMOTT. States that bed might be delivered tomorrow. Started mupirocin yesterday for rash.     Temp:  [97.8  F (36.6  C)-98.1  F (36.7  C)] 97.8  F (36.6  C)  Pulse:  [67-74] 74  Resp:  [14-16] 16  BP: (109-118)/(64-69) 115/64  SpO2:  [95 %-100 %] 100 %  I/O last 3 completed shifts:  In: -   Out: 4010 [Urine:3975; Drains:35]  General: NAD, answers questions appropriately  Resp: NLB on RA  L buttock with mild pink rash with overlying crusting, not actively weeping  R hip flap warm, soft, intact. Incisions c/d/I. No significant swelling or ecchymosis RENARD with serosang output.     RENARD 50 ml yesterday, serous    ASSESSMENT: 49 year old male PMH paraplegia, neurogenic bladder, chronic R troch wound now s/p R hip partial femur resection, R TFL advancement flap closure 3/18/24. Healing as anticipated. New left buttock serous drainage with epidermolysis.    PLAN:   -Bedrest. HOB <30. No sitting. Ok to lay supine and L lateral. Q2 hr turns while awake. WAVE mattress.   -RENARD drain care-strip, empty and record output qshift  -start daily abd/tape dressing to R hip with nursing  -ID consult- oral doxy/cipro  -prophylactic lovenox  -appreciate WOC recs for L IT wound and L hip.-appreciate ID recs, continue mupirocin and will start miconazole, patient now agreeable, monitor for any reaction  -nutrition consult  -carrillo- ok to maintain for now and remove per home nursing so he can use his own straight cath supplies  -medically ready for discharge. Needs TCU with stretcher transport. Does not qualify for TCU, will need hospital bed/mattress at home. Spoke to RNCC about additional prone cushion DME.   -Ok for virtual follow up appointment with Dr. Harris for follow up    -discussed with Dr. Steven Perkins PA-C  Plastic and Reconstructive Surgery   or plastic surgery resident on call in [facility]

## 2024-04-03 NOTE — PLAN OF CARE
Goal Outcome Evaluation:                 Outcome Evaluation: Pt awoken overnight to remind to reposition. All dressings CDI. RENARD drain and Rene catheter in place. Pt waiting for hospital bed to be delivered to home to discharge.

## 2024-04-04 VITALS
BODY MASS INDEX: 20.42 KG/M2 | SYSTOLIC BLOOD PRESSURE: 118 MMHG | DIASTOLIC BLOOD PRESSURE: 71 MMHG | OXYGEN SATURATION: 96 % | RESPIRATION RATE: 16 BRPM | TEMPERATURE: 97.6 F | WEIGHT: 150.79 LBS | HEIGHT: 72 IN | HEART RATE: 61 BPM

## 2024-04-04 LAB
CREAT SERPL-MCNC: 0.57 MG/DL (ref 0.67–1.17)
EGFRCR SERPLBLD CKD-EPI 2021: >90 ML/MIN/1.73M2
HOLD SPECIMEN: NORMAL

## 2024-04-04 PROCEDURE — 36415 COLL VENOUS BLD VENIPUNCTURE: CPT | Performed by: SURGERY

## 2024-04-04 PROCEDURE — 99232 SBSQ HOSP IP/OBS MODERATE 35: CPT | Performed by: INTERNAL MEDICINE

## 2024-04-04 PROCEDURE — 82565 ASSAY OF CREATININE: CPT | Performed by: SURGERY

## 2024-04-04 PROCEDURE — 250N000013 HC RX MED GY IP 250 OP 250 PS 637: Performed by: INTERNAL MEDICINE

## 2024-04-04 PROCEDURE — 250N000013 HC RX MED GY IP 250 OP 250 PS 637: Performed by: PHYSICIAN ASSISTANT

## 2024-04-04 PROCEDURE — 250N000011 HC RX IP 250 OP 636: Performed by: PHYSICIAN ASSISTANT

## 2024-04-04 RX ORDER — MUPIROCIN 20 MG/G
OINTMENT TOPICAL 2 TIMES DAILY
Qty: 60 G | Refills: 1 | Status: SHIPPED | OUTPATIENT
Start: 2024-04-04 | End: 2024-04-11

## 2024-04-04 RX ADMIN — ENOXAPARIN SODIUM 40 MG: 40 INJECTION SUBCUTANEOUS at 08:54

## 2024-04-04 RX ADMIN — OXYBUTYNIN CHLORIDE 30 MG: 10 TABLET, EXTENDED RELEASE ORAL at 08:54

## 2024-04-04 RX ADMIN — DOXYCYCLINE HYCLATE 100 MG: 100 CAPSULE ORAL at 08:54

## 2024-04-04 RX ADMIN — CIPROFLOXACIN HYDROCHLORIDE 750 MG: 250 TABLET, FILM COATED ORAL at 08:54

## 2024-04-04 ASSESSMENT — ACTIVITIES OF DAILY LIVING (ADL)
ADLS_ACUITY_SCORE: 36
ADLS_ACUITY_SCORE: 35
ADLS_ACUITY_SCORE: 36
ADLS_ACUITY_SCORE: 35
ADLS_ACUITY_SCORE: 35
ADLS_ACUITY_SCORE: 36

## 2024-04-04 NOTE — PLAN OF CARE
Goal Outcome Evaluation:                        VS: VSS and afebrile    Output: Adequate output in carrillo catheter    Last BM: 4/3   Activity: Para  Bedrest   Skin: Flap to R hip   L hip pressure wound    Pain: Denies    Neuro: Alert and oriented x4  No sensation to bilateral LEs   Dressing: CDI    Diet: Regular    LDA: RENARD drain    Equipment: Cellphone and personal belongings a bedside    Plan: Possible discharge to home today 4/4   Additional Info:

## 2024-04-04 NOTE — PROGRESS NOTES
[facility]    Medicine Progress Note - Hospitalist Service, GOLD TEAM 17    Date of Admission:  3/18/2024    Assessment & Plan     Sacha Ndiaye is a 49 year old male with a PMH spinal cord injury, paraplegia, neurogenic bladder, chronic R troch wound now s/p R hip partial femur resection, R TFL advancement flap closure 3/18/24.  Medicine was consulted for medical comanagement.     Today's changes:  Doing well.  No new medical concern, changes. Aw hospital bed for home.   Plastic surgery, ID and wocn following.       # S/p I&D of decubitus ulcer of right hip and partial excision of right femur  # Decubitus ulcer of right hip  # Chronic osteomyelitis  # Spinal cord injury  #  New left buttock serous drainage with epidermolysis.     Patient wheelchair bound due to prior spinal cord injury. Underwent aforementioned procedure by orthopedics and plastic surgery on 3/18/2024 due to grade 4 decubitus ulcer. Procedure was uncomplicated with EBL of 100 mL. Patient doing well post-operatively.  Bone cultures: NGTD   - Post op cares --Per ortho & plastics  - WOCN following.      Plastic Surgery:   4/1/2024     -Bedrest. HOB <30. No sitting. Ok to lay supine and L lateral. Q2 hr turns while awake. WAVE mattress.   -RENARD drain care-strip, empty and record output qshift  -start daily abd/tape dressing to R hip with nursing  -ID consult- oral doxy/cipro  -prophylactic lovenox  -WOC recs for L IT wound and L hip.   -nutrition consult  -carrillo- ok to maintain for now and remove per home nursing so he can use his own straight cath supplies  -medically ready for discharge. Needs TCU with stretcher transport. Does not qualify for TCU, will need hospital bed/mattress at home. Spoke to RNCC about additional prone cushion DME.   -Ok for virtual follow up appointment with Dr. Harris for follow up   -soft tissue ultrasound of left hip with specific concern for left IT to assess for fluid  collection - No evidence for organized fluid collection or discrete localized mass.  Edema or cellulitis within the soft tissues of the left buttock.     - ID follow-up for the L buttock cellulitis.   4/2:   Per ID: topical miconazole BID x 2 weeks  and topical mupirocin.   Continue indefinite chronic suppressive therapy with ciprofloxacin and doxycycline  Has follow-up appointment with Dr. Bennett on 4/25/24    # Neurogenic bladder  In the setting of spinal cord injury. PTA on oxybutynin and does intermittent straight cath and condom catheter. Carrillo was placed intra-operatively.  - Continue PTA oxybutynin  - Remove carrillo per primary team when able    # Constipation  - Add bisacodyl to pre-existing bowel regimen  - Continue to monitor bowel movements          Diet: Regular Diet Adult  Diet  Snacks/Supplements Adult: Other; see comments; Between Meals    DVT Prophylaxis: Enoxaparin (Lovenox) SQ  Carrillo Catheter: PRESENT, indication:  (neurogenic bladder)  Lines: None     Cardiac Monitoring: None  Code Status: Full CodeFull resuscitation status    Clinically Significant Risk Factors              # Hypoalbuminemia: Lowest albumin = 3.4 g/dL at 3/21/2024  7:19 AM, will monitor as appropriate                       Disposition Plan             Cory Chen MD, S  Hospitalist Service, GOLD TEAM 17  [facility]  Securely message with [employer] (more info)  Text page via [employer] Paging/Directory   See signed in provider for up to date coverage information  ______________________________________________________________________    Interval History   Patient remains in excellent spirits.  No fever or chills.   No cough or cp or sob.   No NV  No new medical concern.   No pain complaint.       Physical Exam   Vital Signs: Temp: 97.6  F (36.4  C) Temp src: Oral BP: 118/71 Pulse: 61   Resp: 16 SpO2: 96 % O2 Device: None (Room air)    Weight: 150 lbs 12.71 oz    GENERAL: Alert and oriented x  3; no acute distress  HEENT: Normocephalic; atraumatic; Moist MM.  CV: RRR;   RESP: non labored. On RA  GI: nondistended  MSK: No  edema.  NEURO: Paraplegia.  PSYCH: stable mood  Skin: wounds not examined.     Medical Decision Making       30 MINUTES SPENT BY ME on the date of service doing chart review, history, exam, documentation & further activities per the note.      Data         Imaging results reviewed over the past 24 hrs:   No results found for this or any previous visit (from the past 24 hour(s)).

## 2024-04-04 NOTE — PROGRESS NOTES
"4/4/24  Care Management Follow Up    CHW assisted RNCC with setting up a discharge ride for today. CHW reiterated transportation cost to patient, RNCC had already informed him a few days ago. Pt stated \"insurances needs to cover my transport, because I should not be responsible to pay.\" CHW stated that they understood but incase they saw a bill for it they just wanted to inform them about it. Pt again was adamant that insurance was going to pay for it. Pt stated that it was CHW's job to figure out on how he would get home. CHW then set up a stretcher ride for the pt due to their bed bound and paraplegia dx. Ride was set through  [employer] [facility] ([phone]) with a window service time frame of 1235 to 1323. This was notified to the RNCC.    Beatris Cortez  Inpatient CHW  [facility] 5 Ortho, 8 Med Surge, & ED  PH: [phone]    "

## 2024-04-04 NOTE — PROGRESS NOTES
Plastic Surgery Progress Note  Attending:Dr. Steven MCDERMOTT. Antifungal powder started last night    Temp:  [97.4  F (36.3  C)-97.8  F (36.6  C)] 97.4  F (36.3  C)  Pulse:  [57-74] 71  Resp:  [15-16] 16  BP: (115-120)/(64-75) 115/75  SpO2:  [95 %-100 %] 95 %  I/O last 3 completed shifts:  In: -   Out: 2745 [Urine:2725; Drains:20]  General: NAD, answers questions appropriately  Resp: NLB on RA  L buttock with improving  pink rash with overlying crusting, not actively weeping  R hip flap warm, soft, intact. Incisions c/d/I. No significant swelling or ecchymosis RENARD with serosang output.     RENARD 10 ml yesterday, serous    ASSESSMENT: 49 year old male PMH paraplegia, neurogenic bladder, chronic R troch wound now s/p R hip partial femur resection, R TFL advancement flap closure 3/18/24. Healing as anticipated. New left buttock serous drainage with epidermolysis.    PLAN:   -Bedrest. HOB <30. No sitting. Ok to lay supine and L lateral. Q2 hr turns while awake. WAVE mattress.   -RENARD drain care-strip, empty and record output qshift  -start daily abd/tape dressing to R hip with nursing  -ID consult- oral doxy/cipro  -prophylactic lovenox  -appreciate WOC recs for L IT wound and L hip.-appreciate ID recs, continue mupirocin and will start miconazole, patient now agreeable, monitor for any reaction  -nutrition consult  -carrillo- ok to maintain for now and remove per home nursing so he can use his own straight cath supplies  -medically ready for discharge. Needs TCU with stretcher transport. Does not qualify for TCU, will need hospital bed/mattress at home. Spoke to RNCC about additional prone cushion DME.   -Ok for virtual follow up appointment with Dr. Harris for follow up    -discussed with Dr. Steven Perkins PA-C  Plastic and Reconstructive Surgery   [facility]

## 2024-04-04 NOTE — DISCHARGE SUMMARY
[facility] Discharge Summary    Sacha Ndiaye MRN: [id number]   YOB: 1975 Age: 49 year old     Date of Admission:  3/18/2024  Date of Discharge::  04/04/24  Admitting Physician:  Cheryl Harris MD  Discharge Physician:  Steven  Primary Care Physician:         Jaime Ro          Admission Diagnoses:   Decubitus ulcer of trochanteric region of right hip, stage 4 (H) [L89.214]  Paraplegia (H) [G82.20]  Open wound of hip [S71.009A]          Discharge Diagnosis:   Same   Rash          Procedures:   Partial excision of R femur  I &D R hip wound, TFL flap closure        Non-operative procedures:   None performed          Consultations:   CARE MANAGEMENT / SOCIAL WORK IP CONSULT  WOUND OSTOMY CONTINENCE NURSE  IP CONSULT  INTERNAL MEDICINE ADULT IP CONSULT FOR [facility]  INFECTIOUS DISEASE [facility] ADULT IP CONSULT  PHARMACY TO DOSE VANCO  WOUND OSTOMY CONTINENCE NURSE  IP CONSULT  NUTRITION SERVICES ADULT IP CONSULT  WOUND OSTOMY CONTINENCE NURSE  IP CONSULT            Brief History of Illness:   This is a 49 year old paraplegic male with a diagnosis of stage 4 R trochanteric decubitus, possible underlying osteomyelitis, and chronic dislocating R hip.            Hospital Course:   The patient underwent the above operation on 3/18/24, which he tolerated well without complications. He was transferred to the floor for routine postoperative care. Bone cultures were followed, no growth was noted. He resumed his long term antibiotics, doxy and cipro. L IT small wound was being followed, appears to be healing well. Surrounding skin did have worsening rash, topical antifungal and bactroban were started and improvement was noted.     At the time of discharge, he was tolerating a regular diet, voiding spontaneously without difficulty, and pain was controlled with oral pain medications. The patient was discharged home in stable and improved condition. He will follow up in  [facility] wound clinic in 2 weeks.         Final Pathology Result:     Final Diagnosis   Bone, right trochanter, excision:  - Fibrotic soft tissue and underlying bone with chronic inflammation and reactive changes  - Negative for malignancy and acute osteomyelitis          Medications Prior to Admission:     Medications Prior to Admission   Medication Sig Dispense Refill Last Dose    order for [facility] Phone [phone] Fax [phone]    Primary Dressing Endoform AM   Qty 5  Secondary Dressing Mepilex 4X4 border Qty 12    Length of Need: 1 month  Frequency of dressing change: daily 30 days 0     [DISCONTINUED] acetaminophen (TYLENOL) 325 MG tablet Take 2 tablets (650 mg) by mouth every 4 hours as needed for other (surgical pain) 100 tablet  More than a month    [DISCONTINUED] bisacodyl (DULCOLAX) 10 MG Suppository Place 1 suppository (10 mg) rectally daily as needed for constipation 30 suppository  More than a month    [DISCONTINUED] ciprofloxacin (CIPRO) 750 MG tablet Take 1 tablet (750 mg) by mouth 2 times daily Please see if PCP will take over refills. (Patient taking differently: Take 750 mg by mouth daily Please see if PCP will take over refills.) 60 tablet 1 3/17/2024    [DISCONTINUED] doxycycline monohydrate (MONODOX) 100 MG capsule TAKE 1 CAPSULE BY MOUTH TWICE DAILY (Patient taking differently: Take 100 mg by mouth daily) 60 capsule 2 More than a month    [DISCONTINUED] oxybutynin 15 MG TB24 Take 2 tablets (30 mg) by mouth daily 30 tablet  3/17/2024    [DISCONTINUED] polyethylene glycol (MIRALAX/GLYCOLAX) Packet Take 17 g by mouth daily as needed for constipation 7 packet  Past Week    [DISCONTINUED] sodium phosphate (FLEET ENEMA) 7-19 GM/118ML rectal enema Place 1 Bottle (1 enema) rectally every 48 hours (Patient taking differently: Place 1 enema rectally daily as needed for constipation) 1 Bottle 0 More than a month            Discharge Medications:     Current Discharge Medication List         START taking these medications    Details   miconazole (MICATIN) 2 % external powder Apply topically every 12 hours for 14 days  Qty: 85 g, Refills: 1    Associated Diagnoses: Rash      mupirocin (BACTROBAN) 2 % external ointment Apply topically 2 times daily for 7 days  Qty: 60 g, Refills: 1    Associated Diagnoses: Rash      senna-docusate (SENOKOT-S/PERICOLACE) 8.6-50 MG tablet Take 1 tablet by mouth 2 times daily    Associated Diagnoses: Hx of spinal surgery           CONTINUE these medications which have CHANGED    Details   acetaminophen (TYLENOL) 325 MG tablet Take 2 tablets (650 mg) by mouth every 4 hours as needed for other (surgical pain)    Associated Diagnoses: Decubitus ulcer of left ischium, stage 4 (H)      bisacodyl (DULCOLAX) 10 MG suppository Place 1 suppository (10 mg) rectally daily as needed for constipation    Associated Diagnoses: Decubitus ulcer of left ischium, stage 4 (H)      ciprofloxacin (CIPRO) 750 MG tablet Take 1 tablet (750 mg) by mouth 2 times daily Please see if PCP will take over refills.    Associated Diagnoses: Decubitus ulcer of left ischium, stage 4 (H)      doxycycline monohydrate (MONODOX) 100 MG capsule Take 1 capsule (100 mg) by mouth 2 times daily    Associated Diagnoses: Wound infection complicating hardware, sequela      oxyBUTYnin ER (DITROPAN XL) 15 MG 24 hr tablet Take 2 tablets (30 mg) by mouth daily    Associated Diagnoses: Decubitus ulcer of left ischium, stage 4 (H)      polyethylene glycol (MIRALAX) 17 GM/Dose powder Take 17 g by mouth daily    Associated Diagnoses: Hx of spinal surgery      sodium phosphate (FLEET ENEMA) 7-19 GM/118ML rectal enema Place 1 Bottle (1 enema) rectally daily as needed for constipation    Associated Diagnoses: Decubitus ulcer of left ischium, stage 4 (H)           CONTINUE these medications which have NOT CHANGED    Details   order for [facility] Order Phone [phone] Fax [phone]    Primary Dressing [facility] by [facility] AM    Qty 5  Secondary Dressing Mepilex 4X4 border Qty 12    Length of Need: 1 month  Frequency of dressing change: daily  Qty: 30 days, Refills: 0    Associated Diagnoses: Non-healing left groin open wound, subsequent encounter                  Day of Discharge Physical Exam:   Temp:  [97.4  F (36.3  C)-97.7  F (36.5  C)] 97.6  F (36.4  C)  Pulse:  [57-71] 61  Resp:  [15-16] 16  BP: (115-120)/(71-75) 118/71  SpO2:  [95 %-98 %] 96 %  General: NAD, answers questions appropriately  Resp: NLB on RA  L buttock with improving  pink rash with overlying crusting, not actively weeping  R hip flap warm, soft, intact. Incisions c/d/I. No significant swelling or ecchymosis RENARD with serosang output.          Discharge Instructions and Follow-Up:        Home Care Referral      Reason for your hospital stay    Right hip wound debridement and fasciocutaneous flap closure.     Activity    Your activity upon discharge: Bedrest. HOB <30. No sitting. Ok to lay supine and L lateral. Q2 hr turns while awake. WAVE mattress/pressure offloading mattress. Please support R leg/hip when turning to L side, hip is unstable. Generally sitting restrictions are in place for 3-4 weeks, then a sitting protocol is started after being seen for outpatient follow up.     Discharge Instructions    Ok to remove carrillo once at home and resume straight cath/condom cath     Tubes and drains    You are going home with the following tubes or drains: RENARD.  Follow these instructions to care for your tube:  Strip, empty and record RENARD output morning and evening. Removal of drain will happen after discussion at post op appointment. To remove drain cut the stitch near the skin to free the drain, take the drain bulb off suction, and then gently pull the drain to remove it.     When to contact your care team    Call Dr. Harris wound clinic [phone] if you have any of the following: increased swelling, increased pain, or fever greater than 101F, or redness around  "incisions.     Wound care and dressings    Instructions to care for your wound at home:   Left hip/ IT wound(s): clean daily with microklenz or soap and water, gently scrub area to help get rid of flakes  Left hip/IT wounds: apply bactroban twice daily for 1 week. Apply antifungal powder twice daily for 2 weeks. Alternate applications by a few hours. Cover with abd/tape.     Hospital Bed Order    Hospital Bed Documentation:   Hospital bed is required for body positioning, to allow for safe transfers to wheelchair and standing and frequent changes in body position, not feasible in an ordinary bed     NOTE: Patient must have a \"Yes\" in one of the four following questions to qualify for a hospital bed.    1. Does the patient require positioning of the body in ways not feasible with an ordinary bed due to a medical condition that is expected to last at least 1 month? Yes (Please explain): yes, he is a paraplegic who s/p fasciocutaneous flap closure for stage IV pressure ulcer and is on bedrest, no sitting, HOB <30 degrees for 4-8 weeks. Needs to pressure offload other body parts to prevent worsening sores.     2. Does the patient require, for the alleviation of pain, positioning of the body in ways not feasible with an ordinary bed? No     3. Does the patient require the head of the bed to be elevated more than 30 degrees most of the time due to congestive heart failure, chronic pulmonary disease, or aspiration? No    4. Does the patient require traction that can only be attached to a hospital bed? No    Additional Criteria:    Does the patient require frequent changes in body position and/or have an immediate need for change in body position? Yes - Patient qualifies for Semi Electric Bed. Needs q2 hours turns while awake to prevent pressure sores on bedrest     Trapeze Criteria:  (Patient must meet standard hospital bed criteria also)   1. Does patient need this device to sit up because of a respiratory condition, for " change in body position for other medical reasons, or to get in or out of bed? Yes (Please explain): needs to be able to change body position q2 hrs to relieve pressure on the flap and prevent new pressure sores.     I, the undersigned, certify that the above prescribed supplies are medically necessary for this patient and is both reasonable and necessary in reference to accepted standards of medical and necessary in reference to accepted standards of medical practice in the treatment of this patient's condition and is not prescribed as a convenience.     Miscellaneous DME Order    DME Documentation:   Describe the reason for need to support medical necessity: Patient will need a Group 2 mattress due to recent fasciocutaneous flap closure of Right troch stage IV pressure ulceration on the patient's trochanter that has failed to improve on a normal mattress despite regular wound cares and repositioning. A group 1 mattress is not adequate to offload the flap repair and the additional L IT unstageable pressure sore. Patient has impaired sensation and is unable to reposition independently.  .     I, the undersigned, certify that the above prescribed supplies are medically necessary for this patient and is both reasonable and necessary in reference to accepted standards of medical and necessary in reference to accepted standards of medical practice in the treatment of this patient's condition and is not prescribed as a convenience.     Diet    Follow this diet upon discharge: Orders Placed This Encounter      Regular Diet Adult     Follow up:  4/18 with Dr. Harris  4/25 with infectious disease        Home Health Care:     Arranged           Discharge Disposition:     Discharged to home      Condition at discharge: Stable    Patient discussed with staff on day of discharge.

## 2024-04-04 NOTE — PLAN OF CARE
"  VS: /71 (BP Location: Left arm)   Pulse 61   Temp 97.6  F (36.4  C) (Oral)   Resp 16   Ht 1.829 m (6' 0.01\")   Wt 68.4 kg (150 lb 12.7 oz)   SpO2 96%   BMI 20.45 kg/m     O2: Room air   Output: Rene, bowel program   Last BM: 4/4/24   Activity: bedfast   Skin: Right hip surgical incision, LT hip wound/redness   Pain: denies   CMS: paraplegic   Dressing: CDI, Changed this morning   Diet: regular   LDA: Rene, Duke Sosa   Equipment: Specialty bed, lift   Plan: Patient to discharge home when home bed delivered   Additional Info: Patient is alert and oriented. Able to make needs known. Bowel program performed per usual. Dressings changed using prescribed ointment. Rene patent and draining yellow urine. Duke-Sosa drain holding suction and draining serosanguinous fluid.                              "

## 2024-04-05 ENCOUNTER — TELEPHONE (OUTPATIENT)
Dept: PLASTIC SURGERY | Facility: CLINIC | Age: 49
End: 2024-04-05
Payer: COMMERCIAL

## 2024-04-05 NOTE — TELEPHONE ENCOUNTER
Return call and LVM with [facility] nurse to keep all sutures/ staples and drains in place until we can further discuss in clinic with Dr Harris next Thursday 4/11/24.

## 2024-04-05 NOTE — TELEPHONE ENCOUNTER
[facility] Call Center    Phone Message    May a detailed message be left on voicemail: yes     Reason for Call: Other: Looking for orders for staple/suture and drain removal.  Please fax.   Also, looking for date of this to happen.     Action Taken: Message routed to:  [facility]    Travel Screening: Not Applicable

## 2024-04-15 LAB — BACTERIA BONE ANAEROBE+AEROBE CULT: NO GROWTH

## 2024-04-18 ENCOUNTER — HOSPITAL ENCOUNTER (OUTPATIENT)
Dept: WOUND CARE | Facility: CLINIC | Age: 49
Discharge: HOME | End: 2024-04-18
Attending: SURGERY
Payer: COMMERCIAL

## 2024-04-18 ENCOUNTER — TELEPHONE (OUTPATIENT)
Dept: WOUND CARE | Facility: CLINIC | Age: 49
End: 2024-04-18
Payer: COMMERCIAL

## 2024-04-18 DIAGNOSIS — L89.214 PRESSURE INJURY OF RIGHT HIP, STAGE 4 (H): Primary | ICD-10-CM

## 2024-04-18 NOTE — PROGRESS NOTES
Patient Active Problem List   Diagnosis    Spinal stenosis    SCI (spinal cord injury)    Hardware complicating wound infection, initial encounter (H24)    Charcot's joint, vertebrae    Status post flap graft    Constipation    H/O lumbosacral spine surgery    Hip dislocation, bilateral (H)    Infection and inflammatory reaction due to internal orthopedic device, implant, and graft (H24)    Neurogenic bladder    Neurogenic bowel    Osteomyelitis of pelvic region or thigh, acute, left (H)    Chronic osteomyelitis involving pelvic region and thigh (H)    Paraplegia (H)    Traumatic spinal subdural hematoma    Hx of plastic surgery    Non-healing left groin open wound, initial encounter    Non-healing left groin open wound, subsequent encounter    Open wound of knee, leg, and ankle, left, initial encounter    Chronic antibiotic suppression    Chronic ulcer of sacral region (H)    Fixation hardware in spine    Hx of spinal surgery    Pressure injury of right hip, stage 4 (H)    Pressure injury of trochanteric region of left hip, stage 3 (H)    Open wound of hip     Past Medical History:   Diagnosis Date    Anemia     Charcot's joint     Chronic UTI     Constipation     Dislocated hip (H)     hyperlordosis    Epicondylitis     History of blood transfusion     Hx: UTI (urinary tract infection)     Neurogenic bladder     Other chronic pain     Paraplegia following spinal cord injury (H)     Pressure ulcer stage IV     left groin     Labs:   Recent Labs   Lab Test 04/01/24  0837 03/22/24  0735 03/21/24  0719 04/13/19  0651 01/17/19  0945 01/14/17  0636 01/13/17  0609   ALBUMIN  --   --  3.4*  --  2.9*  --   --    HGB 11.9*   < >  --    < >  --    < > 8.9*   WBC 8.7   < >  --    < >  --   --   --    A1C  --   --   --   --   --   --  4.7   CRP  --   --   --   --  37.8*  --   --     < > = values in this interval not displayed.     Nutrition requirements were discussed with patient today.  Vitals:  There were no vitals taken  for this visit.  Wound:   Wound Buttocks Pressure injury community acquired Stage 2 (Active)   Wound Bed Erythema, blanchable 04/04/24 0900   Melissa-wound Assessment Pale 04/04/24 0900   Estimated Circumference ( if not measured quarter sized 03/31/24 0900   Drainage Amount Small 04/03/24 1700   Drainage Color/Characteristics Serosanguineous;Yellow 03/30/24 1000   Wound Care/Cleansing Normal saline 03/30/24 2039   Dressing Per Plan of Care 04/03/24 1700   Dressing Status Clean, dry, intact 04/04/24 0016   Dressing Change Due 04/03/24 04/03/24 1700       Wound Knee  (Active)   Wound Bed Scabbing 04/03/24 1700   Melissa-wound Assessment Warm 03/30/24 2039   Drainage Amount None 03/30/24 2039   Dressing Open to air 04/04/24 0016   Dressing Status Changed 03/30/24 2039       Wound Hip (Active)   Wound Bed Drainage;Pink 04/03/24 1700   Melissa-wound Assessment Moist 03/31/24 1600   Drainage Amount Small 04/03/24 1700   Dressing Status Clean, dry, intact 04/04/24 0016       Rash 11/28/16 Bilateral thigh (Active)       Wound (used by OP [facility] only) 04/12/23 1308 Right greater trochanter pressure injury (Active)   Thickness/Stage Stage 4 02/08/24 0900   Base granulating 02/08/24 0900   Periwound macerated 02/08/24 0900   Periwound Temperature warm 02/08/24 0900   Periwound Skin Turgor soft 02/08/24 0900   Edges open 02/08/24 0900   Length (cm) 1.4 02/08/24 0900   Width (cm) 1.6 02/08/24 0900   Depth (cm) 1.6 02/08/24 0900   Wound (cm^2) 2.24 cm^2 02/08/24 0900   Wound Volume (cm^3) 3.58 cm^3 02/08/24 0900   Wound healing % 92.31 02/08/24 0900   Tunneling [Depth (cm)/Location] 3 oclock/ 4 cm 08/17/23 1250   Undermining [Depth (cm)/Location] 11 - 3 o'/ 3.5cm 02/08/24 0900   Drainage Characteristics/Odor serosanguineous 02/08/24 0900   Drainage Amount moderate 02/08/24 0900   Care, Wound debrided 02/08/24 0900   Dressing Care other (see comments) 07/14/23 1544       Incision/Surgical Site 03/18/24 Anterior;Right Hip (Active)   Incision  Assessment UTV 04/04/24 0900   Dressing Other (Comment) 04/01/24 0000   Melissa-Incision Assessment UTV 04/04/24 0900   Closure KEANU 04/04/24 0900   Incision Drainage Amount UTV 04/04/24 0900   Drainage Description UTV 04/04/24 0900   Incision Care Other (Comment) 03/30/24 2039   Dressing Intervention Clean, dry, intact 04/04/24 0900      Photo: see media tab   Patient called for a telephone/ video visit. Certified Wound Care Nurse time spent evaluating patient record, completed a full evaluation and documented wound(s) & melissa-wound skin; provided recommendation based on treatment plan. Reviewed discharge instructions, patient education, and discussed plan of care with appropriate medical team staff members and patient and/or family members.     Further instructions from your care team         04/18/2024   Sacha Ndiaye   1975  A DME order was not completed because the supplies are ordered by home care or at a care facility  [facility]: [phone] Fax: [phone]    Plan: 4/18/24  Leave Drain in place and monitor output daily  Continue to strip the drain as needed to prevent blockage   Remove every other suture and staple Today and remove the rest of sutures and staples 4/22/24   Begin Sitting protocol May 6  Dressing changes outside of clinic are being performed by Home Care  Continue High protein diet, muscle milk,   Continue upper body physical therapy  Rene catheter changed last week; OK  Done Mattress: [facility] Patient is using a Group 2 mattress due to large,  Flap surgery  repair to Stage 4 pressure ulceration on the patient's pelvis that has failed to improve on a normal mattress despite regular wound cares and repositioning. A group 1 mattress is not adequate to offload these severe ulcerations. Patient has impaired sensation and is unable to reposition independently.    Skin care to surgical flap incison   Remove every other suture and staple today and the rest on 4/22/24  Leave RENARD  Drain in place and monitor daily output  Begin sitting protocol May 6    Important!!!:  After each sitting period/session, the wound or suture site must be checked by the nurse.  Any sign of pressure or damage (e.g. dehiscence of suture line, area of new drainage, poor capillary refill of flap) pushes the protocol back to point of no sign of damage.  All sitting protocols start with the patient IN BED flat on buttocks.    Sitting Protocol May 6, 2024  1.  Begin sitting up in bed for 15 minutes, 3-4 times per day.  2.  At 1-2 days move to 30 minutes of sitting in bed, 3-4 times per day.  If OK wound check, then advance sitting period by 15 minutes the next day or two.   a)   To 45 minutes sitting in bed  b)   To 60 minutes sitting in bed  3.  When patient can tolerate sitting in bed for sessions of 60 minutes, get wheelchair cushion mapped (in both the seating position and also in the tilted position if your chair tilts)  Move the patient to the chair to sit, starting with sitting for 60 minutes in the chair, 3 times per day, checking the flap and incisions after each sitting session.  If OK wound check, then advance sitting period in the chair by 15 minutes the next day or two.  To 1 hour, 15 minutes sitting in the chair  To 1 hour, 30 minutes sitting in the chair  To 1 hour, 45 minutes sitting in the chair  To 2 hours sitting in the chair  5.   When tolerating 2 hour sitting sessions, proceed with usual regimen, in wheelchair, with appropriate off-loading techniques.             Remember: If at any point during the sitting protocol, either in bed or in chair, the wound changes for the worse, return to the previous time period that was tolerated.  Call the [facility] regarding any changes, questions, or concerns at [phone].      Main Provider: Bull Harris M.D. April 18, 2024
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
## 2024-04-25 ENCOUNTER — VIRTUAL VISIT (OUTPATIENT)
Dept: INFECTIOUS DISEASES | Facility: CLINIC | Age: 49
End: 2024-04-25
Attending: INTERNAL MEDICINE
Payer: COMMERCIAL

## 2024-04-25 VITALS — BODY MASS INDEX: 22.35 KG/M2 | HEIGHT: 72 IN | WEIGHT: 165 LBS

## 2024-04-25 DIAGNOSIS — T84.7XXD HARDWARE COMPLICATING WOUND INFECTION, SUBSEQUENT ENCOUNTER: Primary | ICD-10-CM

## 2024-04-25 PROCEDURE — 99214 OFFICE O/P EST MOD 30 MIN: CPT | Mod: 24 | Performed by: INTERNAL MEDICINE

## 2024-04-25 ASSESSMENT — PAIN SCALES - GENERAL: PAINLEVEL: MILD PAIN (2)

## 2024-04-25 NOTE — NURSING NOTE
Vitals were taken last week per pt     Is the patient currently in the state of [place]? YES    Visit mode:VIDEO    If the visit is dropped, the patient can be reconnected by: VIDEO VISIT: Text to cell phone:   Telephone Information:   Mobile [phone]    and VIDEO VISIT: Send to e-mail at: [email or web address]    Will anyone else be joining the visit? NO  (If patient encounters technical issues they should call [phone]    How would you like to obtain your AVS? MyChart    Are changes needed to the allergy or medication list? No    Patient denies any changes since echeck-in completion and states all information entered during echeck-in remains accurate.    Are refills needed on medications prescribed by this physician? NO, already in process     Reason for visit: MIKO Grimes MA VVF
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
## 2024-05-09 ENCOUNTER — TELEPHONE (OUTPATIENT)
Dept: WOUND CARE | Facility: CLINIC | Age: 49
End: 2024-05-09
Payer: COMMERCIAL

## 2024-05-09 NOTE — TELEPHONE ENCOUNTER
Discussed with Dr. Harris. She does not see a need for a video or telephone visit today but would like imaging done before his appointment next week. Returned call to patient to discuss this. Order for CT scan placed by Dr. Harris and sent to [facility] as requested by patient. Dr. Harris gave ok for the patient to be up in his wheelchair to go to the imaging and then resume bedrest once completed. Patient verbalized understanding. No further questions or concerns. CT order faxed to [phone].

## 2024-05-09 NOTE — TELEPHONE ENCOUNTER
Patient called, sent a MyChart message, as well.  Concerned about his drains, wondering if he needs to be seen or have some imaging done.

## 2024-05-14 ENCOUNTER — MYC MEDICAL ADVICE (OUTPATIENT)
Dept: WOUND CARE | Facility: CLINIC | Age: 49
End: 2024-05-14
Payer: COMMERCIAL

## 2024-05-16 ENCOUNTER — HOSPITAL ENCOUNTER (OUTPATIENT)
Dept: WOUND CARE | Facility: CLINIC | Age: 49
Discharge: HOME | End: 2024-05-16
Attending: SURGERY | Admitting: SURGERY
Payer: COMMERCIAL

## 2024-05-16 VITALS — SYSTOLIC BLOOD PRESSURE: 131 MMHG | HEART RATE: 112 BPM | TEMPERATURE: 96.2 F | DIASTOLIC BLOOD PRESSURE: 89 MMHG

## 2024-05-16 DIAGNOSIS — Z98.890 STATUS POST FLAP GRAFT: ICD-10-CM

## 2024-05-16 DIAGNOSIS — L89.214 PRESSURE INJURY OF RIGHT HIP, STAGE 4 (H): Primary | ICD-10-CM

## 2024-05-16 DIAGNOSIS — G82.20 PARAPLEGIA (H): ICD-10-CM

## 2024-05-16 PROCEDURE — G0463 HOSPITAL OUTPT CLINIC VISIT: HCPCS

## 2024-05-16 PROCEDURE — 99024 POSTOP FOLLOW-UP VISIT: CPT | Performed by: SURGERY

## 2024-05-16 NOTE — PROGRESS NOTES
Patient Active Problem List   Diagnosis    Spinal stenosis    SCI (spinal cord injury)    Hardware complicating wound infection, initial encounter (H24)    Charcot's joint, vertebrae    Status post flap graft    Constipation    H/O lumbosacral spine surgery    Hip dislocation, bilateral (H)    Infection and inflammatory reaction due to internal orthopedic device, implant, and graft (H24)    Neurogenic bladder    Neurogenic bowel    Osteomyelitis of pelvic region or thigh, acute, left (H)    Chronic osteomyelitis involving pelvic region and thigh (H)    Paraplegia (H)    Traumatic spinal subdural hematoma    Hx of plastic surgery    Non-healing left groin open wound, initial encounter    Non-healing left groin open wound, subsequent encounter    Open wound of knee, leg, and ankle, left, initial encounter    Chronic antibiotic suppression    Chronic ulcer of sacral region (H)    Fixation hardware in spine    Hx of spinal surgery    Pressure injury of right hip, stage 4 (H)    Pressure injury of trochanteric region of left hip, stage 3 (H)    Open wound of hip     Past Medical History:   Diagnosis Date    Anemia     Charcot's joint     Chronic UTI     Constipation     Dislocated hip (H)     hyperlordosis    Epicondylitis     History of blood transfusion     Hx: UTI (urinary tract infection)     Neurogenic bladder     Other chronic pain     Paraplegia following spinal cord injury (H)     Pressure ulcer stage IV     left groin     Labs:   Recent Labs   Lab Test 04/01/24  0837 03/22/24  0735 03/21/24  0719 04/13/19  0651 01/17/19  0945 01/14/17  0636 01/13/17  0609   ALBUMIN  --   --  3.4*  --  2.9*  --   --    HGB 11.9*   < >  --    < >  --    < > 8.9*   WBC 8.7   < >  --    < >  --   --   --    A1C  --   --   --   --   --   --  4.7   CRP  --   --   --   --  37.8*  --   --     < > = values in this interval not displayed.     Nutrition requirements were discussed with patient today.  Vitals:  /89 (BP Location: Right  arm, Patient Position: Sitting, Cuff Size: Adult Regular)   Pulse 112   Temp (!) 96.2  F (35.7  C) (Temporal)   Wound:   Rash 11/28/16 Bilateral thigh (Active)      Photo:       Further instructions from your care team         05/16/2024   Sacha Grandend   1975  A DME order was not completed because the supplies are ordered by home care or at a care facility  [facility]: Discontinue Home care    Fax: [phone]     Plan: 5/16/24  Dr Harris will contact Dr Elder to discuss hip joint socket  Ortho will review scans and determine recommendations  Leave Drain in place and monitor output daily  Continue to strip the drain as needed to prevent blockage   Need Pressure map for wheel chair  Need Shower chair cushion  Mattress order: [facility] Phone: [phone] Fax: [phone]  Continue High protein diet, muscle milk,   Continue upper body physical therapy  Continue Mattress: [facility] Patient is using a Group 2 mattress due to large, post- Flap surgery repair and suspected hematoma to wound bed/ hip socket and Stage 4 pressure ulceration on the patient's pelvis that has failed to improve on a normal mattress despite regular wound cares and repositioning. A group 1 mattress is not adequate to offload these severe ulcerations. Patient has impaired sensation and is unable to reposition independently.     Skin care to surgical flap incison   Leave RENARD Drain in place; strip, monitor and record daily output  RENARD resecured with sutures today  Slowly begin sitting protocol while assessing drainage amount and color then continue time up twice a day as long as drainage is controlled; if bright red blood excessive then return to bed     Important!!!:  After each sitting period/session, the wound or suture site must be checked by the nurse.  Any sign of pressure or damage (e.g. dehiscence of suture line, area of new drainage, poor capillary refill of flap) pushes the protocol back to point of no sign of  damage.    Sitting Protocol May 16, 2024  1. When patient can tolerate sitting in bed for sessions of 60 minutes, get wheelchair cushion mapped (in both the seating position and also in the tilted position if your chair tilts)  2. Move the patient to the chair to sit, starting with sitting for 60 minutes in the chair, 3 times per day, checking the flap and incisions after each sitting session.  If OK wound check, then advance sitting period in the chair by 15 minutes the next day or two.  To 1 hour, 15 minutes sitting in the chair  To 1 hour, 30 minutes sitting in the chair  To 1 hour, 45 minutes sitting in the chair  To 2 hours sitting in the chair  3.   When tolerating 2 hour sitting sessions, proceed with usual regimen, in wheelchair, with appropriate off-loading techniques.                                                 Remember: If at any point during the sitting protocol, either in bed or in chair, the wound changes for the worse, return to the previous time period that was tolerated.  Call the [facility] [place] regarding any changes, questions, or concerns at [phone].       Main Provider: Bull Harris M.D. May 16, 2024

## 2024-05-16 NOTE — DISCHARGE INSTRUCTIONS
05/16/2024   Sacha Ndiaye   1975  A DME order was not completed because the supplies are ordered by home care or at a care facility  [facility]: Discontinue Home care    Fax: [phone]     Plan: 5/16/24  Dr Harris will contact Dr Elder to discuss hip joint socket  Ortho will review scans and determine recommendations  Leave Drain in place and monitor output daily  Continue to strip the drain as needed to prevent blockage   Need Pressure map for wheel chair  Need Shower chair cushion  Mattress order: [facility] Phone: [phone] Fax: [phone]  Continue High protein diet, muscle milk,   Continue upper body physical therapy  Continue Mattress: [facility] Patient is using a Group 2 mattress due to large, post- Flap surgery repair and suspected hematoma to wound bed/ hip socket and Stage 4 pressure ulceration on the patient's pelvis that has failed to improve on a normal mattress despite regular wound cares and repositioning. A group 1 mattress is not adequate to offload these severe ulcerations. Patient has impaired sensation and is unable to reposition independently.     Skin care to surgical flap incison   Leave RENARD Drain in place; strip, monitor and record daily output  RENARD resecured with sutures today  Slowly begin sitting protocol while assessing drainage amount and color then continue time up twice a day as long as drainage is controlled; if bright red blood excessive then return to bed     Important!!!:  After each sitting period/session, the wound or suture site must be checked by the nurse.  Any sign of pressure or damage (e.g. dehiscence of suture line, area of new drainage, poor capillary refill of flap) pushes the protocol back to point of no sign of damage.    Sitting Protocol May 16, 2024  1. When patient can tolerate sitting in bed for sessions of 60 minutes, get wheelchair cushion mapped (in both the seating position and also in the tilted position if your chair  tilts)  2. Move the patient to the chair to sit, starting with sitting for 60 minutes in the chair, 3 times per day, checking the flap and incisions after each sitting session.  If OK wound check, then advance sitting period in the chair by 15 minutes the next day or two.  To 1 hour, 15 minutes sitting in the chair  To 1 hour, 30 minutes sitting in the chair  To 1 hour, 45 minutes sitting in the chair  To 2 hours sitting in the chair  3.   When tolerating 2 hour sitting sessions, proceed with usual regimen, in wheelchair, with appropriate off-loading techniques.                                                 Remember: If at any point during the sitting protocol, either in bed or in chair, the wound changes for the worse, return to the previous time period that was tolerated.  Call the [facility] [place] regarding any changes, questions, or concerns at [phone].       Main Provider: Bull Harris M.D. May 16, 2024    Call us at [phone] if you have any questions about your wounds, if you have redness or swelling around your wound, have a fever of 101 degrees Fahrenheit or greater or if you have any other problems or concerns. We answer the phone Monday through Friday 8 am to 4 pm, please leave a message as we check the voicemail frequently throughout the day. If you have a concern over the weekend, please leave a message and we will return your call Monday. If the need is urgent, go to the ER or urgent care.    If you had a positive experience please indicate that on your patient satisfaction survey form that [facility] will be sending you.  It was a pleasure meeting with you today.  Thank you for allowing me and my team the privilege of caring for you today.  YOU are the reason we are here, and I truly hope we provided you with the excellent service you deserve.  Please let us know if there is anything else we can do for you so that we can be sure you are leaving  completely satisfied with your care experience.    If you have any billing related questions please call the [facility] Business office at [phone]. The clinic staff does not handle billing related matters.  If you are scheduled to have a follow up appointment, you will receive a reminder call the day before your visit. On the appointment day please arrive 15 minutes prior to your appointment time. If you are unable to keep that appointment, please call the clinic to cancel or reschedule. If you are more than 10 minutes late or greater for your scheduled appointment time, the clinic policy is that you may be asked to reschedule.

## 2024-05-16 NOTE — PROGRESS NOTES
[facility]  PROGRESS NOTE     HISTORY OF PRESENT ILLNESS: Rommel is a 49 yo paraplegic male well-known to me since 2015. Has had paraspinal wounds, L IT and now dealing with a R trochanteric ulcer.      INTERVAL HISTORY:   11/16/23: Rommel has been seeing our PA, Sandra Mendiola, for his R troch ulcer. His wife is doing the VAC dressing changes using white sponge, with pressure @ 200 mmHg continuous therapy. (Having to charge extra during daytime hours now). He has noticed that the sponge is often already displaced at the time of the next dressing change. 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  A referral to Dr Elder was submitted last month but Sacha has not been contacted about a consult.      He is trying to offload this area as much as possible, but still working. He takes an offloading break over lunch, then gets off it again when he gets back for the evening. He does frequent weight shifts while at work, but is still in his wheelchair for at least 3-4 hours.     1/4/24:   Patient reports hip swelling has gone away, did not have any drainage. Has been using white sponge with 150 pressure and reports it seemed like it helped. Wound cares done only by wife since he is still working. Feels fine - no symptoms of infection.      2/8/24: Rommel is here for a final visit before his surgery is scheduled for 3/18 with Dr Elder. He is worried if Girdlestone done how that might affect his sitting and leg mobility. We just won't know what he needs until we get into debridement of pocket to see if connects with joint space.      He is already well-versed on post-flap protocol and will need to go to a facility. Unfortunately, we will not know what skilled nursing cares he will need until after his surgery is completed and microbiology results are back.       5/16: Rommel here today with wife for 9 week  post-flap check.    Reports 20-30mL drainage in 24 hours. Hasn't been following sitting protocol because he bleeds a lot when he sits up and has more drainage. Takes about a day for the bloody drainage to clear after he sits up. Sat up straight in bed yesterday for 45 minutes but did not observe bloody drainage at that time however.     CT scan images reviewed. No indications from CT with contrast that would shed light on this intermittent bloody output after sitting sessions.     Has been experiencing less (but still some) dislocation since the flap. Everything on the outside looks great. Home care has still been coming to help with baths. During baths has been sitting on cushioned transfer bench for about 20 minutes. Does not use it for toileting. Rommel reports no pain.     PHYSICAL EXAM:   11/16/23:  R trochanter with moderate undermined pocket extending anteriorly. No bone exposed. Granulation tissue seems a bit boggy, most likely from using white instead of black VAC sponge. Fairly clean. Periwound skin intact. No signs of gross infection.      1/4/24: R trochanter - edges callused and not too macerated. Visible wound bed appears clean with granulation tissue and no exposed bone. Undermining tracks anterior to greater trochanter, not sure if any occult tracts to joint space. Patient showed photo of xray with flattened acetabulum. This is most likely part of the recurrent hip posterior dislocations.      Measurements on the outside are smaller.      2/8/24: R trochanteric ulcer - getting smaller around outside with macerated callused skin edges. Still significant pocket undermining proximally as well as anterior to greater trochanter. Drainage somewhat viscous and serous. Small area of exposed bone proximally.    5/16: Right TFL flap well healed, viable appearance externally. RENARD drain still in place and patent but suture reinforced.  output from trip to clinic serosanguinous    Please see nursing notes for  "wound measurements, photos and vital signs.    PROCEDURE:   5/16: With patient's informed verbal consent, re-secured RENARD site.      ASSESSMENT/ PLAN:   11/16/23: continue with VAC. Have wife pack white sponge just short of wound depth to allow for space to fill in. Will followup with Dr Elder to render an opinion about the options for a dislocating hip in the setting of a pressure ulcer: bone debridement and VAC vs immediate or delayed flap, etc. Rommel is willing to do whatever is necessary to get the wound healed but will need to plan if we decide on flap as far as bedrest time.      1/4/24: Switch to black foam, ensure undermined area is fully packed by \"ribbon-ing\"/\"snaking\" a finger-width strip as it gets more superficial to the outer skin opening. 175 mmHg continuous pressure.      Work on scheduling excision of H.O. w/ probable TFL flap, maybe even Girdlestone if involves joint, at least as far out as February secondary to patient's work arrangements. Consult with Dr. Elder for combo case.      2/8/24: continue current cares with VASHE soaks and VAC changes with white sponge until surgery date 3/18. He will schedule his H&P once it is within 30 days. They will let us know if they need anything else to tide them over until surgery.     5/16:   -Review CT with Dr. Elder   -Continue same cares  -Try to increase length of time sitting up rather than frequency. Try twice a day for ~2-3 hours at a time and monitor for drainage changes in amount and color. If bloody drainage stops, continue to increase sitting time.     -Reschedule wheelchair cushion mapping   - Discontinue home care service  -Ask Annette about new shower cushion/chair, even though only sits for about 20 minutes for toiletting.   -Continue using air mattress       FOLLOW-UP:   11/16/23: scheduled with Sandra on 12/15 and 1/15/24.   1/4/24: Cancel next appt with Sandra unless need help with dressings. Schedule with me again in February. Will let "  contact patient when find combo date.   2/8/24: we have appointments scheduled for post-flap check on 4/18 and 5/16.    5/16: Video follow-up to be scheduled with me

## 2024-05-24 ENCOUNTER — TELEPHONE (OUTPATIENT)
Dept: WOUND CARE | Facility: CLINIC | Age: 49
End: 2024-05-24
Payer: COMMERCIAL

## 2024-05-24 NOTE — TELEPHONE ENCOUNTER
Return call to [facility] nurse and LVM that if possible [facility] nurse could continue to monitor the Drain output if possible.   Will discuss further with Dr Harris next week to check if she wants Home care to continue.   Patient was informed this could take several months to completely heal and have the hip joint scar down in place.

## 2024-05-24 NOTE — TELEPHONE ENCOUNTER
Received a call from nurse Juarez with [facility]. She received orders to discharge patient from home care but concerned that drain is still in patients leg. Will route to nurse who works with Dr. Sewell closely. Scott number is [phone].

## 2024-05-28 ENCOUNTER — TRANSFERRED RECORDS (OUTPATIENT)
Dept: HEALTH INFORMATION MANAGEMENT | Facility: CLINIC | Age: 49
End: 2024-05-28
Payer: COMMERCIAL

## 2024-05-28 ENCOUNTER — MYC MEDICAL ADVICE (OUTPATIENT)
Dept: PLASTIC SURGERY | Facility: CLINIC | Age: 49
End: 2024-05-28
Payer: COMMERCIAL

## 2024-05-28 DIAGNOSIS — L89.214 PRESSURE INJURY OF RIGHT HIP, STAGE 4 (H): ICD-10-CM

## 2024-05-28 DIAGNOSIS — G82.20 PARAPLEGIA (H): ICD-10-CM

## 2024-05-28 DIAGNOSIS — Z98.890 STATUS POST FLAP GRAFT: Primary | ICD-10-CM

## 2024-05-30 ENCOUNTER — MEDICAL CORRESPONDENCE (OUTPATIENT)
Dept: HEALTH INFORMATION MANAGEMENT | Facility: CLINIC | Age: 49
End: 2024-05-30
Payer: COMMERCIAL

## 2024-05-30 ENCOUNTER — TELEPHONE (OUTPATIENT)
Dept: WOUND CARE | Facility: CLINIC | Age: 49
End: 2024-05-30
Payer: COMMERCIAL

## 2024-05-30 NOTE — TELEPHONE ENCOUNTER
Home care nurse called and LVM that she can discharge the Patient on 6/3/24. No need to return call.

## 2024-05-30 NOTE — TELEPHONE ENCOUNTER
Home care nurse left message.  Needs to re certify or discharge patient 6.3, but is concerned as he still has a drain in.  Also feels he is not getting much from the home care.  Would like to discuss plan going forward.  Katie

## 2024-06-09 ENCOUNTER — HEALTH MAINTENANCE LETTER (OUTPATIENT)
Age: 49
End: 2024-06-09

## 2024-06-12 ENCOUNTER — TELEPHONE (OUTPATIENT)
Dept: WOUND CARE | Facility: CLINIC | Age: 49
End: 2024-06-12
Payer: COMMERCIAL

## 2024-06-12 NOTE — TELEPHONE ENCOUNTER
Patient called reporting that he thinks the RENARD drain has dislodged or fallen out. He states the Bulb no longer holds any suction pressure. He is home alone and will await return of spouse to manage drain and determine if it is out or remains intact. He reports that he is getting about 40cc every day in the RENARD bulb and the consistency is more clotted now.     Appointment offered to see Dr Harris 6/13/24 and he will discuss with his spouse and cancel if he is unable to make it.

## 2024-06-13 ENCOUNTER — HOSPITAL ENCOUNTER (OUTPATIENT)
Dept: WOUND CARE | Facility: CLINIC | Age: 49
Discharge: HOME | End: 2024-06-13
Attending: SURGERY
Payer: COMMERCIAL

## 2024-06-13 DIAGNOSIS — S81.802A OPEN WOUND OF KNEE, LEG, AND ANKLE, LEFT, INITIAL ENCOUNTER: ICD-10-CM

## 2024-06-13 DIAGNOSIS — S81.002A OPEN WOUND OF KNEE, LEG, AND ANKLE, LEFT, INITIAL ENCOUNTER: ICD-10-CM

## 2024-06-13 DIAGNOSIS — S31.104A NON-HEALING LEFT GROIN OPEN WOUND, INITIAL ENCOUNTER: ICD-10-CM

## 2024-06-13 DIAGNOSIS — L89.214 PRESSURE INJURY OF RIGHT HIP, STAGE 4 (H): Primary | ICD-10-CM

## 2024-06-13 DIAGNOSIS — S91.002A OPEN WOUND OF KNEE, LEG, AND ANKLE, LEFT, INITIAL ENCOUNTER: ICD-10-CM

## 2024-06-13 DIAGNOSIS — Z98.890 STATUS POST FLAP GRAFT: ICD-10-CM

## 2024-06-13 PROCEDURE — 99024 POSTOP FOLLOW-UP VISIT: CPT | Mod: 95 | Performed by: SURGERY

## 2024-06-13 NOTE — DISCHARGE INSTRUCTIONS
06/13/2024   Sacha Grandend   1975  A DME order was not completed because supplies were not needed    Plan: 6/13/24  Order: Ultrasound to assess drainage in socket next week [facility] Fax: [phone]  If increase drainage will have Interventional Radiology insert drain    Dr Harris will contact Dr Elder to discuss hip joint socket  Ortho will review scans and determine recommendations  Done: Pressure map for wheel chair  Pending:  Shower chair cushion  Continue High protein diet, muscle milk,   Continue upper body physical therapy  Done: [facility] Patient is using and continue to needs a Group 2 mattress due to large, post-flap surgery repair and suspected hematoma to wound bed/ hip socket and Stage 4 pressure ulceration on the patient's pelvis that has failed to improve on a normal mattress despite regular wound cares and repositioning. A group 1 mattress is not adequate to offload these severe ulcerations. Patient has impaired sensation and is unable to reposition independently.     Skin care to surgical drain site    Betadine swab to area and cover with dry gauze/ Bandaid  Change 1-2 times a day and as needed for soilage  Continue to increase sitting protocol and normal activities as tolerated      Main Provider: Bull Harris M.D. June 13, 2024    Call us at [phone] if you have any questions about your wounds, if you have redness or swelling around your wound, have a fever of 101 degrees Fahrenheit or greater or if you have any other problems or concerns. We answer the phone Monday through Friday 8 am to 4 pm, please leave a message as we check the voicemail frequently throughout the day. If you have a concern over the weekend, please leave a message and we will return your call Monday. If the need is urgent, go to the ER or urgent care.    If you had a positive experience please indicate that on your patient satisfaction survey form that [facility] will be  sending you.  It was a pleasure meeting with you today.  Thank you for allowing me and my team the privilege of caring for you today.  YOU are the reason we are here, and I truly hope we provided you with the excellent service you deserve.  Please let us know if there is anything else we can do for you so that we can be sure you are leaving completely satisfied with your care experience.      If you have any billing related questions please call the [facility] Business office at [phone]. The clinic staff does not handle billing related matters.  If you are scheduled to have a follow up appointment, you will receive a reminder call the day before your visit. On the appointment day please arrive 15 minutes prior to your appointment time. If you are unable to keep that appointment, please call the clinic to cancel or reschedule. If you are more than 10 minutes late or greater for your scheduled appointment time, the clinic policy is that you may be asked to reschedule.

## 2024-06-13 NOTE — PROGRESS NOTES
Patient Active Problem List   Diagnosis    Spinal stenosis    SCI (spinal cord injury)    Hardware complicating wound infection, initial encounter (H24)    Charcot's joint, vertebrae    Status post flap graft    Constipation    H/O lumbosacral spine surgery    Hip dislocation, bilateral (H)    Infection and inflammatory reaction due to internal orthopedic device, implant, and graft (H24)    Neurogenic bladder    Neurogenic bowel    Osteomyelitis of pelvic region or thigh, acute, left (H)    Chronic osteomyelitis involving pelvic region and thigh (H)    Paraplegia (H)    Traumatic spinal subdural hematoma    Hx of plastic surgery    Non-healing left groin open wound, initial encounter    Non-healing left groin open wound, subsequent encounter    Open wound of knee, leg, and ankle, left, initial encounter    Chronic antibiotic suppression    Chronic ulcer of sacral region (H)    Fixation hardware in spine    Hx of spinal surgery    Pressure injury of right hip, stage 4 (H)    Pressure injury of trochanteric region of left hip, stage 3 (H)    Open wound of hip     Past Medical History:   Diagnosis Date    Anemia     Charcot's joint     Chronic UTI     Constipation     Dislocated hip (H)     hyperlordosis    Epicondylitis     History of blood transfusion     Hx: UTI (urinary tract infection)     Neurogenic bladder     Other chronic pain     Paraplegia following spinal cord injury (H)     Pressure ulcer stage IV     left groin     Labs:   Recent Labs   Lab Test 04/01/24  0837 03/22/24  0735 03/21/24  0719 04/13/19  0651 01/17/19  0945 01/14/17  0636 01/13/17  0609   ALBUMIN  --   --  3.4*  --  2.9*  --   --    HGB 11.9*   < >  --    < >  --    < > 8.9*   WBC 8.7   < >  --    < >  --   --   --    A1C  --   --   --   --   --   --  4.7   CRP  --   --   --   --  37.8*  --   --     < > = values in this interval not displayed.     Nutrition requirements were discussed with patient today.  Vitals:  There were no vitals taken  for this visit.  Wound:   Rash 11/28/16 Bilateral thigh (Active)    Patient called for a telephone visit. Certified Wound Care Nurse time spent evaluating patient record, completed a full evaluation and documented wound(s) & jazmine-wound skin; provided recommendation based on treatment plan. Reviewed discharge instructions, patient education, and discussed plan of care with appropriate medical team staff members and patient and/or family members.   Photo: see media tab     Further instructions from your care team         06/13/2024   Sacha Ndiaye   1975  A DME order was not completed because supplies were not needed    Plan: 6/13/24  Order: Ultrasound to assess drainage in socket next week [facility] Fax: [phone]  If increase drainage will have Interventional Radiology insert drain    Dr Harris will contact Dr Elder to discuss hip joint socket  Ortho will review scans and determine recommendations  Done: Pressure map for wheel chair  Pending:  Shower chair cushion  Continue High protein diet, muscle milk,   Continue upper body physical therapy  Done: [facility] Patient is using and continue to needs a Group 2 mattress due to large, post-flap surgery repair and suspected hematoma to wound bed/ hip socket and Stage 4 pressure ulceration on the patient's pelvis that has failed to improve on a normal mattress despite regular wound cares and repositioning. A group 1 mattress is not adequate to offload these severe ulcerations. Patient has impaired sensation and is unable to reposition independently.     Skin care to surgical drain site    Betadine swab to area and cover with dry gauze/ Bandaid  Change 1-2 times a day and as needed for soilage  Continue to increase sitting protocol and normal activities as tolerated      Main Provider: Bull Harris M.D. June 13, 2024

## 2024-06-13 NOTE — PROGRESS NOTES
[facility]  PROGRESS NOTE     HISTORY OF PRESENT ILLNESS: Rommel is a 47 yo paraplegic male well-known to me since 2015. Has had paraspinal wounds, L IT and now dealing with a R trochanteric ulcer.      INTERVAL HISTORY:   11/16/23: Rommel has been seeing our PA, Sandra Mendiola, for his R troch ulcer. His wife is doing the VAC dressing changes using white sponge, with pressure @ 200 mmHg continuous therapy. (Having to charge extra during daytime hours now). He has noticed that the sponge is often already displaced at the time of the next dressing change. 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  A referral to Dr Elder was submitted last month but Sacha has not been contacted about a consult.      He is trying to offload this area as much as possible, but still working. He takes an offloading break over lunch, then gets off it again when he gets back for the evening. He does frequent weight shifts while at work, but is still in his wheelchair for at least 3-4 hours.     1/4/24:   Patient reports hip swelling has gone away, did not have any drainage. Has been using white sponge with 150 pressure and reports it seemed like it helped. Wound cares done only by wife since he is still working. Feels fine - no symptoms of infection.      2/8/24: Rommel is here for a final visit before his surgery is scheduled for 3/18 with Dr Elder. He is worried if Girdlestone done how that might affect his sitting and leg mobility. We just won't know what he needs until we get into debridement of pocket to see if connects with joint space.      He is already well-versed on post-flap protocol and will need to go to a facility. Unfortunately, we will not know what skilled nursing cares he will need until after his surgery is completed and microbiology results are back.         5/16: Rommel here today with wife for 9  week post-flap check.     Reports 20-30mL drainage in 24 hours. Hasn't been following sitting protocol because he bleeds a lot when he sits up and has more drainage. Takes about a day for the bloody drainage to clear after he sits up. Sat up straight in bed yesterday for 45 minutes but did not observe bloody drainage at that time however.      CT scan images reviewed. No indications from CT with contrast that would shed light on this intermittent bloody output after sitting sessions.      Has been experiencing less (but still some) dislocation since the flap. Everything on the outside looks great. Home care has still been coming to help with baths. During baths has been sitting on cushioned transfer bench for about 20 minutes. Does not use it for toileting. Rommel reports no pain.     6/13: Video visit today. Rommel's drain was accidentally pulled yesterday (6/12) when it got accidentally hooked on his pants as he was putting them on. Denies fever, chills. No fluid leakage, pain, redness, or swelling.  Reports normal yellowish, pinkish drainage depending on what he's doing (more drainage when he's more active). Did say there were more clots in the drainage, both red and white. Rommel states he put a wheel bearing in his pickup truck (which was some relatively strenuous full body activity) and after that he said he noticed more fluid output but it wasn't bloody at all. No systemic or external wound changes reported. Video visit lasted ~10 minutes.          PHYSICAL EXAM:   11/16/23:  R trochanter with moderate undermined pocket extending anteriorly. No bone exposed. Granulation tissue seems a bit boggy, most likely from using white instead of black VAC sponge. Fairly clean. Periwound skin intact. No signs of gross infection.      1/4/24: R trochanter - edges callused and not too macerated. Visible wound bed appears clean with granulation tissue and no exposed bone. Undermining tracks anterior to greater trochanter,  "not sure if any occult tracts to joint space. Patient showed photo of xray with flattened acetabulum. This is most likely part of the recurrent hip posterior dislocations.      Measurements on the outside are smaller.      2/8/24: R trochanteric ulcer - getting smaller around outside with macerated callused skin edges. Still significant pocket undermining proximally as well as anterior to greater trochanter. Drainage somewhat viscous and serous. Small area of exposed bone proximally.     5/16: Right TFL flap well healed, viable appearance externally. RENARD drain still in place and patent but suture reinforced.  output from trip to clinic serosanguinous    6/13:  Not done today due to video call. No systemic or external wound changes reported.         Please see nursing notes for wound measurements, photos and vital signs.      PROCEDURE:   5/16: With patient's informed verbal consent, re-secured RENARD site.   6/13: None, not in person          ASSESSMENT/ PLAN:   11/16/23: continue with VAC. Have wife pack white sponge just short of wound depth to allow for space to fill in. Will followup with Dr Elder to render an opinion about the options for a dislocating hip in the setting of a pressure ulcer: bone debridement and VAC vs immediate or delayed flap, etc. Rommel is willing to do whatever is necessary to get the wound healed but will need to plan if we decide on flap as far as bedrest time.      1/4/24: Switch to black foam, ensure undermined area is fully packed by \"ribbon-ing\"/\"snaking\" a finger-width strip as it gets more superficial to the outer skin opening. 175 mmHg continuous pressure.      Work on scheduling excision of H.O. w/ probable TFL flap, maybe even Girdlestone if involves joint, at least as far out as February secondary to patient's work arrangements. Consult with Dr. Elder for combo case.      2/8/24: continue current cares with VASHE soaks and VAC changes with white sponge until surgery date 3/18. " He will schedule his H&P once it is within 30 days. They will let us know if they need anything else to tide them over until surgery.      5/16:   -Review CT with Dr. Elder   -Continue same cares  -Try to increase length of time sitting up rather than frequency. Try twice a day for ~2-3 hours at a time and monitor for drainage changes in amount and color. If bloody drainage stops, continue to increase sitting time.   -Reschedule wheelchair cushion mapping   - Discontinue home care service  -Ask [place] about new shower cushion/chair, even though only sits for about 20 minutes for toiletting.   -Continue using air mattress    6/13: No changes in cares. Rommel to schedule an ultrasound ([facility]) for next Wednesday to assess drainage in hip socket, possibly indicating need for percutaneous drain replacement by IR.    Rommel is still working on getting his shower chair cushion. Still has group 2 mattress. Pressure mapping went well.         FOLLOW-UP:   11/16/23: scheduled with Sandra on 12/15 and 1/15/24.   1/4/24: Cancel next appt with Sandra unless need help with dressings. Schedule with me again in February. Will let  contact patient when find combo date.   2/8/24: we have appointments scheduled for post-flap check on 4/18 and 5/16.  5/16: Video follow-up to be scheduled with me     6/13: Scheduled for next Thursday

## 2024-06-19 ENCOUNTER — TRANSFERRED RECORDS (OUTPATIENT)
Dept: HEALTH INFORMATION MANAGEMENT | Facility: CLINIC | Age: 49
End: 2024-06-19
Payer: COMMERCIAL

## 2024-06-20 ENCOUNTER — HOSPITAL ENCOUNTER (OUTPATIENT)
Dept: WOUND CARE | Facility: CLINIC | Age: 49
Discharge: HOME | End: 2024-06-20
Attending: SURGERY
Payer: COMMERCIAL

## 2024-06-20 DIAGNOSIS — S81.002A OPEN WOUND OF KNEE, LEG, AND ANKLE, LEFT, INITIAL ENCOUNTER: ICD-10-CM

## 2024-06-20 DIAGNOSIS — L89.214 PRESSURE INJURY OF RIGHT HIP, STAGE 4 (H): Primary | ICD-10-CM

## 2024-06-20 DIAGNOSIS — S91.002A OPEN WOUND OF KNEE, LEG, AND ANKLE, LEFT, INITIAL ENCOUNTER: ICD-10-CM

## 2024-06-20 DIAGNOSIS — S81.802A OPEN WOUND OF KNEE, LEG, AND ANKLE, LEFT, INITIAL ENCOUNTER: ICD-10-CM

## 2024-06-20 DIAGNOSIS — L89.223: ICD-10-CM

## 2024-06-20 DIAGNOSIS — S31.104A NON-HEALING LEFT GROIN OPEN WOUND, INITIAL ENCOUNTER: ICD-10-CM

## 2024-06-20 PROCEDURE — 99212 OFFICE O/P EST SF 10 MIN: CPT | Mod: 95 | Performed by: SURGERY

## 2024-06-20 NOTE — DISCHARGE INSTRUCTIONS
06/20/2024   Sacha Ndiaye   1975      A DME order was not completed because supplies were not needed    Plan: 6/20/24  -Will discuss work return in a month after you find out the bare minimum of hours needed   -CRP and ESR labs are recommended to get done at your clinic next week. Orders have been placed. If elevated may warrant a sooner ultrasound. Sent to [facility] Fax: [phone]  -Will place order for another ultrasound in 4 weeks (mid July) to assess drainage in socket to see if the areas get larger than the first ultrasound. If the drainage area has increased IR may have to place a drain. US faxed to [facility] Fax: [phone]  -Order placed for pressure mapping [facility] in [place] Fax:[phone]    DONE: Dr Harris will contact Dr Elder to discuss hip joint socket. Ortho will review scans and determine recommendations  Done: Pressure map for wheel chair  Pending: Shower chair cushion    Continue High protein diet, muscle milk,  Continue upper body physical therapy    Done: [facility] Patient is using and continue to needs a Group 2  mattress due to large, post-flap surgery repair and suspected hematoma to wound  bed/ hip socket and Stage 4 pressure ulceration on the patient's pelvis that has failed  to improve on a normal mattress despite regular wound cares and repositioning. A  group 1 mattress is not adequate to offload these severe ulcerations. Patient has  impaired sensation and is unable to reposition independently.    Skin care to surgical drain site  -Keep area clean and dry  -Continue to increase sitting protocol and normal activities as tolerated     Main Provider: Bull Harris M.D. June 20, 2024    Call us at [phone] if you have any questions about your wounds, if you have redness or swelling around your wound, have a fever of 101 degrees Fahrenheit or greater or if you have any other problems or concerns. We  answer the phone Monday through Friday 8 am to 4 pm, please leave a message as we check the voicemail frequently throughout the day. If you have a concern over the weekend, please leave a message and we will return your call Monday. If the need is urgent, go to the ER or urgent care.    If you had a positive experience please indicate that on your patient satisfaction survey form that [facility] will be sending you.    It was a pleasure meeting with you today.  Thank you for allowing me and my team the privilege of caring for you today.  YOU are the reason we are here, and I truly hope we provided you with the excellent service you deserve.  Please let us know if there is anything else we can do for you so that we can be sure you are leaving completely satisfied with your care experience.      If you have any billing related questions please call the [facility] Business office at [phone]. The clinic staff does not handle billing related matters.    If you are scheduled to have a follow up appointment, you will receive a reminder call the day before your visit. On the appointment day please arrive 15 minutes prior to your appointment time. If you are unable to keep that appointment, please call the clinic to cancel or reschedule. If you are more than 10 minutes late or greater for your scheduled appointment time, the clinic policy is that you may be asked to reschedule.

## 2024-06-20 NOTE — ADDENDUM NOTE
Encounter addended by: Cheryl Harris MD on: 6/20/2024 3:34 PM   Actions taken: Order list changed, Diagnosis association updated

## 2024-06-20 NOTE — PROGRESS NOTES
[facility]  PROGRESS NOTE     HISTORY OF PRESENT ILLNESS: Rommel is a 49 yo paraplegic male well-known to me since 2015. Has had paraspinal wounds, L IT and now dealing with a R trochanteric ulcer.      INTERVAL HISTORY:   11/16/23: Rommel has been seeing our PA, Sandra Mendiola, for his R troch ulcer. His wife is doing the VAC dressing changes using white sponge, with pressure @ 200 mmHg continuous therapy. (Having to charge extra during daytime hours now). He has noticed that the sponge is often already displaced at the time of the next dressing change. 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  A referral to Dr Elder was submitted last month but Sacha has not been contacted about a consult.      He is trying to offload this area as much as possible, but still working. He takes an offloading break over lunch, then gets off it again when he gets back for the evening. He does frequent weight shifts while at work, but is still in his wheelchair for at least 3-4 hours.     1/4/24:   Patient reports hip swelling has gone away, did not have any drainage. Has been using white sponge with 150 pressure and reports it seemed like it helped. Wound cares done only by wife since he is still working. Feels fine - no symptoms of infection.      2/8/24: Rommel is here for a final visit before his surgery is scheduled for 3/18 with Dr Elder. He is worried if Girdlestone done how that might affect his sitting and leg mobility. We just won't know what he needs until we get into debridement of pocket to see if connects with joint space.      He is already well-versed on post-flap protocol and will need to go to a facility. Unfortunately, we will not know what skilled nursing cares he will need until after his surgery is completed and microbiology results are back.         5/16: Rommel here today with wife for 9  "week post-flap check.     Reports 20-30mL drainage in 24 hours. Hasn't been following sitting protocol because he bleeds a lot when he sits up and has more drainage. Takes about a day for the bloody drainage to clear after he sits up. Sat up straight in bed yesterday for 45 minutes but did not observe bloody drainage at that time however.      CT scan images reviewed. No indications from CT with contrast that would shed light on this intermittent bloody output after sitting sessions.      Has been experiencing less (but still some) dislocation since the flap. Everything on the outside looks great. Home care has still been coming to help with baths. During baths has been sitting on cushioned transfer bench for about 20 minutes. Does not use it for toileting. Rommel reports no pain.     6/13: Video visit today. Rommel's drain was accidentally pulled yesterday (6/12) when it got accidentally hooked on his pants as he was putting them on. Denies fever, chills. No fluid leakage, pain, redness, or swelling.  Reports normal yellowish, pinkish drainage depending on what he's doing (more drainage when he's more active). Did say there were more clots in the drainage, both red and white. Rommel states he put a wheel bearing in his pickup truck (which was some relatively strenuous full body activity) and after that he said he noticed more fluid output but it wasn't bloody at all. No systemic or external wound changes reported. Video visit lasted ~10 minutes.     6/20/24:  video visit x 10 minutes. Sacha's ultrasound showed 2 small fluid collections along the lateral leg (3.1 x 1.2 x 0.6 cms) and deep hip (3.4 x 2.5 x 1 cms). This indicates that the wound space is absorbing at least some of the fluid that would have been drained by the RENARD had it not fallen out. He feels fine and the flap site is intact and healthy looking. He has been trying to increase his activities to bet back to his baseline without being \"stupid\". He " understands that his hip joint is abnormal, and he actually noticed the position of his leg has changed when he is in his wheelchair. He is hoping to get another pressure mapping to make sure things are not shifting for the worse.     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     He is going to talk with his work about how many hours would be required for retaining his insurance coverage, at least while he continues to heal things from the inside to stabilize his hip joint.      PHYSICAL EXAM:   11/16/23:  R trochanter with moderate undermined pocket extending anteriorly. No bone exposed. Granulation tissue seems a bit boggy, most likely from using white instead of black VAC sponge. Fairly clean. Periwound skin intact. No signs of gross infection.      1/4/24: R trochanter - edges callused and not too macerated. Visible wound bed appears clean with granulation tissue and no exposed bone. Undermining tracks anterior to greater trochanter, not sure if any occult tracts to joint space. Patient showed photo of xray with flattened acetabulum. This is most likely part of the recurrent hip posterior dislocations.      Measurements on the outside are smaller.      2/8/24: R trochanteric ulcer - getting smaller around outside with macerated callused skin edges. Still significant pocket undermining proximally as well as anterior to greater trochanter. Drainage somewhat viscous and serous. Small area of exposed bone proximally.     5/16: Right TFL flap well healed, viable appearance externally. RENARD drain still in place and patent but suture reinforced.  output from trip to clinic serosanguinous    6/13:  Not done today due to video call. No systemic or external wound changes reported.     6/20/24: no exam today on video.    Please see nursing notes for wound measurements, photos and vital signs.      PROCEDURE:   5/16: With patient's informed verbal  "consent, re-secured RENARD site.   6/13: None, not in person   6/20/24: none.         ASSESSMENT/ PLAN:   11/16/23: continue with VAC. Have wife pack white sponge just short of wound depth to allow for space to fill in. Will followup with Dr Elder to render an opinion about the options for a dislocating hip in the setting of a pressure ulcer: bone debridement and VAC vs immediate or delayed flap, etc. Rommel is willing to do whatever is necessary to get the wound healed but will need to plan if we decide on flap as far as bedrest time.      1/4/24: Switch to black foam, ensure undermined area is fully packed by \"ribbon-ing\"/\"snaking\" a finger-width strip as it gets more superficial to the outer skin opening. 175 mmHg continuous pressure.      Work on scheduling excision of H.O. w/ probable TFL flap, maybe even Girdlestone if involves joint, at least as far out as February secondary to patient's work arrangements. Consult with Dr. Elder for combo case.      2/8/24: continue current cares with VASHE soaks and VAC changes with white sponge until surgery date 3/18. He will schedule his H&P once it is within 30 days. They will let us know if they need anything else to tide them over until surgery.      5/16:   -Review CT with Dr. Elder   -Continue same cares  -Try to increase length of time sitting up rather than frequency. Try twice a day for ~2-3 hours at a time and monitor for drainage changes in amount and color. If bloody drainage stops, continue to increase sitting time.   -Reschedule wheelchair cushion mapping   - Discontinue home care service  -Ask [place] about new shower cushion/chair, even though only sits for about 20 minutes for toiletting.   -Continue using air mattress    6/13: No changes in cares. Rommel to schedule an ultrasound ([facility]) for next Wednesday to assess drainage in hip socket, possibly indicating need for percutaneous drain replacement by WHIT [place] is " still working on getting his shower chair cushion. Still has group 2 mattress. Pressure mapping went well.     6/20/24: continue to use caution while increasing activities. Notify us if any changes in overall health or appearance of flap. Will place orders for ESR, CRP next week at [facility]. Will place order for repeat hip ultrasound for late July to follow up on progression of seromas that may need percutaneous drain placement. Order placed for new pressure mapping with [facility] in [place].         FOLLOW-UP:   11/16/23: scheduled with Sandra on 12/15 and 1/15/24.   1/4/24: Cancel next appt with Sandra unless need help with dressings. Schedule with me again in February. Will let  contact patient when find combo date.   2/8/24: we have appointments scheduled for post-flap check on 4/18 and 5/16.  5/16: Video follow-up to be scheduled with me     6/13: Scheduled for next Thursday 6/20/24: video visit 8/1 to follow up on US and labs.

## 2024-06-20 NOTE — PROGRESS NOTES
Patient Active Problem List   Diagnosis    Spinal stenosis    SCI (spinal cord injury)    Hardware complicating wound infection, initial encounter (H24)    Charcot's joint, vertebrae    Status post flap graft    Constipation    H/O lumbosacral spine surgery    Hip dislocation, bilateral (H)    Infection and inflammatory reaction due to internal orthopedic device, implant, and graft (H24)    Neurogenic bladder    Neurogenic bowel    Osteomyelitis of pelvic region or thigh, acute, left (H)    Chronic osteomyelitis involving pelvic region and thigh (H)    Paraplegia (H)    Traumatic spinal subdural hematoma    Hx of plastic surgery    Non-healing left groin open wound, initial encounter    Non-healing left groin open wound, subsequent encounter    Open wound of knee, leg, and ankle, left, initial encounter    Chronic antibiotic suppression    Chronic ulcer of sacral region (H)    Fixation hardware in spine    Hx of spinal surgery    Pressure injury of right hip, stage 4 (H)    Pressure injury of trochanteric region of left hip, stage 3 (H)    Open wound of hip     Past Medical History:   Diagnosis Date    Anemia     Charcot's joint     Chronic UTI     Constipation     Dislocated hip (H)     hyperlordosis    Epicondylitis     History of blood transfusion     Hx: UTI (urinary tract infection)     Neurogenic bladder     Other chronic pain     Paraplegia following spinal cord injury (H)     Pressure ulcer stage IV     left groin     Labs:   Recent Labs   Lab Test 04/01/24  0837 03/22/24  0735 03/21/24  0719 04/13/19  0651 01/17/19  0945 01/14/17  0636 01/13/17  0609   ALBUMIN  --   --  3.4*  --  2.9*  --   --    HGB 11.9*   < >  --    < >  --    < > 8.9*   WBC 8.7   < >  --    < >  --   --   --    A1C  --   --   --   --   --   --  4.7   CRP  --   --   --   --  37.8*  --   --     < > = values in this interval not displayed.     Nutrition requirements were discussed with patient today.  Vitals:  There were no vitals taken  for this visit.  Wound:   Rash 11/28/16 Bilateral thigh (Active)      Photo: No images are attached to the encounter.      Further instructions from your care team         06/20/2024   Sacha Ndiaye   1975      A DME order was not completed because supplies were not needed    Plan: 6/20/24  -Will discuss work return in a month after you find out the bare minimum of hours needed   -CRP and ESR labs are recommended to get done at your clinic next week. Orders have been placed. If elevated may warrant a sooner ultrasound. Sent to [facility] Fax: [phone]  -Will place order fo another ultrasound in 4 weeks (mid July) to assess drainage in socket to see if the areas get larger than the first ultrasound. If that the drainage area has increased IR may have to place a drain. US faxed to [facility] Fax: [phone]  -Order placed for pressure mapping [facility] in [place]    DONE: Dr Harris will contact Dr Elder to discuss hip joint socket. Ortho will review scans and determine recommendations  Done: Pressure map for wheel chair  Pending: Shower chair cushion    Continue High protein diet, muscle milk,  Continue upper body physical therapy    Done: [facility] Patient is using and continue to needs a Group 2  mattress due to large, post-flap surgery repair and suspected hematoma to wound  bed/ hip socket and Stage 4 pressure ulceration on the patient's pelvis that has failed  to improve on a normal mattress despite regular wound cares and repositioning. A  group 1 mattress is not adequate to offload these severe ulcerations. Patient has  impaired sensation and is unable to reposition independently.    Skin care to surgical drain site  -Keep area clean and dry  -Continue to increase sitting protocol and normal activities as tolerated     Main Provider: Bull Harris M.D. June 20, 2024    Call us at [phone] if you have any questions about your  wounds, if you have redness or swelling around your wound, have a fever of 101 degrees Fahrenheit or greater or if you have any other problems or concerns. We answer the phone Monday through Friday 8 am to 4 pm, please leave a message as we check the voicemail frequently throughout the day. If you have a concern over the weekend, please leave a message and we will return your call Monday. If the need is urgent, go to the ER or urgent care.    If you had a positive experience please indicate that on your patient satisfaction survey form that [facility] will be sending you.    It was a pleasure meeting with you today.  Thank you for allowing me and my team the privilege of caring for you today.  YOU are the reason we are here, and I truly hope we provided you with the excellent service you deserve.  Please let us know if there is anything else we can do for you so that we can be sure you are leaving completely satisfied with your care experience.      If you have any billing related questions please call the [facility] office at [phone]. The clinic staff does not handle billing related matters.    If you are scheduled to have a follow up appointment, you will receive a reminder call the day before your visit. On the appointment day please arrive 15 minutes prior to your appointment time. If you are unable to keep that appointment, please call the clinic to cancel or reschedule. If you are more than 10 minutes late or greater for your scheduled appointment time, the clinic policy is that you may be asked to reschedule.

## 2024-06-20 NOTE — PROGRESS NOTES
Patient called for a telephone/video visit. Certified Wound Care Nurse time spent evaluating patient record, completed a full evaluation and documented wound(s) & jazmine-wound skin; provided recommendation based on treatment plan. Reviewed discharge instructions, patient education, and discussed plan of care with appropriate medical team staff members and patient and/or family members.

## 2024-06-28 ENCOUNTER — TRANSFERRED RECORDS (OUTPATIENT)
Dept: HEALTH INFORMATION MANAGEMENT | Facility: CLINIC | Age: 49
End: 2024-06-28
Payer: COMMERCIAL

## 2024-07-16 ENCOUNTER — TRANSFERRED RECORDS (OUTPATIENT)
Dept: HEALTH INFORMATION MANAGEMENT | Facility: CLINIC | Age: 49
End: 2024-07-16
Payer: COMMERCIAL

## 2024-07-17 ENCOUNTER — TRANSFERRED RECORDS (OUTPATIENT)
Dept: HEALTH INFORMATION MANAGEMENT | Facility: CLINIC | Age: 49
End: 2024-07-17
Payer: COMMERCIAL

## 2024-09-23 ENCOUNTER — TELEPHONE (OUTPATIENT)
Dept: WOUND CARE | Facility: CLINIC | Age: 49
End: 2024-09-23
Payer: COMMERCIAL

## 2024-10-28 ENCOUNTER — TELEPHONE (OUTPATIENT)
Dept: INFECTIOUS DISEASES | Facility: CLINIC | Age: 49
End: 2024-10-28
Payer: COMMERCIAL

## 2024-10-28 NOTE — TELEPHONE ENCOUNTER
EP called 10/28 to sched a 1 year follow up with Dr. Bennett via video per checkout notes from 4/25. Patient said that he'll be in [place] for this appt.

## 2024-12-03 ENCOUNTER — TELEPHONE (OUTPATIENT)
Dept: WOUND CARE | Facility: CLINIC | Age: 49
End: 2024-12-03
Payer: COMMERCIAL

## 2024-12-03 NOTE — TELEPHONE ENCOUNTER
Discussion with Patient regarding request for Pressure mapping order for wheelchair due to shifting in chair and concern for shifting to left side of hip. Patient also concerned about follow up Ultrasound of surgical area to assess if seroma is healed or not. No orders placed by Dr Harris in the chart seen.   Recommended to Patient to contact his PCP for these orders and to follow in the future.   No further questions or concerns.

## 2024-12-03 NOTE — TELEPHONE ENCOUNTER
Patient called wanting to speak to RUBEN Arias regarding ordering imaging for a mapping, patient states he believes there is an order in place for an ultrasound and would like this one included    Buyiu-[phone]

## 2025-05-01 ENCOUNTER — VIRTUAL VISIT (OUTPATIENT)
Dept: INFECTIOUS DISEASES | Facility: CLINIC | Age: 50
End: 2025-05-01
Attending: INTERNAL MEDICINE
Payer: MEDICARE

## 2025-05-01 VITALS — WEIGHT: 170 LBS | BODY MASS INDEX: 23.03 KG/M2 | HEIGHT: 72 IN

## 2025-05-01 DIAGNOSIS — M86.652: Primary | ICD-10-CM

## 2025-05-01 PROCEDURE — 98005 SYNCH AUDIO-VIDEO EST LOW 20: CPT | Performed by: INTERNAL MEDICINE

## 2025-05-01 PROCEDURE — 1126F AMNT PAIN NOTED NONE PRSNT: CPT | Performed by: INTERNAL MEDICINE

## 2025-05-01 PROCEDURE — G2211 COMPLEX E/M VISIT ADD ON: HCPCS | Performed by: INTERNAL MEDICINE

## 2025-05-01 ASSESSMENT — PAIN SCALES - GENERAL: PAINLEVEL_OUTOF10: NO PAIN (0)
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
## 2025-05-01 NOTE — NURSING NOTE
Current patient location: [facility] [address] [address]    Is the patient currently in the state of [place]? YES    Visit mode: VIDEO    If the visit is dropped, the patient can be reconnected by:VIDEO VISIT: Text to cell phone:   Telephone Information:   Mobile [phone]    and VIDEO VISIT: Send to e-mail at: [email or web address]    Will anyone else be joining the visit? NO  (If patient encounters technical issues they should call [phone])    Are changes needed to the allergy or medication list? No    Patient denies any changes and states that all information remains accurate since last reviewed/verified.     Are refills needed on medications prescribed by this physician? NO    Rooming Documentation:  Questionnaire(s) completed    No other vitals to report today    Reason for visit: MIKO Grimes MA VVF

## 2025-05-04 ENCOUNTER — HEALTH MAINTENANCE LETTER (OUTPATIENT)
Age: 50
End: 2025-05-04

## (undated) DEVICE — SOL WOUND IRRIG PRONTOSAN 350 ML BOTTLE 400441

## (undated) DEVICE — PAD CHUX UNDERPAD 30X36" P3036C

## (undated) DEVICE — BONE CLEANING TIP INTERPULSE  0210-010-000

## (undated) DEVICE — LIGHT HANDLE X2

## (undated) DEVICE — DRSG ABDOMINAL 07 1/2X8" 7197D

## (undated) DEVICE — ESU GROUND PAD UNIVERSAL W/O CORD

## (undated) DEVICE — STRAP KNEE/BODY 31143004

## (undated) DEVICE — STPL SKIN 35W ROTATING HEAD PRW35

## (undated) DEVICE — BLADE KNIFE SURG 15 371115

## (undated) DEVICE — LABEL MEDICATION SYSTEM 3303-P

## (undated) DEVICE — STPL SKIN 35W APPOSE 8886803712

## (undated) DEVICE — GLOVE PROTEXIS MICRO 6.5  2D73PM65

## (undated) DEVICE — NDL COUNTER 40CT  31142311

## (undated) DEVICE — LINEN ORTHO PACK 5446

## (undated) DEVICE — SU ETHILON 3-0 PS-1 18" 1663H

## (undated) DEVICE — SU VICRYL 3-0 SH 27" J316H

## (undated) DEVICE — LINEN GOWN X4 5410

## (undated) DEVICE — PAD CHUX UNDERPAD 30X30"

## (undated) DEVICE — DRAIN JACKSON PRATT RESERVOIR 100ML SU130-1305

## (undated) DEVICE — GLOVE BIOGEL PI MICRO SZ 7.0 48570

## (undated) DEVICE — SYR 10ML FINGER CONTROL W/O NDL 309695

## (undated) DEVICE — DRAIN JACKSON PRATT 15FR ROUND SU130-1323

## (undated) DEVICE — Device

## (undated) DEVICE — PREP SCRUB CARE CHLOROXYLENOL (PCMX) 4OZ 29902-004

## (undated) DEVICE — SUCTION MANIFOLD DORNOCH ULTRA CART UL-CL500

## (undated) DEVICE — SU PDS II 3-0 FS-1 27" CLR  Z442H

## (undated) DEVICE — COVER CAMERA IN-LIGHT DISP LT-C02

## (undated) DEVICE — PEN MARKING SKIN W/LABELS 31145918

## (undated) DEVICE — PEN MARKING SKIN W/PAPER RULER 31145785

## (undated) DEVICE — GOWN XLG DISP 9545

## (undated) DEVICE — ESU BLADE PEAK PLASMA 3.0S PS210-030S

## (undated) DEVICE — DRSG PRIMAPORE 02X3" 7133

## (undated) DEVICE — DRAIN JACKSON PRATT CHANNEL 19FR ROUND HUBLESS SIL JP-2230

## (undated) DEVICE — SU PROLENE 3-0 PS-1 18" 8663G

## (undated) DEVICE — CATH TRAY FOLEY SURESTEP 16FR WDRAIN BAG STLK LATEX A300316A

## (undated) DEVICE — PACK TOTAL HIP W/U DRAPE RIVERSIDE LATEX FREE

## (undated) DEVICE — SYR BULB IRRIG DOVER 60 ML LATEX FREE 67000

## (undated) DEVICE — DRSG ADAPTIC 3X8" 6113

## (undated) DEVICE — PREP SKIN SCRUB TRAY 4461A

## (undated) DEVICE — DRSG MEPILEX BORDER 4X4" 295300

## (undated) DEVICE — SU VICRYL 3-0 PS-1 18" UND J683G

## (undated) DEVICE — DRAPE IOBAN INCISE 23X17" 6650EZ

## (undated) DEVICE — STRAP STIRRUP W/SLIP 30187-030

## (undated) DEVICE — SOLUTION WOUND VASHE BOTTLE 16 FL OZ. (475 ML)  00317

## (undated) DEVICE — SOL WATER IRRIG 1000ML BOTTLE 2F7114

## (undated) DEVICE — PREP CHLORAPREP 26ML TINTED HI-LITE ORANGE 930815

## (undated) DEVICE — BLADE CLIPPER SGL USE 9680

## (undated) DEVICE — APPLICATOR COTTON TIP 6"X2 STERILE LF 6012

## (undated) DEVICE — BLADE SAW SAGITTAL STRK 25X90X1.27MM HD SYS 6 6125-127-090

## (undated) DEVICE — SPONGE LAP 18X18" X8435

## (undated) DEVICE — SYR 10ML LL W/O NDL

## (undated) DEVICE — DRAPE U-DRAPE 1015NSD NON-STERILE

## (undated) DEVICE — SUCTION MANIFOLD NEPTUNE 2 SYS 4 PORT 0702-020-000

## (undated) DEVICE — SURGICEL HEMOSTAT 2X3" 1953

## (undated) DEVICE — DRAPE CONVERTORS U-DRAPE 60X72" 8476

## (undated) DEVICE — SPECIMEN CONTAINER 5OZ STERILE 2600SA

## (undated) DEVICE — SOL NACL 0.9% INJ 1000ML BAG 2B1324X

## (undated) DEVICE — SU VICRYL 3-0 PS-1 18" UND J683

## (undated) DEVICE — SYR BULB IRRIG 50ML LATEX FREE 0035280

## (undated) DEVICE — SUCTION IRR SYSTEM W/O TIP INTERPULSE HANDPIECE 0210-100-000

## (undated) DEVICE — GEL ULTRASOUND AQUASONIC 20GM 01-01

## (undated) DEVICE — ESU PENCIL W/SMOKE EVAC NEPTUNE STRYKER 0703-046-000

## (undated) DEVICE — NDL 18GA 1.5" 305196

## (undated) DEVICE — POSITIONER HEAD DONUT FOAM 9" LF FP-HEAD9

## (undated) DEVICE — KIT PROCEDURE SPY-PHI SGL HH9001

## (undated) DEVICE — GLOVE BIOGEL PI MICRO SZ 6.5 48565

## (undated) DEVICE — FILTER HEPA FLUID TRAP NEPTUNE 0703-040-001

## (undated) DEVICE — SONICVAC & TUBE SET MISONIX NEXUS 120-31-13X2

## (undated) DEVICE — SYRINGE IRRIGATION BULB TIP 60ML STER LF DYND20125

## (undated) DEVICE — APPLICATORS COTTON TIP 6"X2 STERILE LF C15053-006

## (undated) DEVICE — GLOVE BIOGEL PI MICRO INDICATOR UNDERGLOVE SZ 7.0 48970

## (undated) DEVICE — KIT CULTURE TRANSPORT SYS A.C.T. II DUAL ANEROBE R124022

## (undated) DEVICE — TAPE MEDIPORE 4"X2YD 2864

## (undated) DEVICE — PREP POVIDONE-IODINE 10% SOLUTION 4OZ BOTTLE MDS093944

## (undated) DEVICE — SU VICRYL 2-0 CT-1 27" UND J259H

## (undated) DEVICE — MARKING PEN REG/FINE DUALT TIP WITH RULER 1437SR-100

## (undated) DEVICE — TRAY PREP DRY SKIN SCRUB 067

## (undated) DEVICE — BLADE KNIFE SURG 10 371110

## (undated) DEVICE — SU ETHILON 2-0 PS 18" 585H

## (undated) DEVICE — HEADREST FOAM 9" PINK

## (undated) DEVICE — HEADREST FOAM PILLOW DISP LATEX FREE 31163184

## (undated) DEVICE — LINEN TOWEL PACK X5 5464

## (undated) DEVICE — SU VICRYL 2-0 SH 27" UND J417H

## (undated) DEVICE — CATH FOLEY 14FR 5ML SILICONE LUBRI-SIL 175814

## (undated) DEVICE — SU PDS II 2-0 CT-2 27"  Z333H

## (undated) DEVICE — DECANTER TRANSFER DEVICE 2008S

## (undated) DEVICE — GLOVE SURG PI ULTRA TOUCH M SZ 6-1/2 LF

## (undated) DEVICE — SOL NACL 0.9% IRRIG 1000ML BOTTLE 2F7124

## (undated) DEVICE — SU VICRYL 3-0 TIE 54" J606H

## (undated) DEVICE — SYR 10ML LL W/O NDL 302995

## (undated) DEVICE — PREP POVIDONE-IODINE 7.5% SCRUB 4OZ BOTTLE MDS093945

## (undated) DEVICE — SU VICRYL 3-0 SH 27" UND J416H

## (undated) DEVICE — POSITIONER HEAD FRAME FOAM LF FP-HEADSF

## (undated) DEVICE — SHARPVAC & TUBE SET MISONIX NEXUS 120-31-13C2

## (undated) DEVICE — PREP TECHNI-CARE CHLOROXYLENOL 3% 4OZ BOTTLE C222-4ZWO

## (undated) DEVICE — CONTAINER SPECIMEN 4OZ STERILE 17099

## (undated) DEVICE — SU VICRYL 2-0 CT-2 27" UND J269H

## (undated) RX ORDER — HYDROMORPHONE HYDROCHLORIDE 1 MG/ML
INJECTION, SOLUTION INTRAMUSCULAR; INTRAVENOUS; SUBCUTANEOUS
Status: DISPENSED
Start: 2019-04-12

## (undated) RX ORDER — SODIUM CHLORIDE 9 MG/ML
INJECTION, SOLUTION INTRAVENOUS
Status: DISPENSED
Start: 2019-04-12

## (undated) RX ORDER — EPHEDRINE SULFATE 50 MG/ML
INJECTION, SOLUTION INTRAMUSCULAR; INTRAVENOUS; SUBCUTANEOUS
Status: DISPENSED
Start: 2024-03-18

## (undated) RX ORDER — PROPOFOL 10 MG/ML
INJECTION, EMULSION INTRAVENOUS
Status: DISPENSED
Start: 2019-04-12

## (undated) RX ORDER — FENTANYL CITRATE 50 UG/ML
INJECTION, SOLUTION INTRAMUSCULAR; INTRAVENOUS
Status: DISPENSED
Start: 2019-04-12

## (undated) RX ORDER — ONDANSETRON 2 MG/ML
INJECTION INTRAMUSCULAR; INTRAVENOUS
Status: DISPENSED
Start: 2019-04-12

## (undated) RX ORDER — DEXAMETHASONE SODIUM PHOSPHATE 4 MG/ML
INJECTION, SOLUTION INTRA-ARTICULAR; INTRALESIONAL; INTRAMUSCULAR; INTRAVENOUS; SOFT TISSUE
Status: DISPENSED
Start: 2024-03-18

## (undated) RX ORDER — TRANEXAMIC ACID 650 MG/1
TABLET ORAL
Status: DISPENSED
Start: 2024-03-18

## (undated) RX ORDER — FENTANYL CITRATE 50 UG/ML
INJECTION, SOLUTION INTRAMUSCULAR; INTRAVENOUS
Status: DISPENSED
Start: 2024-03-18

## (undated) RX ORDER — ONDANSETRON 2 MG/ML
INJECTION INTRAMUSCULAR; INTRAVENOUS
Status: DISPENSED
Start: 2024-03-18

## (undated) RX ORDER — HYDROMORPHONE HYDROCHLORIDE 1 MG/ML
INJECTION, SOLUTION INTRAMUSCULAR; INTRAVENOUS; SUBCUTANEOUS
Status: DISPENSED
Start: 2024-03-18

## (undated) RX ORDER — CEFAZOLIN SODIUM 1 G/3ML
INJECTION, POWDER, FOR SOLUTION INTRAMUSCULAR; INTRAVENOUS
Status: DISPENSED
Start: 2019-04-12

## (undated) RX ORDER — CEFAZOLIN SODIUM/WATER 2 G/20 ML
SYRINGE (ML) INTRAVENOUS
Status: DISPENSED
Start: 2024-03-18

## (undated) RX ORDER — LIDOCAINE HYDROCHLORIDE 20 MG/ML
INJECTION, SOLUTION EPIDURAL; INFILTRATION; INTRACAUDAL; PERINEURAL
Status: DISPENSED
Start: 2019-04-12

## (undated) RX ORDER — CEFAZOLIN SODIUM 2 G/100ML
INJECTION, SOLUTION INTRAVENOUS
Status: DISPENSED
Start: 2017-01-11

## (undated) RX ORDER — PROPOFOL 10 MG/ML
INJECTION, EMULSION INTRAVENOUS
Status: DISPENSED
Start: 2024-03-18